# Patient Record
Sex: MALE | Race: BLACK OR AFRICAN AMERICAN | Employment: UNEMPLOYED | URBAN - METROPOLITAN AREA
[De-identification: names, ages, dates, MRNs, and addresses within clinical notes are randomized per-mention and may not be internally consistent; named-entity substitution may affect disease eponyms.]

---

## 2021-05-04 ENCOUNTER — APPOINTMENT (OUTPATIENT)
Dept: CT IMAGING | Age: 56
End: 2021-05-04
Attending: EMERGENCY MEDICINE
Payer: MEDICAID

## 2021-05-04 ENCOUNTER — HOSPITAL ENCOUNTER (OUTPATIENT)
Age: 56
Setting detail: OBSERVATION
Discharge: HOME | End: 2021-05-06
Attending: EMERGENCY MEDICINE | Admitting: HOSPITALIST
Payer: MEDICAID

## 2021-05-04 DIAGNOSIS — R73.9 HYPERGLYCEMIA: ICD-10-CM

## 2021-05-04 DIAGNOSIS — L03.90 CELLULITIS, UNSPECIFIED CELLULITIS SITE: ICD-10-CM

## 2021-05-04 DIAGNOSIS — L02.91 ABSCESS: Primary | ICD-10-CM

## 2021-05-04 LAB
ALBUMIN SERPL-MCNC: 2.2 G/DL (ref 3.5–5)
ALBUMIN/GLOB SERPL: 0.4 {RATIO} (ref 1.1–2.2)
ALP SERPL-CCNC: 118 U/L (ref 45–117)
ALT SERPL-CCNC: 13 U/L (ref 12–78)
ANION GAP SERPL CALC-SCNC: 9 MMOL/L (ref 5–15)
AST SERPL-CCNC: 14 U/L (ref 15–37)
BASOPHILS # BLD: 0.1 K/UL (ref 0–0.1)
BASOPHILS NFR BLD: 1 % (ref 0–1)
BILIRUB SERPL-MCNC: 0.2 MG/DL (ref 0.2–1)
BUN SERPL-MCNC: 19 MG/DL (ref 6–20)
BUN/CREAT SERPL: 10 (ref 12–20)
CALCIUM SERPL-MCNC: 8.5 MG/DL (ref 8.5–10.1)
CHLORIDE SERPL-SCNC: 100 MMOL/L (ref 97–108)
CO2 SERPL-SCNC: 25 MMOL/L (ref 21–32)
CREAT SERPL-MCNC: 1.92 MG/DL (ref 0.7–1.3)
DIFFERENTIAL METHOD BLD: ABNORMAL
EOSINOPHIL # BLD: 0.2 K/UL (ref 0–0.4)
EOSINOPHIL NFR BLD: 2 % (ref 0–7)
ERYTHROCYTE [DISTWIDTH] IN BLOOD BY AUTOMATED COUNT: 12.3 % (ref 11.5–14.5)
GLOBULIN SER CALC-MCNC: 5.6 G/DL (ref 2–4)
GLUCOSE BLD STRIP.AUTO-MCNC: 145 MG/DL (ref 65–100)
GLUCOSE BLD STRIP.AUTO-MCNC: 410 MG/DL (ref 65–100)
GLUCOSE SERPL-MCNC: 380 MG/DL (ref 65–100)
HCT VFR BLD AUTO: 33.7 % (ref 36.6–50.3)
HGB BLD-MCNC: 10.6 G/DL (ref 12.1–17)
IMM GRANULOCYTES # BLD AUTO: 0 K/UL (ref 0–0.04)
IMM GRANULOCYTES NFR BLD AUTO: 1 % (ref 0–0.5)
LYMPHOCYTES # BLD: 3 K/UL (ref 0.8–3.5)
LYMPHOCYTES NFR BLD: 43 % (ref 12–49)
MCH RBC QN AUTO: 29 PG (ref 26–34)
MCHC RBC AUTO-ENTMCNC: 31.5 G/DL (ref 30–36.5)
MCV RBC AUTO: 92.3 FL (ref 80–99)
MONOCYTES # BLD: 0.4 K/UL (ref 0–1)
MONOCYTES NFR BLD: 6 % (ref 5–13)
NEUTS SEG # BLD: 3.4 K/UL (ref 1.8–8)
NEUTS SEG NFR BLD: 47 % (ref 32–75)
NRBC # BLD: 0 K/UL (ref 0–0.01)
NRBC BLD-RTO: 0 PER 100 WBC
PLATELET # BLD AUTO: 299 K/UL (ref 150–400)
PMV BLD AUTO: 9.5 FL (ref 8.9–12.9)
POTASSIUM SERPL-SCNC: 5.3 MMOL/L (ref 3.5–5.1)
PROT SERPL-MCNC: 7.8 G/DL (ref 6.4–8.2)
RBC # BLD AUTO: 3.65 M/UL (ref 4.1–5.7)
SERVICE CMNT-IMP: ABNORMAL
SERVICE CMNT-IMP: ABNORMAL
SODIUM SERPL-SCNC: 134 MMOL/L (ref 136–145)
WBC # BLD AUTO: 7.1 K/UL (ref 4.1–11.1)

## 2021-05-04 PROCEDURE — 85025 COMPLETE CBC W/AUTO DIFF WBC: CPT

## 2021-05-04 PROCEDURE — 80053 COMPREHEN METABOLIC PANEL: CPT

## 2021-05-04 PROCEDURE — 99285 EMERGENCY DEPT VISIT HI MDM: CPT

## 2021-05-04 PROCEDURE — 36600 WITHDRAWAL OF ARTERIAL BLOOD: CPT

## 2021-05-04 PROCEDURE — 87040 BLOOD CULTURE FOR BACTERIA: CPT

## 2021-05-04 PROCEDURE — 96374 THER/PROPH/DIAG INJ IV PUSH: CPT

## 2021-05-04 PROCEDURE — 74011250636 HC RX REV CODE- 250/636: Performed by: EMERGENCY MEDICINE

## 2021-05-04 PROCEDURE — 36415 COLL VENOUS BLD VENIPUNCTURE: CPT

## 2021-05-04 PROCEDURE — 74011636637 HC RX REV CODE- 636/637: Performed by: EMERGENCY MEDICINE

## 2021-05-04 PROCEDURE — 82962 GLUCOSE BLOOD TEST: CPT

## 2021-05-04 PROCEDURE — 73200 CT UPPER EXTREMITY W/O DYE: CPT

## 2021-05-04 PROCEDURE — 71250 CT THORAX DX C-: CPT

## 2021-05-04 PROCEDURE — 96375 TX/PRO/DX INJ NEW DRUG ADDON: CPT

## 2021-05-04 RX ADMIN — SODIUM CHLORIDE 1000 ML: 9 INJECTION, SOLUTION INTRAVENOUS at 21:34

## 2021-05-04 RX ADMIN — HUMAN INSULIN 10 UNITS: 100 INJECTION, SOLUTION SUBCUTANEOUS at 21:35
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
## 2021-05-04 NOTE — ED TRIAGE NOTES
Pt in with multiple wounds to bilateral forearms with recent debridement at [facility]. Pt states he went home after inpatient stay, was receiving IV antibiotics but had to leave the hospital 2 days ago. He states [facility] reached out to him today with concern for him to follow up. Pt in today for wound check, wound care, is concerned for admission for continued IV antibiotics.
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
## 2021-05-04 NOTE — ED NOTES
Bedside shift change report given to Bonnie Clark RN (oncoming nurse) by Aubree Alvarez RN (offgoing nurse). Report included the following information SBAR.

## 2021-05-05 ENCOUNTER — APPOINTMENT (OUTPATIENT)
Dept: NON INVASIVE DIAGNOSTICS | Age: 56
End: 2021-05-05
Attending: INTERNAL MEDICINE
Payer: MEDICAID

## 2021-05-05 ENCOUNTER — APPOINTMENT (OUTPATIENT)
Dept: ULTRASOUND IMAGING | Age: 56
End: 2021-05-05
Attending: INTERNAL MEDICINE
Payer: MEDICAID

## 2021-05-05 LAB
AMPHET UR QL SCN: NEGATIVE
APPEARANCE UR: CLEAR
BACTERIA URNS QL MICRO: NEGATIVE /HPF
BARBITURATES UR QL SCN: NEGATIVE
BENZODIAZ UR QL: NEGATIVE
BILIRUB UR QL: NEGATIVE
CANNABINOIDS UR QL SCN: NEGATIVE
CHLORIDE UR-SCNC: 96 MMOL/L
CK SERPL-CCNC: 64 U/L (ref 39–308)
COCAINE UR QL SCN: POSITIVE
COLOR UR: ABNORMAL
DRUG SCRN COMMENT,DRGCM: ABNORMAL
ECHO AV CUSP MM: 2.56 CM
ECHO AV MEAN GRADIENT: 2.02 MMHG
ECHO AV PEAK GRADIENT: 5.24 MMHG
ECHO AV PEAK VELOCITY: 114.45 CM/S
ECHO AV VTI: 24.2 CM
ECHO EST RA PRESSURE: 8 MMHG
ECHO LA AREA 4C: 19.39 CM2
ECHO LA MAJOR AXIS: 4.23 CM
ECHO LA MINOR AXIS: 2.11 CM
ECHO LA VOL 2C: 76.77 ML (ref 18–58)
ECHO LA VOL 4C: 56.73 ML (ref 18–58)
ECHO LA VOL BP: 72.54 ML (ref 18–58)
ECHO LA VOL/BSA BIPLANE: 36.22 ML/M2 (ref 16–28)
ECHO LA VOLUME INDEX A2C: 38.33 ML/M2 (ref 16–28)
ECHO LA VOLUME INDEX A4C: 28.33 ML/M2 (ref 16–28)
ECHO LV INTERNAL DIMENSION DIASTOLIC: 4.95 CM (ref 4.2–5.9)
ECHO LV INTERNAL DIMENSION SYSTOLIC: 4 CM
ECHO LV IVSD: 1.14 CM (ref 0.6–1)
ECHO LV MASS 2D: 206.4 G (ref 88–224)
ECHO LV MASS INDEX 2D: 103 G/M2 (ref 49–115)
ECHO LV POSTERIOR WALL DIASTOLIC: 1.08 CM (ref 0.6–1)
ECHO LVOT PEAK GRADIENT: 2.5 MMHG
ECHO LVOT PEAK VELOCITY: 79.13 CM/S
ECHO LVOT VTI: 20.05 CM
ECHO MV A VELOCITY: 53.36 CM/S
ECHO MV E DECELERATION TIME (DT): 213.71 MS
ECHO MV E VELOCITY: 48.65 CM/S
ECHO MV E/A RATIO: 0.91
ECHO RV INTERNAL DIMENSION: 3.44 CM
EOSINOPHIL #/AREA URNS HPF: NEGATIVE /[HPF]
EPITH CASTS URNS QL MICRO: ABNORMAL /LPF
EST. AVERAGE GLUCOSE BLD GHB EST-MCNC: ABNORMAL MG/DL
GLUCOSE BLD STRIP.AUTO-MCNC: 124 MG/DL (ref 65–100)
GLUCOSE BLD STRIP.AUTO-MCNC: 242 MG/DL (ref 65–100)
GLUCOSE BLD STRIP.AUTO-MCNC: 310 MG/DL (ref 65–100)
GLUCOSE BLD STRIP.AUTO-MCNC: 319 MG/DL (ref 65–100)
GLUCOSE BLD STRIP.AUTO-MCNC: 341 MG/DL (ref 65–100)
GLUCOSE BLD STRIP.AUTO-MCNC: 374 MG/DL (ref 65–100)
GLUCOSE UR STRIP.AUTO-MCNC: 500 MG/DL
HBA1C MFR BLD: >14 % (ref 4–5.6)
HGB UR QL STRIP: ABNORMAL
KETONES UR QL STRIP.AUTO: NEGATIVE MG/DL
LA VOL DISK BP: 66.86 ML (ref 18–58)
LACTATE SERPL-SCNC: 1.2 MMOL/L (ref 0.4–2)
LEUKOCYTE ESTERASE UR QL STRIP.AUTO: NEGATIVE
LVOT MG: 1.1 MMHG
METHADONE UR QL: NEGATIVE
NITRITE UR QL STRIP.AUTO: NEGATIVE
OPIATES UR QL: NEGATIVE
PCP UR QL: NEGATIVE
PH UR STRIP: 6.5 [PH] (ref 5–8)
POTASSIUM SERPL-SCNC: 4.7 MMOL/L (ref 3.5–5.1)
POTASSIUM UR-SCNC: 23 MMOL/L
PROT UR STRIP-MCNC: >300 MG/DL
RBC #/AREA URNS HPF: ABNORMAL /HPF (ref 0–5)
SERVICE CMNT-IMP: ABNORMAL
SODIUM UR-SCNC: 84 MMOL/L
SP GR UR REFRACTOMETRY: 1.01 (ref 1–1.03)
UA: UC IF INDICATED,UAUC: ABNORMAL
URATE SERPL-MCNC: 5.3 MG/DL (ref 3.5–7.2)
UROBILINOGEN UR QL STRIP.AUTO: 0.2 EU/DL (ref 0.2–1)
WBC URNS QL MICRO: ABNORMAL /HPF (ref 0–4)

## 2021-05-05 PROCEDURE — 93306 TTE W/DOPPLER COMPLETE: CPT

## 2021-05-05 PROCEDURE — 82436 ASSAY OF URINE CHLORIDE: CPT

## 2021-05-05 PROCEDURE — 65270000032 HC RM SEMIPRIVATE

## 2021-05-05 PROCEDURE — 81001 URINALYSIS AUTO W/SCOPE: CPT

## 2021-05-05 PROCEDURE — 74011636637 HC RX REV CODE- 636/637: Performed by: STUDENT IN AN ORGANIZED HEALTH CARE EDUCATION/TRAINING PROGRAM

## 2021-05-05 PROCEDURE — 96372 THER/PROPH/DIAG INJ SC/IM: CPT

## 2021-05-05 PROCEDURE — 82550 ASSAY OF CK (CPK): CPT

## 2021-05-05 PROCEDURE — 83605 ASSAY OF LACTIC ACID: CPT

## 2021-05-05 PROCEDURE — 84133 ASSAY OF URINE POTASSIUM: CPT

## 2021-05-05 PROCEDURE — 96376 TX/PRO/DX INJ SAME DRUG ADON: CPT

## 2021-05-05 PROCEDURE — 89050 BODY FLUID CELL COUNT: CPT

## 2021-05-05 PROCEDURE — 36600 WITHDRAWAL OF ARTERIAL BLOOD: CPT

## 2021-05-05 PROCEDURE — 93005 ELECTROCARDIOGRAM TRACING: CPT

## 2021-05-05 PROCEDURE — 80307 DRUG TEST PRSMV CHEM ANLYZR: CPT

## 2021-05-05 PROCEDURE — 74011250636 HC RX REV CODE- 250/636: Performed by: HOSPITALIST

## 2021-05-05 PROCEDURE — 86160 COMPLEMENT ANTIGEN: CPT

## 2021-05-05 PROCEDURE — 74011250636 HC RX REV CODE- 250/636: Performed by: EMERGENCY MEDICINE

## 2021-05-05 PROCEDURE — 84132 ASSAY OF SERUM POTASSIUM: CPT

## 2021-05-05 PROCEDURE — 96375 TX/PRO/DX INJ NEW DRUG ADDON: CPT

## 2021-05-05 PROCEDURE — 87040 BLOOD CULTURE FOR BACTERIA: CPT

## 2021-05-05 PROCEDURE — 74011000258 HC RX REV CODE- 258: Performed by: STUDENT IN AN ORGANIZED HEALTH CARE EDUCATION/TRAINING PROGRAM

## 2021-05-05 PROCEDURE — 74011636637 HC RX REV CODE- 636/637: Performed by: INTERNAL MEDICINE

## 2021-05-05 PROCEDURE — 74011250637 HC RX REV CODE- 250/637: Performed by: INTERNAL MEDICINE

## 2021-05-05 PROCEDURE — G0378 HOSPITAL OBSERVATION PER HR: HCPCS

## 2021-05-05 PROCEDURE — 82962 GLUCOSE BLOOD TEST: CPT

## 2021-05-05 PROCEDURE — 74011250636 HC RX REV CODE- 250/636: Performed by: STUDENT IN AN ORGANIZED HEALTH CARE EDUCATION/TRAINING PROGRAM

## 2021-05-05 PROCEDURE — 83036 HEMOGLOBIN GLYCOSYLATED A1C: CPT

## 2021-05-05 PROCEDURE — 84300 ASSAY OF URINE SODIUM: CPT

## 2021-05-05 PROCEDURE — 76770 US EXAM ABDO BACK WALL COMP: CPT

## 2021-05-05 PROCEDURE — 74011250637 HC RX REV CODE- 250/637: Performed by: STUDENT IN AN ORGANIZED HEALTH CARE EDUCATION/TRAINING PROGRAM

## 2021-05-05 PROCEDURE — 84550 ASSAY OF BLOOD/URIC ACID: CPT

## 2021-05-05 PROCEDURE — 87205 SMEAR GRAM STAIN: CPT

## 2021-05-05 PROCEDURE — 36415 COLL VENOUS BLD VENIPUNCTURE: CPT

## 2021-05-05 RX ORDER — IBUPROFEN 200 MG
1 TABLET ORAL DAILY
Status: DISCONTINUED | OUTPATIENT
Start: 2021-05-05 | End: 2021-05-05

## 2021-05-05 RX ORDER — PROMETHAZINE HYDROCHLORIDE 25 MG/1
12.5 TABLET ORAL
Status: DISCONTINUED | OUTPATIENT
Start: 2021-05-05 | End: 2021-05-05

## 2021-05-05 RX ORDER — INSULIN LISPRO 100 [IU]/ML
INJECTION, SOLUTION INTRAVENOUS; SUBCUTANEOUS
Status: DISCONTINUED | OUTPATIENT
Start: 2021-05-05 | End: 2021-05-06 | Stop reason: HOSPADM

## 2021-05-05 RX ORDER — MAGNESIUM SULFATE 100 %
4 CRYSTALS MISCELLANEOUS AS NEEDED
Status: DISCONTINUED | OUTPATIENT
Start: 2021-05-05 | End: 2021-05-06 | Stop reason: HOSPADM

## 2021-05-05 RX ORDER — DEXTROSE 50 % IN WATER (D50W) INTRAVENOUS SYRINGE
25-50 AS NEEDED
Status: DISCONTINUED | OUTPATIENT
Start: 2021-05-05 | End: 2021-05-06 | Stop reason: HOSPADM

## 2021-05-05 RX ORDER — ONDANSETRON 2 MG/ML
4 INJECTION INTRAMUSCULAR; INTRAVENOUS
Status: DISCONTINUED | OUTPATIENT
Start: 2021-05-05 | End: 2021-05-06 | Stop reason: HOSPADM

## 2021-05-05 RX ORDER — INSULIN LISPRO 100 [IU]/ML
INJECTION, SOLUTION INTRAVENOUS; SUBCUTANEOUS
Status: DISCONTINUED | OUTPATIENT
Start: 2021-05-05 | End: 2021-05-05

## 2021-05-05 RX ORDER — OXYCODONE HYDROCHLORIDE 5 MG/1
5 TABLET ORAL
Status: DISCONTINUED | OUTPATIENT
Start: 2021-05-05 | End: 2021-05-06 | Stop reason: HOSPADM

## 2021-05-05 RX ORDER — HEPARIN SODIUM 5000 [USP'U]/ML
5000 INJECTION, SOLUTION INTRAVENOUS; SUBCUTANEOUS EVERY 8 HOURS
Status: DISCONTINUED | OUTPATIENT
Start: 2021-05-05 | End: 2021-05-06 | Stop reason: HOSPADM

## 2021-05-05 RX ORDER — ACETAMINOPHEN 325 MG/1
650 TABLET ORAL
Status: DISCONTINUED | OUTPATIENT
Start: 2021-05-05 | End: 2021-05-06 | Stop reason: HOSPADM

## 2021-05-05 RX ORDER — ACETAMINOPHEN 650 MG/1
650 SUPPOSITORY RECTAL
Status: DISCONTINUED | OUTPATIENT
Start: 2021-05-05 | End: 2021-05-06 | Stop reason: HOSPADM

## 2021-05-05 RX ORDER — INSULIN LISPRO 100 [IU]/ML
2 INJECTION, SOLUTION INTRAVENOUS; SUBCUTANEOUS
Status: DISCONTINUED | OUTPATIENT
Start: 2021-05-05 | End: 2021-05-05

## 2021-05-05 RX ORDER — OXYCODONE HYDROCHLORIDE 5 MG/1
10 TABLET ORAL
Status: DISCONTINUED | OUTPATIENT
Start: 2021-05-05 | End: 2021-05-06 | Stop reason: HOSPADM

## 2021-05-05 RX ORDER — SODIUM CHLORIDE 9 MG/ML
125 INJECTION, SOLUTION INTRAVENOUS CONTINUOUS
Status: DISCONTINUED | OUTPATIENT
Start: 2021-05-05 | End: 2021-05-05

## 2021-05-05 RX ORDER — INSULIN LISPRO 100 [IU]/ML
0.1 INJECTION, SOLUTION INTRAVENOUS; SUBCUTANEOUS
Status: DISCONTINUED | OUTPATIENT
Start: 2021-05-05 | End: 2021-05-06 | Stop reason: HOSPADM

## 2021-05-05 RX ORDER — AMLODIPINE BESYLATE 5 MG/1
5 TABLET ORAL DAILY
Status: DISCONTINUED | OUTPATIENT
Start: 2021-05-05 | End: 2021-05-06 | Stop reason: HOSPADM

## 2021-05-05 RX ORDER — INSULIN GLARGINE 100 [IU]/ML
0.3 INJECTION, SOLUTION SUBCUTANEOUS DAILY
Status: DISCONTINUED | OUTPATIENT
Start: 2021-05-05 | End: 2021-05-06 | Stop reason: HOSPADM

## 2021-05-05 RX ORDER — IBUPROFEN 200 MG
1 TABLET ORAL DAILY
Status: DISCONTINUED | OUTPATIENT
Start: 2021-05-05 | End: 2021-05-06 | Stop reason: HOSPADM

## 2021-05-05 RX ORDER — DICYCLOMINE HYDROCHLORIDE 10 MG/ML
20 INJECTION INTRAMUSCULAR
Status: DISCONTINUED | OUTPATIENT
Start: 2021-05-05 | End: 2021-05-06 | Stop reason: HOSPADM

## 2021-05-05 RX ORDER — INSULIN LISPRO 100 [IU]/ML
INJECTION, SOLUTION INTRAVENOUS; SUBCUTANEOUS EVERY 6 HOURS
Status: DISCONTINUED | OUTPATIENT
Start: 2021-05-05 | End: 2021-05-05

## 2021-05-05 RX ORDER — POLYETHYLENE GLYCOL 3350 17 G/17G
17 POWDER, FOR SOLUTION ORAL DAILY PRN
Status: DISCONTINUED | OUTPATIENT
Start: 2021-05-05 | End: 2021-05-06 | Stop reason: HOSPADM

## 2021-05-05 RX ORDER — ENOXAPARIN SODIUM 100 MG/ML
40 INJECTION SUBCUTANEOUS DAILY
Status: DISCONTINUED | OUTPATIENT
Start: 2021-05-05 | End: 2021-05-05

## 2021-05-05 RX ORDER — CLONIDINE HYDROCHLORIDE 0.1 MG/1
0.1 TABLET ORAL
Status: DISCONTINUED | OUTPATIENT
Start: 2021-05-05 | End: 2021-05-06 | Stop reason: HOSPADM

## 2021-05-05 RX ORDER — METHADONE HYDROCHLORIDE 10 MG/1
30 TABLET ORAL ONCE
Status: COMPLETED | OUTPATIENT
Start: 2021-05-05 | End: 2021-05-05

## 2021-05-05 RX ORDER — HEPARIN SODIUM 5000 [USP'U]/ML
5000 INJECTION, SOLUTION INTRAVENOUS; SUBCUTANEOUS EVERY 12 HOURS
Status: DISCONTINUED | OUTPATIENT
Start: 2021-05-05 | End: 2021-05-05

## 2021-05-05 RX ORDER — METHADONE HYDROCHLORIDE 10 MG/1
10 TABLET ORAL SEE ADMIN INSTRUCTIONS
Status: DISCONTINUED | OUTPATIENT
Start: 2021-05-05 | End: 2021-05-05

## 2021-05-05 RX ORDER — NALOXONE HYDROCHLORIDE 0.4 MG/ML
0.4 INJECTION, SOLUTION INTRAMUSCULAR; INTRAVENOUS; SUBCUTANEOUS
Status: DISCONTINUED | OUTPATIENT
Start: 2021-05-05 | End: 2021-05-06 | Stop reason: HOSPADM

## 2021-05-05 RX ORDER — LOPERAMIDE HYDROCHLORIDE 2 MG/1
2 CAPSULE ORAL
Status: DISCONTINUED | OUTPATIENT
Start: 2021-05-05 | End: 2021-05-06 | Stop reason: HOSPADM

## 2021-05-05 RX ADMIN — HUMAN INSULIN 10 UNITS: 100 INJECTION, SUSPENSION SUBCUTANEOUS at 09:25

## 2021-05-05 RX ADMIN — INSULIN LISPRO 4 UNITS: 100 INJECTION, SOLUTION INTRAVENOUS; SUBCUTANEOUS at 09:25

## 2021-05-05 RX ADMIN — HEPARIN SODIUM 5000 UNITS: 5000 INJECTION INTRAVENOUS; SUBCUTANEOUS at 14:45

## 2021-05-05 RX ADMIN — INSULIN LISPRO 4 UNITS: 100 INJECTION, SOLUTION INTRAVENOUS; SUBCUTANEOUS at 12:43

## 2021-05-05 RX ADMIN — AMLODIPINE BESYLATE 5 MG: 5 TABLET ORAL at 18:06

## 2021-05-05 RX ADMIN — VANCOMYCIN HYDROCHLORIDE 1000 MG: 1 INJECTION, POWDER, LYOPHILIZED, FOR SOLUTION INTRAVENOUS at 00:21

## 2021-05-05 RX ADMIN — METHADONE HYDROCHLORIDE 30 MG: 10 TABLET ORAL at 11:05

## 2021-05-05 RX ADMIN — HEPARIN SODIUM 5000 UNITS: 5000 INJECTION INTRAVENOUS; SUBCUTANEOUS at 21:36

## 2021-05-05 RX ADMIN — SODIUM CHLORIDE 125 ML/HR: 900 INJECTION, SOLUTION INTRAVENOUS at 09:33

## 2021-05-05 RX ADMIN — INSULIN LISPRO 3 UNITS: 100 INJECTION, SOLUTION INTRAVENOUS; SUBCUTANEOUS at 17:28

## 2021-05-05 RX ADMIN — INSULIN GLARGINE 24 UNITS: 100 INJECTION, SOLUTION SUBCUTANEOUS at 17:38

## 2021-05-05 RX ADMIN — CEFEPIME HYDROCHLORIDE 2 G: 2 INJECTION, POWDER, FOR SOLUTION INTRAVENOUS at 21:34

## 2021-05-05 RX ADMIN — CLONIDINE HYDROCHLORIDE 0.1 MG: 0.1 TABLET ORAL at 11:05

## 2021-05-05 RX ADMIN — SODIUM CHLORIDE 125 ML/HR: 900 INJECTION, SOLUTION INTRAVENOUS at 01:02

## 2021-05-05 RX ADMIN — CEFEPIME HYDROCHLORIDE 2 G: 2 INJECTION, POWDER, FOR SOLUTION INTRAVENOUS at 10:52

## 2021-05-05 RX ADMIN — INSULIN LISPRO 8 UNITS: 100 INJECTION, SOLUTION INTRAVENOUS; SUBCUTANEOUS at 17:27

## 2021-05-05 RX ADMIN — VANCOMYCIN HYDROCHLORIDE 1000 MG: 1 INJECTION, POWDER, LYOPHILIZED, FOR SOLUTION INTRAVENOUS at 22:10

## 2021-05-05 RX ADMIN — OXYCODONE HYDROCHLORIDE 10 MG: 5 TABLET ORAL at 21:35

## 2021-05-05 NOTE — PROGRESS NOTES
Case opened for discharge planning. Complete assessment to follow.   JOSE Wheeler/SAMEERA  [phone]

## 2021-05-05 NOTE — ED NOTES
TRANSFER - OUT REPORT:    Verbal report given to Lissy Patterson RN on Anel Burt  being transferred to [facility] [address] for routine progression of care       Report consisted of patients Situation, Background, Assessment and   Recommendations(SBAR). Information from the following report(s) SBAR, ED Summary and Recent Results was reviewed with the receiving nurse. Lines:   Peripheral IV 05/04/21 Left;Posterior Forearm (Active)   Site Assessment Clean, dry, & intact 05/04/21 1932   Phlebitis Assessment 0 05/04/21 1932   Infiltration Assessment 0 05/04/21 1932   Dressing Status Clean, dry, & intact 05/04/21 1932   Hub Color/Line Status Pink 05/04/21 1932   Action Taken Blood drawn 05/04/21 1932        Opportunity for questions and clarification was provided.       Patient transported with:   Registered Nurse

## 2021-05-05 NOTE — PROGRESS NOTES
Pharmacy Automatic Renal Dosing Protocol - Antimicrobials    Indication for Antimicrobials: SSTI     Current Regimen of Each Antimicrobial:  Zosyn 3.375 gm IV every 12 hours (Start Date ; Day # 1)  Vancomycin 1000 mg IV once, then 750 mg IV q15hr (Start Date ; Day # 1)    Previous Antimicrobial Therapy:    Goal Level: VANCOMYCIN TROUGH GOAL RANGE    Vancomycin Trough: 15 - 20 mcg/mL  (AUC: 400 - 600 mg/hr/Liter/day)     Date Dose & Interval Measured (mcg/mL) Extrapolated (mcg/mL)                       Date & time of next level:     Significant Cultures:    blood: pending    Radiology / Imaging results: (X-ray, CT scan or MRI):     Paralysis, amputations, malnutrition: n/a    Labs:  Recent Labs     21   CREA 1.92*   BUN 19   WBC 7.1     Temp (24hrs), Av.2 °F (36.8 °C), Min:98.2 °F (36.8 °C), Max:98.2 °F (36.8 °C)    Is the Patient on Dialysis? No    Creatinine Clearance (mL/min):   CrCl (Actual Body Weight): 41.8  CrCl (Adjusted Body Weight): 44.5  CrCl (Ideal Body Weight): 46.3    Impression/Plan:   Zosyn dose adjusted to 3.375 gm IV q8hr per renal dosing protocol   Vancomycin 1000 mg IV once, then 750 mg IV every 16 hours (predicted trough 16 mcg/ml; )  Antimicrobial stop date 5 days     Pharmacy will follow daily and adjust medications as appropriate for renal function and/or serum levels.     Thank you,  Jeanette Boyer, PHARMD

## 2021-05-05 NOTE — PROGRESS NOTES
0710) Bedside shift change report given to Dirk Schaeffer RN (oncoming nurse) by Lucia Benjamin RN (offgoing nurse). Report included the following information SBAR, Kardex and MAR. One set of blood cultures drawn in ED with arterial stick. Vancomycin given in ED. Only able to get one set for second order of blood cultures on the floor. Lispro order is still q 6 hours; pt should be NPO at midnight. 0740) pt downstairs to ultrasound  0803) pt taken to Echo  0906) Consult Dr. Mike Salinas for diet order. Pt diabetic; NPO tonight  0950) Discuss with Gaby, wound care, plan to defer to surgeon for wound care. 1020) IDR with Dr. Mike Salinas (MD), pharmacy, [address] (), Rios Perez (nursing supervisor), and Dirk Schaeffer (RN)  To discuss elevated blood glucose, insulin ACHS, pt pain control, consult to general surgery, methadone, pt stated last heroin use was Monday  1030) One attempt to draw blood cultures. 7549) Consult Dr. Mike Salinas for arterial stick for repeat potassium  1930) Bedside shift change report given to Lucia Benjamin RN (oncoming nurse) by Dirk Schaeffer RN (offgoing nurse). Report included the following information SBAR, Kardex and MAR.

## 2021-05-05 NOTE — PROGRESS NOTES
[facility] Dosing Services: Antimicrobial Stewardship Daily Doc    Consult for antibiotic dosing of vancomycin by Dr. Davion Dave  Indication:SSTI, h/o IVDU, skin abscesses @ site of injection   Day of Therapy: D2    Ht Readings from Last 1 Encounters:   05/04/21 180.3 cm (71\")        Wt Readings from Last 1 Encounters:   05/05/21 80.3 kg (177 lb 1.6 oz)        Vancomycin therapy:  Current maintenance dose: 750 mg IV q 16 hours   Dose calculated to approximate a therapeutic trough of 10-15 mcg/mL. Last trough level N/A mcg/ml    Plan for level / Adjustment in Therapy: due to patient's weight change, will change vancomycin to 1000 mg IV q 24 hours. Vt not ordered at this time   Dose administration notes:  given appropriately     Date Dose & Interval Measured (mcg/mL) Extrapolated (mcg/mL)   ? ? ? ?   ? ? ? ?   ? ? ? ? Other Antimicrobial   (not dosed by pharmacist) Cefepime 2 g IV q 12 - D1   Cultures 5/4 BCX: NGTD P  5/5 BCX: NGTD P   Serum Creatinine Lab Results   Component Value Date/Time    Creatinine 1.92 (H) 05/04/2021 07:34 PM         Creatinine Clearance Estimated Creatinine Clearance: 46.3 mL/min (A) (based on SCr of 1.92 mg/dL (H)).      Temp Temp: 97.9 °F (36.6 °C)       WBC Lab Results   Component Value Date/Time    WBC 7.1 05/04/2021 07:34 PM        H/H Lab Results   Component Value Date/Time    HGB 10.6 (L) 05/04/2021 07:34 PM        Platelets    Lab Results   Component Value Date/Time    PLATELET 430 03/68/3589 07:34 PM          Pharmacist Lazara Ingram, PHARMD Contact information: [phone]
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
## 2021-05-05 NOTE — ED NOTES
Pt alert and oriented, in no acute distress, resting in bed with bed in low position and wheels locked, call bell in reach.

## 2021-05-05 NOTE — PROGRESS NOTES
Problem: Falls - Risk of  Goal: *Absence of Falls  Description: Document Marciana Distance Fall Risk and appropriate interventions in the flowsheet. Outcome: Progressing Towards Goal  Note: Fall Risk Interventions:            Medication Interventions: Teach patient to arise slowly                   Problem: Pressure Injury - Risk of  Goal: *Prevention of pressure injury  Description: Document Griffin Scale and appropriate interventions in the flowsheet. Outcome: Progressing Towards Goal  Note: Pressure Injury Interventions:  Sensory Interventions: Assess changes in LOC    Moisture Interventions: Contain wound drainage         Mobility Interventions: Assess need for specialty bed                          Problem: Diabetes Self-Management  Goal: *Disease process and treatment process  Description: Define diabetes and identify own type of diabetes; list 3 options for treating diabetes. Outcome: Progressing Towards Goal  Goal: *Incorporating nutritional management into lifestyle  Description: Describe effect of type, amount and timing of food on blood glucose; list 3 methods for planning meals. Outcome: Progressing Towards Goal  Goal: *Incorporating physical activity into lifestyle  Description: State effect of exercise on blood glucose levels. Outcome: Progressing Towards Goal  Goal: *Developing strategies to promote health/change behavior  Description: Define the ABC's of diabetes; identify appropriate screenings, schedule and personal plan for screenings. Outcome: Progressing Towards Goal  Goal: *Using medications safely  Description: State effect of diabetes medications on diabetes; name diabetes medication taking, action and side effects. Outcome: Progressing Towards Goal  Goal: *Monitoring blood glucose, interpreting and using results  Description: Identify recommended blood glucose targets  and personal targets.   Outcome: Progressing Towards Goal  Goal: *Prevention, detection, treatment of acute complications  Description: List symptoms of hyper- and hypoglycemia; describe how to treat low blood sugar and actions for lowering  high blood glucose level. Outcome: Progressing Towards Goal  Goal: *Prevention, detection and treatment of chronic complications  Description: Define the natural course of diabetes and describe the relationship of blood glucose levels to long term complications of diabetes.   Outcome: Progressing Towards Goal  Goal: *Developing strategies to address psychosocial issues  Description: Describe feelings about living with diabetes; identify support needed and support network  Outcome: Progressing Towards Goal  Goal: *Insulin pump training  Outcome: Progressing Towards Goal  Goal: *Sick day guidelines  Outcome: Progressing Towards Goal  Goal: *Patient Specific Goal (EDIT GOAL, INSERT TEXT)  Outcome: Progressing Towards Goal

## 2021-05-05 NOTE — PROGRESS NOTES
**Physician Signature**  This document was electronically signed by: Adolfo Boucher MD  05/04/2021 11:09 PM    **Consult Information**  Member Facility: [address] [place] [address] Rd[address] MRN: [id number]  Consult ID: [id number]  Facility Time Zone: ET  Date and Time of Request: 05/04/2021 10:17:30 PM  Requesting Clinician: Patricia Morales MD  Patient Name: Andrea Hancock  YOB: 1965  Gender: Male    **Clinical Note**  Clinical Note: discussed case with ED provider    pt with h/o drug abuse (heroin) and multiple skin abscesses on arms and legs, with new abscess on upper chest    pt has dirty bandages with drainage noted from abscesses on L arm    no fevers or chills reported    pt was hospitalized at a different facility for treatment of above, and per pt, when he was discharged, he was not given any antibiotics, insulin, htn meds, or adequate PCP f/u    ED attempted to reach out to facility for further details, without success    pt states his blood sugar has been high and he has felt weakness    pt with elevated Cr 1.92 in setting of uncontrolled T2DM and HTN    no murmurs heard by ED provider    requested ED provider to obtain CT imaging of chest and L UE to assess extent of infection, and they will update when results are available    further updates and recommendations per ED    **Attestation**  Interaction Mode: Phone Only  Phone Duration (mins): 9  Time of Call : 05/04/2021 10:23 PM  Interaction Attestation: Interprofessional internet consultation was delivered through telephonic and/or electronic communication upon the request of the patients treating provider, while the requesting and the rendering provider were not in the same physical location. A written summary report was provided to the requesting provider at the originating site.   Evaluation Duration (mins): 15

## 2021-05-05 NOTE — PROGRESS NOTES
[facility] Admission Pharmacy Medication Reconciliation     Information obtained from: Patient  RxQuery data available1: No    Comments/recommendations:    Telephone interviewed patient regarding his PTA medication list and drug allergies. Patient was questioned regarding use of any inhalers, topical products, OTC/herbal/vitamin products or ophthalmic/nasal/otic medication use. Patient stated that he was not taking methadone PTA  Patient stated that he has not taken any medications in a while. Patient stated that he should have been taking the following medications:  70/30 Insulin, SSI and metformin 850 mg po BID: diabetes  Lisinopril: HTN  Gabapentin: neuropathy  Advair/Albuterol: bronchitis flare/SOB  Medication changes (since last review): Added: None  Removed: None  Adjusted: None   1RxQuery pharmacy benefit data reflects medications filled and processed through the patient's insurance, however, this data does NOT capture whether the medication was picked up or is currently being taken by the patient. Total Time Spent: 15 minutes    Past Medical History/Disease States:  Past Medical History:   Diagnosis Date    Diabetes ([facility] [address]     Hypertension          Patient allergies: Allergies as of 05/04/2021 - Review Complete 05/04/2021   Allergen Reaction Noted    Shellfish containing products Swelling 07/27/2010       PRIOR TO ADMISSION MEDICATIONS:  None        Thank you,  Shaun Muñoz, Pharm. D.  [phone]
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
## 2021-05-05 NOTE — PROGRESS NOTES
**Physician Signature**  This document was electronically signed by: Xochitl Newton MD  05/05/2021 01:09 AM    **Consult Information**  Member Facility: [address] [place] [address] Rd[address] MRN: [id number]  Consult ID: [id number]  Facility Time Zone: ET  Date and Time of Request: 05/05/2021 12:42:45 AM  Requesting Clinician: Dr. Ada Newton  Patient Name: Jorge Luis Clemons  YOB: 1965  Gender: Male    **Clinical Note**  Clinical Note: 54 y/o male pmhx of IVDA and DM p/w new abscess on upper chest.  Patient already has multiple abscesses on arms and legs. Reviewed CT imaging which was ordered and requested earlier. CT Chest mentions;  mild bilateral axillary LN  emphysema  3 mm LLL pulmonary nodule    CT UE mentions;  3 areas of focal skin thickening and inflammation in LUE  No focal fluid collection to suggest abscess  No acute osseous abnormality  Diffuse edema throughout visualized UE    Admit orders placed, will start on IV Vancomycin and IV Zosyn, NS IVFs, place on ISS and keep NPO for now. Will evaluate patient on camera when upstairs. **Attestation**  Interaction Mode: Phone Only  Phone Duration (mins): 2  Time of Call : 05/05/2021 01:06 AM  Interaction Attestation: Interprofessional internet consultation was delivered through telephonic and/or electronic communication upon the request of the patients treating provider, while the requesting and the rendering provider were not in the same physical location. A written summary report was provided to the requesting provider at the originating site.   Evaluation Duration (mins): 1

## 2021-05-05 NOTE — PROGRESS NOTES
Bedside and Verbal shift change report given to Dalia RN (oncoming nurse) by Janette RN (offgoing nurse). Report included the following information SBAR and Kardex.      Patient resting in bed quietly at this time.

## 2021-05-05 NOTE — PROGRESS NOTES
AM labs drawn and sent to lab. Only able to get one set of of blood cultures, will notify AM nurse to get other set. Blood cultures drawn on left arm.

## 2021-05-05 NOTE — PROGRESS NOTES
TRANSFER - IN REPORT:    Verbal report received from [place] on Sujatha Shank  being received from  for routine progression of care. Report consisted of patients Situation, Background, Assessment and   Recommendations(SBAR). Information from the following report(s) SBAR, ED summary, and recent results was reviewed with the receiving nurse. Opportunity for questions and clarification was provided. Assessment completed upon patients arrival to unit and care assumed. Patient resting quietly in bed at this time.

## 2021-05-06 VITALS
SYSTOLIC BLOOD PRESSURE: 160 MMHG | HEIGHT: 71 IN | RESPIRATION RATE: 16 BRPM | HEART RATE: 63 BPM | TEMPERATURE: 98.6 F | WEIGHT: 177.1 LBS | DIASTOLIC BLOOD PRESSURE: 103 MMHG | OXYGEN SATURATION: 99 % | BODY MASS INDEX: 24.79 KG/M2

## 2021-05-06 LAB
ATRIAL RATE: 58 BPM
C3 SERPL-MCNC: 109 MG/DL (ref 82–167)
C4 SERPL-MCNC: 15 MG/DL (ref 12–38)
CALCULATED P AXIS, ECG09: 51 DEGREES
CALCULATED R AXIS, ECG10: 42 DEGREES
CALCULATED T AXIS, ECG11: 64 DEGREES
DIAGNOSIS, 93000: NORMAL
GLUCOSE BLD STRIP.AUTO-MCNC: 114 MG/DL (ref 65–100)
GLUCOSE BLD STRIP.AUTO-MCNC: 164 MG/DL (ref 65–100)
P-R INTERVAL, ECG05: 150 MS
Q-T INTERVAL, ECG07: 424 MS
QRS DURATION, ECG06: 72 MS
QTC CALCULATION (BEZET), ECG08: 416 MS
SERVICE CMNT-IMP: ABNORMAL
SERVICE CMNT-IMP: ABNORMAL
VENTRICULAR RATE, ECG03: 58 BPM

## 2021-05-06 PROCEDURE — 74011250637 HC RX REV CODE- 250/637: Performed by: INTERNAL MEDICINE

## 2021-05-06 PROCEDURE — G0378 HOSPITAL OBSERVATION PER HR: HCPCS

## 2021-05-06 PROCEDURE — 96372 THER/PROPH/DIAG INJ SC/IM: CPT

## 2021-05-06 PROCEDURE — 82962 GLUCOSE BLOOD TEST: CPT

## 2021-05-06 PROCEDURE — 74011000258 HC RX REV CODE- 258: Performed by: STUDENT IN AN ORGANIZED HEALTH CARE EDUCATION/TRAINING PROGRAM

## 2021-05-06 PROCEDURE — 74011250636 HC RX REV CODE- 250/636: Performed by: STUDENT IN AN ORGANIZED HEALTH CARE EDUCATION/TRAINING PROGRAM

## 2021-05-06 PROCEDURE — 74011250637 HC RX REV CODE- 250/637: Performed by: STUDENT IN AN ORGANIZED HEALTH CARE EDUCATION/TRAINING PROGRAM

## 2021-05-06 PROCEDURE — 96376 TX/PRO/DX INJ SAME DRUG ADON: CPT

## 2021-05-06 PROCEDURE — 74011636637 HC RX REV CODE- 636/637: Performed by: STUDENT IN AN ORGANIZED HEALTH CARE EDUCATION/TRAINING PROGRAM

## 2021-05-06 RX ORDER — IBUPROFEN 200 MG
1 TABLET ORAL DAILY
Qty: 30 PATCH | Refills: 0 | Status: SHIPPED | OUTPATIENT
Start: 2021-05-07 | End: 2021-06-06

## 2021-05-06 RX ORDER — DOXYCYCLINE 100 MG/1
100 TABLET ORAL 2 TIMES DAILY
Qty: 14 TAB | Refills: 0 | Status: ON HOLD | OUTPATIENT
Start: 2021-05-06 | End: 2022-01-11

## 2021-05-06 RX ORDER — CEFDINIR 300 MG/1
300 CAPSULE ORAL 2 TIMES DAILY
Qty: 14 CAP | Refills: 0 | Status: ON HOLD | OUTPATIENT
Start: 2021-05-06 | End: 2022-01-11

## 2021-05-06 RX ORDER — PEN NEEDLE, DIABETIC 30 GX3/16"
NEEDLE, DISPOSABLE MISCELLANEOUS
Qty: 1 PACKAGE | Refills: 11 | Status: ON HOLD | OUTPATIENT
Start: 2021-05-06 | End: 2022-01-11

## 2021-05-06 RX ORDER — INSULIN LISPRO 100 [IU]/ML
5 INJECTION, SOLUTION INTRAVENOUS; SUBCUTANEOUS
Qty: 1 PACKAGE | Refills: 0 | Status: ON HOLD | OUTPATIENT
Start: 2021-05-06 | End: 2022-01-12

## 2021-05-06 RX ORDER — IBUPROFEN 200 MG
CAPSULE ORAL
Qty: 100 STRIP | Refills: 0 | Status: ON HOLD | OUTPATIENT
Start: 2021-05-06 | End: 2022-01-11

## 2021-05-06 RX ORDER — INSULIN PUMP SYRINGE, 3 ML
EACH MISCELLANEOUS
Qty: 1 KIT | Refills: 0 | Status: ON HOLD | OUTPATIENT
Start: 2021-05-06 | End: 2022-01-11

## 2021-05-06 RX ORDER — LANCETS
EACH MISCELLANEOUS
Qty: 1 EACH | Refills: 11 | Status: ON HOLD | OUTPATIENT
Start: 2021-05-06 | End: 2022-01-11

## 2021-05-06 RX ORDER — AMLODIPINE BESYLATE 10 MG/1
10 TABLET ORAL DAILY
Qty: 30 TAB | Refills: 0 | Status: SHIPPED | OUTPATIENT
Start: 2021-05-07 | End: 2021-10-06 | Stop reason: SDUPTHER

## 2021-05-06 RX ORDER — ACETAMINOPHEN 325 MG/1
650 TABLET ORAL
Qty: 30 TAB | Refills: 0 | Status: ON HOLD | OUTPATIENT
Start: 2021-05-06 | End: 2022-01-11

## 2021-05-06 RX ADMIN — INSULIN LISPRO 2 UNITS: 100 INJECTION, SOLUTION INTRAVENOUS; SUBCUTANEOUS at 08:19

## 2021-05-06 RX ADMIN — METHADONE HYDROCHLORIDE 15 MG: 10 TABLET ORAL at 08:18

## 2021-05-06 RX ADMIN — OXYCODONE HYDROCHLORIDE 5 MG: 5 TABLET ORAL at 08:18

## 2021-05-06 RX ADMIN — CEFEPIME HYDROCHLORIDE 2 G: 2 INJECTION, POWDER, FOR SOLUTION INTRAVENOUS at 09:59

## 2021-05-06 RX ADMIN — HEPARIN SODIUM 5000 UNITS: 5000 INJECTION INTRAVENOUS; SUBCUTANEOUS at 06:01

## 2021-05-06 RX ADMIN — INSULIN GLARGINE 24 UNITS: 100 INJECTION, SOLUTION SUBCUTANEOUS at 08:18

## 2021-05-06 RX ADMIN — INSULIN LISPRO 8 UNITS: 100 INJECTION, SOLUTION INTRAVENOUS; SUBCUTANEOUS at 12:22

## 2021-05-06 RX ADMIN — INSULIN LISPRO 8 UNITS: 100 INJECTION, SOLUTION INTRAVENOUS; SUBCUTANEOUS at 08:19

## 2021-05-06 RX ADMIN — AMLODIPINE BESYLATE 5 MG: 5 TABLET ORAL at 08:18

## 2021-05-06 NOTE — DISCHARGE INSTRUCTIONS

## 2021-05-06 NOTE — ACP (ADVANCE CARE PLANNING)
Current Advanced Directive/Advance Care Plan: Full Code Patient stated that he would want CPR/ventilation. Healthcare Decision maker is his sister, Siobhan Dunne #[phone]. CM added name under Healthcare Decision Maker alone with Willy Amelia Kaurjennifer, patient's uncle.     Shanda Correa, BSW/SAMEERA  [phone]

## 2021-05-06 NOTE — PROGRESS NOTES
**Physician Signature**  This document was electronically signed by: Tabatha Gabriel MD  05/06/2021 03:30 AM    **Consult Information**  Member Facility: [address] [place] [address] Rd[address] MRN: [id number]  Consult ID: [id number]  Facility Time Zone: ET  Date and Time of Request: 05/06/2021 03:20:02 AM  Requesting Clinician: Parvin Felix  Patient Name: Fransisco Montoya  YOB: 1965  Gender: Male    **Clinical Note**  Clinical Note: Order placed for arterial stick for labs for patient who is a difficult stick. **Attestation**  Interaction Mode: Electronic Interaction  Interaction Attestation: Interprofessional internet consultation was delivered through telephonic and/or electronic communication.   Evaluation Duration (mins): 1

## 2021-05-06 NOTE — PROGRESS NOTES
1529) pt prescriptions will be ready just before 5 pm  1530) . Jewell Neighbours Reviewed discharge instructions with pt including follow-up appointments, new medications and side effects, medications to continue, medications to discontinue, cellulitis education, signs/symptoms of stroke and heart attack, and MyChart information. Pt expressed understanding. IV was removed. Belongings sent home with pt.   Pt able to be reached at his uncle's Marla Starkse phone [phone]

## 2021-05-06 NOTE — PROGRESS NOTES
Reason for Admission:HISTORY OF PRESENT ILLNESS:     Hipolito Cha is a 55 y.o.  male with PMH of diabetes, hypertension, IV drug use who presents to ED with c/o multiple skin abscesses    Patient here under INPATIENT status. Has Medicaid. No notifications needed at this time. RUR Score:    11%                 Plan for utilizing home health:          PCP: First and Last name:  Debra Gipson MD     Name of Practice:    Are you a current patient: Yes/No:    Approximate date of last visit:    Can you participate in a virtual visit with your PCP:                     Current Advanced Directive/Advance Care Plan: Full Code Patient stated that he would want CPR/ventilation. Healthcare Decision maker is his sister, Alin Jackson #[phone]. CM added name under Healthcare Decision Maker alone with Mr. Eliezer Karimi, patient's uncle. Healthcare Decision Maker:   Click here to complete [address] including selection of the Healthcare Decision Maker Relationship (ie \"Primary\")                             Transition of Care Plan:                    Met with patient for assessment. Lives with uncle in apartment. Nino Mendes is disabled and patient sometimes provides assistance. Verified address and cell phone number. Patient stated that he has not seen Dr. Nile Radford is some time. He stated that she did not take his insurance anymore and he did not establish PCP services with another provider. CM discussed with patient getting another providers and he is in agreement with using Sports Medicine. Patient stated that he last used Heroin in the last 2 weeks. He has been clean but started usage again to help with pain management. Patient interested in resources for Substance Abuse. Has cane and had [facility] services about two years ago. Wants to use [facility] pharmacy. Stated that he will have ride home at discharge. Planning   1.   Schedule PCP appointment and assist with other arrangements as needed. PCP appointment scheduled for 5/20 at 2:30pm.  He will be seeing Dr. Kalia Salvador. 2. Patient has a Surgery Appointment with Dr. Tremayne Hernandez on Friday, May 7 at 10:45am.  Information put on AVS.  3. Substance Abuse resources placed on AVS.      Care Management Interventions  PCP Verified by CM:  Yes  Mode of Transport at Discharge: Self  Transition of Care Consult (CM Consult): Discharge Planning  Current Support Network: [facility] 2 for Transition of Care is Related to the Following Treatment Goals : PCP follow-up, Surgery Follow-up, Substance Abuse Resources  The Patient and/or Patient Representative was Provided with a Choice of Provider and Agrees with the Discharge Plan?: Yes  Name of the Patient Representative Who was Provided with a Choice of Provider and Agrees with the Discharge Plan: Patient  Freedom of Choice List was Provided with Basic Dialogue that Supports the Patient's Individualized Plan of Care/Goals, Treatment Preferences and Shares the Quality Data Associated with the Providers?: Yes  Discharge Location  Discharge Placement: JOSE Wolf/S[facility]ER[facility]  [phone]
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
## 2021-05-06 NOTE — PROGRESS NOTES
Problem: Falls - Risk of  Goal: *Absence of Falls  Description: Document Doris Christine Fall Risk and appropriate interventions in the flowsheet. Outcome: Progressing Towards Goal  Note: Fall Risk Interventions:            Medication Interventions: Teach patient to arise slowly                   Problem: Pressure Injury - Risk of  Goal: *Prevention of pressure injury  Description: Document Griffin Scale and appropriate interventions in the flowsheet.   Outcome: Progressing Towards Goal  Note: Pressure Injury Interventions:  Sensory Interventions: Check visual cues for pain, Maintain/enhance activity level, Minimize linen layers    Moisture Interventions: Absorbent underpads, Apply protective barrier, creams and emollients         Mobility Interventions: HOB 30 degrees or less, PT/OT evaluation                          Problem: General Wound Care  Goal: Interventions  Outcome: Progressing Towards Goal

## 2021-05-06 NOTE — PROGRESS NOTES
Problem: Falls - Risk of  Goal: *Absence of Falls  Description: Document Boni Sites Fall Risk and appropriate interventions in the flowsheet. Outcome: Resolved/Met     Problem: Patient Education: Go to Patient Education Activity  Goal: Patient/Family Education  Outcome: Resolved/Met     Problem: Pressure Injury - Risk of  Goal: *Prevention of pressure injury  Description: Document Griffin Scale and appropriate interventions in the flowsheet. Outcome: Resolved/Met     Problem: Patient Education: Go to Patient Education Activity  Goal: Patient/Family Education  Outcome: Resolved/Met     Problem: General Infection Care Plan (Adult and Pediatric)  Goal: Improvement in signs and symptoms of infection  Outcome: Resolved/Met  Goal: *Optimize nutritional status  Outcome: Resolved/Met     Problem: Patient Education: Go to Patient Education Activity  Goal: Patient/Family Education  Outcome: Resolved/Met     Problem: Diabetes Self-Management  Goal: *Disease process and treatment process  Description: Define diabetes and identify own type of diabetes; list 3 options for treating diabetes. Outcome: Resolved/Met  Goal: *Incorporating nutritional management into lifestyle  Description: Describe effect of type, amount and timing of food on blood glucose; list 3 methods for planning meals. Outcome: Resolved/Met  Goal: *Incorporating physical activity into lifestyle  Description: State effect of exercise on blood glucose levels. Outcome: Resolved/Met  Goal: *Developing strategies to promote health/change behavior  Description: Define the ABC's of diabetes; identify appropriate screenings, schedule and personal plan for screenings. Outcome: Resolved/Met  Goal: *Using medications safely  Description: State effect of diabetes medications on diabetes; name diabetes medication taking, action and side effects.   Outcome: Resolved/Met  Goal: *Monitoring blood glucose, interpreting and using results  Description: Identify recommended blood glucose targets  and personal targets. Outcome: Resolved/Met  Goal: *Prevention, detection, treatment of acute complications  Description: List symptoms of hyper- and hypoglycemia; describe how to treat low blood sugar and actions for lowering  high blood glucose level. Outcome: Resolved/Met  Goal: *Prevention, detection and treatment of chronic complications  Description: Define the natural course of diabetes and describe the relationship of blood glucose levels to long term complications of diabetes.   Outcome: Resolved/Met  Goal: *Developing strategies to address psychosocial issues  Description: Describe feelings about living with diabetes; identify support needed and support network  Outcome: Resolved/Met  Goal: *Insulin pump training  Outcome: Resolved/Met  Goal: *Sick day guidelines  Outcome: Resolved/Met  Goal: *Patient Specific Goal (EDIT GOAL, INSERT TEXT)  Outcome: Resolved/Met     Problem: Patient Education: Go to Patient Education Activity  Goal: Patient/Family Education  Outcome: Resolved/Met     Problem: General Wound Care  Goal: Interventions  Outcome: Resolved/Met

## 2021-05-06 NOTE — PROGRESS NOTES
0700) Verbal shift change report given to Dwight Henry RN (oncoming nurse) by Neel Aleman RN (offgoing nurse). Report included the following information SBAR, Kardex, Procedure Summary, Intake/Output, MAR and Dual Neuro Assessment. 6846) Pt assessed. Blood pressured elevated at 160/110. Pt stated pain 9/10, PRN darinel given. PT required 2 units of sliding scale insulin for BG. Scheduled meds given. Pt stated no other needs at this time. 0945) Nicotine patch provided and IV antibiotic hung. Pt resting in bed at this time. 56) Interdisciplinary team rounds were held 5/6/2021 with the following team members:Care Management, Nursing, Pharmacy and Physician. Plan of care discussed. See clinical pathway and/or care plan for interventions and desired outcomes. 1100) Pt made aware of discharge and plan of care. Pt left resitng in bed    1220) 8 units of regular insulin given. No sliding scale coverage required for     1445) Pt resting in bed at this time. 0) Janette completed discharge teaching for this pt.     1600) Pt was taken down to ED with this writer. Pt had all of his belongings. IV was removed and pt stated he understood all of his discharge teachings.

## 2021-05-06 NOTE — PROGRESS NOTES
Unable to draw AM labs. Attempted by this nurse and respiratory therapist. Patient is refusing any more attempts at this time. Patient is a hard stick. Will notify AM nurse.

## 2021-05-10 LAB
BACTERIA SPEC CULT: NORMAL
BACTERIA SPEC CULT: NORMAL
SERVICE CMNT-IMP: NORMAL
SERVICE CMNT-IMP: NORMAL

## 2021-05-20 ENCOUNTER — OFFICE VISIT (OUTPATIENT)
Dept: INTERNAL MEDICINE CLINIC | Age: 56
End: 2021-05-20
Payer: MEDICAID

## 2021-05-20 VITALS
SYSTOLIC BLOOD PRESSURE: 135 MMHG | TEMPERATURE: 98.3 F | HEIGHT: 71 IN | OXYGEN SATURATION: 97 % | RESPIRATION RATE: 16 BRPM | WEIGHT: 184.2 LBS | DIASTOLIC BLOOD PRESSURE: 80 MMHG | HEART RATE: 80 BPM | BODY MASS INDEX: 25.79 KG/M2

## 2021-05-20 DIAGNOSIS — G89.21 CHRONIC PAIN DUE TO TRAUMA: ICD-10-CM

## 2021-05-20 DIAGNOSIS — N18.32 STAGE 3B CHRONIC KIDNEY DISEASE (HCC): ICD-10-CM

## 2021-05-20 DIAGNOSIS — E11.65 TYPE 2 DIABETES MELLITUS WITH HYPERGLYCEMIA, WITH LONG-TERM CURRENT USE OF INSULIN (HCC): ICD-10-CM

## 2021-05-20 DIAGNOSIS — Z00.00 ENCOUNTER FOR MEDICAL EXAMINATION TO ESTABLISH CARE: ICD-10-CM

## 2021-05-20 DIAGNOSIS — D64.9 ANEMIA, UNSPECIFIED TYPE: ICD-10-CM

## 2021-05-20 DIAGNOSIS — F11.10 HEROIN ABUSE (HCC): ICD-10-CM

## 2021-05-20 DIAGNOSIS — Z76.89 ENCOUNTER FOR SUPPORT AND COORDINATION OF TRANSITION OF CARE: Primary | ICD-10-CM

## 2021-05-20 DIAGNOSIS — Z79.4 TYPE 2 DIABETES MELLITUS WITH HYPERGLYCEMIA, WITH LONG-TERM CURRENT USE OF INSULIN (HCC): ICD-10-CM

## 2021-05-20 DIAGNOSIS — L03.90 CELLULITIS, UNSPECIFIED CELLULITIS SITE: ICD-10-CM

## 2021-05-20 DIAGNOSIS — F14.10 COCAINE ABUSE (HCC): ICD-10-CM

## 2021-05-20 PROCEDURE — 99204 OFFICE O/P NEW MOD 45 MIN: CPT | Performed by: FAMILY MEDICINE

## 2021-05-20 RX ORDER — INSULIN GLARGINE 100 [IU]/ML
INJECTION, SOLUTION SUBCUTANEOUS
Qty: 8 ADJUSTABLE DOSE PRE-FILLED PEN SYRINGE | Refills: 1 | Status: ON HOLD | OUTPATIENT
Start: 2021-05-20 | End: 2022-01-12

## 2021-05-20 NOTE — PROGRESS NOTES
Chief Complaint   Patient presents with    New Patient     Patient admitted to [facility] on 5/4/21 for cellulitis. Patient has history of hypertension, diabetes,  gout, back pain, neuropathy, hip pain (requesting referral to pain management),         1. Have you been to the ER, urgent care clinic since your last visit? Hospitalized since your last visit? Yes When: 5/4/21 Where: [facility] Reason for visit: cellulitis    2. Have you seen or consulted any other health care providers outside of the [address] [place] [address] since your last visit? Include any pap smears or colon screening.  No
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
## 2021-10-05 ENCOUNTER — HOSPITAL ENCOUNTER (EMERGENCY)
Age: 56
Discharge: HOME | End: 2021-10-06
Attending: EMERGENCY MEDICINE
Payer: MEDICAID

## 2021-10-05 DIAGNOSIS — I10 PRIMARY HYPERTENSION: ICD-10-CM

## 2021-10-05 DIAGNOSIS — F11.90 OPIOID USE: ICD-10-CM

## 2021-10-05 DIAGNOSIS — F14.90 COCAINE USE: Primary | ICD-10-CM

## 2021-10-05 LAB
GLUCOSE BLD STRIP.AUTO-MCNC: 114 MG/DL (ref 65–117)
SERVICE CMNT-IMP: NORMAL

## 2021-10-05 PROCEDURE — 99285 EMERGENCY DEPT VISIT HI MDM: CPT

## 2021-10-05 PROCEDURE — 82962 GLUCOSE BLOOD TEST: CPT

## 2021-10-05 PROCEDURE — 81001 URINALYSIS AUTO W/SCOPE: CPT

## 2021-10-05 PROCEDURE — 80307 DRUG TEST PRSMV CHEM ANLYZR: CPT

## 2021-10-05 RX ORDER — AMLODIPINE BESYLATE 5 MG/1
10 TABLET ORAL
Status: COMPLETED | OUTPATIENT
Start: 2021-10-05 | End: 2021-10-06

## 2021-10-06 VITALS
RESPIRATION RATE: 37 BRPM | SYSTOLIC BLOOD PRESSURE: 175 MMHG | OXYGEN SATURATION: 100 % | DIASTOLIC BLOOD PRESSURE: 91 MMHG | HEIGHT: 71 IN | WEIGHT: 185 LBS | BODY MASS INDEX: 25.9 KG/M2 | TEMPERATURE: 97.9 F | HEART RATE: 58 BPM

## 2021-10-06 LAB
AMPHET UR QL SCN: NEGATIVE
APPEARANCE UR: CLEAR
ATRIAL RATE: 67 BPM
BACTERIA URNS QL MICRO: NEGATIVE /HPF
BARBITURATES UR QL SCN: NEGATIVE
BENZODIAZ UR QL: NEGATIVE
BILIRUB UR QL: NEGATIVE
CALCULATED P AXIS, ECG09: 78 DEGREES
CALCULATED R AXIS, ECG10: 55 DEGREES
CALCULATED T AXIS, ECG11: 83 DEGREES
CANNABINOIDS UR QL SCN: NEGATIVE
COCAINE UR QL SCN: POSITIVE
COLOR UR: ABNORMAL
DIAGNOSIS, 93000: NORMAL
DRUG SCRN COMMENT,DRGCM: ABNORMAL
EPITH CASTS URNS QL MICRO: NORMAL /LPF
GLUCOSE UR STRIP.AUTO-MCNC: NEGATIVE MG/DL
HGB UR QL STRIP: ABNORMAL
KETONES UR QL STRIP.AUTO: NEGATIVE MG/DL
LEUKOCYTE ESTERASE UR QL STRIP.AUTO: NEGATIVE
METHADONE UR QL: NEGATIVE
NITRITE UR QL STRIP.AUTO: NEGATIVE
OPIATES UR QL: NEGATIVE
P-R INTERVAL, ECG05: 148 MS
PCP UR QL: NEGATIVE
PH UR STRIP: 6.5 [PH] (ref 5–8)
PROT UR STRIP-MCNC: >300 MG/DL
Q-T INTERVAL, ECG07: 428 MS
QRS DURATION, ECG06: 80 MS
QTC CALCULATION (BEZET), ECG08: 452 MS
RBC #/AREA URNS HPF: NORMAL /HPF (ref 0–5)
SP GR UR REFRACTOMETRY: 1.01 (ref 1–1.03)
UROBILINOGEN UR QL STRIP.AUTO: 1 EU/DL (ref 0.2–1)
VENTRICULAR RATE, ECG03: 67 BPM
WBC URNS QL MICRO: NORMAL /HPF (ref 0–4)

## 2021-10-06 PROCEDURE — 93005 ELECTROCARDIOGRAM TRACING: CPT

## 2021-10-06 PROCEDURE — 74011250637 HC RX REV CODE- 250/637: Performed by: EMERGENCY MEDICINE

## 2021-10-06 RX ORDER — NALOXONE HYDROCHLORIDE 4 MG/.1ML
SPRAY NASAL
Qty: 2 EACH | Refills: 2 | Status: ON HOLD | OUTPATIENT
Start: 2021-10-06 | End: 2022-01-12

## 2021-10-06 RX ORDER — AMLODIPINE BESYLATE 10 MG/1
10 TABLET ORAL DAILY
Qty: 30 TABLET | Refills: 0 | Status: ON HOLD | OUTPATIENT
Start: 2021-10-06 | End: 2022-01-12

## 2021-10-06 RX ADMIN — AMLODIPINE BESYLATE 10 MG: 5 TABLET ORAL at 00:12

## 2021-10-06 NOTE — ED NOTES
Patient is A&Ox3. Speech clear. Follows commands. Good and equal strength in all extremities. Symmetrical smile. NAD noted. Will continue to monitor. Emergency Department Nursing Plan of Care       The Nursing Plan of Care is developed from the Nursing assessment and Emergency Department Attending provider initial evaluation. The plan of care may be reviewed in the ED Provider note.     The Plan of Care was developed with the following considerations:   Patient / Family readiness to learn indicated by:verbalized understanding  Persons(s) to be included in education: patient  Barriers to Learning/Limitations:No    Signed     Mica Pierre RN    10/6/2021   1:12 AM

## 2022-01-11 ENCOUNTER — HOSPITAL ENCOUNTER (INPATIENT)
Age: 57
LOS: 3 days | Discharge: HOME | DRG: 137 | End: 2022-01-14
Attending: EMERGENCY MEDICINE | Admitting: STUDENT IN AN ORGANIZED HEALTH CARE EDUCATION/TRAINING PROGRAM
Payer: MEDICAID

## 2022-01-11 ENCOUNTER — APPOINTMENT (OUTPATIENT)
Dept: GENERAL RADIOLOGY | Age: 57
DRG: 137 | End: 2022-01-11
Attending: EMERGENCY MEDICINE
Payer: MEDICAID

## 2022-01-11 DIAGNOSIS — U07.1 COVID-19: Primary | ICD-10-CM

## 2022-01-11 DIAGNOSIS — F11.10 OPIOID ABUSE (HCC): ICD-10-CM

## 2022-01-11 DIAGNOSIS — N28.9 ACUTE RENAL INSUFFICIENCY: ICD-10-CM

## 2022-01-11 PROBLEM — N17.9 AKI (ACUTE KIDNEY INJURY) (HCC): Status: ACTIVE | Noted: 2022-01-11

## 2022-01-11 LAB
ALBUMIN SERPL-MCNC: 1.6 G/DL (ref 3.5–5)
ALBUMIN/GLOB SERPL: 0.3 {RATIO} (ref 1.1–2.2)
ALP SERPL-CCNC: 79 U/L (ref 45–117)
ALT SERPL-CCNC: 20 U/L (ref 12–78)
AMPHET UR QL SCN: NEGATIVE
ANION GAP SERPL CALC-SCNC: 12 MMOL/L (ref 5–15)
APPEARANCE UR: CLEAR
AST SERPL-CCNC: 27 U/L (ref 15–37)
BACTERIA URNS QL MICRO: ABNORMAL /HPF
BARBITURATES UR QL SCN: NEGATIVE
BASOPHILS # BLD: 0 K/UL (ref 0–0.1)
BASOPHILS NFR BLD: 0 % (ref 0–1)
BENZODIAZ UR QL: NEGATIVE
BILIRUB SERPL-MCNC: 0.6 MG/DL (ref 0.2–1)
BILIRUB UR QL: NEGATIVE
BNP SERPL-MCNC: ABNORMAL PG/ML (ref 0–125)
BUN SERPL-MCNC: 62 MG/DL (ref 6–20)
BUN/CREAT SERPL: 14 (ref 12–20)
CALCIUM SERPL-MCNC: 8 MG/DL (ref 8.5–10.1)
CANNABINOIDS UR QL SCN: NEGATIVE
CHLORIDE SERPL-SCNC: 101 MMOL/L (ref 97–108)
CO2 SERPL-SCNC: 23 MMOL/L (ref 21–32)
COCAINE UR QL SCN: POSITIVE
COLOR UR: ABNORMAL
CREAT SERPL-MCNC: 4.28 MG/DL (ref 0.7–1.3)
CREAT UR-MCNC: 91.7 MG/DL
CREAT UR-MCNC: 92.4 MG/DL
D DIMER PPP FEU-MCNC: 2.06 MG/L FEU (ref 0–0.65)
DIFFERENTIAL METHOD BLD: ABNORMAL
DRUG SCRN COMMENT,DRGCM: ABNORMAL
EOSINOPHIL # BLD: 0.1 K/UL (ref 0–0.4)
EOSINOPHIL NFR BLD: 1 % (ref 0–7)
EPITH CASTS URNS QL MICRO: ABNORMAL /LPF
ERYTHROCYTE [DISTWIDTH] IN BLOOD BY AUTOMATED COUNT: 13.9 % (ref 11.5–14.5)
EST. AVERAGE GLUCOSE BLD GHB EST-MCNC: 120 MG/DL
FIBRINOGEN PPP-MCNC: >800 MG/DL (ref 200–475)
FLUAV RNA SPEC QL NAA+PROBE: NOT DETECTED
FLUBV RNA SPEC QL NAA+PROBE: NOT DETECTED
GLOBULIN SER CALC-MCNC: 5.8 G/DL (ref 2–4)
GLUCOSE BLD STRIP.AUTO-MCNC: 88 MG/DL (ref 65–117)
GLUCOSE BLD STRIP.AUTO-MCNC: 94 MG/DL (ref 65–117)
GLUCOSE BLD STRIP.AUTO-MCNC: 96 MG/DL (ref 65–117)
GLUCOSE SERPL-MCNC: 106 MG/DL (ref 65–100)
GLUCOSE UR STRIP.AUTO-MCNC: NEGATIVE MG/DL
HBA1C MFR BLD: 5.8 % (ref 4–5.6)
HCT VFR BLD AUTO: 41.9 % (ref 36.6–50.3)
HGB BLD-MCNC: 13.1 G/DL (ref 12.1–17)
HGB UR QL STRIP: ABNORMAL
IMM GRANULOCYTES # BLD AUTO: 0 K/UL
IMM GRANULOCYTES NFR BLD AUTO: 0 %
KETONES UR QL STRIP.AUTO: NEGATIVE MG/DL
LACTATE SERPL-SCNC: 1 MMOL/L (ref 0.4–2)
LDH SERPL L TO P-CCNC: 231 U/L (ref 85–241)
LEUKOCYTE ESTERASE UR QL STRIP.AUTO: NEGATIVE
LYMPHOCYTES # BLD: 1 K/UL (ref 0.8–3.5)
LYMPHOCYTES NFR BLD: 14 % (ref 12–49)
MCH RBC QN AUTO: 28.1 PG (ref 26–34)
MCHC RBC AUTO-ENTMCNC: 31.3 G/DL (ref 30–36.5)
MCV RBC AUTO: 89.9 FL (ref 80–99)
METHADONE UR QL: NEGATIVE
MONOCYTES # BLD: 0.5 K/UL (ref 0–1)
MONOCYTES NFR BLD: 7 % (ref 5–13)
NEUTS SEG # BLD: 5.3 K/UL (ref 1.8–8)
NEUTS SEG NFR BLD: 78 % (ref 32–75)
NITRITE UR QL STRIP.AUTO: NEGATIVE
NRBC # BLD: 0 K/UL (ref 0–0.01)
NRBC BLD-RTO: 0 PER 100 WBC
OPIATES UR QL: POSITIVE
PCP UR QL: NEGATIVE
PH UR STRIP: 6 [PH] (ref 5–8)
PLATELET # BLD AUTO: 183 K/UL (ref 150–400)
PMV BLD AUTO: 10.1 FL (ref 8.9–12.9)
POTASSIUM SERPL-SCNC: 5 MMOL/L (ref 3.5–5.1)
POTASSIUM UR-SCNC: 39 MMOL/L
PROT SERPL-MCNC: 7.4 G/DL (ref 6.4–8.2)
PROT UR STRIP-MCNC: >300 MG/DL
PROT UR-MCNC: 892 MG/DL (ref 0–11.9)
PROT/CREAT UR-RTO: 9.7
RBC # BLD AUTO: 4.66 M/UL (ref 4.1–5.7)
RBC #/AREA URNS HPF: ABNORMAL /HPF (ref 0–5)
RBC MORPH BLD: ABNORMAL
SARS-COV-2, COV2: DETECTED
SERVICE CMNT-IMP: NORMAL
SODIUM SERPL-SCNC: 136 MMOL/L (ref 136–145)
SODIUM UR-SCNC: 25 MMOL/L
SP GR UR REFRACTOMETRY: 1.01 (ref 1–1.03)
TROPONIN-HIGH SENSITIVITY: 57 NG/L (ref 0–76)
TROPONIN-HIGH SENSITIVITY: 57 NG/L (ref 0–76)
UA: UC IF INDICATED,UAUC: ABNORMAL
UROBILINOGEN UR QL STRIP.AUTO: 1 EU/DL (ref 0.2–1)
WBC # BLD AUTO: 6.9 K/UL (ref 4.1–11.1)
WBC URNS QL MICRO: ABNORMAL /HPF (ref 0–4)

## 2022-01-11 PROCEDURE — 80307 DRUG TEST PRSMV CHEM ANLYZR: CPT

## 2022-01-11 PROCEDURE — 93005 ELECTROCARDIOGRAM TRACING: CPT

## 2022-01-11 PROCEDURE — 85384 FIBRINOGEN ACTIVITY: CPT

## 2022-01-11 PROCEDURE — 74011250637 HC RX REV CODE- 250/637: Performed by: STUDENT IN AN ORGANIZED HEALTH CARE EDUCATION/TRAINING PROGRAM

## 2022-01-11 PROCEDURE — 82570 ASSAY OF URINE CREATININE: CPT

## 2022-01-11 PROCEDURE — 84484 ASSAY OF TROPONIN QUANT: CPT

## 2022-01-11 PROCEDURE — 71045 X-RAY EXAM CHEST 1 VIEW: CPT

## 2022-01-11 PROCEDURE — 87636 SARSCOV2 & INF A&B AMP PRB: CPT

## 2022-01-11 PROCEDURE — 87449 NOS EACH ORGANISM AG IA: CPT

## 2022-01-11 PROCEDURE — 71046 X-RAY EXAM CHEST 2 VIEWS: CPT

## 2022-01-11 PROCEDURE — 83880 ASSAY OF NATRIURETIC PEPTIDE: CPT

## 2022-01-11 PROCEDURE — 65270000032 HC RM SEMIPRIVATE

## 2022-01-11 PROCEDURE — 84156 ASSAY OF PROTEIN URINE: CPT

## 2022-01-11 PROCEDURE — 87040 BLOOD CULTURE FOR BACTERIA: CPT

## 2022-01-11 PROCEDURE — 74011250636 HC RX REV CODE- 250/636: Performed by: STUDENT IN AN ORGANIZED HEALTH CARE EDUCATION/TRAINING PROGRAM

## 2022-01-11 PROCEDURE — 85025 COMPLETE CBC W/AUTO DIFF WBC: CPT

## 2022-01-11 PROCEDURE — 81001 URINALYSIS AUTO W/SCOPE: CPT

## 2022-01-11 PROCEDURE — 99284 EMERGENCY DEPT VISIT MOD MDM: CPT

## 2022-01-11 PROCEDURE — 74011000258 HC RX REV CODE- 258: Performed by: EMERGENCY MEDICINE

## 2022-01-11 PROCEDURE — 74011250636 HC RX REV CODE- 250/636: Performed by: EMERGENCY MEDICINE

## 2022-01-11 PROCEDURE — 80053 COMPREHEN METABOLIC PANEL: CPT

## 2022-01-11 PROCEDURE — 74011000258 HC RX REV CODE- 258: Performed by: STUDENT IN AN ORGANIZED HEALTH CARE EDUCATION/TRAINING PROGRAM

## 2022-01-11 PROCEDURE — 82728 ASSAY OF FERRITIN: CPT

## 2022-01-11 PROCEDURE — 36415 COLL VENOUS BLD VENIPUNCTURE: CPT

## 2022-01-11 PROCEDURE — 84300 ASSAY OF URINE SODIUM: CPT

## 2022-01-11 PROCEDURE — 85379 FIBRIN DEGRADATION QUANT: CPT

## 2022-01-11 PROCEDURE — 83615 LACTATE (LD) (LDH) ENZYME: CPT

## 2022-01-11 PROCEDURE — 84133 ASSAY OF URINE POTASSIUM: CPT

## 2022-01-11 PROCEDURE — 74011000250 HC RX REV CODE- 250: Performed by: STUDENT IN AN ORGANIZED HEALTH CARE EDUCATION/TRAINING PROGRAM

## 2022-01-11 PROCEDURE — 83036 HEMOGLOBIN GLYCOSYLATED A1C: CPT

## 2022-01-11 PROCEDURE — 82962 GLUCOSE BLOOD TEST: CPT

## 2022-01-11 PROCEDURE — 83605 ASSAY OF LACTIC ACID: CPT

## 2022-01-11 PROCEDURE — 87899 AGENT NOS ASSAY W/OPTIC: CPT

## 2022-01-11 RX ORDER — MAGNESIUM SULFATE 100 %
4 CRYSTALS MISCELLANEOUS AS NEEDED
Status: DISCONTINUED | OUTPATIENT
Start: 2022-01-11 | End: 2022-01-14 | Stop reason: HOSPADM

## 2022-01-11 RX ORDER — DICYCLOMINE HYDROCHLORIDE 10 MG/ML
20 INJECTION INTRAMUSCULAR
Status: DISCONTINUED | OUTPATIENT
Start: 2022-01-11 | End: 2022-01-14 | Stop reason: HOSPADM

## 2022-01-11 RX ORDER — AZITHROMYCIN 250 MG/1
500 TABLET, FILM COATED ORAL
Status: DISCONTINUED | OUTPATIENT
Start: 2022-01-11 | End: 2022-01-11 | Stop reason: DRUGHIGH

## 2022-01-11 RX ORDER — ENOXAPARIN SODIUM 100 MG/ML
30 INJECTION SUBCUTANEOUS DAILY
Status: DISCONTINUED | OUTPATIENT
Start: 2022-01-12 | End: 2022-01-11 | Stop reason: ALTCHOICE

## 2022-01-11 RX ORDER — AZITHROMYCIN 250 MG/1
500 TABLET, FILM COATED ORAL DAILY
Status: DISCONTINUED | OUTPATIENT
Start: 2022-01-11 | End: 2022-01-14 | Stop reason: HOSPADM

## 2022-01-11 RX ORDER — ACETAMINOPHEN 325 MG/1
650 TABLET ORAL
Status: DISCONTINUED | OUTPATIENT
Start: 2022-01-11 | End: 2022-01-14 | Stop reason: HOSPADM

## 2022-01-11 RX ORDER — HEPARIN SODIUM 5000 [USP'U]/ML
5000 INJECTION, SOLUTION INTRAVENOUS; SUBCUTANEOUS EVERY 8 HOURS
Status: DISCONTINUED | OUTPATIENT
Start: 2022-01-12 | End: 2022-01-14 | Stop reason: HOSPADM

## 2022-01-11 RX ORDER — GUAIFENESIN/DEXTROMETHORPHAN 100-10MG/5
10 SYRUP ORAL
Status: DISCONTINUED | OUTPATIENT
Start: 2022-01-11 | End: 2022-01-14 | Stop reason: HOSPADM

## 2022-01-11 RX ORDER — SODIUM CHLORIDE 0.9 % (FLUSH) 0.9 %
5-10 SYRINGE (ML) INJECTION AS NEEDED
Status: DISCONTINUED | OUTPATIENT
Start: 2022-01-11 | End: 2022-01-11 | Stop reason: SDUPTHER

## 2022-01-11 RX ORDER — ONDANSETRON 4 MG/1
4 TABLET, ORALLY DISINTEGRATING ORAL
Status: DISCONTINUED | OUTPATIENT
Start: 2022-01-11 | End: 2022-01-14 | Stop reason: HOSPADM

## 2022-01-11 RX ORDER — LANOLIN ALCOHOL/MO/W.PET/CERES
100 CREAM (GRAM) TOPICAL DAILY
Status: DISCONTINUED | OUTPATIENT
Start: 2022-01-12 | End: 2022-01-14 | Stop reason: HOSPADM

## 2022-01-11 RX ORDER — METHADONE HYDROCHLORIDE 10 MG/1
30 TABLET ORAL DAILY
Status: COMPLETED | OUTPATIENT
Start: 2022-01-12 | End: 2022-01-12

## 2022-01-11 RX ORDER — AZITHROMYCIN 250 MG/1
250 TABLET, FILM COATED ORAL DAILY
Status: DISCONTINUED | OUTPATIENT
Start: 2022-01-12 | End: 2022-01-11 | Stop reason: DRUGHIGH

## 2022-01-11 RX ORDER — FOLIC ACID 1 MG/1
1 TABLET ORAL DAILY
Status: DISCONTINUED | OUTPATIENT
Start: 2022-01-12 | End: 2022-01-14 | Stop reason: HOSPADM

## 2022-01-11 RX ORDER — SODIUM CHLORIDE, SODIUM LACTATE, POTASSIUM CHLORIDE, CALCIUM CHLORIDE 600; 310; 30; 20 MG/100ML; MG/100ML; MG/100ML; MG/100ML
100 INJECTION, SOLUTION INTRAVENOUS CONTINUOUS
Status: DISPENSED | OUTPATIENT
Start: 2022-01-11 | End: 2022-01-12

## 2022-01-11 RX ORDER — ACETAMINOPHEN 650 MG/1
650 SUPPOSITORY RECTAL
Status: DISCONTINUED | OUTPATIENT
Start: 2022-01-11 | End: 2022-01-14 | Stop reason: HOSPADM

## 2022-01-11 RX ORDER — LOPERAMIDE HYDROCHLORIDE 2 MG/1
2 CAPSULE ORAL
Status: DISCONTINUED | OUTPATIENT
Start: 2022-01-11 | End: 2022-01-14 | Stop reason: HOSPADM

## 2022-01-11 RX ORDER — SODIUM CHLORIDE 0.9 % (FLUSH) 0.9 %
5-40 SYRINGE (ML) INJECTION EVERY 8 HOURS
Status: DISCONTINUED | OUTPATIENT
Start: 2022-01-11 | End: 2022-01-14 | Stop reason: HOSPADM

## 2022-01-11 RX ORDER — DEXTROSE 50 % IN WATER (D50W) INTRAVENOUS SYRINGE
25-50 AS NEEDED
Status: DISCONTINUED | OUTPATIENT
Start: 2022-01-11 | End: 2022-01-14 | Stop reason: HOSPADM

## 2022-01-11 RX ORDER — ONDANSETRON 2 MG/ML
4 INJECTION INTRAMUSCULAR; INTRAVENOUS
Status: DISCONTINUED | OUTPATIENT
Start: 2022-01-11 | End: 2022-01-14 | Stop reason: HOSPADM

## 2022-01-11 RX ORDER — LOPERAMIDE HYDROCHLORIDE 2 MG/1
2 CAPSULE ORAL AS NEEDED
Status: DISCONTINUED | OUTPATIENT
Start: 2022-01-11 | End: 2022-01-11 | Stop reason: SDUPTHER

## 2022-01-11 RX ORDER — POLYETHYLENE GLYCOL 3350 17 G/17G
17 POWDER, FOR SOLUTION ORAL DAILY PRN
Status: DISCONTINUED | OUTPATIENT
Start: 2022-01-11 | End: 2022-01-14 | Stop reason: HOSPADM

## 2022-01-11 RX ORDER — SODIUM CHLORIDE 0.9 % (FLUSH) 0.9 %
5-40 SYRINGE (ML) INJECTION AS NEEDED
Status: DISCONTINUED | OUTPATIENT
Start: 2022-01-11 | End: 2022-01-14 | Stop reason: HOSPADM

## 2022-01-11 RX ORDER — INSULIN LISPRO 100 [IU]/ML
1-10 INJECTION, SOLUTION INTRAVENOUS; SUBCUTANEOUS
Status: DISCONTINUED | OUTPATIENT
Start: 2022-01-11 | End: 2022-01-14 | Stop reason: HOSPADM

## 2022-01-11 RX ADMIN — SODIUM CHLORIDE, POTASSIUM CHLORIDE, SODIUM LACTATE AND CALCIUM CHLORIDE 125 ML/HR: 600; 310; 30; 20 INJECTION, SOLUTION INTRAVENOUS at 17:32

## 2022-01-11 RX ADMIN — SODIUM CHLORIDE, PRESERVATIVE FREE 10 ML: 5 INJECTION INTRAVENOUS at 21:08

## 2022-01-11 RX ADMIN — SODIUM CHLORIDE 190 ML: 9 INJECTION, SOLUTION INTRAVENOUS at 15:29

## 2022-01-11 RX ADMIN — SODIUM CHLORIDE 1000 ML: 9 INJECTION, SOLUTION INTRAVENOUS at 15:29

## 2022-01-11 RX ADMIN — SODIUM CHLORIDE 1000 ML: 9 INJECTION, SOLUTION INTRAVENOUS at 13:51

## 2022-01-11 RX ADMIN — SODIUM CHLORIDE, PRESERVATIVE FREE 10 ML: 5 INJECTION INTRAVENOUS at 21:09

## 2022-01-11 RX ADMIN — AZITHROMYCIN MONOHYDRATE 500 MG: 250 TABLET ORAL at 17:40

## 2022-01-11 RX ADMIN — CEFTRIAXONE SODIUM 1 G: 1 INJECTION, POWDER, FOR SOLUTION INTRAMUSCULAR; INTRAVENOUS at 17:37

## 2022-01-11 RX ADMIN — ONDANSETRON 4 MG: 2 INJECTION INTRAMUSCULAR; INTRAVENOUS at 19:18

## 2022-01-11 NOTE — PROGRESS NOTES
Problem: Airway Clearance - Ineffective  Goal: Achieve or maintain patent airway  Outcome: Progressing Towards Goal     Problem: Gas Exchange - Impaired  Goal: Absence of hypoxia  Outcome: Progressing Towards Goal  Goal: Promote optimal lung function  Outcome: Progressing Towards Goal     Problem: Breathing Pattern - Ineffective  Goal: Ability to achieve and maintain a regular respiratory rate  Outcome: Progressing Towards Goal     Problem:  Body Temperature -  Risk of, Imbalanced  Goal: Ability to maintain a body temperature within defined limits  Outcome: Progressing Towards Goal     Problem: Isolation Precautions - Risk of Spread of Infection  Goal: Prevent transmission of infectious organism to others  Outcome: Progressing Towards Goal     Problem: Nutrition Deficits  Goal: Optimize nutrtional status  Outcome: Progressing Towards Goal     Problem: Risk for Fluid Volume Deficit  Goal: Maintain normal heart rhythm  Outcome: Progressing Towards Goal  Goal: Maintain absence of muscle cramping  Outcome: Progressing Towards Goal  Goal: Maintain normal serum potassium, sodium, calcium, phosphorus, and pH  Outcome: Progressing Towards Goal     Problem: Loneliness or Risk for Loneliness  Goal: Demonstrate positive use of time alone when socialization is not possible  Outcome: Progressing Towards Goal     Problem: Fatigue  Goal: Verbalize increase energy and improved vitality  Outcome: Progressing Towards Goal

## 2022-01-11 NOTE — ED TRIAGE NOTES
Report difficulty breathing, loss of appetite and generalized weakness. Productive cough. Symptoms for several days now.

## 2022-01-11 NOTE — H&P
Hospitalist Admission Note    NAME: Yvette Anderson   :  1965   MRN:  [id number]   Room Number: ER03/03  @ [facility]     Date/Time:  2022 3:02 PM    Patient PCP: Rina Johnson MD    Please note that this dictation was completed with [employer], the computer voice recognition software. Quite often unanticipated grammatical, syntax, homophones, and other interpretive errors are inadvertently transcribed by the computer software. Please disregard these errors. Please excuse any errors that have escaped final proofreading.  ______________________________________________________________________  Given the patient's current clinical presentation, I have a high level of concern for decompensation if discharged from the emergency department. Complex decision making was performed, which includes reviewing the patient's available past medical records, laboratory results, and x-ray films. My assessment of this patient's clinical condition and my plan of care is as follows. Assessment / Plan:        Active Problems:    BHASKAR (acute kidney injury) ([facility] [address] (2022)      COVID-19 (2022)      #Acute kidney injury POA stage III  On CKDIII with proteinuria  -Suspect prerenal versus COVID induced nephropathy  -UA with protein and moderate blood  - FeNA labs pending   - US retro pending     - IVF   - Renally dose medication   - avoid nephrotoxic medications   - check Protein creatinine ratio   -Strict ins and outs      #COVID-19 pneumonia  #Shortness of breath  #Productive cough  -Chest x-ray  reviewed independently bibasilar pneumonia  -Not requiring supplemental oxygen  -Check of inflammatory markers  -Droplet precaution    -Ceftriaxone / azithromycin for suspected community-acquired pneumonia  - de-escalate antibiotics if procalcitonin is less than 0.05  -Sputum culture  -Urine Legionella and strep pneumo antigen    #Insulin-dependent diabetes mellitus  -Last A1c greater than 14   - Lispro correctional scale, FSG AC HS  - Consistent carb diet, hypoglycemia protocol. #Hypertension  -At home on amlodipine    #Opioid addiction POA  #IV fentanyl use  #Cocaine use  -UDS positive for cocaine and opioids  -Last used fentanyl today IV route  -Substance abuse order set utilized  -Methadone taper initiated   - . Patient was counseled extensively on the need to abstain from drugs its addictive tendencies, its deleterious effects on the brain, cardiovascular system, lungs as well as its financial & social sequelae          Body mass index is 23.68 kg/m². Code Status: Full  Surrogate Decision Maker:    DVT Prophylaxis: Hep SQ  GI Prophylaxis: not indicated        Subjective:   CHIEF COMPLAINT: Productive cough and shortness of breath    HISTORY OF PRESENT ILLNESS:     Gerhardt Snowman is a 55 y.o.  male with PMH of above-mentioned problems who presents to ED with c/o productive cough and shortness of breath for past 5 days. Patient states that he been having symptoms of chills, dyspnea on exertion and productive cough. Patient also endorses using IV fentanyl. Patient had any fever chest pain or abdominal pain. Denied any loss of sense of smell or taste or diarrhea. Patient endorsed loss of appetite and feeling generalized weak    We were asked to admit for work up and evaluation of the above problems.      Past Medical History:   Diagnosis Date    Diabetes ([facility] [address]     Hypertension         Past Surgical History:   Procedure Laterality Date    HX ORTHOPAEDIC  rt hip replacement    HX ORTHOPAEDIC  rt thigh woo       Social History     Tobacco Use    Smoking status: Current Every Day Smoker     Packs/day: 1.00    Smokeless tobacco: Never Used   Substance Use Topics    Alcohol use: Not Currently        Family History   Problem Relation Age of Onset    Diabetes Other     Hypertension Other      Allergies   Allergen Reactions    Shellfish Containing Products Swelling Prior to Admission medications    Medication Sig Start Date End Date Taking? Authorizing Provider   amLODIPine (NORVASC) 10 mg tablet Take 1 Tablet by mouth daily. 10/6/21   Brenda Garcia MD   naloxone (Narcan) 4 mg/actuation nasal spray Use 1 spray intranasally, then discard. Repeat with new spray every 2 min as needed for opioid overdose symptoms, alternating nostrils. 10/6/21   Brenda Garcia MD   insulin glargine (LANTUS,BASAGLAR) 100 unit/mL (3 mL) inpn Inject 25 units beneath the skin daily for diabetes 5/20/21   Jose Roberto Singh MD   acetaminophen (TYLENOL) 325 mg tablet Take 2 Tabs by mouth every six (6) hours as needed for Pain or Fever. 5/6/21   Marco A Carrillo MD   doxycycline (ADOXA) 100 mg tablet Take 1 Tab by mouth two (2) times a day. 5/6/21   Marco A Carrillo MD   cefdinir (OMNICEF) 300 mg capsule Take 1 Cap by mouth two (2) times a day. 5/6/21   Marco A Carrillo MD   Blood-Glucose Meter (Blood Glucose Monitoring) monitoring kit Check blood sugar before breakfast,before lunch,before dinner and 2 hours after dinner 5/6/21   Marco A Carrillo MD   lancets misc Check blood sugar before breakfast,before lunch,before dinner and 2 hours after dinner 5/6/21   Marco A Carrillo MD   glucose blood VI test strips (blood glucose test) strip Check blood sugar before breakfast,before lunch,before dinner and 2 hours after dinner 5/6/21   Marco A Carrillo MD   insulin lispro (HUMALOG) 100 unit/mL kwikpen 5 Units by SubCUTAneous route Before breakfast, lunch, and dinner. 5/6/21   Marco A Carrillo MD   Insulin Needles, Disposable, 31 gauge x 5/16\" ndle Use to inject insulin under the skin 4 times daily as prescribed 5/6/21   Marco A Carrillo MD       REVIEW OF SYSTEMS:     I am not able to complete the review of systems because:    The patient is intubated and sedated    The patient has altered mental status due to his acute medical problems    The patient has baseline aphasia from prior stroke(s)    The patient has baseline dementia and is not reliable historian    The patient is in acute medical distress and unable to provide information           Total of 12 systems reviewed as follows:       POSITIVE= underlined text  Negative = text not underlined  General:  fever, chills, sweats, generalized weakness, weight loss/gain,      loss of appetite   Eyes:    blurred vision, eye pain, loss of vision, double vision  ENT:    rhinorrhea, pharyngitis   Respiratory:   cough, sputum production, SOB, MEJIA, wheezing, pleuritic pain   Cardiology:   chest pain, palpitations, orthopnea, PND, edema, syncope   Gastrointestinal:  abdominal pain , N/V, diarrhea, dysphagia, constipation, bleeding   Genitourinary:  frequency, urgency, dysuria, hematuria, incontinence   Muskuloskeletal :  arthralgia, myalgia, back pain  Hematology:  easy bruising, nose or gum bleeding, lymphadenopathy   Dermatological: rash, ulceration, pruritis, color change / jaundice  Endocrine:   hot flashes or polydipsia   Neurological:  headache, dizziness, confusion, focal weakness, paresthesia,     Speech difficulties, memory loss, gait difficulty  Psychological: Feelings of anxiety, depression, agitation    Objective:   VITALS:    Visit Vitals  /83 (BP 1 Location: Left upper arm, BP Patient Position: Sitting)   Pulse (!) 109   Temp 98.8 °F (37.1 °C)   Resp 20   Ht 5' 10\" (1.778 m)   Wt 74.8 kg (165 lb)   SpO2 93%   BMI 23.68 kg/m²       PHYSICAL EXAM:    General:    Alert, cooperative, no distress, appears stated age. HEENT: Atraumatic, anicteric sclerae, pink conjunctivae     No oral ulcers, mucosa moist, throat clear, dentition fair  Neck:  Supple, symmetrical,  thyroid: non tender  Lungs:   Crackles bilaterally  Chest wall:  No tenderness  No Accessory muscle use. Heart:   Regular  rhythm,  No  murmur   No edema  Abdomen:   Soft, non-tender. Not distended. Bowel sounds normal  Extremities: No cyanosis.   No clubbing,      Skin turgor normal, Capillary refill normal, Radial dial pulse 2+  Skin:     Not pale. Not Jaundiced  No rashes   Psych:  Good insight. Not depressed. Not anxious or agitated. Neurologic: EOMs intact. No facial asymmetry. No aphasia or slurred speech. Symmetrical strength, Sensation grossly intact. Alert and oriented X 4.     ______________________________________________________________________    Care Plan discussed with:  Patient/Family    Expected  Disposition:  Home w/Family  ________________________________________________________________________  TOTAL TIME:  54 Minutes    Critical Care Provided     Minutes non procedure based      Comments    x Reviewed previous records   >50% of visit spent in counseling and coordination of care x Discussion with patient and/or family and questions answered       ________________________________________________________________________  Signed: Shannon Faith MD    Procedures: see electronic medical records for all procedures/Xrays and details which were not copied into this note but were reviewed prior to creation of Plan. LAB DATA REVIEWED:    Recent Results (from the past 24 hour(s))   EKG, 12 LEAD, INITIAL    Collection Time: 01/11/22 12:35 PM   Result Value Ref Range    Ventricular Rate 100 BPM    Atrial Rate 100 BPM    P-R Interval 128 ms    QRS Duration 68 ms    Q-T Interval 330 ms    QTC Calculation (Bezet) 425 ms    Calculated P Axis 75 degrees    Calculated R Axis 66 degrees    Calculated T Axis 110 degrees    Diagnosis       Normal sinus rhythm  Minimal voltage criteria for LVH, may be normal variant  ST & T wave abnormality, consider anterior ischemia  Abnormal ECG  When compared with ECG of 06-OCT-2021 00:54,  Vent.  rate has increased BY  33 BPM  Non-specific change in ST segment in Anterior leads  T wave inversion now evident in Anterior leads     METABOLIC PANEL, COMPREHENSIVE    Collection Time: 01/11/22  1:44 PM   Result Value Ref Range    Sodium 136 136 - 145 mmol/L    Potassium 5.0 3.5 - 5.1 mmol/L    Chloride 101 97 - 108 mmol/L    CO2 23 21 - 32 mmol/L    Anion gap 12 5 - 15 mmol/L    Glucose 106 (H) 65 - 100 mg/dL    BUN 62 (H) 6 - 20 MG/DL    Creatinine 4.28 (H) 0.70 - 1.30 MG/DL    BUN/Creatinine ratio 14 12 - 20      GFR est AA 18 (L) >60 ml/min/1.73m2    GFR est non-AA 14 (L) >60 ml/min/1.73m2    Calcium 8.0 (L) 8.5 - 10.1 MG/DL    Bilirubin, total 0.6 0.2 - 1.0 MG/DL    ALT (SGPT) 20 12 - 78 U/L    AST (SGOT) 27 15 - 37 U/L    Alk. phosphatase 79 45 - 117 U/L    Protein, total 7.4 6.4 - 8.2 g/dL    Albumin 1.6 (L) 3.5 - 5.0 g/dL    Globulin 5.8 (H) 2.0 - 4.0 g/dL    A-G Ratio 0.3 (L) 1.1 - 2.2     CBC WITH AUTOMATED DIFF    Collection Time: 01/11/22  1:44 PM   Result Value Ref Range    WBC 6.9 4.1 - 11.1 K/uL    RBC 4.66 4.10 - 5.70 M/uL    HGB 13.1 12.1 - 17.0 g/dL    HCT 41.9 36.6 - 50.3 %    MCV 89.9 80.0 - 99.0 FL    MCH 28.1 26.0 - 34.0 PG    MCHC 31.3 30.0 - 36.5 g/dL    RDW 13.9 11.5 - 14.5 %    PLATELET 441 374 - 360 K/uL    MPV 10.1 8.9 - 12.9 FL    NRBC 0.0 0  WBC    ABSOLUTE NRBC 0.00 0.00 - 0.01 K/uL    NEUTROPHILS 78 (H) 32 - 75 %    LYMPHOCYTES 14 12 - 49 %    MONOCYTES 7 5 - 13 %    EOSINOPHILS 1 0 - 7 %    BASOPHILS 0 0 - 1 %    IMMATURE GRANULOCYTES 0 %    ABS. NEUTROPHILS 5.3 1.8 - 8.0 K/UL    ABS. LYMPHOCYTES 1.0 0.8 - 3.5 K/UL    ABS. MONOCYTES 0.5 0.0 - 1.0 K/UL    ABS. EOSINOPHILS 0.1 0.0 - 0.4 K/UL    ABS. BASOPHILS 0.0 0.0 - 0.1 K/UL    ABS. IMM.  GRANS. 0.0 K/UL    DF MANUAL      RBC COMMENTS NORMOCYTIC, NORMOCHROMIC     LACTIC ACID    Collection Time: 01/11/22  1:44 PM   Result Value Ref Range    Lactic acid 1.0 0.4 - 2.0 MMOL/L   TROPONIN-HIGH SENSITIVITY    Collection Time: 01/11/22  1:44 PM   Result Value Ref Range    Troponin-High Sensitivity 57 0 - 76 ng/L   COVID-19 WITH INFLUENZA A/B    Collection Time: 01/11/22  1:44 PM   Result Value Ref Range    SARS-CoV-2 Detected (A) NOTD      Influenza A by PCR Not detected      Influenza B by PCR Not detected     GLUCOSE, POC    Collection Time: 01/11/22  2:10 PM   Result Value Ref Range    Glucose (POC) 94 65 - 117 mg/dL    Performed by [facility]

## 2022-01-11 NOTE — ED NOTES
Pt presents ambulatory to ED complaining of \"hard time breathing, weak and no appetite\". Pt reports symptoms for 4 days. Pt reports using insulin yesterday, has not had BP med in a while, approx a month. Pt reports last IV fentanyl use was this morning. Pt reports it takes about a day to begin experiencing withdrawals. Pt is alert and oriented x 4, RR even and unlabored, skin is warm and dry. Assesment completed and pt updated on plan of care. Emergency Department Nursing Plan of Care       The Nursing Plan of Care is developed from the Nursing assessment and Emergency Department Attending provider initial evaluation. The plan of care may be reviewed in the ED Provider note.     The Plan of Care was developed with the following considerations:   Patient / Family readiness to learn indicated by:verbalized understanding  Persons(s) to be included in education: patient  Barriers to Learning/Limitations:No    [address]    1/11/2022   12:17 PM    s

## 2022-01-11 NOTE — ED PROVIDER NOTES
EMERGENCY DEPARTMENT HISTORY AND PHYSICAL EXAM      Date: 1/11/2022  Patient Name: Binh Bailey    History of Presenting Illness     Chief Complaint   Patient presents with    Shortness of Breath    Anorexia    Lethargy     History Provided By: Patient    HPI: Binh Bailey, 55 y.o. male with past medical history significant for hypertension and diabetes who presents via private vehicle to the ED with cc of shortness of breath, lethargy, decreased appetite, myalgias, and productive cough for the past 3 to 4 days. Patient states he has been off his blood pressure medications for the past month or so. He does report compliance with his diabetes medications but does not check his blood glucose regularly. He states his symptoms feel similar to when he has had elevated blood glucoses in the past.  He is fully vaccinated for COVID-19 with the J&J vaccine in November and denies any recent known COVID exposures. He denies any fevers, chills, nausea, vomiting, or diarrhea. PMHx: Hypertension and diabetes  Social Hx: Smokes 1 pack/day, denies alcohol use, IV fentanyl abuse    PCP: Morris Barker MD    There are no other complaints, changes, or physical findings at this time. No current facility-administered medications on file prior to encounter. Current Outpatient Medications on File Prior to Encounter   Medication Sig Dispense Refill    amLODIPine (NORVASC) 10 mg tablet Take 1 Tablet by mouth daily. 30 Tablet 0    naloxone (Narcan) 4 mg/actuation nasal spray Use 1 spray intranasally, then discard. Repeat with new spray every 2 min as needed for opioid overdose symptoms, alternating nostrils. 2 Each 2    insulin glargine (LANTUS,BASAGLAR) 100 unit/mL (3 mL) inpn Inject 25 units beneath the skin daily for diabetes 8 Adjustable Dose Pre-filled Pen Syringe 1    acetaminophen (TYLENOL) 325 mg tablet Take 2 Tabs by mouth every six (6) hours as needed for Pain or Fever.  30 Tab 0    doxycycline (ADOXA) 100 mg tablet Take 1 Tab by mouth two (2) times a day. 14 Tab 0    cefdinir (OMNICEF) 300 mg capsule Take 1 Cap by mouth two (2) times a day. 14 Cap 0    Blood-Glucose Meter (Blood Glucose Monitoring) monitoring kit Check blood sugar before breakfast,before lunch,before dinner and 2 hours after dinner 1 Kit 0    lancets misc Check blood sugar before breakfast,before lunch,before dinner and 2 hours after dinner 1 Each 11    glucose blood VI test strips (blood glucose test) strip Check blood sugar before breakfast,before lunch,before dinner and 2 hours after dinner 100 Strip 0    insulin lispro (HUMALOG) 100 unit/mL kwikpen 5 Units by SubCUTAneous route Before breakfast, lunch, and dinner. 1 Package 0    Insulin Needles, Disposable, 31 gauge x 5/16\" ndle Use to inject insulin under the skin 4 times daily as prescribed 1 Package 11     Past History     Past Medical History:  Past Medical History:   Diagnosis Date    Diabetes ([facility] [address]     Hypertension      Past Surgical History:  Past Surgical History:   Procedure Laterality Date    HX ORTHOPAEDIC  rt hip replacement    HX ORTHOPAEDIC  rt thigh woo     Family History:  Family History   Problem Relation Age of Onset    Diabetes Other     Hypertension Other      Social History:  Social History     Tobacco Use    Smoking status: Current Every Day Smoker     Packs/day: 1.00    Smokeless tobacco: Never Used   Vaping Use    Vaping Use: Never used   Substance Use Topics    Alcohol use: Not Currently    Drug use: Yes     Types: Opiates     Comment: IV fentanly use today 1/11     Allergies: Allergies   Allergen Reactions    Shellfish Containing Products Swelling     Review of Systems   Review of Systems   Constitutional: Positive for appetite change and fatigue. Negative for chills and fever. HENT: Positive for congestion and rhinorrhea. Negative for sneezing and sore throat. Eyes: Negative for redness and visual disturbance.    Respiratory: Positive for cough and shortness of breath. Cardiovascular: Negative for chest pain and leg swelling. Gastrointestinal: Negative for abdominal pain, nausea and vomiting. Genitourinary: Negative for difficulty urinating and frequency. Musculoskeletal: Positive for myalgias. Negative for back pain and neck stiffness. Skin: Negative for rash. Neurological: Positive for weakness. Negative for dizziness, syncope and headaches. Hematological: Negative for adenopathy. All other systems reviewed and are negative. Physical Exam   Physical Exam  Vitals and nursing note reviewed. Constitutional:       Appearance: Normal appearance. He is well-developed. He is ill-appearing. HENT:      Head: Normocephalic and atraumatic. Mouth/Throat:      Mouth: Mucous membranes are dry. Cardiovascular:      Rate and Rhythm: Regular rhythm. Tachycardia present. Pulses: Normal pulses. Heart sounds: Normal heart sounds. Pulmonary:      Effort: Pulmonary effort is normal. No respiratory distress. Breath sounds: Normal breath sounds. Chest:      Chest wall: No tenderness. Abdominal:      General: Bowel sounds are normal.      Palpations: Abdomen is soft. Tenderness: There is no abdominal tenderness. There is no guarding or rebound. Musculoskeletal:      Cervical back: Full passive range of motion without pain, normal range of motion and neck supple. Skin:     General: Skin is warm and dry. Findings: No erythema or rash. Comments: Track marks on his bilateral arms from IV drug use   Neurological:      Mental Status: He is alert and oriented to person, place, and time. Psychiatric:         Speech: Speech normal.         Behavior: Behavior normal.         Thought Content:  Thought content normal.         Judgment: Judgment normal.       Diagnostic Study Results   Labs -     Recent Results (from the past 12 hour(s))   EKG, 12 LEAD, INITIAL    Collection Time: 01/11/22 12:35 PM   Result Value Ref Range    Ventricular Rate 100 BPM    Atrial Rate 100 BPM    P-R Interval 128 ms    QRS Duration 68 ms    Q-T Interval 330 ms    QTC Calculation (Bezet) 425 ms    Calculated P Axis 75 degrees    Calculated R Axis 66 degrees    Calculated T Axis 110 degrees    Diagnosis       Normal sinus rhythm  Minimal voltage criteria for LVH, may be normal variant  ST & T wave abnormality, consider anterior ischemia  Abnormal ECG  When compared with ECG of 06-OCT-2021 00:54,  Vent. rate has increased BY  33 BPM  Non-specific change in ST segment in Anterior leads  T wave inversion now evident in Anterior leads     METABOLIC PANEL, COMPREHENSIVE    Collection Time: 01/11/22  1:44 PM   Result Value Ref Range    Sodium 136 136 - 145 mmol/L    Potassium 5.0 3.5 - 5.1 mmol/L    Chloride 101 97 - 108 mmol/L    CO2 23 21 - 32 mmol/L    Anion gap 12 5 - 15 mmol/L    Glucose 106 (H) 65 - 100 mg/dL    BUN 62 (H) 6 - 20 MG/DL    Creatinine 4.28 (H) 0.70 - 1.30 MG/DL    BUN/Creatinine ratio 14 12 - 20      GFR est AA 18 (L) >60 ml/min/1.73m2    GFR est non-AA 14 (L) >60 ml/min/1.73m2    Calcium 8.0 (L) 8.5 - 10.1 MG/DL    Bilirubin, total 0.6 0.2 - 1.0 MG/DL    ALT (SGPT) 20 12 - 78 U/L    AST (SGOT) 27 15 - 37 U/L    Alk.  phosphatase 79 45 - 117 U/L    Protein, total 7.4 6.4 - 8.2 g/dL    Albumin 1.6 (L) 3.5 - 5.0 g/dL    Globulin 5.8 (H) 2.0 - 4.0 g/dL    A-G Ratio 0.3 (L) 1.1 - 2.2     CBC WITH AUTOMATED DIFF    Collection Time: 01/11/22  1:44 PM   Result Value Ref Range    WBC 6.9 4.1 - 11.1 K/uL    RBC 4.66 4.10 - 5.70 M/uL    HGB 13.1 12.1 - 17.0 g/dL    HCT 41.9 36.6 - 50.3 %    MCV 89.9 80.0 - 99.0 FL    MCH 28.1 26.0 - 34.0 PG    MCHC 31.3 30.0 - 36.5 g/dL    RDW 13.9 11.5 - 14.5 %    PLATELET 950 583 - 348 K/uL    MPV 10.1 8.9 - 12.9 FL    NRBC 0.0 0  WBC    ABSOLUTE NRBC 0.00 0.00 - 0.01 K/uL    NEUTROPHILS 78 (H) 32 - 75 %    LYMPHOCYTES 14 12 - 49 %    MONOCYTES 7 5 - 13 %    EOSINOPHILS 1 0 - 7 %    BASOPHILS 0 0 - 1 %    IMMATURE GRANULOCYTES 0 %    ABS. NEUTROPHILS 5.3 1.8 - 8.0 K/UL    ABS. LYMPHOCYTES 1.0 0.8 - 3.5 K/UL    ABS. MONOCYTES 0.5 0.0 - 1.0 K/UL    ABS. EOSINOPHILS 0.1 0.0 - 0.4 K/UL    ABS. BASOPHILS 0.0 0.0 - 0.1 K/UL    ABS. IMM. GRANS. 0.0 K/UL    DF MANUAL      RBC COMMENTS NORMOCYTIC, NORMOCHROMIC     LACTIC ACID    Collection Time: 01/11/22  1:44 PM   Result Value Ref Range    Lactic acid 1.0 0.4 - 2.0 MMOL/L   TROPONIN-HIGH SENSITIVITY    Collection Time: 01/11/22  1:44 PM   Result Value Ref Range    Troponin-High Sensitivity 57 0 - 76 ng/L   COVID-19 WITH INFLUENZA A/B    Collection Time: 01/11/22  1:44 PM   Result Value Ref Range    SARS-CoV-2 Detected (A) NOTD      Influenza A by PCR Not detected      Influenza B by PCR Not detected     GLUCOSE, POC    Collection Time: 01/11/22  2:10 PM   Result Value Ref Range    Glucose (POC) 94 65 - 117 mg/dL    Performed by [facility]        Radiologic Studies -   XR CHEST PA LAT   Final Result      Right lower lobe subsegmental atelectasis versus early pneumonia   No pneumothorax      XR CHEST PORT   Final Result      Bibasilar pneumonia   Possible left-sided pneumothorax. Recommend expiratory or lateral view. XR CHEST PA LAT    Result Date: 1/11/2022  Right lower lobe subsegmental atelectasis versus early pneumonia No pneumothorax    XR CHEST PORT    Result Date: 1/11/2022  Bibasilar pneumonia Possible left-sided pneumothorax. Recommend expiratory or lateral view. Medical Decision Making   I am the first provider for this patient. I reviewed the vital signs, available nursing notes, past medical history, past surgical history, family history and social history. Vital Signs-Reviewed the patient's vital signs.   Patient Vitals for the past 24 hrs:   Temp Pulse Resp BP SpO2   01/11/22 1220 -- -- -- -- 93 %   01/11/22 1200 98.8 °F (37.1 °C) (!) 109 20 133/83 96 %     Pulse Oximetry Analysis - 96% on RA (borderline)    Cardiac Monitor:   Rate: 109 bpm  Rhythm: Sinus Tachycardia     ED EKG interpretation: 12:35  Rhythm: normal sinus rhythm; and regular . Rate (approx.): 100; Axis: normal; P wave: normal; QRS interval: normal ; ST/T wave: non-specific changes; Other findings: left ventricular hypertrophy. This EKG was interpreted by Philip Bonilla MD,ED Provider. Records Reviewed: Nursing Notes and Old Medical Records    Provider Notes (Medical Decision Making):   67-year-old male presents with shortness of breath, myalgias, decreased appetite, cough, and congestion for the past 3 to 4 days. He is vaccinated for COVID-19 but has multiple symptoms concerning for the infection. Differential also includes hyperglycemia, medication noncompliance, electrolyte abnormality, and pneumonia. Will treat sepsis protocols, swab for COVID, and reassess. ED Course:   Initial assessment performed. The patients presenting problems have been discussed, and they are in agreement with the care plan formulated and outlined with them. I have encouraged them to ask questions as they arise throughout their visit. Progress Note  1:20 PM  Nursing informs me that he has been stuck 3 times for an IV and they have been unsuccessful. Will attempt an ultrasound-guided IV. Procedure Note- Peripheral IV Access  1:41 PM  Performed by: MD Amparo Troncoso MD gained IV access using a 20 gauge needle because the patient had no vascular access. After cleaning the site with alcohol prep, the Right basilic vein was localized with ultrasound guidance in an anterior approach. Line confirmation was obtained by direct visualization and good blood return. No anaesthetic was used. The line was successfully flushed with normal saline and was secured with transparent tape. Estimated blood loss: 1mL  The procedure took 15 minutes, and pt tolerated well. Progress Note  2:42 PM  I have re-evaluated pt and his COVID test is positive.   His creatinine has more than doubled since last year. Patient is getting 30 ml/kg of IV fluid bolus per sepsis protocols. Will discuss with the hospitalist for admission for BHASKAR and COVID.    2:56 PM  Jamel Celeste MD spoke with Dr. Hodan Abdi, Consult for Hospitalist. Discussed HPI and PE, available diagnostic tests and clinical findings. He is in agreement with care plans as outlined. He agrees to admit the patient. CRITICAL CARE NOTE :    2:58 PM    IMPENDING DETERIORATION -Airway, Respiratory, Metabolic and Renal    ASSOCIATED RISK FACTORS - Hypotension, Metabolic changes and Dehydration    MANAGEMENT- Bedside Assessment    INTERPRETATION -  Blood Pressure    INTERVENTIONS - Metobolic interventions    CASE REVIEW - Hospitalist/Intensivist and Nursing    TREATMENT RESPONSE -Improved    PERFORMED BY - Self    NOTES   :    I have spent 45 minutes of critical care time involved in lab review, consultations with specialist, family decision- making, bedside attention and documentation. During this entire length of time I was immediately available to the patient. Critical Care: The reason for providing this level of medical care for this critically ill patient was due to a critical illness that impaired one or more vital organ systems such that there was a high probability of imminent or life threatening deterioration in the patients condition. This care involved high complexity decision making to assess, manipulate, and support vital system functions. James Russell MD    Progress Note:   Updated pt on all returned results and findings. Discussed the importance of proper follow up as referred below along with return precautions. Pt in agreement with the care plan and expresses agreement with and understanding of all items discussed. Disposition:  ADMIT NOTE:  2:59 PM  The patient is being admitted to the hospital by Dr. Hodan Abdi.   The results of their tests and reasons for their admission have been discussed with the patient and/or available family. They convey agreement and understanding for the need to be admitted and for their admission diagnosis. PLAN:  1. Admit    Diagnosis     Clinical Impression:   1. COVID-19    2. Acute renal insufficiency    3. Opioid abuse [facility])            Please note that this dictation was completed with Dragon, computer voice recognition software. Quite often unanticipated grammatical, syntax, homophones, and other interpretive errors are inadvertently transcribed by the computer software. Please disregard these errors. Additionally, please excuse any errors that have escaped final proofreading.

## 2022-01-11 NOTE — PROGRESS NOTES
CM review chart patient Pt presents ambulatory to ED complaining of \"hard time breathing, weak and no appetite\". Pt reports symptoms for 4 days. Pt reports using insulin yesterday, has not had BP med in a while, approx a month. Per patient Dr. Kwesi Salomon is PCP last appointment was in may he stated he ran out of his medication and has no refills. Per patient he lives with someone and uses [facility] [address], patient confirm he is able to do a virtual appointment confirm his phone number is [phone]. Virtual appointment schedule for 1/20/22 @ [address]. Patient will need a ride on discharge. Patient ready for discharge from CM standpoint.     Vibha Castillo CM

## 2022-01-11 NOTE — ED NOTES
TRANSFER - OUT REPORT:    Verbal report given to Pavithra Tapia RN(name) on Vivienne Silverman  being transferred to PCU(unit) for routine progression of care       Report consisted of patients Situation, Background, Assessment and   Recommendations(SBAR). Information from the following report(s) SBAR, ED Summary, [facility] and Recent Results was reviewed with the receiving nurse. Lines:   Peripheral IV 01/11/22 Right;Upper Arm (Active)   Site Assessment Clean, dry, & intact 01/11/22 1348   Phlebitis Assessment 0 01/11/22 1348   Infiltration Assessment 0 01/11/22 1348   Dressing Status Clean, dry, & intact 01/11/22 1348   Dressing Type Transparent 01/11/22 1348   Hub Color/Line Status Pink;Flushed 01/11/22 1348   Action Taken Blood drawn 01/11/22 1348        Opportunity for questions and clarification was provided.       Patient transported with:   Monitor

## 2022-01-11 NOTE — PROGRESS NOTES
Patient admitted from ED to PCU 4. Patient placed on cardiac monitor showing NSR. Patient currently denying pain, \"except when I cough\". Patient maintaining oxygen saturation of 94-95% on room air with no dyspnea noted at rest. Patient reports productive cough for four days as well as increasing malaise. Patient noted to be nodding off to sleep during interview. Patient admits to injecting fentanyl earlier today. When asked how long he has used IV drugs he replied \"a long, long time. \"  Patient noted to have scarring to both upper extremities. Patient arrived to unit with a 20 gauge PIV in the RUE infusing NS bolus started in the ED.

## 2022-01-12 ENCOUNTER — APPOINTMENT (OUTPATIENT)
Dept: ULTRASOUND IMAGING | Age: 57
DRG: 137 | End: 2022-01-12
Attending: STUDENT IN AN ORGANIZED HEALTH CARE EDUCATION/TRAINING PROGRAM
Payer: MEDICAID

## 2022-01-12 ENCOUNTER — APPOINTMENT (OUTPATIENT)
Dept: NON INVASIVE DIAGNOSTICS | Age: 57
DRG: 137 | End: 2022-01-12
Attending: STUDENT IN AN ORGANIZED HEALTH CARE EDUCATION/TRAINING PROGRAM
Payer: MEDICAID

## 2022-01-12 LAB
ALBUMIN SERPL-MCNC: 1.2 G/DL (ref 3.5–5)
ALBUMIN/GLOB SERPL: 0.3 {RATIO} (ref 1.1–2.2)
ALP SERPL-CCNC: 65 U/L (ref 45–117)
ALT SERPL-CCNC: 15 U/L (ref 12–78)
ANION GAP SERPL CALC-SCNC: 11 MMOL/L (ref 5–15)
AST SERPL-CCNC: 22 U/L (ref 15–37)
ATRIAL RATE: 100 BPM
BASOPHILS # BLD: 0 K/UL (ref 0–0.1)
BASOPHILS NFR BLD: 0 % (ref 0–1)
BILIRUB SERPL-MCNC: 0.3 MG/DL (ref 0.2–1)
BUN SERPL-MCNC: 57 MG/DL (ref 6–20)
BUN/CREAT SERPL: 16 (ref 12–20)
CALCIUM SERPL-MCNC: 7.3 MG/DL (ref 8.5–10.1)
CALCULATED P AXIS, ECG09: 75 DEGREES
CALCULATED R AXIS, ECG10: 66 DEGREES
CALCULATED T AXIS, ECG11: 110 DEGREES
CHLORIDE SERPL-SCNC: 106 MMOL/L (ref 97–108)
CO2 SERPL-SCNC: 21 MMOL/L (ref 21–32)
CREAT SERPL-MCNC: 3.6 MG/DL (ref 0.7–1.3)
CRP SERPL-MCNC: 14.3 MG/DL (ref 0–0.6)
D DIMER PPP FEU-MCNC: 1.98 MG/L FEU (ref 0–0.65)
DIAGNOSIS, 93000: NORMAL
DIFFERENTIAL METHOD BLD: ABNORMAL
ECHO AV PEAK GRADIENT: 4 MMHG
ECHO AV PEAK VELOCITY: 1 M/S
ECHO AV VELOCITY RATIO: 0.7
ECHO EST RA PRESSURE: 15 MMHG
ECHO LA DIAMETER INDEX: 1.91 CM/M2
ECHO LA DIAMETER: 3.7 CM
ECHO LA VOL 2C: 60 ML (ref 18–58)
ECHO LA VOL 4C: 52 ML (ref 18–58)
ECHO LA VOL BP: 57 ML (ref 18–58)
ECHO LA VOL/BSA BIPLANE: 29 ML/M2 (ref 16–34)
ECHO LA VOLUME AREA LENGTH: 64 ML
ECHO LA VOLUME INDEX A2C: 31 ML/M2 (ref 16–34)
ECHO LA VOLUME INDEX A4C: 27 ML/M2 (ref 16–34)
ECHO LA VOLUME INDEX AREA LENGTH: 33 ML/M2 (ref 16–34)
ECHO LV EDV A2C: 190 ML
ECHO LV EDV A4C: 214 ML
ECHO LV EDV BP: 214 ML (ref 67–155)
ECHO LV EDV BP: 214 ML (ref 67–155)
ECHO LV EDV INDEX A4C: 110 ML/M2
ECHO LV EDV NDEX A2C: 98 ML/M2
ECHO LV EJECTION FRACTION A2C: 28 %
ECHO LV EJECTION FRACTION A4C: 28 %
ECHO LV EJECTION FRACTION BIPLANE: 29 % (ref 55–100)
ECHO LV EJECTION FRACTION BIPLANE: 29 % (ref 55–100)
ECHO LV ESV A2C: 137 ML
ECHO LV ESV A4C: 154 ML
ECHO LV ESV BP: 153 ML (ref 22–58)
ECHO LV ESV INDEX A2C: 71 ML/M2
ECHO LV ESV INDEX A4C: 79 ML/M2
ECHO LV ESV INDEX BP: 79 ML/M2
ECHO LV FRACTIONAL SHORTENING: 19 % (ref 28–44)
ECHO LV INTERNAL DIMENSION DIASTOLE INDEX: 2.99 CM/M2
ECHO LV INTERNAL DIMENSION DIASTOLIC: 5.8 CM (ref 4.2–5.9)
ECHO LV INTERNAL DIMENSION SYSTOLIC INDEX: 2.42 CM/M2
ECHO LV INTERNAL DIMENSION SYSTOLIC: 4.7 CM
ECHO LV IVSD: 0.9 CM (ref 0.6–1)
ECHO LV MASS 2D: 189.3 G (ref 88–224)
ECHO LV MASS INDEX 2D: 97.6 G/M2 (ref 49–115)
ECHO LV POSTERIOR WALL DIASTOLIC: 0.8 CM (ref 0.6–1)
ECHO LV RELATIVE WALL THICKNESS RATIO: 0.28
ECHO LVOT PEAK GRADIENT: 2 MMHG
ECHO LVOT PEAK VELOCITY: 0.7 M/S
ECHO MV A VELOCITY: 0.74 M/S
ECHO MV AREA PHT: 4.7 CM2
ECHO MV E DECELERATION TIME (DT): 162.1 MS
ECHO MV E VELOCITY: 0.57 M/S
ECHO MV E/A RATIO: 0.77
ECHO MV PRESSURE HALF TIME (PHT): 47 MS
ECHO RIGHT VENTRICULAR SYSTOLIC PRESSURE (RVSP): 34 MMHG
ECHO RV INTERNAL DIMENSION: 3.4 CM
ECHO TV REGURGITANT MAX VELOCITY: 2.2 M/S
ECHO TV REGURGITANT PEAK GRADIENT: 18 MMHG
EOSINOPHIL # BLD: 0 K/UL (ref 0–0.4)
EOSINOPHIL NFR BLD: 0 % (ref 0–7)
ERYTHROCYTE [DISTWIDTH] IN BLOOD BY AUTOMATED COUNT: 13.8 % (ref 11.5–14.5)
FERRITIN SERPL-MCNC: 457 NG/ML (ref 26–388)
GLOBULIN SER CALC-MCNC: 4.6 G/DL (ref 2–4)
GLUCOSE BLD STRIP.AUTO-MCNC: 102 MG/DL (ref 65–117)
GLUCOSE BLD STRIP.AUTO-MCNC: 109 MG/DL (ref 65–117)
GLUCOSE BLD STRIP.AUTO-MCNC: 140 MG/DL (ref 65–117)
GLUCOSE BLD STRIP.AUTO-MCNC: 143 MG/DL (ref 65–117)
GLUCOSE SERPL-MCNC: 119 MG/DL (ref 65–100)
HCT VFR BLD AUTO: 33.4 % (ref 36.6–50.3)
HGB BLD-MCNC: 10.7 G/DL (ref 12.1–17)
IMM GRANULOCYTES # BLD AUTO: 0 K/UL (ref 0–0.04)
IMM GRANULOCYTES NFR BLD AUTO: 1 % (ref 0–0.5)
LDH SERPL L TO P-CCNC: 168 U/L (ref 85–241)
LYMPHOCYTES # BLD: 0.9 K/UL (ref 0.8–3.5)
LYMPHOCYTES NFR BLD: 19 % (ref 12–49)
MCH RBC QN AUTO: 28.8 PG (ref 26–34)
MCHC RBC AUTO-ENTMCNC: 32 G/DL (ref 30–36.5)
MCV RBC AUTO: 89.8 FL (ref 80–99)
MONOCYTES # BLD: 0.4 K/UL (ref 0–1)
MONOCYTES NFR BLD: 7 % (ref 5–13)
NEUTS SEG # BLD: 3.7 K/UL (ref 1.8–8)
NEUTS SEG NFR BLD: 73 % (ref 32–75)
NRBC # BLD: 0 K/UL (ref 0–0.01)
NRBC BLD-RTO: 0 PER 100 WBC
P-R INTERVAL, ECG05: 128 MS
PLATELET # BLD AUTO: 148 K/UL (ref 150–400)
PMV BLD AUTO: 10 FL (ref 8.9–12.9)
POTASSIUM SERPL-SCNC: 4.4 MMOL/L (ref 3.5–5.1)
PROCALCITONIN SERPL-MCNC: 2.62 NG/ML
PROT SERPL-MCNC: 5.8 G/DL (ref 6.4–8.2)
Q-T INTERVAL, ECG07: 330 MS
QRS DURATION, ECG06: 68 MS
QTC CALCULATION (BEZET), ECG08: 425 MS
RBC # BLD AUTO: 3.72 M/UL (ref 4.1–5.7)
SERVICE CMNT-IMP: ABNORMAL
SERVICE CMNT-IMP: ABNORMAL
SERVICE CMNT-IMP: NORMAL
SERVICE CMNT-IMP: NORMAL
SODIUM SERPL-SCNC: 138 MMOL/L (ref 136–145)
VENTRICULAR RATE, ECG03: 100 BPM
WBC # BLD AUTO: 5 K/UL (ref 4.1–11.1)

## 2022-01-12 PROCEDURE — 36415 COLL VENOUS BLD VENIPUNCTURE: CPT

## 2022-01-12 PROCEDURE — 83615 LACTATE (LD) (LDH) ENZYME: CPT

## 2022-01-12 PROCEDURE — C9113 INJ PANTOPRAZOLE SODIUM, VIA: HCPCS | Performed by: STUDENT IN AN ORGANIZED HEALTH CARE EDUCATION/TRAINING PROGRAM

## 2022-01-12 PROCEDURE — 80053 COMPREHEN METABOLIC PANEL: CPT

## 2022-01-12 PROCEDURE — 85025 COMPLETE CBC W/AUTO DIFF WBC: CPT

## 2022-01-12 PROCEDURE — 93306 TTE W/DOPPLER COMPLETE: CPT | Performed by: SPECIALIST

## 2022-01-12 PROCEDURE — 93306 TTE W/DOPPLER COMPLETE: CPT

## 2022-01-12 PROCEDURE — 74011000258 HC RX REV CODE- 258: Performed by: STUDENT IN AN ORGANIZED HEALTH CARE EDUCATION/TRAINING PROGRAM

## 2022-01-12 PROCEDURE — 97530 THERAPEUTIC ACTIVITIES: CPT

## 2022-01-12 PROCEDURE — 87077 CULTURE AEROBIC IDENTIFY: CPT

## 2022-01-12 PROCEDURE — 87070 CULTURE OTHR SPECIMN AEROBIC: CPT

## 2022-01-12 PROCEDURE — 74011000250 HC RX REV CODE- 250: Performed by: STUDENT IN AN ORGANIZED HEALTH CARE EDUCATION/TRAINING PROGRAM

## 2022-01-12 PROCEDURE — 65270000032 HC RM SEMIPRIVATE

## 2022-01-12 PROCEDURE — 86140 C-REACTIVE PROTEIN: CPT

## 2022-01-12 PROCEDURE — 82962 GLUCOSE BLOOD TEST: CPT

## 2022-01-12 PROCEDURE — 84145 PROCALCITONIN (PCT): CPT

## 2022-01-12 PROCEDURE — 97161 PT EVAL LOW COMPLEX 20 MIN: CPT

## 2022-01-12 PROCEDURE — 74011250637 HC RX REV CODE- 250/637: Performed by: STUDENT IN AN ORGANIZED HEALTH CARE EDUCATION/TRAINING PROGRAM

## 2022-01-12 PROCEDURE — 74011636637 HC RX REV CODE- 636/637: Performed by: STUDENT IN AN ORGANIZED HEALTH CARE EDUCATION/TRAINING PROGRAM

## 2022-01-12 PROCEDURE — 87186 SC STD MICRODIL/AGAR DIL: CPT

## 2022-01-12 PROCEDURE — 76770 US EXAM ABDO BACK WALL COMP: CPT

## 2022-01-12 PROCEDURE — 74011250636 HC RX REV CODE- 250/636: Performed by: STUDENT IN AN ORGANIZED HEALTH CARE EDUCATION/TRAINING PROGRAM

## 2022-01-12 PROCEDURE — 85379 FIBRIN DEGRADATION QUANT: CPT

## 2022-01-12 RX ORDER — PROCHLORPERAZINE EDISYLATE 5 MG/ML
5 INJECTION INTRAMUSCULAR; INTRAVENOUS
Status: DISCONTINUED | OUTPATIENT
Start: 2022-01-12 | End: 2022-01-14 | Stop reason: HOSPADM

## 2022-01-12 RX ORDER — CLONIDINE HYDROCHLORIDE 0.1 MG/1
0.1 TABLET ORAL
Status: DISCONTINUED | OUTPATIENT
Start: 2022-01-12 | End: 2022-01-14 | Stop reason: HOSPADM

## 2022-01-12 RX ORDER — CLONIDINE HYDROCHLORIDE 0.1 MG/1
0.2 TABLET ORAL
Status: DISCONTINUED | OUTPATIENT
Start: 2022-01-12 | End: 2022-01-14 | Stop reason: HOSPADM

## 2022-01-12 RX ADMIN — CEFTRIAXONE SODIUM 1 G: 1 INJECTION, POWDER, FOR SOLUTION INTRAMUSCULAR; INTRAVENOUS at 08:56

## 2022-01-12 RX ADMIN — SODIUM CHLORIDE, PRESERVATIVE FREE 10 ML: 5 INJECTION INTRAVENOUS at 12:40

## 2022-01-12 RX ADMIN — SODIUM CHLORIDE, PRESERVATIVE FREE 10 ML: 5 INJECTION INTRAVENOUS at 21:24

## 2022-01-12 RX ADMIN — HEPARIN SODIUM 5000 UNITS: 5000 INJECTION, SOLUTION INTRAVENOUS; SUBCUTANEOUS at 06:01

## 2022-01-12 RX ADMIN — AZITHROMYCIN MONOHYDRATE 500 MG: 250 TABLET ORAL at 08:56

## 2022-01-12 RX ADMIN — PANTOPRAZOLE SODIUM 40 MG: 40 INJECTION, POWDER, FOR SOLUTION INTRAVENOUS at 12:29

## 2022-01-12 RX ADMIN — ONDANSETRON 4 MG: 2 INJECTION INTRAMUSCULAR; INTRAVENOUS at 12:30

## 2022-01-12 RX ADMIN — METHADONE HYDROCHLORIDE 30 MG: 10 TABLET ORAL at 08:56

## 2022-01-12 RX ADMIN — Medication 100 MG: at 08:57

## 2022-01-12 RX ADMIN — SODIUM CHLORIDE, PRESERVATIVE FREE 10 ML: 5 INJECTION INTRAVENOUS at 04:08

## 2022-01-12 RX ADMIN — SODIUM CHLORIDE, PRESERVATIVE FREE 10 ML: 5 INJECTION INTRAVENOUS at 08:57

## 2022-01-12 RX ADMIN — Medication 2 UNITS: at 18:46

## 2022-01-12 RX ADMIN — HEPARIN SODIUM 5000 UNITS: 5000 INJECTION, SOLUTION INTRAVENOUS; SUBCUTANEOUS at 21:23

## 2022-01-12 RX ADMIN — FOLIC ACID 1 MG: 1 TABLET ORAL at 08:57

## 2022-01-12 NOTE — PROGRESS NOTES
TRANSFER - IN REPORT:    Verbal report received from Cass Cho RN on [place]  being received from ED for routine progression of care. Report consisted of patients Situation, Background, Assessment and   Recommendations(SBAR). Information from the following report(s) SBAR, labs accordion file was reviewed with the receiving nurse. Opportunity for questions and clarification was provided. Assessment completed upon patients arrival to unit and care assumed.

## 2022-01-12 NOTE — PROGRESS NOTES
[facility] Admission Pharmacy Medication Reconciliation     Information obtained from: Patient and [address] ([phone])  RxQuery data Judi Carter: [facility]    Comments/recommendations:    Telephone interviewed patient regarding his PTA medication list and drug allergies. Patient was questioned regarding use of any other inhalers, topical products, OTC/herbal/vitamin products or ophthalmic/nasal/otic medication use. The patient was discharged from [facility] on 5/6/21 with the following medications:  Amlodipine 10 mg po daily  Toujeo 300 units/mL pen: 25 units subcutaneously daily  Humalog Kwik 100 units/mL pen: 5 units subcutaneously before breakfast, lunch, and dinner  Medication changes (since last review): Added: Black sea oil  Removed:  Amlodipine  Lantus   Humalog   Naloxone  Adjusted: None   1RxQuery pharmacy benefit data reflects medications filled and processed through the patient's insurance, however, this data does NOT capture whether the medication was picked up or is currently being taken by the patient. Total Time Spent: 30 minutes    Past Medical History/Disease States:  Past Medical History:   Diagnosis Date    Diabetes ([facility] [address]     Hypertension          Patient allergies: Allergies as of 01/11/2022 - Fully Reviewed 01/11/2022   Allergen Reaction Noted    Shellfish containing products Swelling 07/27/2010         Prior to Admission Medications   Prescriptions Last Dose Informant Patient Reported? Taking? OTHER 1/11/2022 Self Yes Yes   Sig: Black Sea Oil: Take one teaspoonful by mouth twice daily      Facility-Administered Medications: None        Thank you,  Ella Schumacher, Pharm. D., [facility]  [phone]

## 2022-01-12 NOTE — PROGRESS NOTES
Care plan reviewed, pt progressing towards care plan goals. Problem: Airway Clearance - Ineffective  Goal: Achieve or maintain patent airway  Outcome: Progressing Towards Goal     Problem: Gas Exchange - Impaired  Goal: Absence of hypoxia  Outcome: Progressing Towards Goal  Goal: Promote optimal lung function  Outcome: Progressing Towards Goal     Problem: Breathing Pattern - Ineffective  Goal: Ability to achieve and maintain a regular respiratory rate  Outcome: Progressing Towards Goal     Problem:  Body Temperature -  Risk of, Imbalanced  Goal: Ability to maintain a body temperature within defined limits  Outcome: Progressing Towards Goal  Goal: Will regain or maintain usual level of consciousness  Outcome: Progressing Towards Goal  Goal: Complications related to the disease process, condition or treatment will be avoided or minimized  Outcome: Progressing Towards Goal     Problem: Isolation Precautions - Risk of Spread of Infection  Goal: Prevent transmission of infectious organism to others  Outcome: Progressing Towards Goal     Problem: Nutrition Deficits  Goal: Optimize nutrtional status  Outcome: Progressing Towards Goal     Problem: Risk for Fluid Volume Deficit  Goal: Maintain normal heart rhythm  Outcome: Progressing Towards Goal  Goal: Maintain absence of muscle cramping  Outcome: Progressing Towards Goal  Goal: Maintain normal serum potassium, sodium, calcium, phosphorus, and pH  Outcome: Progressing Towards Goal     Problem: Loneliness or Risk for Loneliness  Goal: Demonstrate positive use of time alone when socialization is not possible  Outcome: Progressing Towards Goal     Problem: Fatigue  Goal: Verbalize increase energy and improved vitality  Outcome: Progressing Towards Goal     Problem: Patient Education: Go to Patient Education Activity  Goal: Patient/Family Education  Outcome: Progressing Towards Goal     Problem: Risk for Falls  Goal: Free of falls during inpatient stay  Description: Patient will be free of falls during inpatient stay. Outcome: Progressing Towards Goal     Problem: Alteration in Mobility  Goal: Remain as independent as possible and remain safe in environment  Description: Patient will remain as independent as possible and remain safe in their environment. Outcome: Progressing Towards Goal     Problem: Pain  Goal: Assess satisfaction of level of comfort and symptom control  Outcome: Progressing Towards Goal  Goal: *Control of acute pain  Outcome: Progressing Towards Goal     Problem: Pressure Injury - Risk of  Goal: *Prevention of pressure injury  Description: Document Griffin Scale and appropriate interventions in the flowsheet. Outcome: Progressing Towards Goal  Note: Pressure Injury Interventions:  Sensory Interventions: Minimize linen layers,Keep linens dry and wrinkle-free,Discuss PT/OT consult with provider,Assess changes in LOC,Assess need for specialty bed    Moisture Interventions: Minimize layers,Maintain skin hydration (lotion/cream),Assess need for specialty bed,Offer toileting Q_hr    Activity Interventions: Pressure redistribution bed/mattress(bed type),PT/OT evaluation    Mobility Interventions: HOB 30 degrees or less,Pressure redistribution bed/mattress (bed type),PT/OT evaluation    Nutrition Interventions: Offer support with meals,snacks and hydration    Friction and Shear Interventions: Apply protective barrier, creams and emollients,HOB 30 degrees or less,Minimize layers                Problem: Patient Education: Go to Patient Education Activity  Goal: Patient/Family Education  Outcome: Progressing Towards Goal     Problem: Falls - Risk of  Goal: *Absence of Falls  Description: Document Nataly Fall Risk and appropriate interventions in the flowsheet.   Outcome: Progressing Towards Goal  Note: Fall Risk Interventions:  Mobility Interventions: Bed/chair exit alarm,PT Consult for mobility concerns         Medication Interventions: Bed/chair exit alarm,Evaluate medications/consider consulting pharmacy,Patient to call before getting OOB,Teach patient to arise slowly                   Problem: Patient Education: Go to Patient Education Activity  Goal: Patient/Family Education  Outcome: Progressing Towards Goal     Problem: Patient Education: Go to Patient Education Activity  Goal: Patient/Family Education  Outcome: Progressing Towards Goal

## 2022-01-12 NOTE — PROGRESS NOTES
Hospitalist Progress Note    NAME: Zeina Kaplan   :  1965   MRN:  [id number]   Room Number:  Pavan Brewster  @ [facility]     Please note that this dictation was completed with Eren Gonzalez, the computer voice recognition software. Quite often unanticipated grammatical, syntax, homophones, and other interpretive errors are inadvertently transcribed by the computer software. Please disregard these errors. Please excuse any errors that have escaped final proofreading. Interim Hospital Summary: 55 y.o. male whom presented on 2022 with      Assessment / Plan:      #Acute kidney injury POA stage III  On CKDIII with nephrotic range proteinuria  -Suspect prerenal versus COVID induced nephropathy  -UA with protein and moderate blood  -Protein creatinine ratio 9.7  - FeNA 0.9%   - US retro without any obstruction     - IVF   - Renally dose medication   - avoid nephrotoxic medications   -Strict ins and outs        #COVID-19 pneumonia  #Shortness of breath  #Productive cough  -Chest x-ray  reviewed independently bibasilar pneumonia  -Not requiring supplemental oxygen  -, proBNP 60438, troponin high sensitive 57,  -Procalcitonin 2.62 and CRP 14.30  -Droplet precaution     -Ceftriaxone / azithromycin for suspected community-acquired pneumonia  -Sputum culture  -Urine Legionella and strep pneumo antigen    #Nausea and vomiting  -Zofran as needed and Compazine as needed second line  -PPI added     #Insulin-dependent diabetes mellitus  -A1c 5.8  - Lispro correctional scale, FSG AC HS  - Consistent carb diet, hypoglycemia protocol.      #Hypertension  -At home on amlodipine     #Opioid addiction POA  #IV fentanyl use  #Cocaine use  -UDS positive for cocaine and opioids  -Last used fentanyl today IV route  -Substance abuse order set utilized  -COWS  -Clonidine as needed  -Methadone taper initiated   - . Patient was counseled extensively on the need to abstain from drugs its addictive tendencies, its deleterious effects on the brain, cardiovascular system, lungs as well as its financial & social sequelae              Body mass index is 23.68 kg/m². Code Status: Full  Surrogate Decision Maker:     DVT Prophylaxis: Hep SQ  GI Prophylaxis: not indicated           Subjective:     Chief Complaint / Reason for Physician Visit  Vomiting   Discussed with RN events overnight. Review of Systems:  No fevers, chills, appetite change, cough, sputum production, shortness of breath, dyspnea on exertion, nausea,  diarrhea, constipation, chest pain, leg edema, abdominal pain, joint pain, rash, itching. Tolerating PT/OT. Tolerating diet. Objective:     VITALS:   Last 24hrs VS reviewed since prior progress note. Most recent are:  Patient Vitals for the past 24 hrs:   Temp Pulse Resp BP SpO2   01/12/22 0800 -- 76 18 122/84 97 %   01/12/22 0400 -- 89 -- -- --   01/12/22 0303 99.4 °F (37.4 °C) 87 17 128/86 96 %   01/12/22 0030 99.2 °F (37.3 °C) 98 22 131/84 94 %   01/12/22 0000 -- 95 -- -- --   01/11/22 2100 99.1 °F (37.3 °C) 91 20 135/86 94 %   01/11/22 1950 -- (!) 101 -- -- --   01/11/22 1633 98.5 °F (36.9 °C) 95 20 (!) 142/89 94 %   01/11/22 1600 -- 92 -- -- --   01/11/22 1500 -- 92 23 128/87 93 %   01/11/22 1220 -- -- -- -- 93 %   01/11/22 1200 98.8 °F (37.1 °C) (!) 109 20 133/83 96 %       Intake/Output Summary (Last 24 hours) at 1/12/2022 0846  Last data filed at 1/12/2022 0303  Gross per 24 hour   Intake 7274.83 ml   Output 950 ml   Net 6324.83 ml        PHYSICAL EXAM:  General: WD, WN. Alert, cooperative, no acute distress    EENT:  EOMI. Anicteric sclerae. MMM  Resp:  CTA bilaterally, no wheezing or rales. No accessory muscle use  CV:  Regular  rhythm,  normal S1/S2, no murmurs rubs gallops, No edema  GI:  Soft, Non distended, Non tender. +Bowel sounds  Neurologic:  Alert and oriented X 3, normal speech,   Psych:   Good insight. Not anxious nor agitated  Skin:  No rashes.   No jaundice    Reviewed most current lab test results and cultures  YES  Reviewed most current radiology test results   YES  Review and summation of old records today    NO  Reviewed patient's current orders and MAR    YES  PMH/SH reviewed - no change compared to H&P  ________________________________________________________________________  Care Plan discussed with:    Comments   Patient x    Family      RN x    Care Manager x    Consultant                       x Multidiciplinary team rounds were held today with , nursing, pharmacist and clinical coordinator. Patient's plan of care was discussed; medications were reviewed and discharge planning was addressed. ________________________________________________________________________  Total NON critical care TIME:  25  Minutes    Total CRITICAL CARE TIME Spent:   Minutes non procedure based      Comments   >50% of visit spent in counseling and coordination of care x    ________________________________________________________________________  Veronique Dior MD     Procedures: see electronic medical records for all procedures/Xrays and details which were not copied into this note but were reviewed prior to creation of Plan. LABS:  I reviewed today's most current labs and imaging studies.   Pertinent labs include:  Recent Labs     01/12/22  0251 01/11/22  1344   WBC 5.0 6.9   HGB 10.7* 13.1   HCT 33.4* 41.9   * 183     Recent Labs     01/12/22  0251 01/11/22  1344    136   K 4.4 5.0    101   CO2 21 23   * 106*   BUN 57* 62*   CREA 3.60* 4.28*   CA 7.3* 8.0*   ALB 1.2* 1.6*   TBILI 0.3 0.6   ALT 15 20       Signed: Veronique Dior MD

## 2022-01-12 NOTE — PROGRESS NOTES
1930: Report from [facility]: Emesis x1, Sascha RN in to assist pt and admin Zofran. 2000: Assessment complete. VSS. RT in for art stick, unable to obtain labs. Pt agreed to let me attempt venipuncture a bit later. Pt resting comfortably now. Denies pain and nausea. 2330: Sleeping comfortably. No needs. 0300: Labs sent. Vitals obtained, low grade temp, asymptomatic. Requesting not to be bothered, wants to try to get some sleep.    0600: Heparin given. Pt awake, alert, no complaints at this time.

## 2022-01-12 NOTE — PROGRESS NOTES
Problem: Mobility Impaired (Adult and Pediatric)  Goal: *Acute Goals and Plan of Care (Insert Text)  Description: FUNCTIONAL STATUS PRIOR TO ADMISSION: Patient was independent without use of DME.    HOME SUPPORT PRIOR TO ADMISSION: The patient reports he is staying with a friend right now. His friends home has 2-3 steps to enter and patient previously had not difficulty with stairs. Physical Therapy Goals  Initiated 1/12/2022  1. Patient will move from supine to sit and sit to supine  in bed with independence within 7 day(s). 2.  Patient will transfer from bed to chair and chair to bed with modified independence using the least restrictive device within 7 day(s). 3.  Patient will perform sit to stand with modified independence within 7 day(s). 4.  Patient will ambulate with modified independence for 50 feet with the least restrictive device within 7 day(s). 5.  Patient will ascend/descend 3 stairs with 1 handrail(s) with minimal assistance/contact guard assist within 7 day(s). Outcome: Progressing Towards Goal   PHYSICAL THERAPY EVALUATION  Patient: Elysia Murray (36 y.o. male)  Date: 1/12/2022  Primary Diagnosis: COVID-19 [U07.1]  BHASKAR (acute kidney injury) ([facility] [address] [N17.9]        Precautions:        ASSESSMENT  Based on the objective data described below, the patient presents with decreased independence with functional mobility S/P admission for COVID- and BHASKAR. Patient reports bilateral foot pain and tenderness. He states \"feels like I'm walking on rocks\" and that he walks slowly. He is received seated upright in bed. Patient reports he is fatigued and weak but agreeable to attempting evaluation. Provided increased time he transition supine to sit with feet flat on the floor. He is able to stand with Min A and side step up [facility] with HHA. Patient demonstrates increased trunk and knee flexion in standing requiring VC for upright posture. Once seated he begins to cough and has one episode of emesis. Patient is able to transition sit to supine with increased time. HOB is elevated. Current Level of Function Impacting Discharge (mobility/balance): Min A for safety    Functional Outcome Measure: The patient scored 55/100 on the Barthel outcome measure. Other factors to consider for discharge: medical stability, decreased strength/endurance     Patient will benefit from skilled therapy intervention to address the above noted impairments. PLAN :  Recommendations and Planned Interventions: bed mobility training, transfer training, gait training, therapeutic exercises, neuromuscular re-education, edema management/control, patient and family training/education, and therapeutic activities      Frequency/Duration: Patient will be followed by physical therapy:  3 times a week to address goals. Recommendation for discharge: (in order for the patient to meet his/her long term goals)  Therapy up to 5 days/week in SNF setting  pending progress with acute care     This discharge recommendation:  Has not yet been discussed the attending provider and/or case management    IF patient discharges home will need the following DME: to be determined (TBD)         SUBJECTIVE:   Patient stated [facility] just need something in case I throw up again.     OBJECTIVE DATA SUMMARY:   HISTORY:    Past Medical History:   Diagnosis Date    Diabetes ([address]     Hypertension      Past Surgical History:   Procedure Laterality Date    HX ORTHOPAEDIC  rt hip replacement    HX ORTHOPAEDIC  rt thigh woo       Personal factors and/or comorbidities impacting plan of care:     Home Situation  Home Environment:  (patient reports staying with a friend )  # Steps to Enter: 2  Rails to Enter: Yes  Hand Rails : Right  One/Two Story Residence: One story  Living Alone: No  Support Systems: Friend/Neighbor  Patient Expects to be Discharged to[de-identified] Apartment  Current DME Used/Available at Home: None    EXAMINATION/PRESENTATION/DECISION MAKING:   Critical Behavior:  Neurologic State: Alert,Drowsy,Eyes open spontaneously,Lethargic  Orientation Level: Oriented X4  Cognition: Follows commands     Hearing: Auditory  Auditory Impairment: None  Skin:    Edema:   Range Of Motion:  AROM: Generally decreased, functional           PROM: Generally decreased, functional           Strength:    Strength: Generally decreased, functional                    Tone & Sensation:   Tone: Normal              Sensation: Impaired (bilateral feet )               Coordination:  Coordination: Generally decreased, functional  Vision:      Functional Mobility:  Bed Mobility:     Supine to Sit: Bed Modified;Stand-by assistance; Additional time  Sit to Supine: Stand-by assistance; Additional time  Scooting: Stand-by assistance  Transfers:  Sit to Stand: Minimum assistance  Stand to Sit: Contact guard assistance                       Balance:   Sitting: Intact; Without support  Standing: Impaired; With support  Standing - Static: Constant support; Fair  Standing - Dynamic : Constant support; Fair  Ambulation/Gait Training:                                                         Stairs: Therapeutic Exercises:       Functional Measure:  Barthel Index:    Bathin  Bladder: 5  Bowels: 5  Groomin  Dressin  Feeding: 10  Mobility: 10  Stairs: 0  Toilet Use: 5  Transfer (Bed to Chair and Back): 10  Total: 55/100       The Barthel ADL Index: Guidelines  1. The index should be used as a record of what a patient does, not as a record of what a patient could do. 2. The main aim is to establish degree of independence from any help, physical or verbal, however minor and for whatever reason. 3. The need for supervision renders the patient not independent. 4. A patient's performance should be established using the best available evidence. Asking the patient, friends/relatives and nurses are the usual sources, but direct observation and common sense are also important.  However direct testing is not needed. 5. Usually the patient's performance over the preceding 24-48 hours is important, but occasionally longer periods will be relevant. 6. Middle categories imply that the patient supplies over 50 per cent of the effort. 7. Use of aids to be independent is allowed. Score Interpretation (from [address])    Independent   60-79 Minimally independent   40-59 Partially dependent   20-39 Very dependent   <20 Totally dependent     -Rosa Samano., Barthel, D.W. (1965). Functional evaluation: the Barthel Index. [address] ([address] (1997). The Barthel activities of daily living index: self-reporting versus actual performance in the old (> or = 75 years). Journal of [address] [facility] 45(7), [address] [facility], .Willy., Brian Phoenix., [facility]. (1999). Measuring the change in disability after inpatient rehabilitation; comparison of the responsiveness of the Barthel Index and Functional [place] Measure. Journal of Neurology, Neurosurgery, and Psychiatry, 66(4), 515-684. Radha Tinoco, N.J.A, ISABELLA Moreno, & Dennise Shelby, MWillyA. (2004) Assessment of post-stroke quality of life in cost-effectiveness studies: The usefulness of the Barthel Index and the EuroQoL-5D.  Quality of Life Research, 15, 255-30            Physical Therapy Evaluation Charge Determination   History Examination Presentation Decision-Making   MEDIUM  Complexity : 1-2 comorbidities / personal factors will impact the outcome/ POC  LOW Complexity : 1-2 Standardized tests and measures addressing body structure, function, activity limitation and / or participation in recreation  MEDIUM Complexity : Evolving with changing characteristics  Other outcome measures Barthel  MEDIUM      Based on the above components, the patient evaluation is determined to be of the following complexity level: MEDIUM    Pain Rating:      Activity Tolerance:   Fair and Poor    After treatment patient left in no apparent distress:   Supine in bed    COMMUNICATION/EDUCATION:   The patients plan of care was discussed with: Registered nurse. Fall prevention education was provided and the patient/caregiver indicated understanding., Patient/family have participated as able in goal setting and plan of care. , and Patient/family agree to work toward stated goals and plan of care.     Thank you for this referral.  Ovidio Atkinson, PT   Time Calculation: 19 mins

## 2022-01-13 LAB
ANION GAP SERPL CALC-SCNC: 11 MMOL/L (ref 5–15)
BUN SERPL-MCNC: 50 MG/DL (ref 6–20)
BUN/CREAT SERPL: 15 (ref 12–20)
CALCIUM SERPL-MCNC: 6.9 MG/DL (ref 8.5–10.1)
CHLORIDE SERPL-SCNC: 107 MMOL/L (ref 97–108)
CO2 SERPL-SCNC: 20 MMOL/L (ref 21–32)
CREAT SERPL-MCNC: 3.3 MG/DL (ref 0.7–1.3)
CRP SERPL-MCNC: 9.42 MG/DL (ref 0–0.6)
FLUID CULTURE, SPNG2: NORMAL
GLUCOSE BLD STRIP.AUTO-MCNC: 111 MG/DL (ref 65–117)
GLUCOSE BLD STRIP.AUTO-MCNC: 142 MG/DL (ref 65–117)
GLUCOSE BLD STRIP.AUTO-MCNC: 150 MG/DL (ref 65–117)
GLUCOSE BLD STRIP.AUTO-MCNC: 79 MG/DL (ref 65–117)
GLUCOSE SERPL-MCNC: 166 MG/DL (ref 65–100)
L PNEUMO1 AG UR QL IA: NEGATIVE
ORGANISM ID, SPNG3: NORMAL
PLEASE NOTE, SPNG4: NORMAL
POTASSIUM SERPL-SCNC: 4.2 MMOL/L (ref 3.5–5.1)
S PNEUM AG SPEC QL LA: NEGATIVE
SERVICE CMNT-IMP: ABNORMAL
SERVICE CMNT-IMP: ABNORMAL
SERVICE CMNT-IMP: NORMAL
SERVICE CMNT-IMP: NORMAL
SODIUM SERPL-SCNC: 138 MMOL/L (ref 136–145)
SPECIMEN SOURCE: NORMAL
SPECIMEN SOURCE: NORMAL
SPECIMEN, SPNG1: NORMAL

## 2022-01-13 PROCEDURE — C9113 INJ PANTOPRAZOLE SODIUM, VIA: HCPCS | Performed by: STUDENT IN AN ORGANIZED HEALTH CARE EDUCATION/TRAINING PROGRAM

## 2022-01-13 PROCEDURE — 36415 COLL VENOUS BLD VENIPUNCTURE: CPT

## 2022-01-13 PROCEDURE — 82962 GLUCOSE BLOOD TEST: CPT

## 2022-01-13 PROCEDURE — 74011000250 HC RX REV CODE- 250: Performed by: STUDENT IN AN ORGANIZED HEALTH CARE EDUCATION/TRAINING PROGRAM

## 2022-01-13 PROCEDURE — 74011250636 HC RX REV CODE- 250/636: Performed by: STUDENT IN AN ORGANIZED HEALTH CARE EDUCATION/TRAINING PROGRAM

## 2022-01-13 PROCEDURE — 74011000258 HC RX REV CODE- 258: Performed by: STUDENT IN AN ORGANIZED HEALTH CARE EDUCATION/TRAINING PROGRAM

## 2022-01-13 PROCEDURE — 65270000032 HC RM SEMIPRIVATE

## 2022-01-13 PROCEDURE — 86140 C-REACTIVE PROTEIN: CPT

## 2022-01-13 PROCEDURE — 80048 BASIC METABOLIC PNL TOTAL CA: CPT

## 2022-01-13 PROCEDURE — 74011250637 HC RX REV CODE- 250/637: Performed by: STUDENT IN AN ORGANIZED HEALTH CARE EDUCATION/TRAINING PROGRAM

## 2022-01-13 RX ORDER — FOLIC ACID 1 MG/1
1 TABLET ORAL DAILY
Qty: 30 TABLET | Refills: 0 | Status: SHIPPED | OUTPATIENT
Start: 2022-01-14

## 2022-01-13 RX ORDER — CEFDINIR 300 MG/1
300 CAPSULE ORAL DAILY
Qty: 4 CAPSULE | Refills: 0 | Status: SHIPPED | OUTPATIENT
Start: 2022-01-13 | End: 2022-01-17

## 2022-01-13 RX ORDER — CEFDINIR 300 MG/1
300 CAPSULE ORAL 2 TIMES DAILY
Qty: 8 CAPSULE | Refills: 0 | Status: SHIPPED | OUTPATIENT
Start: 2022-01-13 | End: 2022-01-13 | Stop reason: SDUPTHER

## 2022-01-13 RX ORDER — PANTOPRAZOLE SODIUM 40 MG/1
40 TABLET, DELAYED RELEASE ORAL DAILY
Qty: 14 TABLET | Refills: 0 | Status: SHIPPED | OUTPATIENT
Start: 2022-01-13 | End: 2022-09-07

## 2022-01-13 RX ORDER — GUAIFENESIN 100 MG/5ML
200 SOLUTION ORAL
Qty: 1 EACH | Refills: 0 | Status: SHIPPED | OUTPATIENT
Start: 2022-01-13 | End: 2022-09-07

## 2022-01-13 RX ORDER — AZITHROMYCIN 250 MG/1
500 TABLET, FILM COATED ORAL DAILY
Qty: 6 TABLET | Refills: 0 | Status: SHIPPED | OUTPATIENT
Start: 2022-01-13 | End: 2022-01-16

## 2022-01-13 RX ORDER — LANOLIN ALCOHOL/MO/W.PET/CERES
100 CREAM (GRAM) TOPICAL DAILY
Qty: 30 TABLET | Refills: 0 | Status: SHIPPED | OUTPATIENT
Start: 2022-01-14 | End: 2022-01-14 | Stop reason: SDUPTHER

## 2022-01-13 RX ADMIN — METHADONE HYDROCHLORIDE 15 MG: 10 TABLET ORAL at 08:58

## 2022-01-13 RX ADMIN — AZITHROMYCIN MONOHYDRATE 500 MG: 250 TABLET ORAL at 08:57

## 2022-01-13 RX ADMIN — Medication 100 MG: at 08:57

## 2022-01-13 RX ADMIN — HEPARIN SODIUM 5000 UNITS: 5000 INJECTION, SOLUTION INTRAVENOUS; SUBCUTANEOUS at 05:01

## 2022-01-13 RX ADMIN — PANTOPRAZOLE SODIUM 40 MG: 40 INJECTION, POWDER, FOR SOLUTION INTRAVENOUS at 08:58

## 2022-01-13 RX ADMIN — CEFTRIAXONE SODIUM 1 G: 1 INJECTION, POWDER, FOR SOLUTION INTRAMUSCULAR; INTRAVENOUS at 08:58

## 2022-01-13 RX ADMIN — SODIUM CHLORIDE, PRESERVATIVE FREE 10 ML: 5 INJECTION INTRAVENOUS at 05:02

## 2022-01-13 RX ADMIN — FOLIC ACID 1 MG: 1 TABLET ORAL at 08:57

## 2022-01-13 NOTE — DISCHARGE INSTRUCTIONS
- Please take antibiotic to complete 7 day course   - Please follow up nephrology for repeat kidney function tests   - please quarantine till 1/16/22 and wear mask around people for 5 days      When to seek emergency medical attention   Look for emergency warning signs* for COVID-19. If someone is showing any of these signs, seek emergency medical care immediately:   Trouble breathing    Persistent pain or pressure in the chest    New confusion    Inability to wake or stay awake    Bluish lips or face  *This list is not all possible symptoms. Please call your medical provider for any other symptoms that are severe or concerning to you. Call 911 or call ahead to your local emergency facility: Notify the  that you are seeking care for someone who has or may have COVID-19.

## 2022-01-13 NOTE — INTERDISCIPLINARY ROUNDS
Interdisciplinary team rounds were held 1/13/2022 with the following team members:Care Management, Nursing, Pharmacy and Physician. Plan of care discussed. See clinical pathway and/or care plan for interventions and desired outcomes.

## 2022-01-13 NOTE — DISCHARGE SUMMARY
Hospitalist Discharge Summary     Patient ID:  Jerod Sánchez  [id number]  34 y.o.  1965    PCP on record: Dennis Giordano MD    Admit date: 1/11/2022  Discharge date and time: 1/13/2022    Please note that this dictation was completed with [employer], the [employer] voice recognition software. Quite often unanticipated grammatical, syntax, homophones, and other interpretive errors are inadvertently transcribed by the computer software. Please disregard these errors. Please excuse any errors that have escaped final proofreading. Admission Diagnoses: COVID-19 [U07.1]  BHASKAR (acute kidney injury) ([facility] [address] [N17.9]    Discharge Diagnoses: Active Problems:    BHASKAR (acute kidney injury) ([facility] [address] (1/11/2022)      COVID-19 (1/11/2022)           Hospital Course:       #Acute kidney injury POA stage III  On CKDIII with nephrotic range proteinuria resolving  - Scr 4.28 > 3.60 > 3.30 ( baseline around 2)   -UA with protein and moderate blood  -Protein creatinine ratio 9.7  - FeNA 0.9%   - US retro without any obstruction  - suspect prenal in the setting of COVID nephropathy   -Nephrology consulted appreciate recommendation  - Will draw glomerulonephritis labs prior to patient discharge and labs to be followed by nephrology at [facility]  -Patient to follow-up with nephrology as an outpatient further management of BHASKAR on CKD.   Patient to have follow-up kidney function test in 1 week with nephrology  - Patient agreed and verbalized understanding           #COVID-19 pneumonia  #Shortness of breath resolved  #Productive cough resolved   -Chest x-ray  reviewed independently bibasilar pneumonia  -Not requiring supplemental oxygen  -, proBNP 57624, troponin high sensitive 57,  -Procalcitonin 2.62 and CRP 14.30 > 9.42   -Patient to receive antibiotics to complete 7-day course of community-acquired pneumonia  -Patient to practice quarantine and follow-up with primary care further management     #Nausea and vomiting POA resolved   -Patient was given 14-day PPI trial       #Type II diabetes mellitus  -A1c decreased from 14 to 5.8  -Patient to follow-up with primary care further management of diabetes mellitus     #Hypertension  --Blood pressure readings were not elevated that would require antihypertensive at this time  -Patient to follow-up with primary care for management of hypertension     #Opioid addiction POA  #IV fentanyl use  #Cocaine use  -UDS positive for cocaine and opioids  -Last used fentanyl 1/11/22 IV route  - . Patient was counseled extensively on the need to abstain from drugs its addictive tendencies, its deleterious effects on the brain, cardiovascular system, lungs as well as its financial & social sequelae      #Homelessness  -Case management to provide resources    CONSULTATIONS:  IP CONSULT TO NEPHROLOGY    Excerpted HPI from H&P of Felipe Young MD:    Thomas Lucero is a 55 y.o.  male with PMH of above-mentioned problems who presents to ED with c/o productive cough and shortness of breath for past 5 days. Patient states that he been having symptoms of chills, dyspnea on exertion and productive cough. Patient also endorses using IV fentanyl. Patient had any fever chest pain or abdominal pain. Denied any loss of sense of smell or taste or diarrhea. Patient endorsed loss of appetite and feeling generalized weak     ______________________________________________________________________  DISCHARGE SUMMARY/HOSPITAL COURSE:  for full details see H&P, daily progress notes, labs, consult notes. _______________________________________________________________________  Patient seen and examined by me on discharge day. Pertinent Findings:  Gen:    Not in distress  Chest: Clear lungs  CVS:   Regular rhythm. No edema  Abd:  Soft, not distended, not tender  Neuro:  Alert with good insight.   Oriented to person, place, and time _______________________________________________________________________  DISCHARGE MEDICATIONS:   Current Discharge Medication List      START taking these medications    Details   azithromycin (ZITHROMAX) 250 mg tablet Take 2 Tablets by mouth daily for 3 days. Qty: 6 Tablet, Refills: 0  Start date: 1/13/2022, End date: 7/91/8959      folic acid (FOLVITE) 1 mg tablet Take 1 Tablet by mouth daily. Qty: 30 Tablet, Refills: 0  Start date: 1/14/2022      thiamine HCL (B-1) 100 mg tablet Take 1 Tablet by mouth daily. Qty: 30 Tablet, Refills: 0  Start date: 1/14/2022      pantoprazole (Protonix) 40 mg tablet Take 1 Tablet by mouth daily. Qty: 14 Tablet, Refills: 0  Start date: 1/13/2022      guaiFENesin (ROBITUSSIN) 100 mg/5 mL liquid Take 10 mL by mouth three (3) times daily as needed for Cough. Qty: 1 Each, Refills: 0  Start date: 1/13/2022      cefdinir (OMNICEF) 300 mg capsule Take 1 Capsule by mouth daily for 4 days. Qty: 4 Capsule, Refills: 0  Start date: 1/13/2022, End date: 1/17/2022         CONTINUE these medications which have NOT CHANGED    Details   OTHER Black Sea Oil: Take one teaspoonful by mouth twice daily             My Recommended Diet, Activity, Wound Care, and follow-up labs are listed in the patient's Discharge Insturctions which I have personally completed and reviewed.     _______________________________________________________________________  DISPOSITION:     Home with Family: x   Home with HH/PT/OT/RN:    SNF/LTC:    ANGELA:    OTHER:        Condition at Discharge:  Stable  _______________________________________________________________________  Follow up with:   PCP : Zak Verde MD  Follow-up Information     Follow up With Specialties Details Why Qing Mccrary MD Family Medicine On 1/20/2022 Your appointment time 0845,, THIS IS A VIRTUAL APPOINTMENT, nurse will call you at 15 mins prior to you talking to MD. [address]  [address]  [facility] [address]      Dennis Giordano, [address] [phone]                Total time in minutes spent coordinating this discharge (includes going over instructions, follow-up, prescriptions, and preparing report for sign off to her PCP) :  35 minutes    Signed:  Steven Morrell MD

## 2022-01-13 NOTE — CONSULTS
[facility] Consult - [facility]  Requesting physician:   Date of consult: 01/13/22    CC: BHASKAR stage 3 on CKD 3b    This is an \"e-consult\" with data obtained by chart review and by discussion with the requesting physician. I did not directly examine or interview the patient. HPI:  56yr with pmhx of IDDM-2, heroin and cocaine abuse, chronic pain and ckd 3b admitted with COVID-19 and found to have BHASKAR stage 3. Renal service consulted for BHASKAR stage 3 with NS. Pt crt peaked on presentation and was IVF resuscitated with crt now improving from 4's--> 3, upcr of 9, ua  With > 300 protein (unchanged), moderate blood and 5-10 rbc. Home medications do not include ace-I/arb, patient denies nsaid use. Drug screen positive for cocaine. Pt reports using heroin on the day of arrival but not cocaine. Last seen by pcp in 5/21 [facility])  JJ vaccine 2 months ago.          PMH:  Past Medical History:   Diagnosis Date    Diabetes ([address]     Hypertension          PSH:  Past Surgical History:   Procedure Laterality Date    HX ORTHOPAEDIC  rt hip replacement    HX ORTHOPAEDIC  rt thigh woo       FH:  Family History   Problem Relation Age of Onset    Diabetes Other     Hypertension Other        SH:  Social History     Socioeconomic History    Marital status: SINGLE     Spouse name: Not on file    Number of children: Not on file    Years of education: Not on file    Highest education level: Not on file   Occupational History    Not on file   Tobacco Use    Smoking status: Current Every Day Smoker     Packs/day: 1.00    Smokeless tobacco: Never Used   Vaping Use    Vaping Use: Never used   Substance and Sexual Activity    Alcohol use: Not Currently    Drug use: Yes     Types: Opiates     Comment: IV fentanly use today 1/11    Sexual activity: Not Currently   Other Topics Concern    Not on file   Social History Narrative    Mr Adryan Avendano used to work as a  at a hospital. He suffered a fall from a bridge and suffered many injuries, requiring neck and hip surgery. Had TBI    He is now on SSI. On discussion 11/9/16, he names his mother Jared Park as his HEQE      Social Determinants of Health     Financial Resource Strain:     Difficulty of Paying Living Expenses: Not on file   Food Insecurity:     Worried About [address] [employer] in the Last Year: Not on file    Ramya of Food in the Last Year: Not on file   Transportation Needs:     Lack of Transportation (Medical): Not on file    Lack of Transportation (Non-Medical): Not on file   Physical Activity:     Days of Exercise per Week: Not on file    Minutes of Exercise per Session: Not on file   Stress:     Feeling of Stress : Not on file   Social Connections:     Frequency of Communication with Friends and Family: Not on file    Frequency of Social Gatherings with Friends and Family: Not on file    Attends [employer] Services: Not on file    Active Member of [address] [place] [address] or Organizations: Not on file    Attends Club or Organization Meetings: Not on file    Marital Status: Not on file   Intimate Partner Violence:     Fear of Current or Ex-Partner: Not on file    Emotionally Abused: Not on file    Physically Abused: Not on file    Sexually Abused: Not on file   Housing Stability:     Unable to Pay for Housing in the Last Year: Not on file    Number of [place] in the Last Year: Not on file    Unstable Housing in the Last Year: Not on file       Home meds:  Prior to Admission Medications   Prescriptions Last Dose Informant Patient Reported? Taking?    OTHER 1/11/2022 Self Yes Yes   Sig: Black Sea Oil: Take one teaspoonful by mouth twice daily      Facility-Administered Medications: None       Current inpatient medications:    Current Facility-Administered Medications:     cloNIDine HCL (CATAPRES) tablet 0.1 mg, 0.1 mg, Oral, Q6H PRN, Phi Knight MD    cloNIDine HCL (CATAPRES) tablet 0.2 mg, 0.2 mg, Oral, Q6H PRN, Marga Morin MD    prochlorperazine (COMPAZINE) injection 5 mg, 5 mg, IntraMUSCular, Q3H PRN, Marga Morin MD    pantoprazole (PROTONIX) 40 mg in 0.9% sodium chloride 10 mL injection, 40 mg, IntraVENous, DAILY, Marga Morin MD, 40 mg at 01/13/22 0858    sodium chloride (NS) flush 5-40 mL, 5-40 mL, IntraVENous, Q8H, Darrius Bright MD, 10 mL at 01/13/22 0502    sodium chloride (NS) flush 5-40 mL, 5-40 mL, IntraVENous, PRN, Marga Morin MD, 10 mL at 01/12/22 0857    acetaminophen (TYLENOL) tablet 650 mg, 650 mg, Oral, Q6H PRN **OR** acetaminophen (TYLENOL) suppository 650 mg, 650 mg, Rectal, Q6H PRN, Marga Morin MD    polyethylene glycol (MIRALAX) packet 17 g, 17 g, Oral, DAILY PRN, Bel Demarco MD    ondansetron (ZOFRAN ODT) tablet 4 mg, 4 mg, Oral, Q8H PRN **OR** ondansetron (ZOFRAN) injection 4 mg, 4 mg, IntraVENous, Q6H PRN, Darrius Demarco MD, 4 mg at 58/15/51 4792    folic acid (FOLVITE) tablet 1 mg, 1 mg, Oral, DAILY, Darrius Demarco MD, 1 mg at 01/13/22 0857    thiamine HCL (B-1) tablet 100 mg, 100 mg, Oral, DAILY, Darrius Demarco MD, 100 mg at 01/13/22 0857    dicyclomine (BENTYL) 10 mg/mL injection 20 mg, 20 mg, IntraMUSCular, Q6H PRN, Darrius Demarco MD    glucose chewable tablet 16 g, 4 Tablet, Oral, PRN, Marga Morin MD    dextrose (D50W) injection syrg 12.5-25 g, 25-50 mL, IntraVENous, PRN, Darrius Demarco MD    glucagon (GLUCAGEN) injection 1 mg, 1 mg, IntraMUSCular, PRN, Darrius Demarco MD    insulin lispro (HUMALOG) injection 1-10 Units, 1-10 Units, SubCUTAneous, AC&HS, Marga Morin MD, 2 Units at 01/12/22 1846    guaiFENesin-dextromethorphan (ROBITUSSIN DM) 100-10 mg/5 mL syrup 10 mL, 10 mL, Oral, Q6H PRN, Darrius Demarco MD    ipratropium-albuterol (COMBIVENT RESPIMAT) 20 mcg-100 mcg inhalation spray +++KEEP AT BEDSIDE+++, 1 Puff, Inhalation, Q4H PRN, Marga Morin MD    loperamide (IMODIUM) capsule 2 mg, 2 mg, Oral, Q3H PRN, Ambrosio Jacobs MD    [COMPLETED] methadone (DOLOPHINE) tablet 30 mg, 30 mg, Oral, DAILY, 30 mg at 01/12/22 0856 **FOLLOWED BY** methadone (DOLOPHINE) tablet 15 mg, 15 mg, Oral, DAILY, Darrius Kapadia MD, 15 mg at 01/13/22 0858    azithromycin (ZITHROMAX) tablet 500 mg, 500 mg, Oral, DAILY, Darrius Kapadia MD, 500 mg at 01/13/22 0857    heparin (porcine) injection 5,000 Units, 5,000 Units, SubCUTAneous, Q8H, Darrius Bright MD, 5,000 Units at 01/13/22 0501    cefTRIAXone (ROCEPHIN) 1 g in 0.9% sodium chloride (MBP/ADV) 50 mL MBP, 1 g, IntraVENous, DAILY, Darrius Bright MD, Last Rate: 100 mL/hr at 01/13/22 0858, 1 g at 01/13/22 0858    influenza vaccine 2021-22 (6 mos+)(PF) (FLUARIX/FLULAVAL/FLUZONE QUAD) injection 0.5 mL, 1 Each, IntraMUSCular, PRIOR TO DISCHARGE, Ambrosio Jacobs MD    Allergies:   Allergies   Allergen Reactions    Shellfish Containing Products Swelling       ROS:   Not performed    Vitals:  Patient Vitals for the past 24 hrs:   Temp Pulse Resp BP SpO2   01/13/22 0800 98.6 °F (37 °C) 72 19 139/88 98 %   01/13/22 0400 98.3 °F (36.8 °C) 71 24 129/75 97 %   01/13/22 0350 -- 79 -- -- --   01/13/22 0000 98.5 °F (36.9 °C) 71 18 110/66 94 %   01/12/22 2346 -- 70 -- -- --   01/12/22 2121 97.9 °F (36.6 °C) 74 18 122/74 95 %   01/12/22 2000 -- 72 -- -- --   01/12/22 1615 98.8 °F (37.1 °C) 77 21 138/88 97 %   01/12/22 1558 -- 79 -- -- --   01/12/22 1200 98.3 °F (36.8 °C) 79 19 129/86 99 %       I/O:    Intake/Output Summary (Last 24 hours) at 1/13/2022 1018  Last data filed at 1/13/2022 0400  Gross per 24 hour   Intake 2204 ml   Output 1175 ml   Net 1029 ml       Physical exam:  Not performed    Labs/imaging:  Recent Results (from the past 24 hour(s))   ECHO ADULT COMPLETE    Collection Time: 01/12/22 11:56 AM   Result Value Ref Range    IVSd 0.9 0.6 - 1.0 cm    LVIDd 5.8 4.2 - 5.9 cm    LVIDs 4.7 cm    LVPWd 0.8 0.6 - 1.0 cm    EF BP 29 (A) 55 - 100 %    EF BP 29 (A) 55 - 100 %    LV Ejection Fraction A2C 28 %    LV Ejection Fraction A4C 28 %    LV EDV A2C 190 mL    LV EDV A4C 214 mL    LV EDV  (A) 67 - 155 mL    LV EDV  (A) 67 - 155 mL    LV ESV A2C 137 mL    LV ESV A4C 154 mL    LV ESV  (A) 22 - 58 mL    LVOT Peak Gradient 2 mmHg    LVOT Peak Velocity 0.7 m/s    RVIDd 3.4 cm    RVSP 34 mmHg    LA Diameter 3.7 cm    LA Volume A/L 64 mL    LA Volume 2C 60 (A) 18 - 58 mL    LA Volume 4C 52 18 - 58 mL    LA Volume BP 57 18 - 58 mL    Est. RA Pressure 15 mmHg    AV Peak Gradient 4 mmHg    AV Peak Velocity 1.0 m/s    MV A Velocity 0.74 m/s    MV E Wave Deceleration Time 162.1 ms    MV E Velocity 0.57 m/s    MV PHT 47.0 ms    MV Area by PHT 4.7 cm2    TR Peak Gradient 18 mmHg    TR Max Velocity 2.20 m/s    Fractional Shortening 2D 19 28 - 44 %    LV ESV Index BP 79 mL/m2    LV ESV Index A4C 79 mL/m2    LV EDV Index A4C 110 mL/m2    LV ESV Index A2C 71 mL/m2    LV EDV Index A2C 98 mL/m2    LVIDd Index 2.99 cm/m2    LVIDs Index 2.42 cm/m2    LV RWT Ratio 0.28     LV Mass 2D 189.3 88 - 224 g    LV Mass 2D Index 97.6 49 - 115 g/m2    MV E/A 0.77     LA Volume Index A/L 33 16 - 34 mL/m2    LA Volume Index 2C 31 16 - 34 mL/m2    LA Volume Index 4C 27 16 - 34 mL/m2    LA Size Index 1.91 cm/m2    AV Velocity Ratio 0.70     LA Volume Index BP 29 16 - 34 ml/m2   GLUCOSE, POC    Collection Time: 01/12/22 12:20 PM   Result Value Ref Range    Glucose (POC) 109 65 - 117 mg/dL    Performed by Les Tavares    GLUCOSE, POC    Collection Time: 01/12/22  5:24 PM   Result Value Ref Range    Glucose (POC) 143 (H) 65 - 117 mg/dL    Performed by Les Tavares    GLUCOSE, POC    Collection Time: 01/12/22  9:20 PM   Result Value Ref Range    Glucose (POC) 102 65 - 117 mg/dL    Performed by Nic Rendon (TRV RN)    METABOLIC PANEL, BASIC    Collection Time: 01/13/22  5:03 AM   Result Value Ref Range    Sodium 138 136 - 145 mmol/L    Potassium 4.2 3.5 - 5.1 mmol/L    Chloride 107 97 - 108 mmol/L    CO2 20 (L) 21 - 32 mmol/L    Anion gap 11 5 - 15 mmol/L    Glucose 166 (H) 65 - 100 mg/dL    BUN 50 (H) 6 - 20 MG/DL    Creatinine 3.30 (H) 0.70 - 1.30 MG/DL    BUN/Creatinine ratio 15 12 - 20      GFR est AA 24 (L) >60 ml/min/1.73m2    GFR est non-AA 20 (L) >60 ml/min/1.73m2    Calcium 6.9 (L) 8.5 - 10.1 MG/DL   C REACTIVE PROTEIN, QT    Collection Time: 01/13/22  5:03 AM   Result Value Ref Range    C-Reactive protein 9.42 (H) 0.00 - 0.60 mg/dL   GLUCOSE, POC    Collection Time: 01/13/22  7:52 AM   Result Value Ref Range    Glucose (POC) 79 65 - 117 mg/dL    Performed by Anam Clarke RN        A/P:    1. Bhaskar stage 3 on CKD 3b: non-oligouric, BHASKAR etiology unclear but suspect pre-renal/ATI 2/2 COVID-19 with ddx includes GN. -Baseline ckd likely 2/2 uncontrolled DM with A1C>14 and likely DMN with heroin/cocaine use. Ddx for NS includes baseline DMN, COVAN, FSGS, MN, IgA and MM/amyloid. -no acute indication for RRT with improving renal function  -would check GN labs as patient with h/o cocaine use and covid at risk for GN  -would check javad, anti-dsdna, c3/c4, anca, antigbm, hiv, hepb/c and cryo  -will need repeat Renal panel and UPCR in 2 weeks if being discharged and followed via Telemedicine-  -would arrange for f/u labs with pcp in 7-10 days.   -discussed with patient high risk of complication including progression to ESRD and will need close follow-up if being discharged  -will need renal biopsy if no improvement     [phone] cell phone for patient     Thank you for this consult. I will continue to follow the patient with you. Please feel free to call with questions.      Anayeli Tavera M.D.  [facility]

## 2022-01-13 NOTE — PROGRESS NOTES
0700  Shift change report received from [facility] (Nurse). Report included review of SBAR, accordion report results review, orders, meds, ROS and POC. All questions answered. Transfer of care completed.

## 2022-01-13 NOTE — PROGRESS NOTES
Reason for Admission:  HISTORY OF PRESENT ILLNESS:     Lidia Carvalho is a 55 y.o.  male with PMH of above-mentioned problems who presents to ED with c/o productive cough and shortness of breath for past 5 days. RUR Score:    12%                 Plan for utilizing home health:     n/a     PCP: First and Last name:  Nay Morris MD     Name of Practice:    Are you a current patient: Yes/No:    Approximate date of last visit:    Can you participate in a virtual visit with your PCP:                     Current Advanced Directive/Advance Care Plan: Full Code  Wants CPR/ventilation. Healthcare decision maker is his sister,    Primary Decision Maker: Martell Mora Sister - [phone] or his nephew Michael Edge (phone number unknown). Healthcare Decision Maker:   Click here to complete [address] including selection of the Healthcare Decision Maker Relationship (ie \"Primary\")             Primary Decision Maker: Martell Payton - [phone]                  Transition of Care Plan:                    Patient had been staying with a friend by can not return because he is COVID positive and cannot get access to his disability income because he lost his bank card. Last stable housing was with his aunt who  last year. Patient uses [facility] pharmacy. Patient did not return to get his BP medications and has lost all his insulin and insulin supplies. SAMEERA verified that he has his cell phone and the number is [phone]. SAMEERA assisted patient in calling the [employer] a message for a return call so that patient can be entered into their data base.     SAMEERA sent an email to the [facility] for possible admission into their program.     JOSE Vickers/SAMEERA  [phone]

## 2022-01-13 NOTE — ACP (ADVANCE CARE PLANNING)
Current Advanced Directive/Advance Care Plan: Full Code  Wants CPR/ventilation. Healthcare decision maker is his sister,    Primary Decision Maker: Terry Metzger - Sister - [phone] or his nephew Kaylee Cuadra (phone number unknown).

## 2022-01-14 VITALS
WEIGHT: 167 LBS | BODY MASS INDEX: 23.91 KG/M2 | OXYGEN SATURATION: 96 % | HEART RATE: 71 BPM | SYSTOLIC BLOOD PRESSURE: 134 MMHG | DIASTOLIC BLOOD PRESSURE: 80 MMHG | HEIGHT: 70 IN | RESPIRATION RATE: 16 BRPM | TEMPERATURE: 97.7 F

## 2022-01-14 LAB
ANION GAP SERPL CALC-SCNC: 8 MMOL/L (ref 5–15)
BUN SERPL-MCNC: 49 MG/DL (ref 6–20)
BUN/CREAT SERPL: 14 (ref 12–20)
CALCIUM SERPL-MCNC: 7.8 MG/DL (ref 8.5–10.1)
CHLORIDE SERPL-SCNC: 106 MMOL/L (ref 97–108)
CO2 SERPL-SCNC: 21 MMOL/L (ref 21–32)
CREAT SERPL-MCNC: 3.41 MG/DL (ref 0.7–1.3)
GLUCOSE BLD STRIP.AUTO-MCNC: 128 MG/DL (ref 65–117)
GLUCOSE BLD STRIP.AUTO-MCNC: 162 MG/DL (ref 65–117)
GLUCOSE SERPL-MCNC: 108 MG/DL (ref 65–100)
HBV SURFACE AG SER QL: <0.1 INDEX
HBV SURFACE AG SER QL: NEGATIVE
HCV AB SER IA-ACNC: >11 INDEX
HCV AB SERPL QL IA: REACTIVE
HIV 1+2 AB+HIV1 P24 AG SERPL QL IA: NONREACTIVE
HIV12 RESULT COMMENT, HHIVC: NORMAL
POTASSIUM SERPL-SCNC: 4.7 MMOL/L (ref 3.5–5.1)
SERVICE CMNT-IMP: ABNORMAL
SERVICE CMNT-IMP: ABNORMAL
SODIUM SERPL-SCNC: 135 MMOL/L (ref 136–145)

## 2022-01-14 PROCEDURE — 83516 IMMUNOASSAY NONANTIBODY: CPT

## 2022-01-14 PROCEDURE — 80048 BASIC METABOLIC PNL TOTAL CA: CPT

## 2022-01-14 PROCEDURE — 97116 GAIT TRAINING THERAPY: CPT | Performed by: PHYSICAL THERAPIST

## 2022-01-14 PROCEDURE — 87389 HIV-1 AG W/HIV-1&-2 AB AG IA: CPT

## 2022-01-14 PROCEDURE — 74011636637 HC RX REV CODE- 636/637: Performed by: STUDENT IN AN ORGANIZED HEALTH CARE EDUCATION/TRAINING PROGRAM

## 2022-01-14 PROCEDURE — 83520 IMMUNOASSAY QUANT NOS NONAB: CPT

## 2022-01-14 PROCEDURE — 86038 ANTINUCLEAR ANTIBODIES: CPT

## 2022-01-14 PROCEDURE — 74011250637 HC RX REV CODE- 250/637: Performed by: STUDENT IN AN ORGANIZED HEALTH CARE EDUCATION/TRAINING PROGRAM

## 2022-01-14 PROCEDURE — 86160 COMPLEMENT ANTIGEN: CPT

## 2022-01-14 PROCEDURE — 86803 HEPATITIS C AB TEST: CPT

## 2022-01-14 PROCEDURE — 87340 HEPATITIS B SURFACE AG IA: CPT

## 2022-01-14 PROCEDURE — 82585 ASSAY OF CRYOFIBRINOGEN: CPT

## 2022-01-14 PROCEDURE — 36415 COLL VENOUS BLD VENIPUNCTURE: CPT

## 2022-01-14 PROCEDURE — 82962 GLUCOSE BLOOD TEST: CPT

## 2022-01-14 PROCEDURE — 86225 DNA ANTIBODY NATIVE: CPT

## 2022-01-14 RX ORDER — LANOLIN ALCOHOL/MO/W.PET/CERES
100 CREAM (GRAM) TOPICAL DAILY
Qty: 30 TABLET | Refills: 0 | Status: SHIPPED | OUTPATIENT
Start: 2022-01-14

## 2022-01-14 RX ORDER — PANTOPRAZOLE SODIUM 40 MG/1
40 TABLET, DELAYED RELEASE ORAL
Status: DISCONTINUED | OUTPATIENT
Start: 2022-01-14 | End: 2022-01-14 | Stop reason: HOSPADM

## 2022-01-14 RX ADMIN — AZITHROMYCIN MONOHYDRATE 500 MG: 250 TABLET ORAL at 09:13

## 2022-01-14 RX ADMIN — PANTOPRAZOLE SODIUM 40 MG: 40 TABLET, DELAYED RELEASE ORAL at 12:52

## 2022-01-14 RX ADMIN — METHADONE HYDROCHLORIDE 15 MG: 10 TABLET ORAL at 09:11

## 2022-01-14 RX ADMIN — Medication 100 MG: at 09:10

## 2022-01-14 RX ADMIN — FOLIC ACID 1 MG: 1 TABLET ORAL at 09:11

## 2022-01-14 RX ADMIN — Medication 2 UNITS: at 13:02

## 2022-01-14 NOTE — PROGRESS NOTES
[facility] Dosing Services: IV to PO Conversion    The pharmacist has determined that this patient meets P & T approved criteria for conversion from IV to oral therapy for the following medication:Pantoprazole    The pharmacist has written the following order for the patient: pantoprazole 40 mg po daily   The pharmacist will continue to monitor the patient's status and advise the physician if conversion back to IV therapy is recommended.     Signed Ela Null PHARMD Contact information:  [phone]

## 2022-01-14 NOTE — PROGRESS NOTES
Problem: Airway Clearance - Ineffective  Goal: Achieve or maintain patent airway  1/14/2022 1323 by Tony Hernandez RN  Outcome: Resolved/Met  1/14/2022 0926 by Tony Hernandez RN  Outcome: Progressing Towards Goal     Problem: Gas Exchange - Impaired  Goal: Absence of hypoxia  1/14/2022 1323 by Tony Hernandez RN  Outcome: Resolved/Met  1/14/2022 0926 by Tony Hernandez RN  Outcome: Progressing Towards Goal  Goal: Promote optimal lung function  1/14/2022 1323 by Tony Hernandez RN  Outcome: Resolved/Met  1/14/2022 0926 by Tony Hernandez RN  Outcome: Progressing Towards Goal     Problem: Breathing Pattern - Ineffective  Goal: Ability to achieve and maintain a regular respiratory rate  1/14/2022 1323 by Tony Hernandez RN  Outcome: Resolved/Met  1/14/2022 0926 by Tony Hernandez RN  Outcome: Progressing Towards Goal     Problem:  Body Temperature -  Risk of, Imbalanced  Goal: Ability to maintain a body temperature within defined limits  1/14/2022 1323 by Tony Hernandez RN  Outcome: Resolved/Met  1/14/2022 0926 by Tony Hernandez RN  Outcome: Progressing Towards Goal  Goal: Will regain or maintain usual level of consciousness  1/14/2022 1323 by Tony Hernandez RN  Outcome: Resolved/Met  1/14/2022 0926 by Tony Hernandez RN  Outcome: Progressing Towards Goal  Goal: Complications related to the disease process, condition or treatment will be avoided or minimized  1/14/2022 1323 by Tony Hernandez RN  Outcome: Resolved/Met  1/14/2022 0926 by Tony Hernandez RN  Outcome: Progressing Towards Goal     Problem: Isolation Precautions - Risk of Spread of Infection  Goal: Prevent transmission of infectious organism to others  1/14/2022 1323 by Tony Hernandez RN  Outcome: Resolved/Met  1/14/2022 0926 by Tony Hernandez RN  Outcome: Progressing Towards Goal     Problem: Nutrition Deficits  Goal: Optimize nutrtional status  1/14/2022 1323 by Tony Hernandez RN  Outcome: Resolved/Met  1/14/2022 0926 by Audra Pozo RN  Outcome: Progressing Towards Goal     Problem: Risk for Fluid Volume Deficit  Goal: Maintain normal heart rhythm  1/14/2022 1323 by Audra Pozo RN  Outcome: Resolved/Met  1/14/2022 0926 by Audra Pozo RN  Outcome: Progressing Towards Goal  Goal: Maintain absence of muscle cramping  1/14/2022 1323 by Audra Pozo RN  Outcome: Resolved/Met  1/14/2022 0926 by Audra Pozo RN  Outcome: Progressing Towards Goal  Goal: Maintain normal serum potassium, sodium, calcium, phosphorus, and pH  1/14/2022 1323 by Audra Pozo RN  Outcome: Resolved/Met  1/14/2022 0926 by Audra Pozo RN  Outcome: Progressing Towards Goal     Problem: Loneliness or Risk for Loneliness  Goal: Demonstrate positive use of time alone when socialization is not possible  1/14/2022 1323 by Audra Pozo RN  Outcome: Resolved/Met  1/14/2022 0926 by Audra Pozo RN  Outcome: Progressing Towards Goal     Problem: Fatigue  Goal: Verbalize increase energy and improved vitality  1/14/2022 1323 by Audra Pozo RN  Outcome: Resolved/Met  1/14/2022 0926 by Audra Pozo RN  Outcome: Progressing Towards Goal     Problem: Risk for Falls  Goal: Free of falls during inpatient stay  Description: Patient will be free of falls during inpatient stay. 1/14/2022 1323 by Audra Pozo RN  Outcome: Resolved/Met  1/14/2022 0926 by Audra Pozo RN  Outcome: Progressing Towards Goal     Problem: Alteration in Mobility  Goal: Remain as independent as possible and remain safe in environment  Description: Patient will remain as independent as possible and remain safe in their environment.   1/14/2022 1323 by Audra Pozo RN  Outcome: Resolved/Met  1/14/2022 0926 by Audra Pozo RN  Outcome: Progressing Towards Goal     Problem: Pain  Goal: Assess satisfaction of level of comfort and symptom control  1/14/2022 1323 by Audra Pozo RN  Outcome: Resolved/Met  1/14/2022 0926 by Audra Pozo RN  Outcome: Progressing Towards Goal  Goal: *Control of acute pain  1/14/2022 1323 by Fredy Robbins RN  Outcome: Resolved/Met  1/14/2022 0926 by Fredy Robbins RN  Outcome: Progressing Towards Goal     Problem: Pressure Injury - Risk of  Goal: *Prevention of pressure injury  Description: Document Griffin Scale and appropriate interventions in the flowsheet. 1/14/2022 1323 by Fredy Robbins RN  Outcome: Resolved/Met  Note: Pressure Injury Interventions:  Sensory Interventions: Assess changes in LOC    Moisture Interventions: Absorbent underpads    Activity Interventions: Increase time out of bed    Mobility Interventions: HOB 30 degrees or less    Nutrition Interventions: Offer support with meals,snacks and hydration    Friction and Shear Interventions: HOB 30 degrees or less             1/14/2022 0926 by Fredy Robbins RN  Outcome: Progressing Towards Goal  Note: Pressure Injury Interventions:  Sensory Interventions: Assess changes in LOC    Moisture Interventions: Absorbent underpads    Activity Interventions: Increase time out of bed    Mobility Interventions: HOB 30 degrees or less    Nutrition Interventions: Offer support with meals,snacks and hydration    Friction and Shear Interventions: HOB 30 degrees or less                Problem: Patient Education: Go to Patient Education Activity  Goal: Patient/Family Education  1/14/2022 1323 by Fredy Robbins RN  Outcome: Resolved/Met  1/14/2022 0926 by Fredy Robbins RN  Outcome: Progressing Towards Goal     Problem: Falls - Risk of  Goal: *Absence of Falls  Description: Document Nataly Fall Risk and appropriate interventions in the flowsheet.   1/14/2022 1323 by Fredy Robbins RN  Outcome: Resolved/Met  Note: Fall Risk Interventions:  Mobility Interventions: Bed/chair exit alarm         Medication Interventions: Teach patient to arise slowly                1/14/2022 0926 by Fredy Robbins RN  Outcome: Progressing Towards Goal  Note: Fall Risk Interventions:  Mobility Interventions: Bed/chair exit alarm         Medication Interventions: Teach patient to arise slowly                   Problem: Patient Education: Go to Patient Education Activity  Goal: Patient/Family Education  1/14/2022 1323 by Tony Hernandez RN  Outcome: Resolved/Met  1/14/2022 0926 by Tony Hernandez RN  Outcome: Progressing Towards Goal     Problem: Patient Education: Go to Patient Education Activity  Goal: Patient/Family Education  Outcome: Progressing Towards Goal

## 2022-01-14 NOTE — DISCHARGE SUMMARY
Hospitalist Discharge Summary     Patient ID:  Yvette Anderson  [id number]  2 y.o.  1965    PCP on record: Rina Johnson MD    Admit date: 1/11/2022  Discharge date and time: 1/14/2022    Please note that this dictation was completed with Bens[employer], the [employer] voice recognition software. Quite often unanticipated grammatical, syntax, homophones, and other interpretive errors are inadvertently transcribed by the computer software. Please disregard these errors. Please excuse any errors that have escaped final proofreading. 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   Patient to have follow-up kidney function test in 1 week with nephrology  - Patient agreed and verbalized understanding           #COVID-19 pneumonia  #Shortness of breath resolved  #Productive cough resolved   -Chest x-ray  reviewed independently bibasilar pneumonia  -Not requiring supplemental oxygen  -, proBNP 40513, troponin high sensitive 57,  -Procalcitonin 2.62 and CRP 14.30 > 9.42   -Patient to receive antibiotics to complete 7-day course of community-acquired pneumonia  -Patient to practice quarantine and follow-up with primary care further management     #Nausea and vomiting POA resolved   -Patient was given 14-day PPI trial       #Type II diabetes mellitus  -A1c decreased from 14 to 5.8  -Patient to follow-up with primary care further management of diabetes mellitus     #Hypertension  --Blood pressure readings were not elevated that would require antihypertensive at this time  -Patient to follow-up with primary care for management of hypertension     #Opioid addiction POA  #IV fentanyl use  #Cocaine use  -UDS positive for cocaine and opioids  -Last used fentanyl 1/11/22 IV route  - . Patient was counseled extensively on the need to abstain from drugs its addictive tendencies, its deleterious effects on the brain, cardiovascular system, lungs as well as its financial & social sequelae      #Homelessness  -Case management to provide resources    CONSULTATIONS:  IP CONSULT TO NEPHROLOGY    Excerpted HPI from H&P of Kelly Hsieh MD:    Mayela Mckeon is a 55 y.o.  male with PMH of above-mentioned problems who presents to ED with c/o productive cough and shortness of breath for past 5 days. Patient states that he been having symptoms of chills, dyspnea on exertion and productive cough. Patient also endorses using IV fentanyl. Patient had any fever chest pain or abdominal pain. Denied any loss of sense of smell or taste or diarrhea. Patient endorsed loss of appetite and feeling generalized weak     ______________________________________________________________________  DISCHARGE SUMMARY/HOSPITAL COURSE:  for full details see H&P, daily progress notes, labs, consult notes. _______________________________________________________________________  Patient seen and examined by me on discharge day. Pertinent Findings:  Gen:    Not in distress  Chest: Clear lungs  CVS:   Regular rhythm. No edema  Abd:  Soft, not distended, not tender  Neuro:  Alert with good insight.   Oriented to person, place, and time _______________________________________________________________________  DISCHARGE MEDICATIONS:   Current Discharge Medication List      START taking these medications    Details   azithromycin (ZITHROMAX) 250 mg tablet Take 2 Tablets by mouth daily for 3 days. Qty: 6 Tablet, Refills: 0  Start date: 1/13/2022, End date: 7/36/4296      folic acid (FOLVITE) 1 mg tablet Take 1 Tablet by mouth daily. Qty: 30 Tablet, Refills: 0  Start date: 1/14/2022      thiamine HCL (B-1) 100 mg tablet Take 1 Tablet by mouth daily. Qty: 30 Tablet, Refills: 0  Start date: 1/14/2022      pantoprazole (Protonix) 40 mg tablet Take 1 Tablet by mouth daily. Qty: 14 Tablet, Refills: 0  Start date: 1/13/2022      guaiFENesin (ROBITUSSIN) 100 mg/5 mL liquid Take 10 mL by mouth three (3) times daily as needed for Cough. Qty: 1 Each, Refills: 0  Start date: 1/13/2022      cefdinir (OMNICEF) 300 mg capsule Take 1 Capsule by mouth daily for 4 days. Qty: 4 Capsule, Refills: 0  Start date: 1/13/2022, End date: 1/17/2022         CONTINUE these medications which have NOT CHANGED    Details   OTHER Black Sea Oil: Take one teaspoonful by mouth twice daily             My Recommended Diet, Activity, Wound Care, and follow-up labs are listed in the patient's Discharge Insturctions which I have personally completed and reviewed.     _______________________________________________________________________  DISPOSITION:     Home with Family: x   Home with HH/PT/OT/RN:    SNF/LTC:    ANGELA:    OTHER:        Condition at Discharge:  Stable  _______________________________________________________________________  Follow up with:   PCP : Katerina Lantigua MD  Follow-up Information     Follow up With Specialties Details Why Makeda Foote MD Family Medicine On 1/20/2022 Your appointment time 0845,, THIS IS A VIRTUAL APPOINTMENT, nurse will call you at 15 mins prior to you talking to MD. [facility]  [address]  [facility] [address] , [address] [facility] 7 [phone]                Total time in minutes spent coordinating this discharge (includes going over instructions, follow-up, prescriptions, and preparing report for sign off to her PCP) :  35 minutes    Signed:  Elis Mckinnon MD

## 2022-01-14 NOTE — PROGRESS NOTES
Problem: Airway Clearance - Ineffective  Goal: Achieve or maintain patent airway  Outcome: Progressing Towards Goal     Problem: Gas Exchange - Impaired  Goal: Absence of hypoxia  Outcome: Progressing Towards Goal  Goal: Promote optimal lung function  Outcome: Progressing Towards Goal     Problem: Breathing Pattern - Ineffective  Goal: Ability to achieve and maintain a regular respiratory rate  Outcome: Progressing Towards Goal     Problem: Body Temperature -  Risk of, Imbalanced  Goal: Ability to maintain a body temperature within defined limits  Outcome: Progressing Towards Goal  Goal: Will regain or maintain usual level of consciousness  Outcome: Progressing Towards Goal  Goal: Complications related to the disease process, condition or treatment will be avoided or minimized  Outcome: Progressing Towards Goal     Problem: Isolation Precautions - Risk of Spread of Infection  Goal: Prevent transmission of infectious organism to others  Outcome: Progressing Towards Goal     Problem: Nutrition Deficits  Goal: Optimize nutrtional status  Outcome: Progressing Towards Goal     Problem: Risk for Fluid Volume Deficit  Goal: Maintain normal heart rhythm  Outcome: Progressing Towards Goal  Goal: Maintain absence of muscle cramping  Outcome: Progressing Towards Goal  Goal: Maintain normal serum potassium, sodium, calcium, phosphorus, and pH  Outcome: Progressing Towards Goal     Problem: Loneliness or Risk for Loneliness  Goal: Demonstrate positive use of time alone when socialization is not possible  Outcome: Progressing Towards Goal     Problem: Fatigue  Goal: Verbalize increase energy and improved vitality  Outcome: Progressing Towards Goal     Problem: Patient Education: Go to Patient Education Activity  Goal: Patient/Family Education  Outcome: Progressing Towards Goal     Problem: Risk for Falls  Goal: Free of falls during inpatient stay  Description: Patient will be free of falls during inpatient stay.   Outcome: Progressing Towards Goal     Problem: Alteration in Mobility  Goal: Remain as independent as possible and remain safe in environment  Description: Patient will remain as independent as possible and remain safe in their environment. Outcome: Progressing Towards Goal     Problem: Pain  Goal: Assess satisfaction of level of comfort and symptom control  Outcome: Progressing Towards Goal  Goal: *Control of acute pain  Outcome: Progressing Towards Goal     Problem: Pressure Injury - Risk of  Goal: *Prevention of pressure injury  Description: Document Griffin Scale and appropriate interventions in the flowsheet. Outcome: Progressing Towards Goal  Note: Pressure Injury Interventions:  Sensory Interventions: Assess changes in LOC    Moisture Interventions: Absorbent underpads    Activity Interventions: Increase time out of bed    Mobility Interventions: HOB 30 degrees or less    Nutrition Interventions: Offer support with meals,snacks and hydration    Friction and Shear Interventions: HOB 30 degrees or less                Problem: Patient Education: Go to Patient Education Activity  Goal: Patient/Family Education  Outcome: Progressing Towards Goal     Problem: Falls - Risk of  Goal: *Absence of Falls  Description: Document Nataly Fall Risk and appropriate interventions in the flowsheet.   Outcome: Progressing Towards Goal  Note: Fall Risk Interventions:  Mobility Interventions: Bed/chair exit alarm         Medication Interventions: Teach patient to arise slowly                   Problem: Patient Education: Go to Patient Education Activity  Goal: Patient/Family Education  Outcome: Progressing Towards Goal     Problem: Patient Education: Go to Patient Education Activity  Goal: Patient/Family Education  Outcome: Progressing Towards Goal

## 2022-01-14 NOTE — PROGRESS NOTES
0700- shift change report given to miquel benson  by rachell. Report included the following information lab values, sbar, current vitals and discharge information. 0900- patient assessed resting comfortably in bed with no complaints. Plans to d.c later today. 1415- patient safely discharged, provided w/ meds, d/c avs reviewed this patient in detail regarding meds and f/u apts, pt able to provide accurate teach back, all belongs in pt possession. Room reviewed no personal belongings left behind. Pt transported off unit via transport set up by hospital. No s/s of distress at d/c. Patient safely d/c off unit.

## 2022-01-15 LAB
C3 SERPL-MCNC: 142 MG/DL (ref 82–167)
C4 SERPL-MCNC: 22 MG/DL (ref 12–38)
DSDNA AB SER-ACNC: 6 IU/ML (ref 0–9)

## 2022-01-16 LAB
BACTERIA SPEC CULT: ABNORMAL
BACTERIA SPEC CULT: ABNORMAL
BACTERIA SPEC CULT: NORMAL
BACTERIA SPEC CULT: NORMAL
GRAM STN SPEC: ABNORMAL
SERVICE CMNT-IMP: ABNORMAL
SERVICE CMNT-IMP: NORMAL
SERVICE CMNT-IMP: NORMAL

## 2022-01-17 ENCOUNTER — PATIENT OUTREACH (OUTPATIENT)
Dept: CASE MANAGEMENT | Age: 57
End: 2022-01-17

## 2022-01-17 LAB
ANA TITR SER IF: NEGATIVE {TITER}
C-ANCA TITR SER IF: NORMAL TITER
GBM IGG SER-ACNC: 5 UNITS (ref 0–20)
MYELOPEROXIDASE AB SER IA-ACNC: <9 U/ML (ref 0–9)
P-ANCA ATYPICAL TITR SER IF: NORMAL TITER
P-ANCA TITR SER IF: NORMAL TITER
PROTEINASE3 AB SER IA-ACNC: <3.5 U/ML (ref 0–3.5)

## 2022-01-19 LAB — CRYOFIB PLAS QL: NORMAL

## 2022-01-25 ENCOUNTER — NURSE TRIAGE (OUTPATIENT)
Dept: OTHER | Facility: CLINIC | Age: 57
End: 2022-01-25

## 2022-01-25 NOTE — TELEPHONE ENCOUNTER
Received call from Jasmin Officer at [facility] with Red Flag Complaint. Subjective: Caller states \"My nose still runs but besides that everything is getting much better. My appetite is coming back. \"     Current Symptoms: Patient previously was admitted to the hospital with Jeremie. Missed his virtual appointment on 1/20. Went to the pharmacy for more medication and was told they didn't have any for him. Patient denies worsening symptoms. Completed COVID isolation on 1/21. Recommended disposition: Reschedule ER follow up appointment. States he has to call his ride 3 days ahead of time. Has flip phone and doesn't know how to operate it much so unable to do virtual visit. Is homeless right now. Care advice provided, patient verbalizes understanding; denies any other questions or concerns; instructed to call back for any new or worsening symptoms. Writer provided warm transfer to [place] at [facility] for appointment scheduling    Attention Provider: Thank you for allowing me to participate in the care of your patient. The patient was connected to triage in response to information provided to the [facility]. Please do not respond through this encounter as the response is not directed to a shared pool.               Reason for Disposition   Requesting regular office appointment    Protocols used: INFORMATION ONLY CALL - NO TRIAGE-ADULT-

## 2022-02-10 ENCOUNTER — PATIENT OUTREACH (OUTPATIENT)
Dept: CASE MANAGEMENT | Age: 57
End: 2022-02-10

## 2022-02-10 NOTE — PROGRESS NOTES
02/10/22  CTN unable to reach patient on phone, call goes directly to . CTN left message. Chart reviewed, patient called PCP office, reports he is doing better with exception of \"runny nose\". He has appt scheduled with provider tomorrow. ---leno

## 2022-02-14 ENCOUNTER — PATIENT OUTREACH (OUTPATIENT)
Dept: CASE MANAGEMENT | Age: 57
End: 2022-02-14

## 2022-02-14 NOTE — PROGRESS NOTES
Patient resolved from [address] Transitions episode on 2/14/2022. Patient/family has been provided the following resources and education related to COVID-19:                         Signs, symptoms and red flags related to COVID-19            CDC exposure and quarantine guidelines            Con[facility]it exposure contact - [phone]            Contact for their local Department of Health                 Patient currently reports that the following symptoms have improved:  patient states he has no symptoms and feels better. No further outreach scheduled with this CTN/ACM/LPN/HC/ MA. Episode of Care resolved. Patient has this CTN/ACM/LPN/HC/MA contact information if future needs arise.

## 2022-03-05 ENCOUNTER — HOSPITAL ENCOUNTER (EMERGENCY)
Age: 57
Discharge: HOME | End: 2022-03-05
Attending: EMERGENCY MEDICINE
Payer: MEDICAID

## 2022-03-05 VITALS
BODY MASS INDEX: 27.1 KG/M2 | WEIGHT: 188.9 LBS | DIASTOLIC BLOOD PRESSURE: 92 MMHG | HEART RATE: 75 BPM | RESPIRATION RATE: 17 BRPM | OXYGEN SATURATION: 100 % | SYSTOLIC BLOOD PRESSURE: 146 MMHG

## 2022-03-05 DIAGNOSIS — I10 ESSENTIAL HYPERTENSION: Primary | ICD-10-CM

## 2022-03-05 DIAGNOSIS — F11.10 OPIOID ABUSE (HCC): ICD-10-CM

## 2022-03-05 PROCEDURE — 99284 EMERGENCY DEPT VISIT MOD MDM: CPT

## 2022-03-05 PROCEDURE — 74011250637 HC RX REV CODE- 250/637: Performed by: EMERGENCY MEDICINE

## 2022-03-05 PROCEDURE — 93005 ELECTROCARDIOGRAM TRACING: CPT

## 2022-03-05 RX ORDER — LISINOPRIL 30 MG/1
30 TABLET ORAL DAILY
COMMUNITY
End: 2022-03-05 | Stop reason: SDUPTHER

## 2022-03-05 RX ORDER — CLONIDINE HYDROCHLORIDE 0.1 MG/1
0.2 TABLET ORAL
Status: COMPLETED | OUTPATIENT
Start: 2022-03-05 | End: 2022-03-05

## 2022-03-05 RX ORDER — LISINOPRIL 30 MG/1
30 TABLET ORAL DAILY
Qty: 30 TABLET | Refills: 0 | Status: SHIPPED | OUTPATIENT
Start: 2022-03-05 | End: 2022-07-12 | Stop reason: SINTOL

## 2022-03-05 RX ADMIN — CLONIDINE HYDROCHLORIDE 0.2 MG: 0.1 TABLET ORAL at 09:29

## 2022-03-05 NOTE — ED TRIAGE NOTES
Pt has been at [facility] detox and was sent for general malaise and hypertesnsion. Pt has been non-compliant with htn meds.

## 2022-03-05 NOTE — ED PROVIDER NOTES
EMERGENCY DEPARTMENT HISTORY AND PHYSICAL EXAM      Date: 3/5/2022  Patient Name: Lilliana Lora    History of Presenting Illness     Chief Complaint   Patient presents with    Hypertension     History Provided By: Patient and EMS    HPI: Lilliana Lora, 65 y.o. male with past medical history significant for hypertension, diabetes, and acid reflux who presents via EMS to the ED with cc of generalized malaise and elevated blood pressure readings. Patient states he is currently at [facility] inpatient detox program and has been feeling bad for the past day or 2. He has been off all of his blood pressure medications as well as his diabetes medications for the past 2 months. He has not used any illegal drugs since Wednesday but has not been started on Suboxone yet. He has been given clonidine and hydroxyzine to help manage the withdrawal symptoms. He denies any fevers, chills, cough, nausea, vomiting, or diarrhea. He states he had Covid back in January of this year and does not feel like this is Covid or anything infectious. PMHx: Hypertension, diabetes, GERD  Social Hx: Hypertension, diabetes, and GERD    PCP: Isac Stratton MD    There are no other complaints, changes, or physical findings at this time. No current facility-administered medications on file prior to encounter. Current Outpatient Medications on File Prior to Encounter   Medication Sig Dispense Refill    [DISCONTINUED] lisinopriL (PRINIVIL, ZESTRIL) 30 mg tablet Take 30 mg by mouth daily.  thiamine HCL (B-1) 100 mg tablet Take 1 Tablet by mouth daily. 30 Tablet 0    folic acid (FOLVITE) 1 mg tablet Take 1 Tablet by mouth daily. 30 Tablet 0    pantoprazole (Protonix) 40 mg tablet Take 1 Tablet by mouth daily. 14 Tablet 0    guaiFENesin (ROBITUSSIN) 100 mg/5 mL liquid Take 10 mL by mouth three (3) times daily as needed for Cough.  1 Each 0    OTHER Black Sea Oil: Take one teaspoonful by mouth twice daily       Past History Past Medical History:  Past Medical History:   Diagnosis Date    Diabetes ([facility] [address]     Hypertension      Past Surgical History:  Past Surgical History:   Procedure Laterality Date    HX ORTHOPAEDIC  rt hip replacement    HX ORTHOPAEDIC  rt thigh woo     Family History:  Family History   Problem Relation Age of Onset    Diabetes Other     Hypertension Other      Social History:  Social History     Tobacco Use    Smoking status: Current Every Day Smoker     Packs/day: 1.00    Smokeless tobacco: Never Used   Vaping Use    Vaping Use: Never used   Substance Use Topics    Alcohol use: Not Currently    Drug use: Yes     Types: Opiates     Comment: IV fentanly use today 1/11     Allergies: Allergies   Allergen Reactions    Shellfish Containing Products Swelling     Review of Systems   Review of Systems   Constitutional: Positive for fatigue. Negative for chills and fever. HENT: Negative for congestion, rhinorrhea, sneezing and sore throat. Eyes: Negative for redness and visual disturbance. Respiratory: Negative for shortness of breath. Cardiovascular: Negative for chest pain and leg swelling. Gastrointestinal: Negative for abdominal pain, nausea and vomiting. Genitourinary: Negative for difficulty urinating and frequency. Musculoskeletal: Negative for back pain, myalgias and neck stiffness. Skin: Negative for rash. Neurological: Negative for dizziness, syncope, weakness and headaches. Hematological: Negative for adenopathy. All other systems reviewed and are negative. Physical Exam   Physical Exam  Vitals and nursing note reviewed. Constitutional:       Appearance: Normal appearance. He is well-developed. HENT:      Head: Normocephalic and atraumatic. Cardiovascular:      Rate and Rhythm: Normal rate and regular rhythm. Pulses: Normal pulses. Heart sounds: Normal heart sounds. Pulmonary:      Effort: Pulmonary effort is normal. No respiratory distress.       Breath sounds: Normal breath sounds. Chest:      Chest wall: No tenderness. Abdominal:      General: Bowel sounds are normal.      Palpations: Abdomen is soft. Tenderness: There is no abdominal tenderness. There is no guarding or rebound. Musculoskeletal:      Cervical back: Full passive range of motion without pain, normal range of motion and neck supple. Skin:     General: Skin is warm and dry. Findings: No erythema or rash. Neurological:      Mental Status: He is alert and oriented to person, place, and time. Psychiatric:         Speech: Speech normal.         Behavior: Behavior normal.         Thought Content: Thought content normal.         Judgment: Judgment normal.       Diagnostic Study Results   Labs -   No results found for this or any previous visit (from the past 12 hour(s)). Radiologic Studies -   No orders to display     No results found. Medical Decision Making   I am the first provider for this patient. I reviewed the vital signs, available nursing notes, past medical history, past surgical history, family history and social history. Vital Signs-Reviewed the patient's vital signs.   Patient Vitals for the past 24 hrs:   Pulse Resp BP SpO2   03/05/22 1120 -- -- (!) 146/92 --   03/05/22 1100 -- -- (!) 146/99 --   03/05/22 1040 -- -- (!) 159/98 --   03/05/22 1020 -- -- (!) 151/94 --   03/05/22 0925 75 17 -- 100 %   03/05/22 0920 69 23 (!) 172/109 98 %   03/05/22 0915 67 21 -- 100 %   03/05/22 0910 75 18 -- 100 %   03/05/22 0905 67 14 -- 99 %   03/05/22 0900 70 20 (!) 174/109 96 %   03/05/22 0855 71 18 -- 100 %   03/05/22 0850 66 19 -- 96 %   03/05/22 0845 71 16 -- 100 %   03/05/22 0840 65 21 (!) 159/103 100 %   03/05/22 0835 69 18 -- 100 %   03/05/22 0830 64 14 -- 100 %   03/05/22 0825 68 22 -- --   03/05/22 0820 65 18 (!) 174/101 100 %   03/05/22 0730 66 18 (!) 157/99 99 %     Pulse Oximetry Analysis - 99% on RA (normal)    Cardiac Monitor:   Rate: 66 bpm  Rhythm: Normal Sinus Rhythm     ED EKG interpretation: 08:22  Rhythm: normal sinus rhythm; and regular . Rate (approx.): 62; Axis: normal; P wave: normal; QRS interval: normal ; ST/T wave: non-specific changes; Other findings: abnormal ekg. This EKG was interpreted by Mame Carl MD,ED Provider. Records Reviewed: Nursing Notes, Old Medical Records, Previous electrocardiograms, Previous Radiology Studies and Previous Laboratory Studies    Provider Notes (Medical Decision Making):   60-year-old male presents via EMS with elevated blood pressure readings. He is currently staying and the healing place for opioid abuse. Per chart review, he has been on lisinopril in the past and he reports taking 30 mg. He has been off of it for approximately a month. Will treat him with clonidine in the emergency department and refill his medication. Will have him follow-up with his primary care doctor as an outpatient. ED Course:   Initial assessment performed. The patients presenting problems have been discussed, and they are in agreement with the care plan formulated and outlined with them. I have encouraged them to ask questions as they arise throughout their visit. Progress Note  8:48 AM  Nursing has informed me that they are unable to obtain an IV. Will treat with oral medications and reassess. Progress Note  10:02 AM  I have re-evaluated pt and he states he is starting to feel a little better. His blood pressure has started to trend down. Will continue to monitor and plan for discharge. Progress Note  11:18 AM  I have re-evaluated pt and his blood pressure is currently 146/99. Will refill his lisinopril and have him follow-up with outpatient primary care. Progress Note:   Updated pt on all returned results and findings. Discussed the importance of proper follow up as referred below along with return precautions.  Pt in agreement with the care plan and expresses agreement with and understanding of all items discussed. Disposition:  Discharge Note:  The pt is ready for discharge. The pt's signs, symptoms, diagnosis, and discharge instructions have been discussed and pt has conveyed their understanding. The pt is to follow up as recommended or return to ER should their symptoms worsen. Plan has been discussed and pt is in agreement. PLAN:  1. Discharge Medication List as of 3/5/2022 11:21 AM      CONTINUE these medications which have CHANGED    Details   lisinopriL (PRINIVIL, ZESTRIL) 30 mg tablet Take 1 Tablet by mouth daily. , Print, Disp-30 Tablet, R-0         CONTINUE these medications which have NOT CHANGED    Details   thiamine HCL (B-1) 100 mg tablet Take 1 Tablet by mouth daily. , Normal, Disp-30 Tablet, R-0      folic acid (FOLVITE) 1 mg tablet Take 1 Tablet by mouth daily. , Normal, Disp-30 Tablet, R-0      pantoprazole (Protonix) 40 mg tablet Take 1 Tablet by mouth daily. , Normal, Disp-14 Tablet, R-0      guaiFENesin (ROBITUSSIN) 100 mg/5 mL liquid Take 10 mL by mouth three (3) times daily as needed for Cough., Normal, Disp-1 Each, R-0      OTHER Black Sea Oil: Take one teaspoonful by mouth twice daily, Historical Med           2. Follow-up Information     Follow up With Specialties Details Why Contact Info    Enrique Peraza MD Family Medicine Schedule an appointment as soon as possible for a visit   [address] [place] [address] [address] [address]  [phone]      [address] [place] [address] DEPT Emergency Medicine  As needed, If symptoms worsen [facility]  [phone]        Return to ED if worse     Diagnosis     Clinical Impression:   1. Essential hypertension    2. Opioid abuse [facility])            Please note that this dictation was completed with Dragon, computer voice recognition software. Quite often unanticipated grammatical, syntax, homophones, and other interpretive errors are inadvertently transcribed by the computer software. Please disregard these errors.   Additionally, please excuse any errors that have escaped final proofreading.

## 2022-03-05 NOTE — ED NOTES
Discharge instructions were given to the patient by Warren Espitia RN  . The patient left the Emergency Department ambulatory, alert and oriented and in no acute distress with 1 prescriptions. The patient was encouraged to call or return to the ED for worsening issues or problems and was encouraged to schedule a follow up appointment for continuing care. The patient verbalized understanding of discharge instructions and prescriptions, all questions were answered. The patient has no further concerns at this time.

## 2022-03-05 NOTE — ED NOTES
Patient here via EMS with c/o general malaise and elevated blood pressure. Patient reports that he has been at [facility] for inpatient detox. EMS reports that patient takes medication for blood pressure and diabetes but that he told them he has not had it in months. Patient reportedly taking clonidine in rehab but states that they have not started him on suboxone yet. Patient alert and oriented, respirations even and unlabored. Emergency Department Nursing Plan of Care       The Nursing Plan of Care is developed from the Nursing assessment and Emergency Department Attending provider initial evaluation. The plan of care may be reviewed in the ED Provider note.     The Plan of Care was developed with the following considerations:   Patient / Family readiness to learn indicated by:verbalized understanding  Persons(s) to be included in education: patient  Barriers to Learning/Limitations:No    Signed     Brookyln Hawkins RN    3/5/2022   11:57 AM

## 2022-03-05 NOTE — ED NOTES
TRANSFER - OUT REPORT:    Report called to [facility] Withdrawal Management. TELEPHONE report given to [facility] - GAUDENCIO -[phone] (name) on Megan Manuel  being transferred back to rehab facility via [facility] (unit) for routine progression of care       Report consisted of patients Situation, Background, Assessment and   Recommendations(SBAR). Information from the following report(s) SBAR, ED Summary and MAR was reviewed with the receiving nurse. Lines:       Opportunity for questions and clarification was provided.       Patient transported with:   cab/Uber/aDniel

## 2022-03-07 LAB
ATRIAL RATE: 62 BPM
CALCULATED P AXIS, ECG09: 72 DEGREES
CALCULATED R AXIS, ECG10: 32 DEGREES
CALCULATED T AXIS, ECG11: 156 DEGREES
DIAGNOSIS, 93000: NORMAL
P-R INTERVAL, ECG05: 152 MS
Q-T INTERVAL, ECG07: 416 MS
QRS DURATION, ECG06: 72 MS
QTC CALCULATION (BEZET), ECG08: 422 MS
VENTRICULAR RATE, ECG03: 62 BPM

## 2022-03-19 PROBLEM — N17.9 AKI (ACUTE KIDNEY INJURY) (HCC): Status: ACTIVE | Noted: 2022-01-11

## 2022-03-19 PROBLEM — U07.1 COVID-19: Status: ACTIVE | Noted: 2022-01-11

## 2022-05-05 PROCEDURE — 96375 TX/PRO/DX INJ NEW DRUG ADDON: CPT

## 2022-07-12 ENCOUNTER — OFFICE VISIT (OUTPATIENT)
Dept: INTERNAL MEDICINE CLINIC | Age: 57
End: 2022-07-12
Payer: MEDICAID

## 2022-07-12 VITALS
OXYGEN SATURATION: 97 % | TEMPERATURE: 97.6 F | BODY MASS INDEX: 32.5 KG/M2 | HEART RATE: 74 BPM | SYSTOLIC BLOOD PRESSURE: 138 MMHG | RESPIRATION RATE: 14 BRPM | HEIGHT: 70 IN | DIASTOLIC BLOOD PRESSURE: 92 MMHG | WEIGHT: 227 LBS

## 2022-07-12 DIAGNOSIS — F11.91 HISTORY OF HEROIN USE: ICD-10-CM

## 2022-07-12 DIAGNOSIS — Z79.4 TYPE 2 DIABETES MELLITUS WITH HYPERGLYCEMIA, WITH LONG-TERM CURRENT USE OF INSULIN (HCC): ICD-10-CM

## 2022-07-12 DIAGNOSIS — N18.4 CKD (CHRONIC KIDNEY DISEASE) STAGE 4, GFR 15-29 ML/MIN (HCC): ICD-10-CM

## 2022-07-12 DIAGNOSIS — I10 ELEVATED BLOOD PRESSURE READING IN OFFICE WITH DIAGNOSIS OF HYPERTENSION: Primary | ICD-10-CM

## 2022-07-12 DIAGNOSIS — E11.65 TYPE 2 DIABETES MELLITUS WITH HYPERGLYCEMIA, WITH LONG-TERM CURRENT USE OF INSULIN (HCC): ICD-10-CM

## 2022-07-12 PROCEDURE — 3044F HG A1C LEVEL LT 7.0%: CPT | Performed by: FAMILY MEDICINE

## 2022-07-12 PROCEDURE — 99213 OFFICE O/P EST LOW 20 MIN: CPT | Performed by: FAMILY MEDICINE

## 2022-07-12 RX ORDER — METOPROLOL SUCCINATE 100 MG/1
TABLET, EXTENDED RELEASE ORAL
COMMUNITY
Start: 2022-07-08

## 2022-07-12 RX ORDER — AMLODIPINE BESYLATE 5 MG/1
5 TABLET ORAL DAILY
Qty: 30 TABLET | Refills: 2 | Status: SHIPPED | OUTPATIENT
Start: 2022-07-12

## 2022-07-12 RX ORDER — QUETIAPINE FUMARATE 100 MG/1
TABLET, FILM COATED ORAL
COMMUNITY
Start: 2022-07-08 | End: 2022-09-07

## 2022-07-12 RX ORDER — FUROSEMIDE 40 MG/1
TABLET ORAL
COMMUNITY
Start: 2022-07-08 | End: 2022-09-07

## 2022-07-12 RX ORDER — GABAPENTIN 300 MG/1
300 CAPSULE ORAL 3 TIMES DAILY
COMMUNITY
Start: 2022-07-08

## 2022-07-12 RX ORDER — TRAZODONE HYDROCHLORIDE 50 MG/1
TABLET ORAL
COMMUNITY
Start: 2022-07-08

## 2022-07-12 NOTE — PROGRESS NOTES
SPORTS MEDICINE AND PRIMARY CARE  Zoila Dejesus. MD Rebecca  [address]  [id number]    Chief Complaint   Patient presents with    Hypertension       SUBJECTIVE:    Edilma Parent is a 65 y.o. male for HTN follow up. Last visit May 2021.    htn x 20 yr  +ckd  bp not well controlled at [facility]'s office  No chest pain  +sob +/- (rascon, 2 blocks) none at rest  No palpitations  No edema  Not exercising or following low sodium diet    Fentanyl/heroin/cocaine use-last used 5 mo  overdose hx  cva-like sx with last use  Hx of over 30 yrs use    feet numb  Uses gabapentin    Mood disorder- on meds    T2DM  a1c  1/2022 5.8%, 5/2021, over 14%  No diabetic meds listed  Current Outpatient Medications   Medication Sig Dispense Refill    furosemide (LASIX) 40 mg tablet       QUEtiapine (SEROquel) 100 mg tablet       traZODone (DESYREL) 50 mg tablet       gabapentin (NEURONTIN) 300 mg capsule Take 300 mg by mouth three (3) times daily.  amLODIPine (NORVASC) 5 mg tablet Take 1 Tablet by mouth daily. 30 Tablet 2    metoprolol succinate (TOPROL-XL) 100 mg tablet  (Patient not taking: Reported on 7/12/2022)      thiamine HCL (B-1) 100 mg tablet Take 1 Tablet by mouth daily. (Patient not taking: Reported on 7/12/2022) 30 Tablet 0    folic acid (FOLVITE) 1 mg tablet Take 1 Tablet by mouth daily. (Patient not taking: Reported on 7/12/2022) 30 Tablet 0    pantoprazole (Protonix) 40 mg tablet Take 1 Tablet by mouth daily. (Patient not taking: Reported on 7/12/2022) 14 Tablet 0    guaiFENesin (ROBITUSSIN) 100 mg/5 mL liquid Take 10 mL by mouth three (3) times daily as needed for Cough.  (Patient not taking: Reported on 7/12/2022) 1 Each 0    OTHER Black Sea Oil: Take one teaspoonful by mouth twice daily (Patient not taking: Reported on 7/12/2022)       Past Medical History:   Diagnosis Date    Diabetes ([address]     Hypertension      Past Surgical History:   Procedure Laterality Date    HX ORTHOPAEDIC  rt hip replacement  HX ORTHOPAEDIC  rt thigh woo     Allergies   Allergen Reactions    Shellfish Containing Products Swelling       REVIEW OF SYSTEMS:  General: negative for - chills or fever  ENT: negative for - headaches, nasal congestion, tinnitus, hearing loss, vision changes, sore throat  Respiratory: negative for - cough, hemoptysis, shortness of breath or wheezing  Cardiovascular : negative for - chest pain, edema, palpitations or shortness of breath  Gastrointestinal: negative for - abdominal pain, blood in stools, heartburn or nausea/vomiting, diarrhea, constipation  Genito-Urinary: no dysuria, trouble voiding, hematuria or erectile dysfunction  Musculoskeletal: negative for - gait disturbance, joint pain, joint stiffness , joint swelling, muscle aches  Neurological: no TIA or stroke symptoms  Hematologic: no bruises, no bleeding, no swollen glands  Integument: no lumps, mole changes, nail changes or rash  Endocrine:no malaise/lethargy or unexpected weight changes      Social History     Socioeconomic History    Marital status: SINGLE   Tobacco Use    Smoking status: Current Every Day Smoker     Packs/day: 1.00    Smokeless tobacco: Never Used   Vaping Use    Vaping Use: Never used   Substance and Sexual Activity    Alcohol use: Not Currently    Drug use: Not Currently     Types: Opiates     Comment: IV fentanly use today 1/11    Sexual activity: Not Currently   Social History Narrative    Mr Rebel 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 He suffered a fall from a bridge and suffered many injuries, requiring neck and hip surgery. Had TBI    He is now on SSI.         On discussion 11/9/16, he names his mother Juany Lu as his MPOA      Family History   Problem Relation Age of Onset    Diabetes Other     Hypertension Other        OBJECTIVE:     Visit Vitals  BP (!) 138/92 (BP 1 Location: Left arm, BP Patient Position: Sitting, BP Cuff Size: Adult)   Pulse 74   Temp 97.6 °F (36.4 °C) (Oral)   Resp 14 Ht 5' 10\" (1.778 m)   Wt 227 lb (103 kg)   SpO2 97%   BMI 32.57 kg/m²     Appearance: alert, well appearing, and in no distress. General exam: CVS exam BP noted to be elevated today in office, S1, S2 normal, no gallop, no murmur, chest clear, no JVD, no HSM, +BLLE edema,  peripheral vascular exam both carotids normal upstroke without bruits, radial pulses normal, pedal pulses normal both DP's neurological exam alert, , normal speech, no focal findings or movement disorder noted. ASSESSMENT/PLAN:  1. Elevated blood pressure reading in office with diagnosis of hypertension -initiate norvasc 5 mg . Follow up 2 wks   2. CKD (chronic kidney disease) stage 4, GFR 15-29 ml/min ([facility]) - pt needs renal follow up   3. Type 2 diabetes mellitus with hyperglycemia, with long-term current use of insulin ([facility]) -controlled a1c off medication? 4. History of heroin use - abstinent x 5 mo   . Orders Placed This Encounter    furosemide (LASIX) 40 mg tablet    QUEtiapine (SEROquel) 100 mg tablet    traZODone (DESYREL) 50 mg tablet    metoprolol succinate (TOPROL-XL) 100 mg tablet    gabapentin (NEURONTIN) 300 mg capsule    amLODIPine (NORVASC) 5 mg tablet   RTO 2 wks, DM, medication follow up    I have discussed the diagnosis with the patient and the intended plan as seen in the  orders above. The patient understands and agrees with the plan. The patient has   received an after visit summary. Questions were answered concerning  future plans  Patient labs and/or xrays were reviewed as available. Past records were reviewed as available. Counseled regarding diet, exercise and healthy lifestyle          Advised patient to proceed to urgent care, call back or return to office if symptoms develop/worsen/change/persist.  Discussed expected course/resolution/complications of diagnosis in detail with patient.      Medication risks/benefits/costs/interactions/alternatives discussed with patient    Signed,  Beau Rowe M.D.      This note was created using voice recognition software.   Edits have been made but syntax errors might exist.

## 2022-07-12 NOTE — PROGRESS NOTES
Identified pt with two pt identifiers(name and ). Chief Complaint   Patient presents with    Hypertension      Vitals:    22 1328 22 1431   BP: (!) 144/95 (!) 138/92   Pulse: 87 74   Resp: 14    Temp: 97.6 °F (36.4 °C)    TempSrc: Oral    SpO2: 97%    Weight: 227 lb (103 kg)    Height: 5' 10\" (1.778 m)    PainSc:   0 - No pain       Health Maintenance Due   Topic    Depression Screen     COVID-19 Vaccine (1)    Pneumococcal 0-64 years (1 - PCV)    Foot Exam Q1     MICROALBUMIN Q1     Eye Exam Retinal or Dilated     Lipid Screen     DTaP/Tdap/Td series (1 - Tdap)    Colorectal Cancer Screening Combo     Shingrix Vaccine Age 50> (1 of 2)       Depression Screening:  :     3 most recent PHQ Screens 2022   Little interest or pleasure in doing things Not at all   Feeling down, depressed, irritable, or hopeless Not at all   Total Score PHQ 2 0        Fall Risk Assessment:  :     No flowsheet data found. Abuse Screening:  :     No flowsheet data found. Coordination of Care Questionnaire:  :     1. Have you been to the ER, urgent care clinic since your last visit? Hospitalized since your last visit? No    2. Have you seen or consulted any other health care providers outside of the [address] [place] [address] since your last visit? Include any pap smears or colon screening.   Dr. Rajan Lenz for Suboxone treatment at [facility]

## 2022-08-11 ENCOUNTER — HOSPITAL ENCOUNTER (EMERGENCY)
Age: 57
Discharge: HOME | End: 2022-08-11
Attending: EMERGENCY MEDICINE
Payer: MEDICAID

## 2022-08-11 VITALS
HEIGHT: 71 IN | RESPIRATION RATE: 16 BRPM | TEMPERATURE: 98.4 F | HEART RATE: 95 BPM | WEIGHT: 227 LBS | OXYGEN SATURATION: 98 % | DIASTOLIC BLOOD PRESSURE: 108 MMHG | SYSTOLIC BLOOD PRESSURE: 156 MMHG | BODY MASS INDEX: 31.78 KG/M2

## 2022-08-11 DIAGNOSIS — K04.7 DENTAL ABSCESS: Primary | ICD-10-CM

## 2022-08-11 PROCEDURE — 74011000250 HC RX REV CODE- 250: Performed by: NURSE PRACTITIONER

## 2022-08-11 PROCEDURE — 99283 EMERGENCY DEPT VISIT LOW MDM: CPT

## 2022-08-11 PROCEDURE — 74011250637 HC RX REV CODE- 250/637: Performed by: NURSE PRACTITIONER

## 2022-08-11 RX ORDER — PENICILLIN V POTASSIUM 500 MG/1
500 TABLET, FILM COATED ORAL 4 TIMES DAILY
Qty: 28 TABLET | Refills: 0 | Status: SHIPPED | OUTPATIENT
Start: 2022-08-11 | End: 2022-08-18

## 2022-08-11 RX ORDER — NAPROXEN 500 MG/1
500 TABLET ORAL 2 TIMES DAILY WITH MEALS
Qty: 20 TABLET | Refills: 0 | Status: SHIPPED | OUTPATIENT
Start: 2022-08-11 | End: 2022-08-21

## 2022-08-11 RX ADMIN — DIPHENHYDRAMINE HYDROCHLORIDE: 12.5 LIQUID ORAL at 10:11

## 2022-08-11 NOTE — DISCHARGE INSTRUCTIONS
It was a pleasure taking care of you at [facility] Emergency Department today. We know that when you come to [facility], you are entrusting us with your health, comfort, and safety. Our physicians and nurses honor that trust, and we truly appreciate the opportunity to care for you and your loved ones. We also value our feedback. If you receive a survey about your Emergency Department experience today, please fill it out. We care about our patients' feedback, and we listen to what you have to say. Thank you!

## 2022-08-11 NOTE — ED PROVIDER NOTES
EMERGENCY DEPARTMENT HISTORY AND PHYSICAL EXAM          Date: 8/11/2022  Patient Name: Brian Calvillo    History of Presenting Illness     Chief Complaint   Patient presents with    Skin Problem         History Provided By: Patient    HPI: Brian Calvillo is a 65 y.o. male with a PMH of diabetes and hypertension who presents with skin problem. Patient states he noticed swelling to his chin approximately 4 days ago. He complains of dental pain and gum swelling. Denies fever, chills, ear pain. Patient states he has poor dentition and does not have a dentist.  Patient states he has not taken anything for his pain. PCP: Rakesh Gayle MD    Current Facility-Administered Medications   Medication Dose Route Frequency Provider Last Rate Last Admin    dental ball (lidocaine/Benadryl/Cetacaine) mixture   Mucous Membrane ONCE Johnson Pinto NP         Current Outpatient Medications   Medication Sig Dispense Refill    furosemide (LASIX) 40 mg tablet       QUEtiapine (SEROquel) 100 mg tablet       traZODone (DESYREL) 50 mg tablet       metoprolol succinate (TOPROL-XL) 100 mg tablet  (Patient not taking: Reported on 7/12/2022)      gabapentin (NEURONTIN) 300 mg capsule Take 300 mg by mouth three (3) times daily. amLODIPine (NORVASC) 5 mg tablet Take 1 Tablet by mouth daily. 30 Tablet 2    thiamine HCL (B-1) 100 mg tablet Take 1 Tablet by mouth daily. (Patient not taking: Reported on 7/12/2022) 30 Tablet 0    folic acid (FOLVITE) 1 mg tablet Take 1 Tablet by mouth daily. (Patient not taking: Reported on 7/12/2022) 30 Tablet 0    pantoprazole (Protonix) 40 mg tablet Take 1 Tablet by mouth daily. (Patient not taking: Reported on 7/12/2022) 14 Tablet 0    guaiFENesin (ROBITUSSIN) 100 mg/5 mL liquid Take 10 mL by mouth three (3) times daily as needed for Cough.  (Patient not taking: Reported on 7/12/2022) 1 Each 0    OTHER Black Sea Oil: Take one teaspoonful by mouth twice daily (Patient not taking: Reported on 7/12/2022)         Past History     Past Medical History:  Past Medical History:   Diagnosis Date    Diabetes [address] [address]     Hypertension        Past Surgical History:  Past Surgical History:   Procedure Laterality Date    HX ORTHOPAEDIC  rt hip replacement    HX ORTHOPAEDIC  rt thigh woo       Family History:  Family History   Problem Relation Age of Onset    Diabetes Other     Hypertension Other        Social History:  Social History     Tobacco Use    Smoking status: Every Day     Packs/day: 1.00     Types: Cigarettes    Smokeless tobacco: Never   Vaping Use    Vaping Use: Never used   Substance Use Topics    Alcohol use: Not Currently    Drug use: Not Currently     Types: Opiates     Comment: IV fentanly use today 1/11       Allergies: Allergies   Allergen Reactions    Shellfish Containing Products Swelling         Review of Systems   Review of Systems   Constitutional:  Negative for chills and fever. HENT:  Positive for dental problem and facial swelling. Negative for congestion, drooling, ear discharge, ear pain, mouth sores, rhinorrhea and sore throat. Eyes:  Negative for pain and discharge. Respiratory:  Negative for cough and shortness of breath. Cardiovascular:  Negative for chest pain and leg swelling. Gastrointestinal:  Negative for abdominal pain, nausea and vomiting. Genitourinary:  Negative for urgency. Musculoskeletal:  Negative for arthralgias. Skin:  Negative for wound. Neurological:  Negative for numbness and headaches. All other systems reviewed and are negative. Physical Exam     Vitals:    08/11/22 0930   BP: (!) 157/94   Pulse: (!) 103   Resp: 14   Temp: 98.4 °F (36.9 °C)   SpO2: 96%   Weight: 103 kg (227 lb)   Height: 5' 11\" (1.803 m)     Physical Exam  Vitals and nursing note reviewed. Constitutional:       General: He is not in acute distress. Appearance: He is well-developed. He is not ill-appearing. HENT:      Head: Normocephalic and atraumatic.       Right Ear: Tympanic membrane, ear canal and external ear normal.      Left Ear: Tympanic membrane, ear canal and external ear normal.      Nose: Nose normal.      Mouth/Throat:      Dentition: Abnormal dentition. Dental caries present. Pharynx: Oropharynx is clear. Uvula midline. Eyes:      Conjunctiva/sclera: Conjunctivae normal.      Pupils: Pupils are equal, round, and reactive to light. Cardiovascular:      Rate and Rhythm: Normal rate and regular rhythm. Pulses: Normal pulses. Heart sounds: Normal heart sounds. Pulmonary:      Effort: Pulmonary effort is normal.      Breath sounds: Normal breath sounds. Musculoskeletal:      Cervical back: Normal range of motion and neck supple. Neurological:      Mental Status: He is alert and oriented to person, place, and time. Psychiatric:         Thought Content: Thought content normal.         Diagnostic Study Results     Labs -   No results found for this or any previous visit (from the past 12 hour(s)). Radiologic Studies -   No orders to display     CT Results  (Last 48 hours)      None          CXR Results  (Last 48 hours)      None              Medical Decision Making   I am the first provider for this patient. I reviewed the vital signs, available nursing notes, past medical history, past surgical history, family history and social history. Vital Signs-Reviewed the patient's vital signs. Records Reviewed: Nursing Notes and Old Medical Records    Provider Notes (Medical Decision Making):     ED COURSE:   Initial assessment performed. The patients presenting problems have been discussed, and they are in agreement with the care plan formulated and outlined with them. I have encouraged them to ask questions as they arise throughout their visit.      80-year-old female presenting with skin problem however patient appears to have dental problem with lower gum swelling noted to possibly be an abscess but not visible for incision and drainage. Plan to treat with pen VK and naprosyn. Disposition:  Discharge     DISCHARGE NOTE:       Care plan outlined and precautions discussed. Patient has no new complaints, changes, or physical findings. All of pt's questions and concerns were addressed. Patient was instructed and agrees to follow up with Dentist, as well as to return to the ED upon further deterioration. Patient is ready to go home. Follow-up Information       Follow up With Specialties Details Why [facility] EMERGENCY DEPT Emergency Medicine Go in 2 days If symptoms worsen [address]  Schedule an appointment as soon as possible for a visit  for tooth extraction [address]  [phone]            Current Discharge Medication List          Procedures:  Procedures    Please note that this dictation was completed with Dragon, computer voice recognition software. Quite often unanticipated grammatical, syntax, homophones, and other interpretive errors are inadvertently transcribed by the computer software. Please disregard these errors. Additionally, please excuse any errors that have escaped final proofreading. Diagnosis     Clinical Impression:   1.  Dental abscess

## 2022-08-30 ENCOUNTER — APPOINTMENT (OUTPATIENT)
Dept: GENERAL RADIOLOGY | Age: 57
End: 2022-08-30
Attending: PHYSICIAN ASSISTANT
Payer: MEDICAID

## 2022-08-30 ENCOUNTER — HOSPITAL ENCOUNTER (EMERGENCY)
Age: 57
Discharge: ANOTHER HOSPITAL | End: 2022-08-30
Attending: EMERGENCY MEDICINE | Admitting: EMERGENCY MEDICINE
Payer: MEDICAID

## 2022-08-30 ENCOUNTER — HOSPITAL ENCOUNTER (INPATIENT)
Age: 57
LOS: 8 days | Discharge: HOME HEALTH | DRG: 194 | End: 2022-09-07
Attending: FAMILY MEDICINE | Admitting: FAMILY MEDICINE
Payer: MEDICAID

## 2022-08-30 ENCOUNTER — APPOINTMENT (OUTPATIENT)
Dept: CT IMAGING | Age: 57
End: 2022-08-30
Attending: PHYSICIAN ASSISTANT
Payer: MEDICAID

## 2022-08-30 ENCOUNTER — APPOINTMENT (OUTPATIENT)
Dept: VASCULAR SURGERY | Age: 57
End: 2022-08-30
Attending: PHYSICIAN ASSISTANT
Payer: MEDICAID

## 2022-08-30 VITALS
HEIGHT: 71 IN | TEMPERATURE: 98.3 F | RESPIRATION RATE: 13 BRPM | WEIGHT: 227 LBS | OXYGEN SATURATION: 95 % | SYSTOLIC BLOOD PRESSURE: 144 MMHG | HEART RATE: 84 BPM | BODY MASS INDEX: 31.78 KG/M2 | DIASTOLIC BLOOD PRESSURE: 91 MMHG

## 2022-08-30 DIAGNOSIS — N18.4 ACUTE RENAL FAILURE SUPERIMPOSED ON STAGE 4 CHRONIC KIDNEY DISEASE, UNSPECIFIED ACUTE RENAL FAILURE TYPE (HCC): ICD-10-CM

## 2022-08-30 DIAGNOSIS — E87.5 HYPERKALEMIA: ICD-10-CM

## 2022-08-30 DIAGNOSIS — N17.9 AKI (ACUTE KIDNEY INJURY) (HCC): ICD-10-CM

## 2022-08-30 DIAGNOSIS — R80.1 PERSISTENT PROTEINURIA: ICD-10-CM

## 2022-08-30 DIAGNOSIS — N17.9 ACUTE RENAL FAILURE SUPERIMPOSED ON STAGE 4 CHRONIC KIDNEY DISEASE, UNSPECIFIED ACUTE RENAL FAILURE TYPE (HCC): ICD-10-CM

## 2022-08-30 DIAGNOSIS — R77.0 LOW SERUM ALBUMIN: ICD-10-CM

## 2022-08-30 DIAGNOSIS — B18.2 CHRONIC HEPATITIS C WITHOUT HEPATIC COMA (HCC): ICD-10-CM

## 2022-08-30 DIAGNOSIS — F19.11 DRUG ABUSE IN REMISSION (HCC): ICD-10-CM

## 2022-08-30 DIAGNOSIS — M10.9 GOUT OF KNEE, UNSPECIFIED CAUSE, UNSPECIFIED CHRONICITY, UNSPECIFIED LATERALITY: Primary | ICD-10-CM

## 2022-08-30 DIAGNOSIS — I50.9 ACUTE ON CHRONIC CONGESTIVE HEART FAILURE, UNSPECIFIED HEART FAILURE TYPE (HCC): Primary | ICD-10-CM

## 2022-08-30 LAB
ALBUMIN SERPL-MCNC: 3 G/DL (ref 3.5–5)
ALBUMIN/GLOB SERPL: 0.5 {RATIO} (ref 1.1–2.2)
ALP SERPL-CCNC: 175 U/L (ref 45–117)
ALT SERPL-CCNC: 16 U/L (ref 12–78)
ANION GAP SERPL CALC-SCNC: 14 MMOL/L (ref 5–15)
APPEARANCE UR: CLEAR
AST SERPL-CCNC: 15 U/L (ref 15–37)
BACTERIA URNS QL MICRO: NEGATIVE /HPF
BASOPHILS # BLD: 0 K/UL (ref 0–0.1)
BASOPHILS NFR BLD: 0 % (ref 0–1)
BILIRUB SERPL-MCNC: 0.7 MG/DL (ref 0.2–1)
BILIRUB UR QL: NEGATIVE
BNP SERPL-MCNC: ABNORMAL PG/ML (ref 0–125)
BUN SERPL-MCNC: 78 MG/DL (ref 6–20)
BUN/CREAT SERPL: 9 (ref 12–20)
CALCIUM SERPL-MCNC: 7.3 MG/DL (ref 8.5–10.1)
CHLORIDE SERPL-SCNC: 102 MMOL/L (ref 97–108)
CK SERPL-CCNC: 998 U/L (ref 39–308)
CO2 SERPL-SCNC: 20 MMOL/L (ref 21–32)
COLOR UR: ABNORMAL
CREAT SERPL-MCNC: 9.08 MG/DL (ref 0.7–1.3)
CRP SERPL-MCNC: 9.23 MG/DL (ref 0–0.6)
DIFFERENTIAL METHOD BLD: ABNORMAL
EOSINOPHIL # BLD: 0.1 K/UL (ref 0–0.4)
EOSINOPHIL NFR BLD: 1 % (ref 0–7)
EPITH CASTS URNS QL MICRO: ABNORMAL /LPF
ERYTHROCYTE [DISTWIDTH] IN BLOOD BY AUTOMATED COUNT: 14 % (ref 11.5–14.5)
ERYTHROCYTE [SEDIMENTATION RATE] IN BLOOD: 86 MM/HR (ref 0–20)
GLOBULIN SER CALC-MCNC: 5.5 G/DL (ref 2–4)
GLUCOSE SERPL-MCNC: 127 MG/DL (ref 65–100)
GLUCOSE UR STRIP.AUTO-MCNC: NEGATIVE MG/DL
HCT VFR BLD AUTO: 33.7 % (ref 36.6–50.3)
HGB BLD-MCNC: 10.7 G/DL (ref 12.1–17)
HGB UR QL STRIP: ABNORMAL
IMM GRANULOCYTES # BLD AUTO: 0 K/UL (ref 0–0.04)
IMM GRANULOCYTES NFR BLD AUTO: 0 % (ref 0–0.5)
KETONES UR QL STRIP.AUTO: NEGATIVE MG/DL
LEUKOCYTE ESTERASE UR QL STRIP.AUTO: NEGATIVE
LYMPHOCYTES # BLD: 1.1 K/UL (ref 0.8–3.5)
LYMPHOCYTES NFR BLD: 20 % (ref 12–49)
MCH RBC QN AUTO: 30.7 PG (ref 26–34)
MCHC RBC AUTO-ENTMCNC: 31.8 G/DL (ref 30–36.5)
MCV RBC AUTO: 96.6 FL (ref 80–99)
MONOCYTES # BLD: 0.4 K/UL (ref 0–1)
MONOCYTES NFR BLD: 6 % (ref 5–13)
NEUTS SEG # BLD: 4.1 K/UL (ref 1.8–8)
NEUTS SEG NFR BLD: 73 % (ref 32–75)
NITRITE UR QL STRIP.AUTO: NEGATIVE
NRBC # BLD: 0 K/UL (ref 0–0.01)
NRBC BLD-RTO: 0 PER 100 WBC
PH UR STRIP: 7.5 [PH] (ref 5–8)
PLATELET # BLD AUTO: 206 K/UL (ref 150–400)
PMV BLD AUTO: 10.9 FL (ref 8.9–12.9)
POTASSIUM SERPL-SCNC: 6.1 MMOL/L (ref 3.5–5.1)
PROT SERPL-MCNC: 8.5 G/DL (ref 6.4–8.2)
PROT UR STRIP-MCNC: 300 MG/DL
RBC # BLD AUTO: 3.49 M/UL (ref 4.1–5.7)
RBC #/AREA URNS HPF: ABNORMAL /HPF (ref 0–5)
SODIUM SERPL-SCNC: 136 MMOL/L (ref 136–145)
SP GR UR REFRACTOMETRY: 1.01
TROPONIN-HIGH SENSITIVITY: 25 NG/L (ref 0–76)
UA: UC IF INDICATED,UAUC: ABNORMAL
URATE SERPL-MCNC: 7 MG/DL (ref 3.5–7.2)
UROBILINOGEN UR QL STRIP.AUTO: 1 EU/DL (ref 0.2–1)
WBC # BLD AUTO: 5.7 K/UL (ref 4.1–11.1)
WBC URNS QL MICRO: ABNORMAL /HPF (ref 0–4)

## 2022-08-30 PROCEDURE — 93970 EXTREMITY STUDY: CPT | Performed by: INTERNAL MEDICINE

## 2022-08-30 PROCEDURE — 93970 EXTREMITY STUDY: CPT

## 2022-08-30 PROCEDURE — 99285 EMERGENCY DEPT VISIT HI MDM: CPT | Performed by: EMERGENCY MEDICINE

## 2022-08-30 PROCEDURE — 73562 X-RAY EXAM OF KNEE 3: CPT

## 2022-08-30 PROCEDURE — 71045 X-RAY EXAM CHEST 1 VIEW: CPT

## 2022-08-30 PROCEDURE — 84550 ASSAY OF BLOOD/URIC ACID: CPT

## 2022-08-30 PROCEDURE — 74011250637 HC RX REV CODE- 250/637: Performed by: PHYSICIAN ASSISTANT

## 2022-08-30 PROCEDURE — 82550 ASSAY OF CK (CPK): CPT

## 2022-08-30 PROCEDURE — 85025 COMPLETE CBC W/AUTO DIFF WBC: CPT

## 2022-08-30 PROCEDURE — 36600 WITHDRAWAL OF ARTERIAL BLOOD: CPT

## 2022-08-30 PROCEDURE — 93005 ELECTROCARDIOGRAM TRACING: CPT

## 2022-08-30 PROCEDURE — 85652 RBC SED RATE AUTOMATED: CPT

## 2022-08-30 PROCEDURE — 83880 ASSAY OF NATRIURETIC PEPTIDE: CPT

## 2022-08-30 PROCEDURE — 81001 URINALYSIS AUTO W/SCOPE: CPT

## 2022-08-30 PROCEDURE — 80053 COMPREHEN METABOLIC PANEL: CPT

## 2022-08-30 PROCEDURE — 65210000002 HC RM PRIVATE GYN

## 2022-08-30 PROCEDURE — 84484 ASSAY OF TROPONIN QUANT: CPT

## 2022-08-30 PROCEDURE — 73502 X-RAY EXAM HIP UNI 2-3 VIEWS: CPT

## 2022-08-30 PROCEDURE — 86140 C-REACTIVE PROTEIN: CPT

## 2022-08-30 PROCEDURE — 36415 COLL VENOUS BLD VENIPUNCTURE: CPT

## 2022-08-30 PROCEDURE — 74176 CT ABD & PELVIS W/O CONTRAST: CPT

## 2022-08-30 RX ORDER — ACETAMINOPHEN 500 MG
1000 TABLET ORAL
Status: DISCONTINUED | OUTPATIENT
Start: 2022-08-30 | End: 2022-08-30 | Stop reason: HOSPADM

## 2022-08-30 RX ORDER — BUPRENORPHINE HYDROCHLORIDE, NALOXONE HYDROCHLORIDE 8; 2 MG/1; MG/1
FILM, SOLUBLE BUCCAL; SUBLINGUAL
COMMUNITY
Start: 2022-08-17 | End: 2022-09-07

## 2022-08-30 RX ORDER — ALLOPURINOL 300 MG/1
300 TABLET ORAL DAILY
COMMUNITY
End: 2022-09-07

## 2022-08-30 RX ORDER — BUPRENORPHINE HYDROCHLORIDE, NALOXONE HYDROCHLORIDE 4; 1 MG/1; MG/1
FILM, SOLUBLE BUCCAL; SUBLINGUAL
COMMUNITY
Start: 2022-08-17 | End: 2022-09-07

## 2022-08-30 RX ADMIN — SODIUM ZIRCONIUM CYCLOSILICATE 10 G: 10 POWDER, FOR SUSPENSION ORAL at 16:02

## 2022-08-30 NOTE — ED NOTES
Pt presents ambulatory to ED complaining of low back pain, bilateral knee pain and swelling and right hip pain. Hx hip fx in right hip. Hx gout - pt states he has not been taking his allopurinol as prescribed and believes his gout is flaring up in his knees. Pt ambulatory without assistance. Pt is alert and oriented x 4, RR even and unlabored, skin is warm and dry. Assesment completed and pt updated on plan of care. Emergency Department Nursing Plan of Care       The Nursing Plan of Care is developed from the Nursing assessment and Emergency Department Attending provider initial evaluation. The plan of care may be reviewed in the ED Provider note.     The Plan of Care was developed with the following considerations:   Patient / Family readiness to learn indicated by:verbalized understanding  Persons(s) to be included in education: patient  Barriers to Learning/Limitations:No    Signed     Cata Quiñonez RN    8/30/2022   12:44 PM

## 2022-08-30 NOTE — ED PROVIDER NOTES
EMERGENCY DEPARTMENT HISTORY AND PHYSICAL EXAM      Date: 8/30/2022  Patient Name: Jayleen Rico    History of Presenting Illness     Chief Complaint   Patient presents with    Leg Pain    Hip Pain     History Provided By: Patient    HPI: Jayleen Rico, 65 y.o. male with medical history significant for tobacco abuse, hypertension, diabetes, gout, right hip replacement, substance abuse, CKD who presents via self to the ED with cc of acute moderate aching bilateral lower extremity pain and swelling localized to bilateral knees and right hip. Symptoms have been progressing for over 1 week. Endorses that he was not taking his allopurinol, but when his symptoms started he started taking this medicine again. No other new medications. Endorses taking Lasix as prescribed. Denies any new chest pain, shortness of breath, palpitations, dyspnea on exertion, lightheadedness, dizziness, syncope, seizure, fever, chills, nausea, vomiting. He does endorse polyuria. No dysuria, hematuria, flank pain      PCP: Jasmyn Armstrong MD    There are no other complaints, changes, or physical findings at this time. No current facility-administered medications on file prior to encounter. Current Outpatient Medications on File Prior to Encounter   Medication Sig Dispense Refill    Suboxone 4-1 mg film sublingual film       Suboxone 8-2 mg film sublingaul film       allopurinoL (ZYLOPRIM) 300 mg tablet Take 300 mg by mouth daily. furosemide (LASIX) 40 mg tablet       traZODone (DESYREL) 50 mg tablet       metoprolol succinate (TOPROL-XL) 100 mg tablet       gabapentin (NEURONTIN) 300 mg capsule Take 300 mg by mouth three (3) times daily. amLODIPine (NORVASC) 5 mg tablet Take 1 Tablet by mouth daily. 30 Tablet 2    QUEtiapine (SEROquel) 100 mg tablet  (Patient not taking: Reported on 8/30/2022)      thiamine HCL (B-1) 100 mg tablet Take 1 Tablet by mouth daily.  (Patient not taking: No sig reported) 30 Tablet 0 folic acid (FOLVITE) 1 mg tablet Take 1 Tablet by mouth daily. (Patient not taking: No sig reported) 30 Tablet 0    pantoprazole (Protonix) 40 mg tablet Take 1 Tablet by mouth daily. (Patient not taking: No sig reported) 14 Tablet 0    guaiFENesin (ROBITUSSIN) 100 mg/5 mL liquid Take 10 mL by mouth three (3) times daily as needed for Cough. (Patient not taking: No sig reported) 1 Each 0    OTHER Black Sea Oil: Take one teaspoonful by mouth twice daily (Patient not taking: No sig reported)       Past History     Past Medical History:  Past Medical History:   Diagnosis Date    Diabetes ([facility] [address]     Gout     Hypertension      Past Surgical History:  Past Surgical History:   Procedure Laterality Date    HX ORTHOPAEDIC  rt hip replacement    HX ORTHOPAEDIC  rt thigh woo     Family History:  Family History   Problem Relation Age of Onset    Diabetes Other     Hypertension Other      Social History:  Social History     Tobacco Use    Smoking status: Every Day     Packs/day: 1.00     Types: Cigarettes    Smokeless tobacco: Never   Vaping Use    Vaping Use: Never used   Substance Use Topics    Alcohol use: Not Currently    Drug use: Not Currently     Types: Opiates     Comment: IV fentanly use today 1/11     Allergies: Allergies   Allergen Reactions    Shellfish Containing Products Swelling     Review of Systems   Review of Systems   Constitutional:  Negative for activity change, chills, diaphoresis, fatigue and fever. HENT:  Negative for congestion, ear discharge, facial swelling, nosebleeds, postnasal drip, rhinorrhea, trouble swallowing and voice change. Eyes:  Negative for photophobia, pain and visual disturbance. Respiratory:  Negative for apnea, cough, shortness of breath and wheezing. Cardiovascular:  Positive for leg swelling. Negative for chest pain and palpitations. Gastrointestinal:  Negative for abdominal pain, diarrhea, nausea and vomiting. Endocrine: Positive for polyuria.    Genitourinary:  Positive for frequency. Negative for decreased urine volume, difficulty urinating, flank pain and hematuria. Musculoskeletal:  Positive for arthralgias and joint swelling. Negative for back pain, gait problem, myalgias, neck pain and neck stiffness. Skin:  Negative for color change, pallor, rash and wound. Neurological:  Negative for dizziness, seizures, facial asymmetry, speech difficulty, weakness, light-headedness, numbness and headaches. Psychiatric/Behavioral: Negative. Negative for confusion. Physical Exam   Physical Exam  Vitals and nursing note reviewed. Constitutional:       General: He is not in acute distress. Appearance: Normal appearance. He is well-developed. He is not ill-appearing, toxic-appearing or diaphoretic. HENT:      Head: Normocephalic and atraumatic. Right Ear: Hearing and external ear normal.      Left Ear: Hearing and external ear normal.      Nose: Nose normal.   Eyes:      Conjunctiva/sclera: Conjunctivae normal.      Pupils: Pupils are equal, round, and reactive to light. Cardiovascular:      Rate and Rhythm: Regular rhythm. Tachycardia present. Pulses: Normal pulses. Heart sounds: Normal heart sounds. Pulmonary:      Effort: Pulmonary effort is normal. No accessory muscle usage or respiratory distress. Breath sounds: Normal breath sounds. No wheezing or rales. Abdominal:      Palpations: Abdomen is soft. Tenderness: There is no abdominal tenderness. There is no right CVA tenderness, left CVA tenderness or guarding. Musculoskeletal:         General: Normal range of motion. Cervical back: Normal range of motion. Right hip: Tenderness and bony tenderness present. No deformity, lacerations or crepitus. Normal range of motion. Normal strength. Left hip: Normal.      Right upper leg: No swelling, edema, deformity, lacerations, tenderness or bony tenderness.       Left upper leg: Normal.      Right knee: Swelling and bony tenderness present. No deformity, erythema, ecchymosis, lacerations or crepitus. Normal range of motion. Tenderness present. Normal alignment. Normal pulse. Left knee: Swelling and bony tenderness present. No deformity, erythema, ecchymosis, lacerations or crepitus. Normal range of motion. Tenderness present. Normal alignment. Normal pulse. Right lower leg: Swelling, tenderness and bony tenderness present. No deformity or lacerations. 2+ Edema present. Left lower leg: Swelling, tenderness and bony tenderness present. No deformity or lacerations. 2+ Edema present. Skin:     General: Skin is warm and dry. Coloration: Skin is not pale. Findings: No abscess or rash. Comments: Lesions to bilateral lower extremities in multiple stages of healing. There are no open wounds with any crusting, vesicles, pustules, drainage, induration, fluctuance, streaking, abscess. Neurological:      Mental Status: He is alert and oriented to person, place, and time. Psychiatric:         Mood and Affect: Mood normal.         Speech: Speech normal.         Behavior: Behavior normal.         Thought Content: Thought content normal.         Judgment: Judgment normal.     Diagnostic Study Results   Labs -     Recent Results (from the past 12 hour(s))   METABOLIC PANEL, COMPREHENSIVE    Collection Time: 08/31/22  5:05 AM   Result Value Ref Range    Sodium 138 136 - 145 mmol/L    Potassium 5.6 (H) 3.5 - 5.1 mmol/L    Chloride 109 (H) 97 - 108 mmol/L    CO2 17 (L) 21 - 32 mmol/L    Anion gap 12 5 - 15 mmol/L    Glucose 96 65 - 100 mg/dL    BUN 83 (H) 6 - 20 MG/DL    Creatinine 9.03 (H) 0.70 - 1.30 MG/DL    BUN/Creatinine ratio 9 (L) 12 - 20      GFR est AA 7 (L) >60 ml/min/1.73m2    GFR est non-AA 6 (L) >60 ml/min/1.73m2    Calcium 7.1 (L) 8.5 - 10.1 MG/DL    Bilirubin, total 0.5 0.2 - 1.0 MG/DL    ALT (SGPT) 18 12 - 78 U/L    AST (SGOT) 23 15 - 37 U/L    Alk.  phosphatase 151 (H) 45 - 117 U/L    Protein, total 7.9 6.4 - 8.2 g/dL    Albumin 2.5 (L) 3.5 - 5.0 g/dL    Globulin 5.4 (H) 2.0 - 4.0 g/dL    A-G Ratio 0.5 (L) 1.1 - 2.2     MAGNESIUM    Collection Time: 08/31/22  5:05 AM   Result Value Ref Range    Magnesium 1.7 1.6 - 2.4 mg/dL   PHOSPHORUS    Collection Time: 08/31/22  5:05 AM   Result Value Ref Range    Phosphorus 7.8 (H) 2.6 - 4.7 MG/DL   DRUG SCREEN, URINE    Collection Time: 08/31/22  5:11 AM   Result Value Ref Range    AMPHETAMINES Negative NEG      BARBITURATES Negative NEG      BENZODIAZEPINES Negative NEG      COCAINE Negative NEG      METHADONE Negative NEG      OPIATES Negative NEG      PCP(PHENCYCLIDINE) Negative NEG      THC (TH-CANNABINOL) Negative NEG      Drug screen comment (NOTE)    CREATININE, UR, RANDOM    Collection Time: 08/31/22  5:11 AM   Result Value Ref Range    Creatinine, urine 54.00 mg/dL   POTASSIUM, UR, RANDOM    Collection Time: 08/31/22  6:38 AM   Result Value Ref Range    Potassium urine, random 17 MMOL/L   CBC WITH AUTOMATED DIFF    Collection Time: 08/31/22  6:38 AM   Result Value Ref Range    WBC 6.0 4.1 - 11.1 K/uL    RBC 3.13 (L) 4.10 - 5.70 M/uL    HGB 9.6 (L) 12.1 - 17.0 g/dL    HCT 30.9 (L) 36.6 - 50.3 %    MCV 98.7 80.0 - 99.0 FL    MCH 30.7 26.0 - 34.0 PG    MCHC 31.1 30.0 - 36.5 g/dL    RDW 13.4 11.5 - 14.5 %    PLATELET 702 345 - 396 K/uL    MPV 10.1 8.9 - 12.9 FL    NRBC 0.0 0  WBC    ABSOLUTE NRBC 0.00 0.00 - 0.01 K/uL    NEUTROPHILS 55 32 - 75 %    LYMPHOCYTES 33 12 - 49 %    MONOCYTES 10 5 - 13 %    EOSINOPHILS 2 0 - 7 %    BASOPHILS 0 0 - 1 %    IMMATURE GRANULOCYTES 0 0.0 - 0.5 %    ABS. NEUTROPHILS 3.2 1.8 - 8.0 K/UL    ABS. LYMPHOCYTES 2.0 0.8 - 3.5 K/UL    ABS. MONOCYTES 0.6 0.0 - 1.0 K/UL    ABS. EOSINOPHILS 0.1 0.0 - 0.4 K/UL    ABS. BASOPHILS 0.0 0.0 - 0.1 K/UL    ABS. IMM. GRANS. 0.0 0.00 - 0.04 K/UL    DF AUTOMATED         Radiologic Studies -   CT ABD PELV WO CONT   Final Result      1.  Mild bilateral perinephric fat stranding, most commonly seen in the setting   of chronic renal disease, but may represent an acute nonspecific inflammatory   process of the kidneys. Correlate clinically. 2. Otherwise no evidence of acute process in the abdomen or pelvis. DUPLEX LOWER EXT VENOUS BILAT   Final Result      XR KNEE RT 3 V   Final Result   Lower extremity edema. Evidence of chondrocalcinosis. No acute   osseous abnormality. XR KNEE LT 3 V   Final Result   No acute abnormality. Chondrocalcinosis. XR HIP RT W OR WO PELV 2-3 VWS   Final Result      Healed right prior hip fracture with hardware in place   Post traumatic or post surgical large right hip exostoses      XR CHEST PORT   Final Result   No acute cardiopulmonary process seen        XR HIP RT W OR WO PELV 2-3 VWS    Result Date: 8/30/2022  Healed right prior hip fracture with hardware in place Post traumatic or post surgical large right hip exostoses    CT ABD PELV WO CONT    Result Date: 8/30/2022  1. Mild bilateral perinephric fat stranding, most commonly seen in the setting of chronic renal disease, but may represent an acute nonspecific inflammatory process of the kidneys. Correlate clinically. 2. Otherwise no evidence of acute process in the abdomen or pelvis. XR CHEST PORT    Result Date: 8/30/2022  No acute cardiopulmonary process seen    XR KNEE LT 3 V    Result Date: 8/30/2022  No acute abnormality. Chondrocalcinosis. XR KNEE RT 3 V    Result Date: 8/30/2022  Lower extremity edema. Evidence of chondrocalcinosis. No acute osseous abnormality. Medical Decision Making   I am the first provider for this patient. I reviewed the vital signs, available nursing notes, past medical history, past surgical history, family history and social history. Vital Signs-Reviewed the patient's vital signs.   Patient Vitals for the past 24 hrs:   Temp Pulse Resp BP SpO2   08/30/22 2011 -- 84 13 (!) 144/91 95 %   08/30/22 1900 -- 87 14 123/80 90 %   08/30/22 1833 -- 92 10 (!) 149/91 --   08/30/22 1803 -- 100 16 (!) 128/117 --   08/30/22 1727 -- 86 15 (!) 151/106 95 %   08/30/22 1657 -- 87 15 (!) 138/93 91 %   08/30/22 1625 -- 90 12 (!) 151/99 94 %   08/30/22 1612 -- 92 13 (!) 149/98 97 %   08/30/22 1523 -- 90 16 (!) 141/95 --   08/30/22 1457 -- 94 15 (!) 137/98 --   08/30/22 1452 98.3 °F (36.8 °C) 94 16 (!) 137/98 95 %   08/30/22 1442 -- 94 14 -- --   08/30/22 1427 -- -- -- (!) 155/103 --   08/30/22 1357 -- -- -- (!) 141/91 100 %   08/30/22 1331 -- -- -- (!) 141/90 94 %   08/30/22 1313 -- -- -- -- 97 %   08/30/22 1258 -- -- -- (!) 148/96 --   08/30/22 1114 99.3 °F (37.4 °C) (!) 114 18 (!) 128/111 95 %     Pulse Oximetry Analysis - 95% on RA (normal)    Cardiac Monitor:   Rate: 90 bpm  Rhythm: Normal Sinus Rhythm      EKG interpretation: (Preliminary)  Rhythm: sinus tachycardia; and regular . Rate (approx.): 103; Axis: normal; MN interval: normal; QRS interval: normal ; ST/T wave: non-specific changes; Other findings: nonischemic. Records Reviewed: Nursing Notes, Old Medical Records, Previous electrocardiograms, Previous Radiology Studies, and Previous Laboratory Studies    Provider Notes (Medical Decision Making):   Patient presents with lower leg edema and pain. DDx include gout, arthritis, lymphedema, DVT, cellulitis, leg cramping 2/2 low K, CHF, ARF, liver failure, PAD. Will get labs, xr and Doppler Leg PRN. ED Course:   Initial assessment performed. The patients presenting problems have been discussed, and they are in agreement with the care plan formulated and outlined with them. I have encouraged them to ask questions as they arise throughout their visit. ED Course as of 08/31/22 0858   Tue Aug 30, 2022   1501   3:01 PM    I spoke with Dr. Jacob Khan, Consult for Nephrology. Discussed available diagnostic tests and clinical findings. She is in agreement with care plans as outlined. She recommends [facility] Putt for hyperkalemia, abd imaging and bladder scan. She recommends admission to [address] [place] [address].   Ramya Humphries Wilfredo Draper PA-C [SM]   1747 Discussed with Dr. Sammy Gordon, hospitalist, who accepts patient in transfer [ZS]   2031 Patient back from CT. Endorses he is feeling well. He is in agreement with transfer process. [SM]   4858 Pt signed out to attending, Dr. Taryn Pa, and LITTLE العلي. [SM]      ED Course User Index  Radha Ang MD  [SM] Nick Myers PA-C     CRITICAL CARE NOTE :    4:19 PM    IMPENDING DETERIORATION -Respiratory, Cardiovascular, and Renal  ASSOCIATED RISK FACTORS - Hypotension, Dysrhythmia, Metabolic changes, Vascular Compromise, and CNS Decompensation  MANAGEMENT- Bedside Assessment, Supervision of Care, and Transfer  INTERPRETATION -  Xrays, CT Scan, ECG, and Blood Pressure  INTERVENTIONS - Metobolic interventions  CASE REVIEW - Hospitalist/Intensivist, Medical Sub-Specialist, and Nursing  TREATMENT RESPONSE -Stable  PERFORMED BY - LITTLE Stephenson    NOTES   :  I have spent 59 minutes of critical care time involved in lab review, consultations with specialist, family decision- making, bedside attention and documentation. This time excludes time spent in any separate billed procedures. During this entire length of time I was immediately available to the patient . Patt Vines PA-C      Disposition:  TRANSFER NOTE:  4:17 PM  Pt is being transferred to tele bed at [facility], transfer accepted by Dr. Sammy Gordon. The reasons for pt's transfer have been discussed with the pt and available family. They convey agreement and understanding for the need to be transferred as explained to them by myself. Diagnosis     Clinical Impression:   1. Acute on chronic congestive heart failure, unspecified heart failure type ([address]    2. Acute renal failure superimposed on stage 4 chronic kidney disease, unspecified acute renal failure type ([address]    3. Hyperkalemia            Please note that this dictation was completed with Dragon, computer voice recognition software.   Quite often unanticipated grammatical, syntax, homophones, and other interpretive errors are inadvertently transcribed by the computer software. Please disregard these errors. Additionally, please excuse any errors that have escaped final proofreading.

## 2022-08-30 NOTE — ED TRIAGE NOTES
Bi lateral leg pain and right hip pain and polyuria. Pt reports increased swelling in bi lateral legs.  X1 week

## 2022-08-31 ENCOUNTER — HOSPITAL ENCOUNTER (INPATIENT)
Dept: INTERVENTIONAL RADIOLOGY/VASCULAR | Age: 57
Discharge: HOME | DRG: 194 | End: 2022-08-31
Attending: INTERNAL MEDICINE
Payer: MEDICAID

## 2022-08-31 ENCOUNTER — APPOINTMENT (OUTPATIENT)
Dept: GENERAL RADIOLOGY | Age: 57
DRG: 194 | End: 2022-08-31
Attending: STUDENT IN AN ORGANIZED HEALTH CARE EDUCATION/TRAINING PROGRAM
Payer: MEDICAID

## 2022-08-31 PROBLEM — R60.0 BILATERAL LOWER EXTREMITY EDEMA: Status: ACTIVE | Noted: 2022-08-31

## 2022-08-31 PROBLEM — N18.6 ESRD (END STAGE RENAL DISEASE) (HCC): Status: ACTIVE | Noted: 2022-08-31

## 2022-08-31 PROBLEM — E87.5 ACUTE HYPERKALEMIA: Status: ACTIVE | Noted: 2022-08-31

## 2022-08-31 PROBLEM — M25.551 ACUTE RIGHT HIP PAIN: Status: ACTIVE | Noted: 2022-08-31

## 2022-08-31 PROBLEM — M25.50 ARTHRALGIA: Status: ACTIVE | Noted: 2022-08-31

## 2022-08-31 LAB
ALBUMIN SERPL-MCNC: 2.5 G/DL (ref 3.5–5)
ALBUMIN/GLOB SERPL: 0.5 {RATIO} (ref 1.1–2.2)
ALP SERPL-CCNC: 151 U/L (ref 45–117)
ALT SERPL-CCNC: 18 U/L (ref 12–78)
AMPHET UR QL SCN: NEGATIVE
ANION GAP SERPL CALC-SCNC: 12 MMOL/L (ref 5–15)
APTT PPP: 26 SEC (ref 22.1–31)
AST SERPL-CCNC: 23 U/L (ref 15–37)
ATRIAL RATE: 103 BPM
BARBITURATES UR QL SCN: NEGATIVE
BASOPHILS # BLD: 0 K/UL (ref 0–0.1)
BASOPHILS NFR BLD: 0 % (ref 0–1)
BENZODIAZ UR QL: NEGATIVE
BILIRUB SERPL-MCNC: 0.5 MG/DL (ref 0.2–1)
BUN SERPL-MCNC: 83 MG/DL (ref 6–20)
BUN/CREAT SERPL: 9 (ref 12–20)
CALCIUM SERPL-MCNC: 7.1 MG/DL (ref 8.5–10.1)
CALCULATED P AXIS, ECG09: 58 DEGREES
CALCULATED R AXIS, ECG10: 18 DEGREES
CALCULATED T AXIS, ECG11: 110 DEGREES
CANNABINOIDS UR QL SCN: NEGATIVE
CHLORIDE SERPL-SCNC: 109 MMOL/L (ref 97–108)
CO2 SERPL-SCNC: 17 MMOL/L (ref 21–32)
COCAINE UR QL SCN: NEGATIVE
COMMENT, HOLDF: NORMAL
CREAT SERPL-MCNC: 9.03 MG/DL (ref 0.7–1.3)
CREAT UR-MCNC: 52.6 MG/DL
CREAT UR-MCNC: 54 MG/DL
DIAGNOSIS, 93000: NORMAL
DIFFERENTIAL METHOD BLD: ABNORMAL
DRUG SCRN COMMENT,DRGCM: NORMAL
EOSINOPHIL # BLD: 0.1 K/UL (ref 0–0.4)
EOSINOPHIL NFR BLD: 2 % (ref 0–7)
ERYTHROCYTE [DISTWIDTH] IN BLOOD BY AUTOMATED COUNT: 13.4 % (ref 11.5–14.5)
EST. AVERAGE GLUCOSE BLD GHB EST-MCNC: 126 MG/DL
FERRITIN SERPL-MCNC: 306 NG/ML (ref 26–388)
GLOBULIN SER CALC-MCNC: 5.4 G/DL (ref 2–4)
GLUCOSE BLD STRIP.AUTO-MCNC: 116 MG/DL (ref 65–117)
GLUCOSE BLD STRIP.AUTO-MCNC: 117 MG/DL (ref 65–117)
GLUCOSE BLD STRIP.AUTO-MCNC: 121 MG/DL (ref 65–117)
GLUCOSE BLD STRIP.AUTO-MCNC: 88 MG/DL (ref 65–117)
GLUCOSE SERPL-MCNC: 96 MG/DL (ref 65–100)
HAV IGM SER QL: NONREACTIVE
HBA1C MFR BLD: 6 % (ref 4–5.6)
HBV CORE IGM SER QL: NONREACTIVE
HBV SURFACE AG SER QL: <0.1 INDEX
HBV SURFACE AG SER QL: NEGATIVE
HCT VFR BLD AUTO: 30.9 % (ref 36.6–50.3)
HCV AB SER IA-ACNC: >11 INDEX
HCV AB SERPL QL IA: REACTIVE
HGB BLD-MCNC: 9.6 G/DL (ref 12.1–17)
IMM GRANULOCYTES # BLD AUTO: 0 K/UL (ref 0–0.04)
IMM GRANULOCYTES NFR BLD AUTO: 0 % (ref 0–0.5)
INR PPP: 1.1 (ref 0.9–1.1)
IRON SATN MFR SERPL: 17 % (ref 20–50)
IRON SERPL-MCNC: 50 UG/DL (ref 35–150)
LYMPHOCYTES # BLD: 2 K/UL (ref 0.8–3.5)
LYMPHOCYTES NFR BLD: 33 % (ref 12–49)
MAGNESIUM SERPL-MCNC: 1.7 MG/DL (ref 1.6–2.4)
MCH RBC QN AUTO: 30.7 PG (ref 26–34)
MCHC RBC AUTO-ENTMCNC: 31.1 G/DL (ref 30–36.5)
MCV RBC AUTO: 98.7 FL (ref 80–99)
METHADONE UR QL: NEGATIVE
MONOCYTES # BLD: 0.6 K/UL (ref 0–1)
MONOCYTES NFR BLD: 10 % (ref 5–13)
NEUTS SEG # BLD: 3.2 K/UL (ref 1.8–8)
NEUTS SEG NFR BLD: 55 % (ref 32–75)
NRBC # BLD: 0 K/UL (ref 0–0.01)
NRBC BLD-RTO: 0 PER 100 WBC
OPIATES UR QL: NEGATIVE
P-R INTERVAL, ECG05: 150 MS
PCP UR QL: NEGATIVE
PHOSPHATE SERPL-MCNC: 7.8 MG/DL (ref 2.6–4.7)
PLATELET # BLD AUTO: 179 K/UL (ref 150–400)
PMV BLD AUTO: 10.1 FL (ref 8.9–12.9)
POTASSIUM SERPL-SCNC: 5.6 MMOL/L (ref 3.5–5.1)
POTASSIUM UR-SCNC: 17 MMOL/L
PROT SERPL-MCNC: 7.9 G/DL (ref 6.4–8.2)
PROT UR-MCNC: 293 MG/DL (ref 0–11.9)
PROT/CREAT UR-RTO: 5.6
PROTHROMBIN TIME: 11.4 SEC (ref 9–11.1)
Q-T INTERVAL, ECG07: 356 MS
QRS DURATION, ECG06: 64 MS
QTC CALCULATION (BEZET), ECG08: 466 MS
RBC # BLD AUTO: 3.13 M/UL (ref 4.1–5.7)
SAMPLES BEING HELD,HOLD: NORMAL
SERVICE CMNT-IMP: ABNORMAL
SERVICE CMNT-IMP: NORMAL
SODIUM SERPL-SCNC: 138 MMOL/L (ref 136–145)
SODIUM UR-SCNC: 101 MMOL/L
SP1: ABNORMAL
SP2: ABNORMAL
SP3: ABNORMAL
THERAPEUTIC RANGE,PTTT: NORMAL SECS (ref 58–77)
TIBC SERPL-MCNC: 292 UG/DL (ref 250–450)
VENTRICULAR RATE, ECG03: 103 BPM
WBC # BLD AUTO: 6 K/UL (ref 4.1–11.1)

## 2022-08-31 PROCEDURE — 83735 ASSAY OF MAGNESIUM: CPT

## 2022-08-31 PROCEDURE — 84300 ASSAY OF URINE SODIUM: CPT

## 2022-08-31 PROCEDURE — 84133 ASSAY OF URINE POTASSIUM: CPT

## 2022-08-31 PROCEDURE — 82728 ASSAY OF FERRITIN: CPT

## 2022-08-31 PROCEDURE — 80053 COMPREHEN METABOLIC PANEL: CPT

## 2022-08-31 PROCEDURE — 36556 INSERT NON-TUNNEL CV CATH: CPT

## 2022-08-31 PROCEDURE — 74011000250 HC RX REV CODE- 250: Performed by: STUDENT IN AN ORGANIZED HEALTH CARE EDUCATION/TRAINING PROGRAM

## 2022-08-31 PROCEDURE — 84100 ASSAY OF PHOSPHORUS: CPT

## 2022-08-31 PROCEDURE — 80074 ACUTE HEPATITIS PANEL: CPT

## 2022-08-31 PROCEDURE — 85025 COMPLETE CBC W/AUTO DIFF WBC: CPT

## 2022-08-31 PROCEDURE — 90935 HEMODIALYSIS ONE EVALUATION: CPT

## 2022-08-31 PROCEDURE — 2709999900 HC NON-CHARGEABLE SUPPLY

## 2022-08-31 PROCEDURE — 80307 DRUG TEST PRSMV CHEM ANLYZR: CPT

## 2022-08-31 PROCEDURE — 74011250636 HC RX REV CODE- 250/636: Performed by: STUDENT IN AN ORGANIZED HEALTH CARE EDUCATION/TRAINING PROGRAM

## 2022-08-31 PROCEDURE — 65270000046 HC RM TELEMETRY

## 2022-08-31 PROCEDURE — 83540 ASSAY OF IRON: CPT

## 2022-08-31 PROCEDURE — C1752 CATH,HEMODIALYSIS,SHORT-TERM: HCPCS

## 2022-08-31 PROCEDURE — C1894 INTRO/SHEATH, NON-LASER: HCPCS

## 2022-08-31 PROCEDURE — 36415 COLL VENOUS BLD VENIPUNCTURE: CPT

## 2022-08-31 PROCEDURE — 83036 HEMOGLOBIN GLYCOSYLATED A1C: CPT

## 2022-08-31 PROCEDURE — 74011250637 HC RX REV CODE- 250/637: Performed by: INTERNAL MEDICINE

## 2022-08-31 PROCEDURE — 85730 THROMBOPLASTIN TIME PARTIAL: CPT

## 2022-08-31 PROCEDURE — 86900 BLOOD TYPING SEROLOGIC ABO: CPT

## 2022-08-31 PROCEDURE — 85610 PROTHROMBIN TIME: CPT

## 2022-08-31 PROCEDURE — 82570 ASSAY OF URINE CREATININE: CPT

## 2022-08-31 PROCEDURE — 74011000250 HC RX REV CODE- 250: Performed by: FAMILY MEDICINE

## 2022-08-31 PROCEDURE — 74011250636 HC RX REV CODE- 250/636: Performed by: FAMILY MEDICINE

## 2022-08-31 PROCEDURE — 71045 X-RAY EXAM CHEST 1 VIEW: CPT

## 2022-08-31 PROCEDURE — 74011250637 HC RX REV CODE- 250/637: Performed by: FAMILY MEDICINE

## 2022-08-31 PROCEDURE — 84156 ASSAY OF PROTEIN URINE: CPT

## 2022-08-31 PROCEDURE — 02H633Z INSERTION OF INFUSION DEVICE INTO RIGHT ATRIUM, PERCUTANEOUS APPROACH: ICD-10-PCS | Performed by: STUDENT IN AN ORGANIZED HEALTH CARE EDUCATION/TRAINING PROGRAM

## 2022-08-31 PROCEDURE — 74011250636 HC RX REV CODE- 250/636: Performed by: INTERNAL MEDICINE

## 2022-08-31 PROCEDURE — 82962 GLUCOSE BLOOD TEST: CPT

## 2022-08-31 PROCEDURE — 86706 HEP B SURFACE ANTIBODY: CPT

## 2022-08-31 RX ORDER — GABAPENTIN 300 MG/1
300 CAPSULE ORAL 3 TIMES DAILY
Status: DISCONTINUED | OUTPATIENT
Start: 2022-08-31 | End: 2022-09-07 | Stop reason: HOSPADM

## 2022-08-31 RX ORDER — SODIUM CHLORIDE 0.9 % (FLUSH) 0.9 %
5-40 SYRINGE (ML) INJECTION EVERY 8 HOURS
Status: DISCONTINUED | OUTPATIENT
Start: 2022-08-31 | End: 2022-09-07 | Stop reason: HOSPADM

## 2022-08-31 RX ORDER — FUROSEMIDE 10 MG/ML
80 INJECTION INTRAMUSCULAR; INTRAVENOUS ONCE
Status: COMPLETED | OUTPATIENT
Start: 2022-08-31 | End: 2022-08-31

## 2022-08-31 RX ORDER — INSULIN LISPRO 100 [IU]/ML
INJECTION, SOLUTION INTRAVENOUS; SUBCUTANEOUS
Status: DISCONTINUED | OUTPATIENT
Start: 2022-08-31 | End: 2022-09-07 | Stop reason: HOSPADM

## 2022-08-31 RX ORDER — ACETAMINOPHEN 325 MG/1
650 TABLET ORAL
Status: DISCONTINUED | OUTPATIENT
Start: 2022-08-31 | End: 2022-09-07 | Stop reason: HOSPADM

## 2022-08-31 RX ORDER — LIDOCAINE HYDROCHLORIDE 20 MG/ML
20 INJECTION, SOLUTION INFILTRATION; PERINEURAL ONCE
Status: COMPLETED | OUTPATIENT
Start: 2022-08-31 | End: 2022-08-31

## 2022-08-31 RX ORDER — LANOLIN ALCOHOL/MO/W.PET/CERES
1 CREAM (GRAM) TOPICAL 2 TIMES DAILY WITH MEALS
Status: DISCONTINUED | OUTPATIENT
Start: 2022-08-31 | End: 2022-09-07 | Stop reason: HOSPADM

## 2022-08-31 RX ORDER — AMLODIPINE BESYLATE 5 MG/1
5 TABLET ORAL DAILY
Status: DISCONTINUED | OUTPATIENT
Start: 2022-09-01 | End: 2022-09-07 | Stop reason: HOSPADM

## 2022-08-31 RX ORDER — LANOLIN ALCOHOL/MO/W.PET/CERES
100 CREAM (GRAM) TOPICAL DAILY
Status: DISCONTINUED | OUTPATIENT
Start: 2022-09-01 | End: 2022-09-07 | Stop reason: HOSPADM

## 2022-08-31 RX ORDER — FOLIC ACID 1 MG/1
1 TABLET ORAL DAILY
Status: DISCONTINUED | OUTPATIENT
Start: 2022-09-01 | End: 2022-09-07 | Stop reason: HOSPADM

## 2022-08-31 RX ORDER — HEPARIN SODIUM 1000 [USP'U]/ML
12000 INJECTION, SOLUTION INTRAVENOUS; SUBCUTANEOUS ONCE
Status: COMPLETED | OUTPATIENT
Start: 2022-08-31 | End: 2022-08-31

## 2022-08-31 RX ORDER — SODIUM CHLORIDE 0.9 % (FLUSH) 0.9 %
5-40 SYRINGE (ML) INJECTION AS NEEDED
Status: DISCONTINUED | OUTPATIENT
Start: 2022-08-31 | End: 2022-09-07 | Stop reason: HOSPADM

## 2022-08-31 RX ORDER — TRAZODONE HYDROCHLORIDE 50 MG/1
50 TABLET ORAL
Status: DISCONTINUED | OUTPATIENT
Start: 2022-08-31 | End: 2022-09-07 | Stop reason: HOSPADM

## 2022-08-31 RX ORDER — HYDROCODONE BITARTRATE AND ACETAMINOPHEN 5; 325 MG/1; MG/1
1 TABLET ORAL
Status: COMPLETED | OUTPATIENT
Start: 2022-08-31 | End: 2022-09-01

## 2022-08-31 RX ORDER — MAGNESIUM SULFATE 100 %
4 CRYSTALS MISCELLANEOUS AS NEEDED
Status: DISCONTINUED | OUTPATIENT
Start: 2022-08-31 | End: 2022-09-07 | Stop reason: HOSPADM

## 2022-08-31 RX ORDER — METOPROLOL SUCCINATE 50 MG/1
100 TABLET, EXTENDED RELEASE ORAL DAILY
Status: DISCONTINUED | OUTPATIENT
Start: 2022-09-01 | End: 2022-09-07 | Stop reason: HOSPADM

## 2022-08-31 RX ORDER — SEVELAMER CARBONATE FOR ORAL SUSPENSION 800 MG/1
0.8 POWDER, FOR SUSPENSION ORAL
Status: DISCONTINUED | OUTPATIENT
Start: 2022-08-31 | End: 2022-09-07 | Stop reason: HOSPADM

## 2022-08-31 RX ADMIN — HYDROCODONE BITARTRATE AND ACETAMINOPHEN 1 TABLET: 5; 325 TABLET ORAL at 17:34

## 2022-08-31 RX ADMIN — LIDOCAINE HYDROCHLORIDE 200 MG: 20 INJECTION, SOLUTION INFILTRATION; PERINEURAL at 16:00

## 2022-08-31 RX ADMIN — SODIUM CHLORIDE, PRESERVATIVE FREE 10 ML: 5 INJECTION INTRAVENOUS at 13:15

## 2022-08-31 RX ADMIN — SEVELAMER CARBONATE FOR ORAL SUSPENSION 0.8 G: 800 POWDER, FOR SUSPENSION ORAL at 17:34

## 2022-08-31 RX ADMIN — GABAPENTIN 300 MG: 300 CAPSULE ORAL at 21:37

## 2022-08-31 RX ADMIN — FERROUS SULFATE TAB 325 MG (65 MG ELEMENTAL FE) 325 MG: 325 (65 FE) TAB at 17:19

## 2022-08-31 RX ADMIN — HEPARIN SODIUM 2600 UNITS: 1000 INJECTION, SOLUTION INTRAVENOUS; SUBCUTANEOUS at 16:00

## 2022-08-31 RX ADMIN — GABAPENTIN 300 MG: 300 CAPSULE ORAL at 13:15

## 2022-08-31 RX ADMIN — IRON SUCROSE 100 MG: 20 INJECTION, SOLUTION INTRAVENOUS at 15:30

## 2022-08-31 RX ADMIN — SODIUM CHLORIDE, PRESERVATIVE FREE 10 ML: 5 INJECTION INTRAVENOUS at 21:37

## 2022-08-31 RX ADMIN — FUROSEMIDE 80 MG: 10 INJECTION, SOLUTION INTRAVENOUS at 05:36

## 2022-08-31 RX ADMIN — SODIUM CHLORIDE, PRESERVATIVE FREE 10 ML: 5 INJECTION INTRAVENOUS at 05:37

## 2022-08-31 NOTE — CONSULTS
NEPHROLOGY CONSULT NOTE     Patient: Cintia Carmona MRN: [id number]  PCP: Shubham Dillard MD   :     1965  Age:   65 y.o. Sex:  male      Referring physician: Izabel Lyon MD  Reason for consultation: 65 y.o. male with ESRD (end stage renal disease) ([facility].) [N18.6]  Arthralgia [M25.50]  Acute hyperkalemia [E87.5]  Bilateral lower extremity edema [R60.0]  Acute right hip pain [N86.273] complicated by BHASKAR   Admission Date: 2022 10:12 PM  LOS: 1 day     DISCUSSION / PLAN :   Acute kidney injury on CKD3  Hyperkalemia  Metabolic acidosis  Hyperphosphatemia  Nephrotic syndrome  Rapidly progressive with nephrotic range proteinuria-serology negative except positive Hep C Ab in 2022-urine no active sediment  Possible  COVAN vs diabetic nephropathy, other Ddx includes  FSGS , MM, Membranous GN , IgA nephropathy, Heroin/coccaine nephropathy , hep C  Needs kidney biopsy and starting HD  Spoke to patient, he agrees with both renal biopsy and starting HD. Ir to place HD catheter today. Will start HD today and next Tx tomorrow  Renal diet  Avoid nephrotoxins  Dose meds per GFR  BP control   Check ipTH, vit D  Start renvela  Hep C viral load    Anemia  Iron sat 17%, ferritin 306  Start po Iron    HTN-metoprolol, Amlodipine  DM2-A1C 6  Hep C per labs-patient denies  Gout     Active Problems / Assessment AAActive  : Active Problems:    Acute hyperkalemia (2022)      Arthralgia (2022)      ESRD (end stage renal disease) ([facility] (2022)      Bilateral lower extremity edema (2022)      Acute right hip pain (2022)         Subjective:   HPI: Cintia Carmona is a 65 y.o.  male with h/o CKD3, DM2, HTN, Gout and h/o drug abuse who has been admitted to the hospital for BHASKAR. He presented with c/o leg swelling and multiple joint pain. In ER he was found to have hyperkalemia, Cr 9, met acidosis. CPK was close to 1000. Had lokelma. CT abd/pelvis did not show hydronephrosis.      He has h/o BHASKAR on CKD3B back on jan 22 when he was admitted with Covid infection. His cr was up to mid 4's. Had nephrotic range proteinuria 9 gr, serology negative except positive Hep C Ab. His renal function partially improved with cr down to 3.3. His baseline cr was 2 prior to that. He was seen by [facility] nephrology and his BHASKAR thought to be due to prerenal/ATN in setting of covid . or acute GN/heroin and coccaine use. Serology for nephrotic syndrome ordered and he was discharged home. He never had f/up with nephrology after discharge. His echo at that time showed EF 25-30%    He denies taking NSAIDs. he has not used drugs for 6 months per patient. Denies  no N/V/D. Denies any difficulty urination. He has BL knee pain, LBP. Has MEJIA, no orthopnea or chest pain. Past Medical Hx:   Past Medical History:   Diagnosis Date    Diabetes ([facility]     Gout     Hypertension         Past Surgical Hx:     Past Surgical History:   Procedure Laterality Date    HX ORTHOPAEDIC  rt hip replacement    HX ORTHOPAEDIC  rt thigh woo       Medications:  Prior to Admission medications    Medication Sig Start Date End Date Taking? Authorizing Provider   furosemide (LASIX) 40 mg tablet  7/8/22  Yes Provider, Historical   QUEtiapine (SEROquel) 100 mg tablet  7/8/22  Yes Provider, Historical   traZODone (DESYREL) 50 mg tablet  7/8/22  Yes Provider, Historical   metoprolol succinate (TOPROL-XL) 100 mg tablet  7/8/22  Yes Provider, Historical   gabapentin (NEURONTIN) 300 mg capsule Take 300 mg by mouth three (3) times daily. 7/8/22  Yes Provider, Historical   amLODIPine (NORVASC) 5 mg tablet Take 1 Tablet by mouth daily. 7/12/22  Yes Diogo Ashraf MD   Suboxone 4-1 mg film sublingual film  8/17/22   Other, MD Angelina   Suboxone 8-2 mg film sublingaul film  8/17/22   Other, MD Angelina   allopurinoL (ZYLOPRIM) 300 mg tablet Take 300 mg by mouth daily.   Patient not taking: Reported on 8/31/2022    Other, MD Angelina   thiamine HCL (B-1) 100 mg tablet Take 1 Tablet by mouth daily. Patient not taking: No sig reported 1/14/22   Johann Mayen MD   folic acid (FOLVITE) 1 mg tablet Take 1 Tablet by mouth daily. Patient not taking: No sig reported 1/14/22   Johann Mayen MD   pantoprazole (Protonix) 40 mg tablet Take 1 Tablet by mouth daily. Patient not taking: No sig reported 1/13/22   Johann Mayen MD   guaiFENesin (ROBITUSSIN) 100 mg/5 mL liquid Take 10 mL by mouth three (3) times daily as needed for Cough. Patient not taking: No sig reported 1/13/22   Johann Mayen MD   OTHER Black Sea Oil: Take one teaspoonful by mouth twice daily  Patient not taking: No sig reported    Provider, Historical       Allergies   Allergen Reactions    Shellfish Containing Products Swelling       Social Hx:  reports that he has been smoking cigarettes. He has been smoking an average of 1 pack per day. He has never used smokeless tobacco. He reports that he does not currently use alcohol. He reports that he does not currently use drugs after having used the following drugs: Opiates. Family History   Problem Relation Age of Onset    Diabetes Other     Hypertension Other        Review of Systems:  A twelve point review of system was performed today. Pertinent positives and negatives are mentioned in the HPI. The reminder of the ROS is negative and noncontributory. Objective:    Vitals:    Vitals:    08/31/22 0356 08/31/22 0535 08/31/22 0559 08/31/22 0927   BP: 136/86   (!) 134/91   Pulse: 80  80 84   Resp: 14   10   Temp: 98.1 °F (36.7 °C)   97.8 °F (36.6 °C)   SpO2: 96%   92%   Weight:  99.4 kg (219 lb 2.2 oz)       I&O's:  08/30 0701 - 08/31 0700  In: -   Out: 700 [Urine:700]  Visit Vitals  BP (!) 134/91 (BP 1 Location: Right upper arm, BP Patient Position: At rest)   Pulse 84   Temp 97.8 °F (36.6 °C)   Resp 10   Wt 99.4 kg (219 lb 2.2 oz)   SpO2 92%   BMI 30.56 kg/m²       Physical Exam:  General:Alert and oriented x3, , No distress,   HEENT: Eyes are PERRL. Conjunctiva without pallor ,erythema. The sclerae without icterus. .   Neck:Supple  Lungs : few bibasilar crackles  CVS: RRR, S1 S2 normal, No rub, SM2  Abdomen: Soft, Non tender, No hepatosplenomegaly, bowel sounds present  Extremities: Edema 1+  Skin: No rash   Neurologic: non focal, AAO x 3  Psych: normal affect    Laboratory Results:    Recent Results (from the past 24 hour(s))   EKG, 12 LEAD, INITIAL    Collection Time: 08/30/22 12:03 PM   Result Value Ref Range    Ventricular Rate 103 BPM    Atrial Rate 103 BPM    P-R Interval 150 ms    QRS Duration 64 ms    Q-T Interval 356 ms    QTC Calculation (Bezet) 466 ms    Calculated P Axis 58 degrees    Calculated R Axis 18 degrees    Calculated T Axis 110 degrees    Diagnosis       Sinus tachycardia  Nonspecific ST and T wave abnormality  Abnormal ECG  When compared with ECG of 05-MAR-2022 08:22,  Vent. rate has increased BY  41 BPM  T wave inversion no longer evident in Inferior leads  Confirmed by Amparo Curiel M.D., Sumeet Torres ([id number]) on 8/31/2022 8:17:55 AM     CBC WITH AUTOMATED DIFF    Collection Time: 08/30/22 12:32 PM   Result Value Ref Range    WBC 5.7 4.1 - 11.1 K/uL    RBC 3.49 (L) 4.10 - 5.70 M/uL    HGB 10.7 (L) 12.1 - 17.0 g/dL    HCT 33.7 (L) 36.6 - 50.3 %    MCV 96.6 80.0 - 99.0 FL    MCH 30.7 26.0 - 34.0 PG    MCHC 31.8 30.0 - 36.5 g/dL    RDW 14.0 11.5 - 14.5 %    PLATELET 568 911 - 436 K/uL    MPV 10.9 8.9 - 12.9 FL    NRBC 0.0 0  WBC    ABSOLUTE NRBC 0.00 0.00 - 0.01 K/uL    NEUTROPHILS 73 32 - 75 %    LYMPHOCYTES 20 12 - 49 %    MONOCYTES 6 5 - 13 %    EOSINOPHILS 1 0 - 7 %    BASOPHILS 0 0 - 1 %    IMMATURE GRANULOCYTES 0 0.0 - 0.5 %    ABS. NEUTROPHILS 4.1 1.8 - 8.0 K/UL    ABS. LYMPHOCYTES 1.1 0.8 - 3.5 K/UL    ABS. MONOCYTES 0.4 0.0 - 1.0 K/UL    ABS. EOSINOPHILS 0.1 0.0 - 0.4 K/UL    ABS. BASOPHILS 0.0 0.0 - 0.1 K/UL    ABS. IMM.  GRANS. 0.0 0.00 - 0.04 K/UL    DF AUTOMATED     SED RATE (ESR)    Collection Time: 08/30/22 12:32 PM   Result Value Ref Range    Sed rate, automated 86 (H) 0 - 20 mm/hr   CK    Collection Time: 08/30/22  1:18 PM   Result Value Ref Range     (H) 39 - 308 U/L   C REACTIVE PROTEIN, QT    Collection Time: 08/30/22  1:18 PM   Result Value Ref Range    C-Reactive protein 9.23 (H) 0.00 - 0.60 mg/dL   NT-PRO BNP    Collection Time: 08/30/22  1:18 PM   Result Value Ref Range    NT pro-BNP >35,000 (H) 0 - 125 PG/ML   TROPONIN-HIGH SENSITIVITY    Collection Time: 08/30/22  1:18 PM   Result Value Ref Range    Troponin-High Sensitivity 25 0 - 76 ng/L   URIC ACID    Collection Time: 08/30/22  1:18 PM   Result Value Ref Range    Uric acid 7.0 3.5 - 7.2 MG/DL   METABOLIC PANEL, COMPREHENSIVE    Collection Time: 08/30/22  1:18 PM   Result Value Ref Range    Sodium 136 136 - 145 mmol/L    Potassium 6.1 (H) 3.5 - 5.1 mmol/L    Chloride 102 97 - 108 mmol/L    CO2 20 (L) 21 - 32 mmol/L    Anion gap 14 5 - 15 mmol/L    Glucose 127 (H) 65 - 100 mg/dL    BUN 78 (H) 6 - 20 MG/DL    Creatinine 9.08 (H) 0.70 - 1.30 MG/DL    BUN/Creatinine ratio 9 (L) 12 - 20      GFR est AA 7 (L) >60 ml/min/1.73m2    GFR est non-AA 6 (L) >60 ml/min/1.73m2    Calcium 7.3 (L) 8.5 - 10.1 MG/DL    Bilirubin, total 0.7 0.2 - 1.0 MG/DL    ALT (SGPT) 16 12 - 78 U/L    AST (SGOT) 15 15 - 37 U/L    Alk.  phosphatase 175 (H) 45 - 117 U/L    Protein, total 8.5 (H) 6.4 - 8.2 g/dL    Albumin 3.0 (L) 3.5 - 5.0 g/dL    Globulin 5.5 (H) 2.0 - 4.0 g/dL    A-G Ratio 0.5 (L) 1.1 - 2.2     URINALYSIS W/ REFLEX CULTURE    Collection Time: 08/30/22  1:48 PM    Specimen: Urine   Result Value Ref Range    Color YELLOW/STRAW      Appearance CLEAR CLEAR      Specific gravity 1.015      pH (UA) 7.5 5.0 - 8.0      Protein 300 (A) NEG mg/dL    Glucose Negative NEG mg/dL    Ketone Negative NEG mg/dL    Bilirubin Negative NEG      Blood SMALL (A) NEG      Urobilinogen 1.0 0.2 - 1.0 EU/dL    Nitrites Negative NEG      Leukocyte Esterase Negative NEG      WBC 0-4 0 - 4 /hpf    RBC 0-5 0 - 5 /hpf    Epithelial cells FEW FEW /lpf    Bacteria Negative NEG /hpf    UA:UC IF INDICATED CULTURE NOT INDICATED BY UA RESULT CNI     METABOLIC PANEL, COMPREHENSIVE    Collection Time: 08/31/22  5:05 AM   Result Value Ref Range    Sodium 138 136 - 145 mmol/L    Potassium 5.6 (H) 3.5 - 5.1 mmol/L    Chloride 109 (H) 97 - 108 mmol/L    CO2 17 (L) 21 - 32 mmol/L    Anion gap 12 5 - 15 mmol/L    Glucose 96 65 - 100 mg/dL    BUN 83 (H) 6 - 20 MG/DL    Creatinine 9.03 (H) 0.70 - 1.30 MG/DL    BUN/Creatinine ratio 9 (L) 12 - 20      GFR est AA 7 (L) >60 ml/min/1.73m2    GFR est non-AA 6 (L) >60 ml/min/1.73m2    Calcium 7.1 (L) 8.5 - 10.1 MG/DL    Bilirubin, total 0.5 0.2 - 1.0 MG/DL    ALT (SGPT) 18 12 - 78 U/L    AST (SGOT) 23 15 - 37 U/L    Alk.  phosphatase 151 (H) 45 - 117 U/L    Protein, total 7.9 6.4 - 8.2 g/dL    Albumin 2.5 (L) 3.5 - 5.0 g/dL    Globulin 5.4 (H) 2.0 - 4.0 g/dL    A-G Ratio 0.5 (L) 1.1 - 2.2     MAGNESIUM    Collection Time: 08/31/22  5:05 AM   Result Value Ref Range    Magnesium 1.7 1.6 - 2.4 mg/dL   PHOSPHORUS    Collection Time: 08/31/22  5:05 AM   Result Value Ref Range    Phosphorus 7.8 (H) 2.6 - 4.7 MG/DL   DRUG SCREEN, URINE    Collection Time: 08/31/22  5:11 AM   Result Value Ref Range    AMPHETAMINES Negative NEG      BARBITURATES Negative NEG      BENZODIAZEPINES Negative NEG      COCAINE Negative NEG      METHADONE Negative NEG      OPIATES Negative NEG      PCP(PHENCYCLIDINE) Negative NEG      THC (TH-CANNABINOL) Negative NEG      Drug screen comment (NOTE)    CREATININE, UR, RANDOM    Collection Time: 08/31/22  5:11 AM   Result Value Ref Range    Creatinine, urine 54.00 mg/dL   POTASSIUM, UR, RANDOM    Collection Time: 08/31/22  6:38 AM   Result Value Ref Range    Potassium urine, random 17 MMOL/L   CBC WITH AUTOMATED DIFF    Collection Time: 08/31/22  6:38 AM   Result Value Ref Range    WBC 6.0 4.1 - 11.1 K/uL    RBC 3.13 (L) 4.10 - 5.70 M/uL    HGB 9.6 (L) 12.1 - 17.0 g/dL    HCT 30.9 (L) 36.6 - 50.3 %    MCV 98.7 80.0 - 99.0 FL    MCH 30.7 26.0 - 34.0 PG    MCHC 31.1 30.0 - 36.5 g/dL    RDW 13.4 11.5 - 14.5 %    PLATELET 891 237 - 318 K/uL    MPV 10.1 8.9 - 12.9 FL    NRBC 0.0 0  WBC    ABSOLUTE NRBC 0.00 0.00 - 0.01 K/uL    NEUTROPHILS 55 32 - 75 %    LYMPHOCYTES 33 12 - 49 %    MONOCYTES 10 5 - 13 %    EOSINOPHILS 2 0 - 7 %    BASOPHILS 0 0 - 1 %    IMMATURE GRANULOCYTES 0 0.0 - 0.5 %    ABS. NEUTROPHILS 3.2 1.8 - 8.0 K/UL    ABS. LYMPHOCYTES 2.0 0.8 - 3.5 K/UL    ABS. MONOCYTES 0.6 0.0 - 1.0 K/UL    ABS. EOSINOPHILS 0.1 0.0 - 0.4 K/UL    ABS. BASOPHILS 0.0 0.0 - 0.1 K/UL    ABS. IMM.  GRANS. 0.0 0.00 - 0.04 K/UL    DF AUTOMATED     GLUCOSE, POC    Collection Time: 08/31/22  9:38 AM   Result Value Ref Range    Glucose (POC) 88 65 - 117 mg/dL    Performed by Chaz Gonzalez         Lab Results   Component Value Date    BUN 83 (H) 08/31/2022     08/31/2022    K 5.6 (H) 08/31/2022     (H) 08/31/2022    CO2 17 (L) 08/31/2022       Lab Results   Component Value Date    BUN 83 (H) 08/31/2022    BUN 78 (H) 08/30/2022    BUN 49 (H) 01/14/2022    BUN 50 (H) 01/13/2022    BUN 57 (H) 01/12/2022    K 5.6 (H) 08/31/2022    K 6.1 (H) 08/30/2022    K 4.7 01/14/2022    K 4.2 01/13/2022    K 4.4 01/12/2022       Lab Results   Component Value Date    WBC 6.0 08/31/2022    RBC 3.13 (L) 08/31/2022    HGB 9.6 (L) 08/31/2022    HCT 30.9 (L) 08/31/2022    MCV 98.7 08/31/2022    MCH 30.7 08/31/2022    RDW 13.4 08/31/2022     08/31/2022       Lab Results   Component Value Date    PHOS 7.8 (H) 08/31/2022       Urine dipstick:   Lab Results   Component Value Date/Time    Color YELLOW/STRAW 08/30/2022 01:48 PM    Appearance CLEAR 08/30/2022 01:48 PM    Specific gravity 1.015 08/30/2022 01:48 PM    Specific gravity 1.030 07/01/2015 01:41 PM    pH (UA) 7.5 08/30/2022 01:48 PM    Protein 300 (A) 08/30/2022 01:48 PM    Glucose Negative 08/30/2022 01:48 PM    Ketone Negative 08/30/2022 01:48 PM Bilirubin Negative 08/30/2022 01:48 PM    Urobilinogen 1.0 08/30/2022 01:48 PM    Nitrites Negative 08/30/2022 01:48 PM    Leukocyte Esterase Negative 08/30/2022 01:48 PM    Epithelial cells FEW 08/30/2022 01:48 PM    Bacteria Negative 08/30/2022 01:48 PM    WBC 0-4 08/30/2022 01:48 PM    RBC 0-5 08/30/2022 01:48 PM       I have reviewed the following: All pertinent labs, microbiology data, radiology imaging for my assessment     ECG- Rev: Yes / No  Xray/CT/US/MRI REV: Yes/ No    Care Plan discussed with:  patient     Chart reviewed. Total time spent with patient:  45  Medications list Personally Reviewed   [x]      Yes     []               No      Thank you for allowing us to participate in the care of this patient. We will follow patient.  Please dont hesitate to call with any questions    David Valadez MD  8/31/2022    [address] [place] [address]

## 2022-08-31 NOTE — DISCHARGE INSTRUCTIONS
Download the Heart Failure Rainbow Cem: Search in your [employer] (Android) or [employer] C[employer] Store (CodaMationphone): Search for- HF Rainbow Cem.    Robodrom    HF Rainbow is a brand-new phone cem that helps you track daily symptoms, vitals, mood, energy level and more. You can even add your heart failure care team members to view your data and monitor your condition at home. HF Rainbow lets you:  Track symptoms, medications and more  Share health information with your health care team  Connect with others living with heart failure               Discharge Instructions       PATIENT ID: Debby Gonzalez  MRN: [id number]   YOB: 1965       DATE OF DISCHARGE: 9/7/2022      ATTENDING PHYSICIAN: Lucas Samano MD   DISCHARGING PROVIDER: Lucas Samano MD    To contact this individual call [phone] and ask the  to page. If unavailable ask to be transferred the Adult Hospitalist Department.     DISCHARGE DIAGNOSES ESRD, L Knee pain, Hepatitis C    CONSULTATIONS: Nephrology, Hepatology, Cardiology, Orthopedic Surgery     PROCEDURES/SURGERIES: * No surgery found *    PENDING TEST RESULTS:   At the time of discharge the following test results are still pending: none    FOLLOW UP APPOINTMENTS:   Follow-up Information       Follow up With Specialties Details Why Contact [address] follow up with PCP scheduled for Tuesday, September 13th, 2022 at 1:00 p.m. [address] [address] [phone]      [facility]  Go on 9/6/2022 Tuesday, Thursday, Saturday, 11:00 am [address] [place] [address]  [phone] (Phone#)  [phone] (Fax#)    Kianna Sánchez MD [address] [place] [address] Vascular Surgery, Interventional Cardiology Physician, Cardiovascular Disease Physician Schedule an appointment as soon as possible for a visit in 1 week(s)  [address] [facility]      Ronny Jaramillo MD Nephrology Schedule an appointment as soon as possible for a visit in 2 week(s)  [facility]  Schedule an appointment as soon as possible for a visit in 1 week(s) Hepatitis C infection [address] [place] [address]  [address] [place]  [phone]              ADDITIONAL CARE RECOMMENDATIONS:   Please be sure to attend all follow-up appointments, and call and make appointments if they are not already scheduled. Do not skip dialysis! You are supposed to go 3 times every week (Tuesday, Thursday, Saturday). DIET: Renal Diet    ACTIVITY: Activity as tolerated    WOUND CARE: none    EQUIPMENT needed: none      DISCHARGE MEDICATIONS:   See Medication Reconciliation Form    It is important that you take the medication exactly as they are prescribed. Keep your medication in the bottles provided by the pharmacist and keep a list of the medication names, dosages, and times to be taken in your wallet. Do not take other medications without consulting your doctor. NOTIFY YOUR PHYSICIAN FOR ANY OF THE FOLLOWING:   Fever over 101 degrees for 24 hours. Chest pain, shortness of breath, fever, chills, nausea, vomiting, diarrhea, change in mentation, falling, weakness, bleeding. Severe pain or pain not relieved by medications. Or, any other signs or symptoms that you may have questions about. Signed:   Leigh Barnard MD  9/7/2022  8:15 AM    [facility]/ED Visit Follow-Up Instructions/Information    You may have an in home follow up visit set up with [facility] or may wish to contact [facility] to set-up a visit:    What are we? [facility] is an in-home urgent care service staffed with emergency trained medical teams.   We come to your home in a vehicle stocked with medical supplies and technology. An ER physician is always available if needed. When? As a part of your hospital follow-up, an appointment has been/ or can be set up for us to come see you. Usually, this will be 24-72 hours after you leave the hospital or as needed. [facility] is open 7am-9pm, 7 days a week, 365 days a year, including holidays. Why? We know that you cannot always get to your doctor after being in the hospital and that your doctor is not always available when you need them. Once your workup is complete, we'll call in your prescriptions, update your family doctor, and handle billing with your insurance so you can focus on feeling better, faster without leaving home. How much? We accept most major health insurance plans, including Medicaid, Medicare, and Medicare Advantage [facility], [facility], [employer], and [employer]. We also accept: credit, debit, health savings account (HSA), health reimbursement account (HRA) and flexible spending account (FSA) payments. [facility]'s prices compare to conventional urgent care facilities, but we bring the care to you. How to reach us? Getting care is easy- use our mobile cem (Woto), website ([email or web address]) or call us [phone].

## 2022-08-31 NOTE — PROGRESS NOTES
800:    Bedside shift change report given to Ibeth Jay (oncoming nurse) by Cole Crandall (offgoing nurse). Report included the following information SBAR, Kardex, Intake/Output, MAR, and Recent Results.

## 2022-08-31 NOTE — DIABETES MGMT
[facility] SPECIALIST CONSULT     Initial Presentation   Paris Quintanilla is a 65 y.o. male who presented to [address] [place] [address] ED 8/30/22 with a one week c/o bilateral leg pain, swelling and polyuria. Abnormal labs included BUN 78, creatinine 9.08, K 6.1, CO2 20, , , CRP 9.23, proBNP > 35,000. Xray with: Healed right prior hip fracture with hardware in place Post traumatic or post surgical large right hip exostoses  Abd CT with: Mild bilateral perinephric fat stranding, most commonly seen in the setting of chronic renal disease, but may represent an acute nonspecific inflammatory process of the kidneys. Correlate clinically. 2. Otherwise no evidence of acute process in the abdomen or pelvis. He was then transferred to [facility] for further medical management. HX:   Past Medical History:   Diagnosis Date    Diabetes ([facility] [address]     Gout     Hypertension     Substance and tobacco abuse  CKD    INITIAL DX:   ESRD (end stage renal disease) ([facility] [address] [N18.6]  Arthralgia [M25.50]  Acute hyperkalemia [E87.5]  Bilateral lower extremity edema [R60.0]  Acute right hip pain [M25.551]     Current Treatment     TX: Nephrology consult, Strict I&Os    Consulted by  Loco Bishop MD  for advanced diabetes nursing assessment and care for:   [x] Home management assessment    Hospital Course   Clinical progress has been uncomplicated. Diabetes History   Type 2 Diabetes- Diagnosed a little less than 10 years ago  Stopped insulin therapy about 6-9 mos ago  Ambulatory BG management provided by: PCP Jana Egan MD    Diabetes-related Medical History  Acute complications  None  Neurological complications  Peripheral neuropathy  Microvascular disease  Nephropathy  Macrovascular disease  Foot wounds  Other associated conditions     Drug abuse on soboxone, HF    Diabetes Medication History  Key Antihyperglycemic Medications       Patient is on no antihyperglycemic meds.              Diabetes self-management practices:   Eating pattern   Decreased appetite with fluid overload  [x] Breakfast Eggs and toast  [x] Lunch  Skips  [x] [facility]  Residents in group home rotate who cooks. Could be burgers, chicken, usually a meat/green and starch  [x] Bedtime None  [x] Snacks  PB crackers  [x] Beverages Saul aid, water, soda  Physical activity pattern   Limited purposeful activity   Monitoring pattern   Does have a glucometer   Doesn't check BG anymore      Subjective   I don't take medicine anymore for diabetes.      Lives in group recovery home  Single  Objective   Physical exam  General Overweight AA male in no acute distress/ill-appearing. Conversant and cooperative  Neuro  Alert, oriented   Vital Signs Visit Vitals  /86 (BP 1 Location: Right upper arm, BP Patient Position: Supine)   Pulse 80   Temp 98.1 °F (36.7 °C)   Resp 14   Wt 99.4 kg (219 lb 2.2 oz)   SpO2 96%   BMI 30.56 kg/m²     Skin  Warm and dry. Acanthosis noted along neckline. No lipohypertrophy or lipoatrophy noted at injection sites   Heart   Regular rate and rhythm. No murmurs, rubs or gallops  Lungs  Clear to auscultation without rales or rhonchi  Extremities No foot wounds, edema in lower extremities. Old healed scar on R ankle (he tells me from an old spider bite that got infected/required surgery). Scabs and scars on upper and lower extremities.          Laboratory  Recent Labs     22  0638 22  0505 22  1318 22  1232   GLU  --  96 127*  --    AGAP  --  12 14  --    WBC 6.0  --   --  5.7   CREA  --  9.03* 9.08*  --    GFRNA  --  6* 6*  --    AST  --  23 15  --    ALT  --  18 16  --        Factors impacting BG management  Factor Dose Comments   Nutrition:  Standard meals   NPO    Other:   Kidney function GFR 7      Blood glucose pattern      Significant diabetes-related events over the past 24-72 hours  A1C Pending, Last was 5.8% in   Fasting B  Pre-prandial: 127 at 1pm yesterday  No insulin required at this time. Assessment and Plan   Nursing Diagnosis Risk for unstable blood glucose pattern   Nursing Intervention Domain 6089 Decision-making Support   Nursing Interventions Examined current inpatient diabetes/blood glucose control   Explored factors facilitating and impeding inpatient management  Explored corrective strategies with patient and responsible inpatient provider   Informed patient of rational for insulin strategy while hospitalized   \    Evaluation   Gerda Pérez is a 65year old gentleman, with Type 2 Diabetes off anyihyperglycemic therapy, who is admitted with increased bilateral leg swelling/pain and found to have fluid overload, electrolyte disarray and renal failure. His initial labs included BUN 78, creatinine 9.08, K 6.1, CO2 20, , , CRP 9.23, proBNP > 35,000 and has been admitted for medical work-up with nephrology. Current A1C is pending but last was low at 5.8%. Current glucose trends have been  without correctional insulin while NPO. Please continue to trend glucose pattern as oral intake is advanced and add low dose basal if glucose sustained over inpatient BG goal of 140-180mg/dl. Please note that he is at higher risk for hypoglycemia while on insulin therapy with GFR below 20. Recommendations   POC glucose ACHS    Diet advancement per primary team.  When advanced, please include consistent carbohydrate component of diet (60 grams CHO/meal    Continue correctional humalog at HIGH sensitivity ACHS (ESRD)    4. If BG sustained over 200 mg/dl, start very low dose basal insulin: 0.15 units/kg/day    Diabetes Management Team to sign off at this point as patient's blood glucose remains stable. Please re-consult us if patient needs change. Thank you for including us in their care.   Billing Code(s)   [x] 58025     Before making these care recommendations, I personally reviewed the hospitalization record, including notes, laboratory & diagnostic data and current medications, and examined the patient at the bedside (circumstances permitting) before making care recommendations. More than fifty (50) percent of the time was spent in patient counseling and/or care coordination.   Total minutes: 39      PERNELL Mei BC-ADM  Board Certified Advanced Diabetes Manager  Clinical Nurse Specialist  [facility]  Access via [address] [place] [address]

## 2022-08-31 NOTE — PROGRESS NOTES
[facility] Adult  Hospitalist Group                                                                                          Hospitalist Progress Note  Kem Samano MD  Answering service: [phone] from in house phone        Date of Service:  2022  NAME:  Priya Santacruz  :  1965  MRN:  [id number]      Admission Summary:   Priya Santacruz is a 65 y.o. male with past medical history of CKD, type 2 diabetes mellitus, hypertension, gout, heroin/ cocaine/ Fentanyl abuse, tobacco abuse presented as a direct admission/ transfer from [facility] ED to [facility]) with chief complaints of  lower back, right hip, bilateral knee pain and swelling. Patient notes onset of symptoms starting about 1 week ago, constant, moderate severity, worsening, without specific aggravating/ alleviating factors, with associated swelling in both legs. On arrival in the ED, initial recorded vital signs were T 98.1 F, /107m HR 91, RR 13, O2sat 95% on room air. Abnormal labs included BUN 78, creatinine 9.08, K 6.1, CO2 20, , , CRP 9.23, proBNP > 35,000. CT abdomen/pelvis without IV contrast showed mild bilateral perinephric fat stranding suggesting CKD with no acute process noted. Patient was given [facility] with [facility] and then transferred to [facility]. Interval history / Subjective:   Patient reports pain is still severe. No chest pain or shortness of breath. Assessment & Plan:      ESRD (end stage renal disease)   Rapidly progressive renal failure. Need need biopsy and HD. Appreciated nephrology recommendation     2. Acute hyperkalemia   -We will continue Kayexalate. 3. Bilateral lower extremity edema   -h/o HFrEF   -elevated proBNP 35,000 (albeit without overt pulmonary edema noted on chest xray):  We will consult cardiology 2.  -place on strict Is and Os and daily weights  -keep BLE elevated at rest  -venous duplex BLE negative for DVT  -Echocardiogram.  Ordered. 4. Arthralgia   -prior h/o gout but uric acid level still within normal limits  -CRP 9.23; may suggest acute inflammatory/ polyarthritis  -not taking Allopurinol at home     5. Elevated CK level  -; monitor     6. Acute right hip pain   -order Tylenol for mild/ moderate pain     7. Heroin/ cocaine/ Fentanyl abuse  -Negative UDS     8. Tobacco abuse  -encourage smoking cessation     9. Normocytic, normochromic anemia  -Hgb 9.6     10. Type 2 diabetes mellitus: Blood sugar is stable. Hemoglobin A1c is 6       11. HFrEF  -last 2d echocardiogram on 1/12/2022:  LVEF = 25%-30%  -plan as above     VTE prophylaxis  -SCDs to BLEs     ADVANCED DIRECTIVE/ CODE STATUS:  FULL CODE as per discussion with patient. Anticipated disposition: > 48 hours          Code status:   Prophylaxis:   Care Plan discussed with:   Anticipated Disposition:      Hospital Problems  Date Reviewed: 7/12/2022            Codes Class Noted POA    Acute hyperkalemia ICD-10-CM: E87.5  ICD-9-CM: 276.7  8/31/2022 Unknown        Arthralgia ICD-10-CM: M25.50  ICD-9-CM: 719.40  8/31/2022 Unknown        ESRD (end stage renal disease) ([facility] [address] ICD-10-CM: N18.6  ICD-9-CM: 585.6  8/31/2022 Unknown        Bilateral lower extremity edema ICD-10-CM: R60.0  ICD-9-CM: 782.3  8/31/2022 Unknown        Acute right hip pain ICD-10-CM: M25.551  ICD-9-CM: 719.45  8/31/2022 Unknown             Review of Systems:   A comprehensive review of systems was negative except for that written in the HPI. Vital Signs:    Last 24hrs VS reviewed since prior progress note.  Most recent are:  Visit Vitals  /86 (BP 1 Location: Right upper arm, BP Patient Position: Supine)   Pulse 80   Temp 98.1 °F (36.7 °C)   Resp 14   Wt 99.4 kg (219 lb 2.2 oz)   SpO2 96%   BMI 30.56 kg/m²         Intake/Output Summary (Last 24 hours) at 8/31/2022 0804  Last data filed at 8/31/2022 0520  Gross per 24 hour   Intake --   Output 700 ml   Net -700 ml Physical Examination:     I had a face to face encounter with this patient and independently examined them on 8/31/2022 as outlined below:          Constitutional:  No acute distress, cooperative, pleasant    ENT:  Oral mucosa moist, oropharynx benign. Resp:  CTA bilaterally. No wheezing/rhonchi/rales. No accessory muscle use. CV:  Regular rhythm, normal rate, no murmurs, gallops, rubs    GI:  Soft, non distended, non tender. normoactive bowel sounds, no hepatosplenomegaly     Musculoskeletal:  No edema, warm, 2+ pulses throughout    Neurologic:  Moves all extremities. AAOx3, CN II-XII reviewed            Data Review:    Review and/or order of clinical lab test      Labs:     Recent Labs     08/31/22  0638 08/30/22  1232   WBC 6.0 5.7   HGB 9.6* 10.7*   HCT 30.9* 33.7*    206     Recent Labs     08/31/22  0505 08/30/22  1318    136   K 5.6* 6.1*   * 102   CO2 17* 20*   BUN 83* 78*   CREA 9.03* 9.08*   GLU 96 127*   CA 7.1* 7.3*   MG 1.7  --    PHOS 7.8*  --    URICA  --  7.0     Recent Labs     08/31/22  0505 08/30/22  1318   ALT 18 16   * 175*   TBILI 0.5 0.7   TP 7.9 8.5*   ALB 2.5* 3.0*   GLOB 5.4* 5.5*     No results for input(s): INR, PTP, APTT, INREXT in the last 72 hours. No results for input(s): FE, TIBC, PSAT, FERR in the last 72 hours. No results found for: FOL, RBCF   No results for input(s): PH, PCO2, PO2 in the last 72 hours.   Recent Labs     08/30/22  1318   *     No results found for: CHOL, CHOLX, CHLST, CHOLV, HDL, HDLP, LDL, LDLC, DLDLP, TGLX, TRIGL, TRIGP, CHHD, CHHDX  Lab Results   Component Value Date/Time    Glucose (POC) 162 (H) 01/14/2022 12:51 PM    Glucose (POC) 128 (H) 01/14/2022 08:51 AM    Glucose (POC) 150 (H) 01/13/2022 08:57 PM    Glucose (POC) 111 01/13/2022 04:34 PM    Glucose (POC) 142 (H) 01/13/2022 11:37 AM     Lab Results   Component Value Date/Time    Color YELLOW/STRAW 08/30/2022 01:48 PM    Appearance CLEAR 08/30/2022 01:48 PM Specific gravity 1.015 08/30/2022 01:48 PM    Specific gravity 1.030 07/01/2015 01:41 PM    pH (UA) 7.5 08/30/2022 01:48 PM    Protein 300 (A) 08/30/2022 01:48 PM    Glucose Negative 08/30/2022 01:48 PM    Ketone Negative 08/30/2022 01:48 PM    Bilirubin Negative 08/30/2022 01:48 PM    Urobilinogen 1.0 08/30/2022 01:48 PM    Nitrites Negative 08/30/2022 01:48 PM    Leukocyte Esterase Negative 08/30/2022 01:48 PM    Epithelial cells FEW 08/30/2022 01:48 PM    Bacteria Negative 08/30/2022 01:48 PM    WBC 0-4 08/30/2022 01:48 PM    RBC 0-5 08/30/2022 01:48 PM         Medications Reviewed:     Current Facility-Administered Medications   Medication Dose Route Frequency    sodium chloride (NS) flush 5-40 mL  5-40 mL IntraVENous Q8H    sodium chloride (NS) flush 5-40 mL  5-40 mL IntraVENous PRN    glucose chewable tablet 16 g  4 Tablet Oral PRN    glucagon (GLUCAGEN) injection 1 mg  1 mg IntraMUSCular PRN    dextrose 10 % infusion 0-250 mL  0-250 mL IntraVENous PRN    insulin lispro (HUMALOG) injection   SubCUTAneous AC&HS     ______________________________________________________________________  EXPECTED LENGTH OF STAY: - - -  ACTUAL LENGTH OF STAY:          1                 Jose Alfredo Moore MD

## 2022-08-31 NOTE — PROCEDURES
Interventional Radiology  Procedure Note        8/31/2022 4:26 PM    Patient: Priya Santacruz     Informed consent obtained    Diagnosis: CKD    Procedure(s): nontunneled hemodialysis catheter placement    Specimens removed:  none    Complications: None    Primary Physician: Natalie Khan MD    Recommendations: N/A    Discharge Disposition: remain in room    Full dictated report to follow    Natalie Khan MD  Interventional Radiology  [facility], [facility].  4:26 PM, 8/31/2022

## 2022-08-31 NOTE — H&P
[facility]    History & Physical      Date of admission: 8/30/2022    Patient name: Wade Kelly  MRN: [id number]  YOB: 1965  Age: 65 y.o. Primary care provider:  Alma Alejandra MD     Source of Information: patient, ED and electronic medical records                              Chief complaint:  lower back, right hip, bilateral knee pain and swelling    History of present illness  Wade Kelly is a 65 y.o. male with past medical history of CKD, type 2 diabetes mellitus, hypertension, gout, heroin/ cocaine/ Fentanyl abuse, tobacco abuse presented as a direct admission/ transfer from [facility] ED to [facility]) with chief complaints of  lower back, right hip, bilateral knee pain and swelling. Patient notes onset of symptoms starting about 1 week ago, constant, moderate severity, worsening, without specific aggravating/ alleviating factors, with associated swelling in both legs. On arrival in the ED, initial recorded vital signs were T 98.1 F, /107m HR 91, RR 13, O2sat 95% on room air. Abnormal labs included BUN 78, creatinine 9.08, K 6.1, CO2 20, , , CRP 9.23, proBNP > 35,000. CT abdomen/pelvis without IV contrast showed mild bilateral perinephric fat stranding suggesting CKD with no acute process noted. Patient was given ALINA NAINA. [facility] and [facility] Past Medical History:   Diagnosis Date    Diabetes ([address]     Gout     Hypertension       Past Surgical History:   Procedure Laterality Date    HX ORTHOPAEDIC  rt hip replacement    HX ORTHOPAEDIC  rt thigh woo     MEDICATIONS:  Prior to Admission medications    Medication Sig Start Date End Date Taking?  Authorizing Provider   Suboxone 4-1 mg film sublingual film  8/17/22   Other, MD Angelina   Suboxone 8-2 mg film sublingaul film  8/17/22   Other, MD Angelina   allopurinoL (ZYLOPRIM) 300 mg tablet Take 300 mg by mouth daily. Other, MD Angelina   furosemide (LASIX) 40 mg tablet  7/8/22   Provider, Historical   QUEtiapine (SEROquel) 100 mg tablet  7/8/22   Provider, Historical   traZODone (DESYREL) 50 mg tablet  7/8/22   Provider, Historical   metoprolol succinate (TOPROL-XL) 100 mg tablet  7/8/22   Provider, Historical   gabapentin (NEURONTIN) 300 mg capsule Take 300 mg by mouth three (3) times daily. 7/8/22   Provider, Historical   amLODIPine (NORVASC) 5 mg tablet Take 1 Tablet by mouth daily. 7/12/22   Shubham Dillard MD   thiamine HCL (B-1) 100 mg tablet Take 1 Tablet by mouth daily. Patient not taking: No sig reported 1/14/22   Julien Downing MD   folic acid (FOLVITE) 1 mg tablet Take 1 Tablet by mouth daily. Patient not taking: No sig reported 1/14/22   Julien Downing MD   pantoprazole (Protonix) 40 mg tablet Take 1 Tablet by mouth daily. Patient not taking: No sig reported 1/13/22   Julien Downing MD   guaiFENesin (ROBITUSSIN) 100 mg/5 mL liquid Take 10 mL by mouth three (3) times daily as needed for Cough. Patient not taking: No sig reported 1/13/22   Julien Downing MD   OTHER Black Sea Oil: Take one teaspoonful by mouth twice daily  Patient not taking: No sig reported    Provider, Historical     Allergies   Allergen Reactions    Shellfish Containing Products Swelling         Social history  Patient resides  X  Independently                Ambulates  X  Independently                 Alcohol history   X  denies           Smoking history      x  ~ 1 ppd   x  Current smoker     Illicit drugs: h/o heroin/ cocaine/ Fentanyl (reportedly quit about 6 months ago)    Family History   Problem Relation Age of Onset    Diabetes Other     Hypertension Other         Review of systems  Pertinent positives as noted in HPI.   All other systems were reviewed and were negative     Physical Examination   Visit Vitals  /77   Pulse 80   Temp 98.1 °F (36.7 °C)   Resp 13   SpO2 95%               General:  Patient in no acute respiratory distress. Head:  Normocephalic, without obvious abnormality, atraumatic   Eyes:  Conjunctivae/corneas clear. Pupils 2 mm reactive bilateral.   E/N/M/T: Nares normal. Septum midline. No nasal drainage or sinus tenderness  Tongue midline/ non-edematous  Clear oropharynx   Neck: Normal appearance and movements, symmetrical, trachea midline  No palpable adenopathy  No thyroid enlargement, tenderness or nodules  No carotid bruit   No JVD  Trachea midline   Lungs:   Symmetrical chest expansion and respiratory effort  Clear to auscultation bilaterally   Chest wall:  No tenderness or deformity   Heart:  Regular rate and rhythm   Normal S1 and S2; no murmur, click, rub or gallop   Abdomen:   Soft, no tenderness  No rebound, guarding, or rigidity  Non-distended   Bowel sounds normal  No masses or hepatosplenomegaly  No hernias present   Back: No costovertebral angle tenderness  No step-off deformity   Extremities: Extremities normal, atraumatic  No cyanosis   Marked non-pitting bilateral lower extremity (leg) edema     Vascular/  Pulses: 2+ radial/ 1+ DP bilateral pulses   Integument/  Skin: No rashes or ulcers  Warm and dry   Musculo-      skeletal: Gait not tested  Normal symmetry, ROM, strength and tone  No calf tenderness   Neuro: GCS 15. Moves all extremities x 4. No slurred speech. No facial droop. Sensation grossly intact.     Psych: Alert, oriented x 3           I reviewed the following data:      24 Hour Results:  Recent Results (from the past 24 hour(s))   EKG, 12 LEAD, INITIAL    Collection Time: 08/30/22 12:03 PM   Result Value Ref Range    Ventricular Rate 103 BPM    Atrial Rate 103 BPM    P-R Interval 150 ms    QRS Duration 64 ms    Q-T Interval 356 ms    QTC Calculation (Bezet) 466 ms    Calculated P Axis 58 degrees    Calculated R Axis 18 degrees    Calculated T Axis 110 degrees    Diagnosis       Sinus tachycardia  Nonspecific ST and T wave abnormality  Abnormal ECG  When compared with ECG of 05-MAR-2022 08:22,  Vent. rate has increased BY  41 BPM  T wave inversion no longer evident in Inferior leads     CBC WITH AUTOMATED DIFF    Collection Time: 08/30/22 12:32 PM   Result Value Ref Range    WBC 5.7 4.1 - 11.1 K/uL    RBC 3.49 (L) 4.10 - 5.70 M/uL    HGB 10.7 (L) 12.1 - 17.0 g/dL    HCT 33.7 (L) 36.6 - 50.3 %    MCV 96.6 80.0 - 99.0 FL    MCH 30.7 26.0 - 34.0 PG    MCHC 31.8 30.0 - 36.5 g/dL    RDW 14.0 11.5 - 14.5 %    PLATELET 954 359 - 197 K/uL    MPV 10.9 8.9 - 12.9 FL    NRBC 0.0 0  WBC    ABSOLUTE NRBC 0.00 0.00 - 0.01 K/uL    NEUTROPHILS 73 32 - 75 %    LYMPHOCYTES 20 12 - 49 %    MONOCYTES 6 5 - 13 %    EOSINOPHILS 1 0 - 7 %    BASOPHILS 0 0 - 1 %    IMMATURE GRANULOCYTES 0 0.0 - 0.5 %    ABS. NEUTROPHILS 4.1 1.8 - 8.0 K/UL    ABS. LYMPHOCYTES 1.1 0.8 - 3.5 K/UL    ABS. MONOCYTES 0.4 0.0 - 1.0 K/UL    ABS. EOSINOPHILS 0.1 0.0 - 0.4 K/UL    ABS. BASOPHILS 0.0 0.0 - 0.1 K/UL    ABS. IMM.  GRANS. 0.0 0.00 - 0.04 K/UL    DF AUTOMATED     SED RATE (ESR)    Collection Time: 08/30/22 12:32 PM   Result Value Ref Range    Sed rate, automated 86 (H) 0 - 20 mm/hr   CK    Collection Time: 08/30/22  1:18 PM   Result Value Ref Range     (H) 39 - 308 U/L   C REACTIVE PROTEIN, QT    Collection Time: 08/30/22  1:18 PM   Result Value Ref Range    C-Reactive protein 9.23 (H) 0.00 - 0.60 mg/dL   NT-PRO BNP    Collection Time: 08/30/22  1:18 PM   Result Value Ref Range    NT pro-BNP >35,000 (H) 0 - 125 PG/ML   TROPONIN-HIGH SENSITIVITY    Collection Time: 08/30/22  1:18 PM   Result Value Ref Range    Troponin-High Sensitivity 25 0 - 76 ng/L   URIC ACID    Collection Time: 08/30/22  1:18 PM   Result Value Ref Range    Uric acid 7.0 3.5 - 7.2 MG/DL   METABOLIC PANEL, COMPREHENSIVE    Collection Time: 08/30/22  1:18 PM   Result Value Ref Range    Sodium 136 136 - 145 mmol/L    Potassium 6.1 (H) 3.5 - 5.1 mmol/L    Chloride 102 97 - 108 mmol/L    CO2 20 (L) 21 - 32 mmol/L    Anion gap 14 5 - 15 mmol/L Glucose 127 (H) 65 - 100 mg/dL    BUN 78 (H) 6 - 20 MG/DL    Creatinine 9.08 (H) 0.70 - 1.30 MG/DL    BUN/Creatinine ratio 9 (L) 12 - 20      GFR est AA 7 (L) >60 ml/min/1.73m2    GFR est non-AA 6 (L) >60 ml/min/1.73m2    Calcium 7.3 (L) 8.5 - 10.1 MG/DL    Bilirubin, total 0.7 0.2 - 1.0 MG/DL    ALT (SGPT) 16 12 - 78 U/L    AST (SGOT) 15 15 - 37 U/L    Alk. phosphatase 175 (H) 45 - 117 U/L    Protein, total 8.5 (H) 6.4 - 8.2 g/dL    Albumin 3.0 (L) 3.5 - 5.0 g/dL    Globulin 5.5 (H) 2.0 - 4.0 g/dL    A-G Ratio 0.5 (L) 1.1 - 2.2     URINALYSIS W/ REFLEX CULTURE    Collection Time: 08/30/22  1:48 PM    Specimen: Urine   Result Value Ref Range    Color YELLOW/STRAW      Appearance CLEAR CLEAR      Specific gravity 1.015      pH (UA) 7.5 5.0 - 8.0      Protein 300 (A) NEG mg/dL    Glucose Negative NEG mg/dL    Ketone Negative NEG mg/dL    Bilirubin Negative NEG      Blood SMALL (A) NEG      Urobilinogen 1.0 0.2 - 1.0 EU/dL    Nitrites Negative NEG      Leukocyte Esterase Negative NEG      WBC 0-4 0 - 4 /hpf    RBC 0-5 0 - 5 /hpf    Epithelial cells FEW FEW /lpf    Bacteria Negative NEG /hpf    UA:UC IF INDICATED CULTURE NOT INDICATED BY UA RESULT CNI       Recent Labs     08/30/22  1232   WBC 5.7   HGB 10.7*   HCT 33.7*        Recent Labs     08/30/22  1318      K 6.1*      CO2 20*   *   BUN 78*   CREA 9.08*   CA 7.3*   ALB 3.0*   TBILI 0.7   ALT 16       Imaging    CT ABD PELV WO CONT     INDICATION: renal failure     COMPARISON: Renal failure. CONTRAST:  None. TECHNIQUE:   Thin axial images were obtained through the abdomen and pelvis. Coronal and  sagittal reconstructions were generated. Oral contrast was not administered. CT  dose reduction was achieved through use of a standardized protocol tailored for  this examination and automatic exposure control for dose modulation. FINDINGS:   Lower Thorax:  Lung Bases: Mild subsegmental atelectasis/scarring in the left lower lobe. No  pleural effusion. Heart: The heart is normal in size. No pericardial effusion. Abdomen/Pelvis:  Evaluation of the solid organs is markedly limited without the use of IV  contrast.     Liver:  No focal liver lesions. Biliary system: Gallbladder is unremarkable. Spleen: Normal.     Pancreas: Normal.     Kidneys/Ureters/Bladder: There is mild bilateral perinephric fat stranding. No  renal masses. No renal or ureteral calculi. No hydronephrosis or hydroureter. The bladder is normal.     Adrenals: Normal.     Stomach/bowel: No dilation or abnormal wall thickening is present. No free  intraperitoneal air noted. Normal appendix. Small umbilical hernia containing  fat and a loop of small bowel. Reproductive Organs: Prostate is normal in size. Vasculature: Normal caliber arteries. Mild calcification of the infrarenal  abdominal aorta and iliac vasculature. Nodes: No pathologically enlarged lymph nodes. Fluid: No free fluid. Bones/Soft Tissue: No acute fractures or aggressive osseous lesions are seen. Chronic fracture deformity of the right proximal femur status post ORIF. IMPRESSION     1. Mild bilateral perinephric fat stranding, most commonly seen in the setting  of chronic renal disease, but may represent an acute nonspecific inflammatory  process of the kidneys. Correlate clinically. 2. Otherwise no evidence of acute process in the abdomen or pelvis. XR CHEST PORT     INDICATION: leg swelling     COMPARISON: 1/11/2022     FINDINGS: A portable AP radiograph of the chest was obtained at 1205 hours. The  patient is on a cardiac monitor. The lungs are clear. The cardiac and  mediastinal contours and pulmonary vascularity are normal. There is a  posttraumatic deformity of the left chest wall. IMPRESSION  No acute cardiopulmonary process seen    XR HIP RT W OR WO PELV 2-3 VWS     INDICATION: Hip pain after fall. COMPARISON: 3/5/2019.      FINDINGS: AP view of the pelvis and nonstandard frogleg lateral view of the  right hip demonstrate a healed right hip ORIF without hardware loosening. Two  extensive posttraumatic/postoperative exostosis arises from the greater and  lesser trochanters. No new fracture is identified. IMPRESSION     Healed right prior hip fracture with hardware in place  Post traumatic or post surgical large right hip exostoses    XR KNEE LT 3 V     INDICATION: Left knee pain after trauma. COMPARISON: None. FINDINGS: Three views of the left knee demonstrate no fracture or other acute  osseous or articular abnormality. There is no effusion. Marked chondrocalcinosis  without any erosive change. IMPRESSION  No acute abnormality. Chondrocalcinosis      XR KNEE RT 3 V     INDICATION: Right knee pain and swelling. COMPARISON: None. FINDINGS: Three views of the right knee demonstrate no fracture or other acute  osseous or articular abnormality. There is no effusion. Calcification is seen in  the meniscus compatible with chondrocalcinosis. Lower extremity edema is noted. IMPRESSION  Lower extremity edema. Evidence of chondrocalcinosis. No acute  osseous abnormality. VENOUS DUPLEX BILATERAL LOWER EXTREMITIES:    No evidence of deep vein thrombosis in the right lower extremity. No evidence of deep vein thrombosis in the left lower extremity. Assessment and Plan     ESRD (end stage renal disease)   -BUN 78, creatinine 9.08 (vs. 3.41 on 1/14/2022)  -proteinuria (urine protein 300)  -consult nephrologist; evaluate for possible hemodialysis  -repeat renal panel  -order sodium bicarbonate IV infusion    2. Acute hyperkalemia   -noted on 8/30/2022   -given Lokelma 10 grams po x 1 yesterday  -order stat K level   -order continuous telemetry monitoring    3.  Bilateral lower extremity edema   -h/o HFrEF   -elevated proBNP 35,000 (albeit without overt pulmonary edema noted on chest xray)  -place on strict Is and Os and daily weights  -keep BLE elevated at rest  -venous duplex BLE negative for DVT  -order 2d echo    4. Arthralgia   -prior h/o gout but uric acid level still within normal limits  -CRP 9.23; may suggest acute inflammatory/ polyarthritis  -not taking Allopurinol at home    5. Elevated CK level  -; may suggest mild rhabdomyolysis   -will repeat CK levels to trend    6. Acute right hip pain   -order Tylenol for mild/ moderate pain    7. Heroin/ cocaine/ Fentanyl abuse  -order UDS    8. Tobacco abuse  -encourage smoking cessation    9. Normocytic, normochromic anemia  -Hgb 10.7    10. Type 2 diabetes mellitus  -Order Humalog insulin correctional coverage, scheduled blood glucose checks and check hemoglobin A1c level. 11. HFrEF  -last 2d echocardiogram on 1/12/2022:  LVEF = 25%-30%  -plan as above    VTE prophylaxis  -SCDs to BLEs    ADVANCED DIRECTIVE/ CODE STATUS:  FULL CODE as per discussion with patient. Addendum:  I spoke with nephrologist on call Dr. Eugenie Pederson who recommends giving Lasix 80 mg IV x 1 dose and adding labs- urine K, Na, and creatinine.       Anticipated disposition: > 48 hours       Signed by: Kane Elmore MD    August 31, 2022 at 4:10 AM

## 2022-08-31 NOTE — PROGRESS NOTES
TRANSFER - IN REPORT:    Verbal report received from [place] (name) on Ana Luisa Kirby  being received from [facility] ED (unit) for routine progression of care      Report consisted of patients Situation, Background, Assessment and   Recommendations(SBAR). Information from the following report(s) SBAR, Kardex, Intake/Output, MAR, and Recent Results was reviewed with the receiving nurse. Opportunity for questions and clarification was provided. Assessment completed upon patients arrival to unit and care assumed.

## 2022-08-31 NOTE — PROGRESS NOTES
Problem: Heart Failure: Day 1  Goal: Activity/Safety  Outcome: Progressing Towards Goal  Goal: Consults, if ordered  Outcome: Progressing Towards Goal  Goal: Diagnostic Test/Procedures  Outcome: Progressing Towards Goal  Goal: Nutrition/Diet  Outcome: Progressing Towards Goal

## 2022-08-31 NOTE — PROGRESS NOTES
Primary Nurse Janae Edge RN and Leo Miner RN performed a dual skin assessment on this patient No impairment noted  Griffin score is 23

## 2022-08-31 NOTE — PROGRESS NOTES
Bedside shift change report given to Moody (oncoming nurse) by DOMENIC Willett (offgoing nurse). Report included the following information SBAR, Kardex, Procedure Summary, MAR, and Recent Results.

## 2022-08-31 NOTE — NURSE NAVIGATOR
HEART FAILURE NURSE NAVIGATOR NOTE  [address]    Patient chart was reviewed by Heart Failure (HF) Nurse Navigators for compliance of prescribed treatment with guidelines directed medical therapy (GDMT) and HF database completed. Please, review beneath recommendations for symptomatic patients with HF with Reduced Ejection Fraction ? 40% (HFrEF) for your consideration when taking care of this patient. Current Medical Therapy:    Name Paris Quintanilla    1965   LVEF 25-30%- echo dated 22   NYHA Functional Class    ARNi/ACEi/ARB Not ordered-need documentation for contraindication   Beta-blocker Toprol xl 100mg (POA)   Aldosterone Antagonist Not ordered-need documentation for contraindication   SGLT2 inhibitor Not ordered-need documentation for contraindication   Hydralazine/Isosorbide Dinitrate Not ordered-need documentation for contraindication   Consulting Cardiologist: Not yet consulted     Recommendations:    Please, add the following GDMT for HFrEF ? 40% [Class 1] or document in discharge summary/progress note why patient cannot take the medication:  ARNi/ACEi or ARB  Beta-blockers (carvedilol, sustained-release metoprolol succinate or bisoprolol)  Aldosterone antagonists GFR > 30 and K< 5  SGLT2 inhibitor  Hydralazine/Isosorbide dinitrate for  Americans with Class III/IV symptoms  Adjust diuretic dose at discharge if hospitalized for volume overload    Consider adding the following GDMT for HFrEF ? 40%, if appropriate [Class 2b]:   Ivabradine for patients on maximally tolerated beta-blocker dose in order to achieve HR 70-80bpm  Digoxin, goal level 0.5-0.9  Polyunsaturated fatty acids  Vericuguat  For patient with hyperkalemia while on RAASi > 5.5, consider adding potassium binders (patiromer, sodium zirconium cycosilicate)    Note: the following medications may be potentially harmful in heart failure [Class 3]:  Calcium channel blockers (doxazosin, diltiazem, verapamil, nifedipine)  Antiarrhythmics (flecanide, disopyrimide, sotalol, dronedarone)  Diabetes medications (thiasolidinediones, saxagliptin, alogliptin)  NSAIDs and CORTES 2 inhibitors    Consider vaccinations for respiratory illnesses (flu, pneumonia, covid) [Class 2b]    For eligible patients with LVEF < 35% consider discharge with wearable defibrillation [Class 2b] and/or referral for ICD implantation [Class 1] for prevention of sudden cardiac death. Due to severely reduced LVEF < 25% and/or recurrent hospitalizations this patient may benefit from referral to Advanced Heart Failure Program to assess suitability for advanced therapies, such as left-ventricular assist device, heart transplantation, palliative inotropes or palliation [Class 1]. To obtain in-patient consultation or refer to [address] [place] [address], call [phone]    Patient Education:     Teach back in heart failure education provided, including information about medical therapy, lifestyle modifications, diet and fluid restrictions, physical activity. Educational resources provided: Living with Heart Failure booklet; Signs/Symptoms magnet; Weight Calendar; [facility] flyer; Preparation for Successful Discharge Checklist.  Information provided about HF support group. Heart failure avoiding triggers on discharge instructions. Plan for Transitional Care:    Post discharge follow up phone call to be made within 48-72 hours of discharge. Patient will follow-up with PCP. Patient will follow-up with Primary Cardiologist.  Obstructive sleep apnea screening done and patient was referred to Sleep Medicine. Referral/follow-up with Cardiac Rehabilitation.   Referral/follow-up with Advanced Heart Failure Specialist.  Referral/follow-up with Palliative Care Specialist.      Heart Failure Nurse Navigator  Phone: [phone]  /  [phone]    *Recommendations listed above are based on 2022 AHA/ACC/HFSA Guideline for the Management of Heart Failure: A Report of the [address] on Clinical Practice Guidelines. Circulation 2022; [id number]. and 2017 AHA/ACC/HRS guideline for management of patients with ventricular arrhythmias and the prevention of sudden cardiac death: A Report of the [employer] of Cardiology/American Heart Association Task Force on Clinical Practice Guidelines and the Heart Rhythm Society.  Heart Rhythm, Vol 15, No 10, October 2018 *Class of Recommendation: Class 1 (strong), Class 2a (moderate), Class 2b (weak), Class 3 (not recommended, potentially harmful)

## 2022-08-31 NOTE — PROGRESS NOTES
Reason for Admission:   Lower back, right hip, and bilateral knee pain and swelling. History of CKD, type 2 diabetes, hypertension, gout, heroin/cocaine/fentanyl abuse. RUR Score:     16% Moderate             PCP: First and Last name:   Derrek Morris MD     Name of Practice: [address] [place] [address] and Primary Care   Are you a current patient: Yes/No: Yes   Approximate date of last visit: 7/12/22   Can you participate in a virtual visit if needed: No    Do you (patient/family) have any concerns for transition/discharge? None              Plan for utilizing home health:   TBD    Current Advanced Directive/Advance Care Plan:  Full Code      Healthcare Decision Maker:   Click here to complete [id number] including selection of the Healthcare Decision Maker Relationship (ie \"Primary\")            Primary Decision Maker: Martínez Emily [facility] - [phone]    Transition of Care Plan:      CM met with patient to inform of CM role and to assess needs. Patient lives at home with 5 roommates. He reports independence at baseline with no DME use. Patient receives monthly disability income. Patient states that he uses [facility] his primary [facility]. Patient will need transportation assistance home via Medicaid cab. CM to monitor and assist with transitional care planning needs.      Mary Greer MS

## 2022-09-01 ENCOUNTER — APPOINTMENT (OUTPATIENT)
Dept: CT IMAGING | Age: 57
DRG: 194 | End: 2022-09-01
Attending: INTERNAL MEDICINE
Payer: MEDICAID

## 2022-09-01 DIAGNOSIS — G47.33 OSA (OBSTRUCTIVE SLEEP APNEA): Primary | ICD-10-CM

## 2022-09-01 LAB
ABO + RH BLD: NORMAL
ALBUMIN SERPL-MCNC: 2.6 G/DL (ref 3.5–5)
ALBUMIN/GLOB SERPL: 0.5 {RATIO} (ref 1.1–2.2)
ALP SERPL-CCNC: 157 U/L (ref 45–117)
ALT SERPL-CCNC: 17 U/L (ref 12–78)
ANION GAP SERPL CALC-SCNC: 4 MMOL/L (ref 5–15)
AST SERPL-CCNC: 18 U/L (ref 15–37)
BASOPHILS # BLD: 0 K/UL (ref 0–0.1)
BASOPHILS NFR BLD: 0 % (ref 0–1)
BILIRUB SERPL-MCNC: 0.3 MG/DL (ref 0.2–1)
BLOOD GROUP ANTIBODIES SERPL: NORMAL
BUN SERPL-MCNC: 57 MG/DL (ref 6–20)
BUN/CREAT SERPL: 9 (ref 12–20)
CALCIUM SERPL-MCNC: 7.2 MG/DL (ref 8.5–10.1)
CHLORIDE SERPL-SCNC: 105 MMOL/L (ref 97–108)
CO2 SERPL-SCNC: 28 MMOL/L (ref 21–32)
CREAT SERPL-MCNC: 6.67 MG/DL (ref 0.7–1.3)
DIFFERENTIAL METHOD BLD: ABNORMAL
EOSINOPHIL # BLD: 0.1 K/UL (ref 0–0.4)
EOSINOPHIL NFR BLD: 3 % (ref 0–7)
ERYTHROCYTE [DISTWIDTH] IN BLOOD BY AUTOMATED COUNT: 13.3 % (ref 11.5–14.5)
GLOBULIN SER CALC-MCNC: 5.4 G/DL (ref 2–4)
GLUCOSE BLD STRIP.AUTO-MCNC: 121 MG/DL (ref 65–117)
GLUCOSE BLD STRIP.AUTO-MCNC: 142 MG/DL (ref 65–117)
GLUCOSE BLD STRIP.AUTO-MCNC: 77 MG/DL (ref 65–117)
GLUCOSE SERPL-MCNC: 106 MG/DL (ref 65–100)
HBV SURFACE AB SER QL: REACTIVE
HBV SURFACE AB SER-ACNC: 25.49 MIU/ML
HCT VFR BLD AUTO: 29.1 % (ref 36.6–50.3)
HGB BLD-MCNC: 9.4 G/DL (ref 12.1–17)
IMM GRANULOCYTES # BLD AUTO: 0 K/UL (ref 0–0.04)
IMM GRANULOCYTES NFR BLD AUTO: 0 % (ref 0–0.5)
LYMPHOCYTES # BLD: 1.4 K/UL (ref 0.8–3.5)
LYMPHOCYTES NFR BLD: 31 % (ref 12–49)
MAGNESIUM SERPL-MCNC: 1.8 MG/DL (ref 1.6–2.4)
MCH RBC QN AUTO: 30.8 PG (ref 26–34)
MCHC RBC AUTO-ENTMCNC: 32.3 G/DL (ref 30–36.5)
MCV RBC AUTO: 95.4 FL (ref 80–99)
MONOCYTES # BLD: 0.5 K/UL (ref 0–1)
MONOCYTES NFR BLD: 11 % (ref 5–13)
NEUTS SEG # BLD: 2.5 K/UL (ref 1.8–8)
NEUTS SEG NFR BLD: 55 % (ref 32–75)
NRBC # BLD: 0 K/UL (ref 0–0.01)
NRBC BLD-RTO: 0 PER 100 WBC
PLATELET # BLD AUTO: 215 K/UL (ref 150–400)
PMV BLD AUTO: 9.9 FL (ref 8.9–12.9)
POTASSIUM SERPL-SCNC: 4.5 MMOL/L (ref 3.5–5.1)
PROT SERPL-MCNC: 8 G/DL (ref 6.4–8.2)
RBC # BLD AUTO: 3.05 M/UL (ref 4.1–5.7)
SERVICE CMNT-IMP: ABNORMAL
SERVICE CMNT-IMP: ABNORMAL
SERVICE CMNT-IMP: NORMAL
SODIUM SERPL-SCNC: 137 MMOL/L (ref 136–145)
SPECIMEN EXP DATE BLD: NORMAL
WBC # BLD AUTO: 4.6 K/UL (ref 4.1–11.1)

## 2022-09-01 PROCEDURE — 85025 COMPLETE CBC W/AUTO DIFF WBC: CPT

## 2022-09-01 PROCEDURE — 36415 COLL VENOUS BLD VENIPUNCTURE: CPT

## 2022-09-01 PROCEDURE — 77012 CT SCAN FOR NEEDLE BIOPSY: CPT

## 2022-09-01 PROCEDURE — 74011000250 HC RX REV CODE- 250

## 2022-09-01 PROCEDURE — 74011250637 HC RX REV CODE- 250/637: Performed by: INTERNAL MEDICINE

## 2022-09-01 PROCEDURE — 83735 ASSAY OF MAGNESIUM: CPT

## 2022-09-01 PROCEDURE — 88305 TISSUE EXAM BY PATHOLOGIST: CPT

## 2022-09-01 PROCEDURE — 74011000250 HC RX REV CODE- 250: Performed by: FAMILY MEDICINE

## 2022-09-01 PROCEDURE — 99153 MOD SED SAME PHYS/QHP EA: CPT

## 2022-09-01 PROCEDURE — 82962 GLUCOSE BLOOD TEST: CPT

## 2022-09-01 PROCEDURE — 87522 HEPATITIS C REVRS TRNSCRPJ: CPT

## 2022-09-01 PROCEDURE — 80053 COMPREHEN METABOLIC PANEL: CPT

## 2022-09-01 PROCEDURE — 77030020268 HC MISC GENERAL SUPPLY

## 2022-09-01 PROCEDURE — 74011250637 HC RX REV CODE- 250/637: Performed by: FAMILY MEDICINE

## 2022-09-01 PROCEDURE — 77030014115

## 2022-09-01 PROCEDURE — 74011250636 HC RX REV CODE- 250/636: Performed by: RADIOLOGY

## 2022-09-01 PROCEDURE — 90935 HEMODIALYSIS ONE EVALUATION: CPT

## 2022-09-01 PROCEDURE — 65270000046 HC RM TELEMETRY

## 2022-09-01 PROCEDURE — 87902 NFCT AGT GNTYP ALYS HEP C: CPT

## 2022-09-01 RX ORDER — FLUMAZENIL 0.1 MG/ML
0.5 INJECTION INTRAVENOUS
Status: DISCONTINUED | OUTPATIENT
Start: 2022-09-01 | End: 2022-09-01

## 2022-09-01 RX ORDER — ISOSORBIDE DINITRATE 20 MG/1
20 TABLET ORAL 2 TIMES DAILY
Status: DISCONTINUED | OUTPATIENT
Start: 2022-09-01 | End: 2022-09-07 | Stop reason: HOSPADM

## 2022-09-01 RX ORDER — FENTANYL CITRATE 50 UG/ML
200 INJECTION, SOLUTION INTRAMUSCULAR; INTRAVENOUS
Status: DISCONTINUED | OUTPATIENT
Start: 2022-09-01 | End: 2022-09-01

## 2022-09-01 RX ORDER — SODIUM CHLORIDE 9 MG/ML
25 INJECTION, SOLUTION INTRAVENOUS
Status: DISCONTINUED | OUTPATIENT
Start: 2022-09-01 | End: 2022-09-07 | Stop reason: HOSPADM

## 2022-09-01 RX ORDER — HYDRALAZINE HYDROCHLORIDE 25 MG/1
25 TABLET, FILM COATED ORAL 2 TIMES DAILY
Status: DISCONTINUED | OUTPATIENT
Start: 2022-09-01 | End: 2022-09-07 | Stop reason: HOSPADM

## 2022-09-01 RX ORDER — NALOXONE HYDROCHLORIDE 0.4 MG/ML
0.4 INJECTION, SOLUTION INTRAMUSCULAR; INTRAVENOUS; SUBCUTANEOUS
Status: DISCONTINUED | OUTPATIENT
Start: 2022-09-01 | End: 2022-09-01

## 2022-09-01 RX ORDER — MIDAZOLAM HYDROCHLORIDE 1 MG/ML
5 INJECTION, SOLUTION INTRAMUSCULAR; INTRAVENOUS
Status: DISCONTINUED | OUTPATIENT
Start: 2022-09-01 | End: 2022-09-01

## 2022-09-01 RX ORDER — LIDOCAINE HYDROCHLORIDE 10 MG/ML
INJECTION, SOLUTION EPIDURAL; INFILTRATION; INTRACAUDAL; PERINEURAL
Status: COMPLETED
Start: 2022-09-01 | End: 2022-09-01

## 2022-09-01 RX ADMIN — SODIUM CHLORIDE, PRESERVATIVE FREE 10 ML: 5 INJECTION INTRAVENOUS at 21:03

## 2022-09-01 RX ADMIN — ISOSORBIDE DINITRATE 20 MG: 20 TABLET ORAL at 17:16

## 2022-09-01 RX ADMIN — FERROUS SULFATE TAB 325 MG (65 MG ELEMENTAL FE) 325 MG: 325 (65 FE) TAB at 17:16

## 2022-09-01 RX ADMIN — SEVELAMER CARBONATE FOR ORAL SUSPENSION 0.8 G: 800 POWDER, FOR SUSPENSION ORAL at 17:00

## 2022-09-01 RX ADMIN — MIDAZOLAM HYDROCHLORIDE 1 MG: 1 INJECTION, SOLUTION INTRAMUSCULAR; INTRAVENOUS at 10:16

## 2022-09-01 RX ADMIN — AMLODIPINE BESYLATE 5 MG: 5 TABLET ORAL at 08:08

## 2022-09-01 RX ADMIN — HYDROCODONE BITARTRATE AND ACETAMINOPHEN 1 TABLET: 5; 325 TABLET ORAL at 17:16

## 2022-09-01 RX ADMIN — MIDAZOLAM HYDROCHLORIDE 1 MG: 1 INJECTION, SOLUTION INTRAMUSCULAR; INTRAVENOUS at 10:20

## 2022-09-01 RX ADMIN — LIDOCAINE HYDROCHLORIDE 10 ML: 10 INJECTION, SOLUTION EPIDURAL; INFILTRATION; INTRACAUDAL; PERINEURAL at 10:38

## 2022-09-01 RX ADMIN — TRAZODONE HYDROCHLORIDE 50 MG: 50 TABLET ORAL at 23:49

## 2022-09-01 RX ADMIN — METOPROLOL SUCCINATE 100 MG: 50 TABLET, EXTENDED RELEASE ORAL at 08:08

## 2022-09-01 RX ADMIN — GABAPENTIN 300 MG: 300 CAPSULE ORAL at 21:03

## 2022-09-01 RX ADMIN — FENTANYL CITRATE 50 MCG: 50 INJECTION, SOLUTION INTRAMUSCULAR; INTRAVENOUS at 10:21

## 2022-09-01 RX ADMIN — HYDRALAZINE HYDROCHLORIDE 25 MG: 25 TABLET, FILM COATED ORAL at 17:16

## 2022-09-01 NOTE — ADVANCED PRACTICE NURSE
Hemodialysis / [phone]    Vitals Pre Post Assessment Pre Post   BP BP: (!) 155/105 (09/01/22 1300)   158/98 LOC A&O x 3 A&O x 3   HR Pulse (Heart Rate): 83 (09/01/22 1300) 74 Lungs clear clear   Resp Resp Rate: 20 (09/01/22 1300) 19 Cardiac regular regular   Temp Temp: 98.3 °F (36.8 °C) (09/01/22 1240) 98 Skin warm warm   Weight Pre-Dialysis Weight: 100.6 kg (221 lb 12.5 oz) (09/01/22 1240)  Edema No edema No edema   Tele status remote remote Pain Pain Intensity 1: 0 (09/01/22 1220) No pain     Orders   Duration: Start: 1245 End: 1515 Total: 2.5 hr   Dialyzer: Dialyzer/Set Up Inspection: Layla Cogan ([id number]) (09/01/22 1240)   K Bath: Dialysate K (mEq/L): 2 (09/01/22 1240)   Ca Bath: Dialysate CA (mEq/L): 2.5 (09/01/22 1240)   Na: Dialysate NA (mEq/L): 135 (09/01/22 1240)   Bicarb: Dialysate HCO3 (mEq/L): 38 (09/01/22 1240)   Target Fluid Removal: Goal/Amount of Fluid to Remove (mL): 1000 mL (09/01/22 1240)     Access   Type & Location: RIJ cath   Comments:            in place, patent, dressing dry and intact, no S/S of infection.                             Labs   HBsAg (Antigen) / date:                  08/31/22 Negative                             HBsAb (Antibody) / date: 08/31/22 Immune   Source: Connect [facility]   Obtained/Reviewed  Critical Results Called HGB   Date Value Ref Range Status   09/01/2022 9.4 (L) 12.1 - 17.0 g/dL Final     Potassium   Date Value Ref Range Status   09/01/2022 4.5 3.5 - 5.1 mmol/L Final     Calcium   Date Value Ref Range Status   09/01/2022 7.2 (L) 8.5 - 10.1 MG/DL Final     BUN   Date Value Ref Range Status   09/01/2022 57 (H) 6 - 20 MG/DL Final     Creatinine   Date Value Ref Range Status   09/01/2022 6.67 (H) 0.70 - 1.30 MG/DL Final     Comment:     INVESTIGATED PER DELTA CHECK PROTOCOL        Meds Given   Name Dose Route                    Adequacy / Fluid    Total Liters Process: 38 L   Net Fluid Removed: 1000 ml      Comments   Time Out Done:   (Time) Yes, [phone] Admitting Diagnosis: Renal failure   Consent obtained/signed: Informed Consent Verified: Yes (09/01/22 1240)   Machine / RO # Machine Number: [id number] (09/01/22 1240)   Primary Nurse Rpt Pre: Erik Perez RN   Primary Nurse Rpt Post: Shahram RN   Pt Education: Yes, r/t dialysis process   Care Plan: Continue HD as ordered   Pts outpatient clinic:      Tx Summary   Comments:         tolerated tx well, 2nd tx today at end, all blood in circuit returned with 300 ml NS, RIJ cath in place, patent, dressing dry and intact, no S/S of infection.

## 2022-09-01 NOTE — CONSULTS
[facility] Cardiology Consultation    Date of Consult:  09/01/22  Date of Admission: 8/30/2022  Primary Cardiologist: [facility]  Physician Requesting consult: Dr. Josseline Dunbar / Reason for Consult:   CHF    History of Present Illness:  Steve Thomas is a 65 y.o. male with the below listed medical history who was admitted with lower back, right hip and b/l knee pain and swelling. Reports 3-4 weeks of progressively worsening ankle edema and dyspnea. Denies overt orthopnea and denies chest discomfort. Says he has not used illicit drugs for at least 6 months. Denies prior history of stress test or cardiac cath. He seemed to be unaware of the drop in his EF from the 5/2021 to 1/2022 echo. 1/2022 echo was done in setting of COVID-19 PNA and hospitalization. NT pro BNP >35,000. Past Medical History:   Diagnosis Date    Diabetes ([address]     Gout     Hypertension        Prior to Admission medications    Medication Sig Start Date End Date Taking? Authorizing Provider   furosemide (LASIX) 40 mg tablet  7/8/22  Yes Provider, Historical   QUEtiapine (SEROquel) 100 mg tablet  7/8/22  Yes Provider, Historical   traZODone (DESYREL) 50 mg tablet  7/8/22  Yes Provider, Historical   metoprolol succinate (TOPROL-XL) 100 mg tablet  7/8/22  Yes Provider, Historical   gabapentin (NEURONTIN) 300 mg capsule Take 300 mg by mouth three (3) times daily. 7/8/22  Yes Provider, Historical   amLODIPine (NORVASC) 5 mg tablet Take 1 Tablet by mouth daily. 7/12/22  Yes Nayeli Mayes MD   Suboxone 4-1 mg film sublingual film  8/17/22   Other, MD Angelina   Suboxone 8-2 mg film sublingaul film  8/17/22   Other, MD Angelina   allopurinoL (ZYLOPRIM) 300 mg tablet Take 300 mg by mouth daily. Patient not taking: Reported on 8/31/2022    Other, MD Angelina   thiamine HCL (B-1) 100 mg tablet Take 1 Tablet by mouth daily.   Patient not taking: No sig reported 1/14/22   Yancy Olivo MD   folic acid (FOLVITE) 1 mg tablet Take 1 Tablet by mouth daily. Patient not taking: No sig reported 1/14/22   Yancy Olivo MD   pantoprazole (Protonix) 40 mg tablet Take 1 Tablet by mouth daily. Patient not taking: No sig reported 1/13/22   Yancy Olivo MD   guaiFENesin (ROBITUSSIN) 100 mg/5 mL liquid Take 10 mL by mouth three (3) times daily as needed for Cough.   Patient not taking: No sig reported 1/13/22   Yancy Olivo MD   OTHER [place] Oil: Take one teaspoonful by mouth twice daily  Patient not taking: No sig reported    Provider, Historical       Current Facility-Administered Medications   Medication Dose Route Frequency    flumazeniL (ROMAZICON) 0.1 mg/mL injection 0.5 mg  0.5 mg IntraVENous RAD PRN    midazolam (VERSED) injection 5 mg  5 mg IntraVENous Rad Multiple    fentaNYL citrate (PF) injection 200 mcg  200 mcg IntraVENous Rad Multiple    naloxone (NARCAN) injection 0.4 mg  0.4 mg IntraVENous RAD PRN    0.9% sodium chloride infusion  25 mL/hr IntraVENous RAD CONTINUOUS    sodium chloride (NS) flush 5-40 mL  5-40 mL IntraVENous Q8H    sodium chloride (NS) flush 5-40 mL  5-40 mL IntraVENous PRN    glucose chewable tablet 16 g  4 Tablet Oral PRN    glucagon (GLUCAGEN) injection 1 mg  1 mg IntraMUSCular PRN    dextrose 10 % infusion 0-250 mL  0-250 mL IntraVENous PRN    insulin lispro (HUMALOG) injection   SubCUTAneous AC&HS    amLODIPine (NORVASC) tablet 5 mg  5 mg Oral DAILY    folic acid (FOLVITE) tablet 1 mg  1 mg Oral DAILY    gabapentin (NEURONTIN) capsule 300 mg  300 mg Oral TID    metoprolol succinate (TOPROL-XL) XL tablet 100 mg  100 mg Oral DAILY    thiamine HCL (B-1) tablet 100 mg  100 mg Oral DAILY    traZODone (DESYREL) tablet 50 mg  50 mg Oral QHS PRN    acetaminophen (TYLENOL) tablet 650 mg  650 mg Oral Q6H PRN    HYDROcodone-acetaminophen (NORCO) 5-325 mg per tablet 1 Tablet  1 Tablet Oral Q6H PRN    sevelamer carbonate (RENVELA) oral powder 0.8 g  0.8 g Oral TID WITH MEALS    ferrous sulfate tablet 325 mg  1 Tablet Oral BID WITH MEALS    iron sucrose (VENOFER) injection 100 mg  100 mg IntraVENous DAILY       Family History   Problem Relation Age of Onset    Diabetes Other     Hypertension Other        Social History     Socioeconomic History    Marital status: SINGLE     Spouse name: Not on file    Number of children: Not on file    Years of education: Not on file    Highest education level: Not on file   Occupational History    Not on file   Tobacco Use    Smoking status: Every Day     Packs/day: 1.00     Types: Cigarettes    Smokeless tobacco: Never   Vaping Use    Vaping Use: Never used   Substance and Sexual Activity    Alcohol use: Not Currently    Drug use: Not Currently     Types: Opiates     Comment: IV fentanly use today 1/11    Sexual activity: Not Currently   Other Topics Concern    Not on file   Social History Narrative    Mr Samuel Claudio used to work as a  at a hospital. He suffered a fall from a bridge and suffered many injuries, requiring neck and hip surgery. Had TBI    He is now on SSI. On discussion 11/9/16, he names his mother Samson Harkins as his MPOA      Social Determinants of Health     Financial Resource Strain: Not on file   Food Insecurity: Not on file   Transportation Needs: Not on file   Physical Activity: Not on file   Stress: Not on file   Social Connections: Not on file   Intimate Partner Violence: Not on file   Housing Stability: Not on file       Review of Systems   All other systems reviewed and are negative.     Visit Vitals  BP (!) 138/97   Pulse 88   Temp 98.2 °F (36.8 °C)   Resp 12   Wt 98 kg (216 lb)   SpO2 95%   BMI 30.13 kg/m²         Intake/Output Summary (Last 24 hours) at 9/1/2022 1046  Last data filed at 9/1/2022 0817  Gross per 24 hour   Intake 480 ml   Output 3275 ml   Net -2795 ml        Physical Exam  GEN: NAD, appears stated age  HEENT: EOMI  NECK: Normal JVP   CV: RRR, normal S1 and S2, no M/R/G  LUNGS: CTAB, no W/R/R  ABD: NABS, soft, NT/ND  EXT: Trace Additional Notes: SPG OBSERVED pitting edema in b/l ankles, 2+ and symmetrical radial pulses b/l, slightly cool extremities  PSYCH: Mood and affect normal  NEURO: Alert, MAEW, face symmetrical, speech intact    Lab Review:  BMP:   Lab Results   Component Value Date/Time     09/01/2022 04:45 AM    K 4.5 09/01/2022 04:45 AM     09/01/2022 04:45 AM    CO2 28 09/01/2022 04:45 AM    AGAP 4 (L) 09/01/2022 04:45 AM     (H) 09/01/2022 04:45 AM    BUN 57 (H) 09/01/2022 04:45 AM    CREA 6.67 (H) 09/01/2022 04:45 AM    GFRAA 10 (L) 09/01/2022 04:45 AM    GFRNA 9 (L) 09/01/2022 04:45 AM        CBC:  Lab Results   Component Value Date/Time    WBC 4.6 09/01/2022 04:45 AM    HGB 9.4 (L) 09/01/2022 04:45 AM    HCT 29.1 (L) 09/01/2022 04:45 AM    PLATELET 499 08/33/1325 04:45 AM    MCV 95.4 09/01/2022 04:45 AM       All Cardiac Markers in the last 24 hours:  No results found for: CPK, CK, CKMMB, CKMB, RCK3, CKMBT, CKMBPOC, CKNDX, CKND1, ALVIN, TROPT, TROIQ, ALESSIA, TROPT, TNIPOC, BNP, BNPP, BNPNT    No results found for: CHOL, CHOLPOCT, CHOLX, CHLST, CHOLV, HDL, HDLPOC, HDLP, LDL, LDLCPOC, LDLC, DLDLP, VLDLC, VLDL, TGLX, TRIGL, TRIGP, TGLPOCT, CHHD, CHHDX     Data Review:  ECG tracing personally reviewed:   Sinus tachycardia at 103 bpm, NSSTT changes    Echocardiogram:  01/11/22    ECHO ADULT COMPLETE 01/12/2022 1/12/2022    Interpretation Summary  Formatting of this result is different from the original.      Left Ventricle: Left ventricle size is normal. Normal wall thickness. Moderate global hypokinesis present. Severely reduced left ventricular systolic function with a visually estimated EF of 25 - 30%. Diastolic dysfunction present. Mitral Valve: Mildly thickened leaflets. Technical qualifiers: Echo study was technically difficult. Signed by: Tara Wray MD on 1/12/2022  4:34 PM    TTE 5/5/21:  LV: Estimated LVEF is 55 - 60%. Visually measured ejection fraction.  Normal cavity size and systolic function (ejection fraction Detail Level: Simple normal). Mild concentric hypertrophy. Wall motion: normal. Left ventricular diastolic dysfunction. LA: Mildly dilated left atrium. Left Atrium volume index is 33 mL/m2. MV: Mitral valve thickening. Mild mitral valve regurgitation is present. Echo study was technically difficulty. Other cardiac testing: N/A    Other imaging:  CT abd/pelvis:  IMPRESSION     1. Mild bilateral perinephric fat stranding, most commonly seen in the setting  of chronic renal disease, but may represent an acute nonspecific inflammatory  process of the kidneys. Correlate clinically. 2. Otherwise no evidence of acute process in the abdomen or pelvis. Assessment:    Chronic combined systolic and diastolic CHF with EF 98-35%, unclear if NICM or ICM, NYHA class II  Acute on chronic renal insufficiency  Polysubstance abuse  Now abstinent  HTN  DM2  Anemia    Recommendations / Plan:  - Repeat echo  - Continue metoprolol succinate 100 mg daily, amlodipine 5 mg daily  - Start ISDN 20 mg BID  - Start hydralazine 25 mg BID  - Strict Is and Os, daily weights  - Once better compensated, will need R/LHC to evaluate hemodynamics and evaluate for CAD -- no urgent need to pursue this now  - Will ask one of my partners to see him in my absence    Thank you for the opportunity to participate in the care of Steve Thomas and please do not hesitate to contact us should you have any questions. Loyd Mullen, [address] [facility]  09/01/22 Additional Notes: Will refer out to Dr. Montalvo for treatment when she is ready.

## 2022-09-01 NOTE — DIALYSIS
Hemodialysis / [phone]    Vitals Pre Post Assessment Pre Post   BP BP: (!) 142/94 (08/31/22 2315)   153/100 LOC AXOX4 AXOX4   HR Pulse (Heart Rate): 75 (08/31/22 2315) 77 Lungs CTA, RA Clear   Resp Resp Rate: 10 (08/31/22 2315) 11 Cardiac NSR NSR   Temp Temp: 98.2 °F (36.8 °C) (08/31/22 2315) 98 Skin W/D/I W/D/I   Weight Pre-Dialysis Weight: 100.1 kg (220 lb 10.9 oz) (08/31/22 2315) -1 kg Edema BLE,non pitting BLE, non pitting   Tele status Bedside/Remote Bedside/Remote Pain Denies Denies     Orders   Duration: Start: 3169 End: 0115 Total: 2 hrs   Dialyzer: Dialyzer/Set Up Inspection: Layla Cogan (08/31/22 2315)   K Bath: Dialysate K (mEq/L): 2 (08/31/22 2315)   Ca Bath: Dialysate CA (mEq/L): 2.5 (08/31/22 2315)   Na: Dialysate NA (mEq/L): 135 (08/31/22 2315)   Bicarb: Dialysate HCO3 (mEq/L): 38 (08/31/22 2315)   Target Fluid Removal: Goal/Amount of Fluid to Remove (mL): 1000 mL (08/31/22 2315)     Access   Type & Location: R IJ non tunneled CVC    Comments: Inserted /placement verified 8/31/22. Ports patent , running well . Initial dsg  CDI. Pre/Post labs drawn.                                       Labs   HBsAg (Antigen) / date:      Neg 8/31/22                                         HBsAb (Antibody) / date: Immune 8/31/22   Source: [facility]   Obtained/Reviewed  Critical Results Called HGB   Date Value Ref Range Status   08/31/2022 9.6 (L) 12.1 - 17.0 g/dL Final     Potassium   Date Value Ref Range Status   08/31/2022 5.6 (H) 3.5 - 5.1 mmol/L Final     Comment:     SPECIMEN HEMOLYZED, RESULTS MAY BE AFFECTED     Calcium   Date Value Ref Range Status   08/31/2022 7.1 (L) 8.5 - 10.1 MG/DL Final     BUN   Date Value Ref Range Status   08/31/2022 83 (H) 6 - 20 MG/DL Final     Creatinine   Date Value Ref Range Status   08/31/2022 9.03 (H) 0.70 - 1.30 MG/DL Final        Meds Given   Name Dose Route                    Adequacy / Fluid    Total Liters Process: 43.2   Net Fluid Removed: 1000 ml Comments   Time Out Done:   (Time) 2310   Admitting Diagnosis: BHASKAR/Hyperkalemia   Consent obtained/signed: Informed Consent Verified: Yes (08/31/22 3211)   Machine / RO # Machine Number: [id number] (08/31/22 9980)   Primary Nurse Rpt Pre: Melchor Guidry RN   Primary Nurse Rpt Post: JD Ocampo RN   Pt Education: Procedure, access care   Care Plan: 2nd HD tx as per Nephrologists, monitor labs. Pts outpatient clinic: N/A     Tx Summary   Comments:   Uneventful 1st HD tx, pt tolerated well, BP stable but elevated. No c/o throughout tx. No meds given intra tx. All blood returned with rinseback, ports flushed and packed with NS only, new caps and clamps secured. Pt was left in stable condition.

## 2022-09-01 NOTE — PROGRESS NOTES
Bedside and Verbal shift change report given to Home Iraheta RN (oncoming nurse) by Zoya Keller RN (offgoing nurse). Report included the following information SBAR, Kardex, ED Summary, Procedure Summary, Intake/Output, MAR, Recent Results, and Cardiac Rhythm NSR .

## 2022-09-01 NOTE — PROGRESS NOTES
[address] [facility]. [place], [address]   Tel.  [phone]   Fax. [address] [facility] [address]   [place], [address] [facility]   Tel.  [phone]   Fax. [phone] [address]   Tel.  [phone]   Fax. [phone]         Subjective:     Jayleen Rico is a 65y.o. year old male seen in-patient for sleep evaluation in collaboration with the Heart Failure Nurse Navigator. He reports he has experienced excessive daytime sleepiness for several years and it has worsened. The sleepiness is described as being moderate, he has been taking naps during the day. The patient notes that he will experience frequent awakening from sleep. In general he is able to return to sleep after awakening, he tends to awaken spontaneously. The patient has not undergone diagnostic testing for the current problems. Active Problems List   HFrEF with an EF of 25-30%  ESRD  DM2  HTN  BMI 30.13    Assessment:     Holland Sleepiness Score: 14     Stop Bang 9/1/2022   Does the patient snore loudly (louder than talking or loud enough to be heard through closed doors)? 0   Does the patient often feel tired, fatigued, or sleepy during the daytime, even after a \"good\" night's sleep? 1   Has anyone ever observed the patient stop breathing during their sleep?  0   Does the patient have or are they being treated for high blood pressure? 1   Is the patient's BMI greater than 35? 0   Is your neck circumference greater than 17 inches (Male) or 16 inches (Female)? 1   Is the patient older than 48? 1   Is the patient male? 1   MEGHANA Score 5       Plan:     The patient currently has significant risk for having sleep apnea. Sleep testing will be scheduled for 10/4/22 at the [facility] sleep center. He was provided information on the correlation between sleep apnea and heart failure.       Corresponding risk factors for sleep apnea and the importance of proper treatment were reviewed. Reviewed how treating sleep apnea can help improve heart function     The patient was counseled regarding proper sleep hygiene, with emphasis on ensuring sufficient total sleep time; safe driving and the benefits of exercise and weight loss. All of his questions were addressed. STEPHANIE Tran  Electronically signed.  09/01/22

## 2022-09-01 NOTE — PROGRESS NOTES
TRANSFER - OUT REPORT:    Verbal report given to [facility] RN(name) on [facility]ks[facility]  being transferred to (unit) for routine post - op       Report consisted of patients Situation, Background, Assessment and   Recommendations(SBAR). Information from the following report(s) SBAR and Procedure Summary was reviewed with the receiving nurse. Lines:   Peripheral IV 08/30/22 Right Antecubital (Active)   Site Assessment Intact 09/01/22 0345   Phlebitis Assessment 0 09/01/22 0345   Infiltration Assessment 0 09/01/22 0345   Dressing Status Intact 09/01/22 0345   Dressing Type Transparent 09/01/22 0345   Hub Color/Line Status Blue;Capped 09/01/22 0345   Action Taken Open ports on tubing capped 09/01/22 0345   Alcohol Cap Used Yes 09/01/22 0345       Peripheral IV 09/01/22 Left Antecubital (Active)        Opportunity for questions and clarification was provided.       Patient transported with:   Transporter when ready

## 2022-09-01 NOTE — PROGRESS NOTES
Patient seen and evaluated in patient by Mackenzie Kiran Sleep Navigator in collaboration with the Heart Failure Nurse Navigator due to high suspicion of sleep disordered breathing. In lab sleep study ordered for evaluation.      Orders Placed This Encounter    [phone] POLYSOMNOGRAPHY (1st NIGHT STUDY) SPLIT IF MEETS CRITERIA     Standing Status:   Future     Standing Expiration Date:   9/1/2023     Scheduling Instructions:      Route to Dr. Rima Saha Specific Question:   Reason for Exam     Answer:   MEGHANA/heart failure     CHRIS Rasheed, [facility]   Nurse Practitioner  [facility] [address]

## 2022-09-01 NOTE — PROGRESS NOTES
Spoke with Dialysis nurse about procedure summary and patient monitoring required post procedure. Dialysis states understanding.

## 2022-09-01 NOTE — PROGRESS NOTES
Renal Progress Note    NAME:  Esme Anguiano   :   1965   MRN:   [id number]     Date/Time:  2022 1:05 PM  Subjective:        seen on HD, HD JAMIE Valencia at bedside  Had renal biopsy this am, patient has generalized bodyache.  No N/V/SOB      Medications reviewed:  Current Facility-Administered Medications   Medication Dose Route Frequency    0.9% sodium chloride infusion  25 mL/hr IntraVENous RAD CONTINUOUS    isosorbide dinitrate (ISORDIL) tablet 20 mg  20 mg Oral BID    hydrALAZINE (APRESOLINE) tablet 25 mg  25 mg Oral BID    sodium chloride (NS) flush 5-40 mL  5-40 mL IntraVENous Q8H    sodium chloride (NS) flush 5-40 mL  5-40 mL IntraVENous PRN    glucose chewable tablet 16 g  4 Tablet Oral PRN    glucagon (GLUCAGEN) injection 1 mg  1 mg IntraMUSCular PRN    dextrose 10 % infusion 0-250 mL  0-250 mL IntraVENous PRN    insulin lispro (HUMALOG) injection   SubCUTAneous AC&HS    amLODIPine (NORVASC) tablet 5 mg  5 mg Oral DAILY    folic acid (FOLVITE) tablet 1 mg  1 mg Oral DAILY    gabapentin (NEURONTIN) capsule 300 mg  300 mg Oral TID    metoprolol succinate (TOPROL-XL) XL tablet 100 mg  100 mg Oral DAILY    thiamine HCL (B-1) tablet 100 mg  100 mg Oral DAILY    traZODone (DESYREL) tablet 50 mg  50 mg Oral QHS PRN    acetaminophen (TYLENOL) tablet 650 mg  650 mg Oral Q6H PRN    HYDROcodone-acetaminophen (NORCO) 5-325 mg per tablet 1 Tablet  1 Tablet Oral Q6H PRN    sevelamer carbonate (RENVELA) oral powder 0.8 g  0.8 g Oral TID WITH MEALS    ferrous sulfate tablet 325 mg  1 Tablet Oral BID WITH MEALS    iron sucrose (VENOFER) injection 100 mg  100 mg IntraVENous DAILY        Objective:   Vitals:  Visit Vitals  BP (!) 150/100 (BP 1 Location: Right upper arm, BP Patient Position: Supine)   Pulse 89   Temp 98.2 °F (36.8 °C)   Resp 18   Wt 98 kg (216 lb)   SpO2 100%   BMI 30.13 kg/m²     Temp (24hrs), Av.2 °F (36.8 °C), Min:98 °F (36.7 °C), Max:98.3 °F (36.8 °C)    O2 Flow Rate (L/min): 3 l/min O2 Device: None (Room air)    Last 24hr Input/Output:    Intake/Output Summary (Last 24 hours) at 9/1/2022 1305  Last data filed at 9/1/2022 0817  Gross per 24 hour   Intake --   Output 3275 ml   Net -3275 ml        Physical Exam:  General:Alert and oriented x3, , No distress, on HD  HEENT: Eyes are PERRL. Conjunctiva without pallor ,erythema. The sclerae without icterus. .   Neck:Supple, RT IJ temp HD catheter  Lungs : decreased breathing sounds basilar   CVS: RRR, S1 S2 normal, No rub, SM2  Abdomen: Soft, Non tender, ND,  bowel sounds present  Extremities: Edema 1+  Skin: No rash   Neurologic: non focal, AAO x 3  Psych: normal affect       [] Telemetry Reviewed     [] NSR [] PAC/PVCs   [] Afib  [] Paced   [] NSVT   [] Felix [] NGT  [] Intubated on vent    Lab Data Reviewed:    Recent Results (from the past 24 hour(s))   GLUCOSE, POC    Collection Time: 08/31/22  1:12 PM   Result Value Ref Range    Glucose (POC) 117 65 - 117 mg/dL    Performed by Nacho Moreno    GLUCOSE, POC    Collection Time: 08/31/22  4:31 PM   Result Value Ref Range    Glucose (POC) 116 65 - 117 mg/dL    Performed by Phyllis BOYKIN    HEPATITIS PANEL, ACUTE    Collection Time: 08/31/22  5:49 PM   Result Value Ref Range    Hepatitis A, IgM NONREACTIVE NR      __          Hepatitis B surface Ag <0.10 Index    Hep B surface Ag Interp. Negative NEG      __          Hepatitis B core, IgM NONREACTIVE NR      __          Hepatitis C virus Ab >11.00 Index    Hep C virus Ab Interp. REACTIVE (A) NR     SAMPLES BEING HELD    Collection Time: 08/31/22  5:49 PM   Result Value Ref Range    SAMPLES BEING HELD 1SST     COMMENT        Add-on orders for these samples will be processed based on acceptable specimen integrity and analyte stability, which may vary by analyte. HEP B SURFACE AB    Collection Time: 08/31/22  5:49 PM   Result Value Ref Range    Hepatitis B surface Ab 25.49 mIU/mL    Hep B surface Ab Interp.  REACTIVE (A) NR     TYPE & SCREEN Collection Time: 08/31/22  5:53 PM   Result Value Ref Range    Crossmatch Expiration 09/03/2022,2359     ABO/Rh(D) [place] POSITIVE     Antibody screen NEG    GLUCOSE, POC    Collection Time: 08/31/22  9:18 PM   Result Value Ref Range    Glucose (POC) 121 (H) 65 - 117 mg/dL    Performed by OMER Thorne    Collection Time: 09/01/22  4:45 AM   Result Value Ref Range    Sodium 137 136 - 145 mmol/L    Potassium 4.5 3.5 - 5.1 mmol/L    Chloride 105 97 - 108 mmol/L    CO2 28 21 - 32 mmol/L    Anion gap 4 (L) 5 - 15 mmol/L    Glucose 106 (H) 65 - 100 mg/dL    BUN 57 (H) 6 - 20 MG/DL    Creatinine 6.67 (H) 0.70 - 1.30 MG/DL    BUN/Creatinine ratio 9 (L) 12 - 20      GFR est AA 10 (L) >60 ml/min/1.73m2    GFR est non-AA 9 (L) >60 ml/min/1.73m2    Calcium 7.2 (L) 8.5 - 10.1 MG/DL    Bilirubin, total 0.3 0.2 - 1.0 MG/DL    ALT (SGPT) 17 12 - 78 U/L    AST (SGOT) 18 15 - 37 U/L    Alk. phosphatase 157 (H) 45 - 117 U/L    Protein, total 8.0 6.4 - 8.2 g/dL    Albumin 2.6 (L) 3.5 - 5.0 g/dL    Globulin 5.4 (H) 2.0 - 4.0 g/dL    A-G Ratio 0.5 (L) 1.1 - 2.2     MAGNESIUM    Collection Time: 09/01/22  4:45 AM   Result Value Ref Range    Magnesium 1.8 1.6 - 2.4 mg/dL   CBC WITH AUTOMATED DIFF    Collection Time: 09/01/22  4:45 AM   Result Value Ref Range    WBC 4.6 4.1 - 11.1 K/uL    RBC 3.05 (L) 4.10 - 5.70 M/uL    HGB 9.4 (L) 12.1 - 17.0 g/dL    HCT 29.1 (L) 36.6 - 50.3 %    MCV 95.4 80.0 - 99.0 FL    MCH 30.8 26.0 - 34.0 PG    MCHC 32.3 30.0 - 36.5 g/dL    RDW 13.3 11.5 - 14.5 %    PLATELET 450 702 - 403 K/uL    MPV 9.9 8.9 - 12.9 FL    NRBC 0.0 0  WBC    ABSOLUTE NRBC 0.00 0.00 - 0.01 K/uL    NEUTROPHILS 55 32 - 75 %    LYMPHOCYTES 31 12 - 49 %    MONOCYTES 11 5 - 13 %    EOSINOPHILS 3 0 - 7 %    BASOPHILS 0 0 - 1 %    IMMATURE GRANULOCYTES 0 0.0 - 0.5 %    ABS. NEUTROPHILS 2.5 1.8 - 8.0 K/UL    ABS. LYMPHOCYTES 1.4 0.8 - 3.5 K/UL    ABS. MONOCYTES 0.5 0.0 - 1.0 K/UL    ABS.  EOSINOPHILS 0.1 0.0 - 0.4 K/UL    ABS. BASOPHILS 0.0 0.0 - 0.1 K/UL    ABS. IMM.  GRANS. 0.0 0.00 - 0.04 K/UL    DF AUTOMATED     GLUCOSE, POC    Collection Time: 09/01/22  8:29 AM   Result Value Ref Range    Glucose (POC) 77 65 - 117 mg/dL    Performed by [facility]          Assessment/Plan:     Acute kidney injury on CKD3  Hyperkalemia  Metabolic acidosis  Hyperphosphatemia  Nephrotic syndrome  Rapidly progressive with nephrotic range proteinuria-serology negative except positive Hep C Ab in Jan 2022-urine no active sediment--UPCR 5.6  Possible  COVAN vs diabetic nephropathy, other Ddx includes  FSGS , MM, Membranous GN , IgA nephropathy, Heroin/coccaine nephropathy , hep C  Spoke to patient, he agrees with both renal biopsy and starting HD.   -s/p kidney biopsy 9/1and   -started  HD 8/31, second HD today, next on Sat  Renal diet  Avoid nephrotoxins  Dose meds per GFR  BP control   Check ipTH, vit D  Start renvela  Hep C viral load     Anemia  Iron sat 17%, ferritin 306  Start IV Iron     HTN-metoprolol, Amlodipine  Combined systolic and diastolic HF, cardiology recommendation appreciated  DM2-A1C 6  Hep C per labs-patient denies  Gout-uric acid 7  ____________________________________________    Total time spent with patient:  [] 15   [] 25   [] 35   []  __ minutes    [] Critical Care Provided    Care Plan discussed with:    [] Patient   [] Family    [] Care Manager   [] Consultant/Specialist :      []   >50% of visit spent in counseling and coordination of care   (Discussed [] CODE status,  [] Care Plan, [] D/C Planning)    ___________________________________________________    Attending Physician: MD Khoi Soto

## 2022-09-01 NOTE — H&P
Interventional Radiology History and Physical (Inpatient)    Patient: Radha Zhou 65 y.o. male     Referring Physician:  No ref. provider found    Chief Complaint: No chief complaint on file. History of Present Illness: conscious sedation (Versed and fentanyl) for  renal biopsy    History:  Past Medical History:   Diagnosis Date    Diabetes ([address]     Gout     Hypertension      Family History   Problem Relation Age of Onset    Diabetes Other     Hypertension Other      Social History     Socioeconomic History    Marital status: SINGLE     Spouse name: Not on file    Number of children: Not on file    Years of education: Not on file    Highest education level: Not on file   Occupational History    Not on file   Tobacco Use    Smoking status: Every Day     Packs/day: 1.00     Types: Cigarettes    Smokeless tobacco: Never   Vaping Use    Vaping Use: Never used   Substance and Sexual Activity    Alcohol use: Not Currently    Drug use: Not Currently     Types: Opiates     Comment: IV fentanly use today 1/11    Sexual activity: Not Currently   Other Topics Concern    Not on file   Social History Narrative    Mr Karla Kingsley used to work as a  at a hospital. He suffered a fall from a bridge and suffered many injuries, requiring neck and hip surgery. Had TBI    He is now on SSI. On discussion 11/9/16, he names his mother Carloz Delatorre as his MPOA      Social Determinants of Health     Financial Resource Strain: Not on file   Food Insecurity: Not on file   Transportation Needs: Not on file   Physical Activity: Not on file   Stress: Not on file   Social Connections: Not on file   Intimate Partner Violence: Not on file   Housing Stability: Not on file       Allergies:    Allergies   Allergen Reactions    Shellfish Containing Products Swelling       Current Medications:  Current Facility-Administered Medications   Medication Dose Route Frequency    lidocaine (PF) (XYLOCAINE) 10 mg/mL (1 %) injection flumazeniL (ROMAZICON) 0.1 mg/mL injection 0.5 mg  0.5 mg IntraVENous RAD PRN    midazolam (VERSED) injection 5 mg  5 mg IntraVENous Rad Multiple    fentaNYL citrate (PF) injection 200 mcg  200 mcg IntraVENous Rad Multiple    naloxone (NARCAN) injection 0.4 mg  0.4 mg IntraVENous RAD PRN    0.9% sodium chloride infusion  25 mL/hr IntraVENous RAD CONTINUOUS    sodium chloride (NS) flush 5-40 mL  5-40 mL IntraVENous Q8H    sodium chloride (NS) flush 5-40 mL  5-40 mL IntraVENous PRN    glucose chewable tablet 16 g  4 Tablet Oral PRN    glucagon (GLUCAGEN) injection 1 mg  1 mg IntraMUSCular PRN    dextrose 10 % infusion 0-250 mL  0-250 mL IntraVENous PRN    insulin lispro (HUMALOG) injection   SubCUTAneous AC&HS    amLODIPine (NORVASC) tablet 5 mg  5 mg Oral DAILY    folic acid (FOLVITE) tablet 1 mg  1 mg Oral DAILY    gabapentin (NEURONTIN) capsule 300 mg  300 mg Oral TID    metoprolol succinate (TOPROL-XL) XL tablet 100 mg  100 mg Oral DAILY    thiamine HCL (B-1) tablet 100 mg  100 mg Oral DAILY    traZODone (DESYREL) tablet 50 mg  50 mg Oral QHS PRN    acetaminophen (TYLENOL) tablet 650 mg  650 mg Oral Q6H PRN    HYDROcodone-acetaminophen (NORCO) 5-325 mg per tablet 1 Tablet  1 Tablet Oral Q6H PRN    sevelamer carbonate (RENVELA) oral powder 0.8 g  0.8 g Oral TID WITH MEALS    ferrous sulfate tablet 325 mg  1 Tablet Oral BID WITH MEALS    iron sucrose (VENOFER) injection 100 mg  100 mg IntraVENous DAILY        Physical Exam:  Blood pressure (!) 154/107, pulse 88, temperature 98.2 °F (36.8 °C), resp. rate 11, weight 98 kg (216 lb), SpO2 94 %.   GENERAL: alert, cooperative, no distress, appears stated age, LUNG: clear to auscultation bilaterally, HEART: regular rhythm, tachy    Findings/Diagnosis: conscious sedation (Versed and fentanyl) for  renal biopsy    Alerts:    Hospital Problems  Date Reviewed: 7/12/2022            Codes Class Noted POA    Acute hyperkalemia ICD-10-CM: E87.5  ICD-9-CM: 276.7  8/31/2022 Unknown        Arthralgia ICD-10-CM: M25.50  ICD-9-CM: 719.40  8/31/2022 Unknown        ESRD (end stage renal disease) ([facility] [address] ICD-10-CM: N18.6  ICD-9-CM: 585.6  8/31/2022 Unknown        Bilateral lower extremity edema ICD-10-CM: R60.0  ICD-9-CM: 782.3  8/31/2022 Unknown        Acute right hip pain ICD-10-CM: M25.551  ICD-9-CM: 719.45  8/31/2022 Unknown           Laboratory:      Recent Labs     09/01/22  0445 08/31/22  1103   HGB 9.4*  --    HCT 29.1*  --    WBC 4.6  --      --    INR  --  1.1   BUN 57*  --    CREA 6.67*  --    K 4.5  --          Plan of Care/Planned Procedure:  Risks, benefits, and alternatives reviewed with patient and he agrees to proceed with the procedure. Full dictated report to follow.   History and Physical

## 2022-09-01 NOTE — ADVANCED PRACTICE NURSE
Left lower back biopsy site assess, no bleeding noted, Band-Aid in place. Continue to monitor, patient remain in supine position.

## 2022-09-01 NOTE — PROGRESS NOTES
[facility] Adult  Hospitalist Group                                                                                          Hospitalist Progress Note  Gaurav Gallo MD  Answering service: [phone] OR [phone] from in house phone        Date of Service:  2022  NAME:  Luma Ram  :  1965  MRN:  [id number]      Admission Summary:   Luma Ram is a 65 y.o. male with past medical history of CKD, type 2 diabetes mellitus, hypertension, gout, heroin/ cocaine/ Fentanyl abuse, tobacco abuse presented as a direct admission/ transfer from [facility] ED to [facility]) with chief complaints of  lower back, right hip, bilateral knee pain and swelling. Patient notes onset of symptoms starting about 1 week ago, constant, moderate severity, worsening, without specific aggravating/ alleviating factors, with associated swelling in both legs. On arrival in the ED, initial recorded vital signs were T 98.1 F, /107m HR 91, RR 13, O2sat 95% on room air. Abnormal labs included BUN 78, creatinine 9.08, K 6.1, CO2 20, , , CRP 9.23, proBNP > 35,000. CT abdomen/pelvis without IV contrast showed mild bilateral perinephric fat stranding suggesting CKD with no acute process noted. Patient was given [place] and then transferred to [facility] [place]. Interval history / Subjective:   Endorses hunger because he hasn't eaten today. States his knees are sore which is chronic for him. He has no other concerns today. Assessment & Plan:     #ESRD (end stage renal disease)   Rapidly progressive renal failure, possible COVAN v diabetic nephropathy per nephrology.   - HD started   Plan:   -Biopsy today  - F/u ipTH, vit D, Hep C  - Continue renvela  -Appreciated nephrology recommendations     #Acute hyperkalemia   -S/p Kayexalate.   - now s/p HD      #Bilateral lower extremity edema   #HFrEF, EF 25-50%  - NYHA class II   -elevated proBNP 35,000 (albeit without overt pulmonary edema noted on chest xray)  -venous duplex BLE negative for DVT  Plan:   - Consult cardiology, appreciate recommendations   - Will need med optimization given reduced ejection fraction   -place on strict Is and Os and daily weights  -keep BLE elevated at rest  -F/u ECHO     #Arthralgia   #History of gout  -prior h/o gout but uric acid level still within normal limits  -CRP 9.23; may suggest acute inflammatory/ polyarthritis  - Knees mildly swollen on exam, nonerythematous, unlikely to be bilateral septic joints   -not taking Allopurinol at home  - Recommend f/u for nonemergent arthrocentesis    #Elevated CK level  -; possibly secondary to acute renal failure   - monitor     #Acute right hip pain   -order Tylenol for mild/ moderate pain     #History of Heroin/ cocaine/ Fentanyl abuse  -Negative UDS     #Tobacco abuse  -encourage smoking cessation     #Normocytic, normochromic anemia  -Hgb 9.6     #Type 2 diabetes mellitus: Blood sugar is stable. Hemoglobin A1c is 6       VTE prophylaxis  -SCDs to BLEs     ADVANCED DIRECTIVE/ CODE STATUS:  FULL CODE as per discussion with patient. Code status: FULL  Prophylaxis: SCDs  Care Plan discussed with: patient, nursing  Anticipated Disposition: [place] Problems  Date Reviewed: 7/12/2022            Codes Class Noted POA    Acute hyperkalemia ICD-10-CM: E87.5  ICD-9-CM: 276.7  8/31/2022 Unknown        Arthralgia ICD-10-CM: M25.50  ICD-9-CM: 719.40  8/31/2022 Unknown        ESRD (end stage renal disease) ([facility] [address] ICD-10-CM: N18.6  ICD-9-CM: 585.6  8/31/2022 Unknown        Bilateral lower extremity edema ICD-10-CM: R60.0  ICD-9-CM: 782.3  8/31/2022 Unknown        Acute right hip pain ICD-10-CM: M25.551  ICD-9-CM: 719.45  8/31/2022 Unknown             Review of Systems:   A comprehensive review of systems was negative except for that written in the HPI. Vital Signs:    Last 24hrs VS reviewed since prior progress note.  Most recent are:  Visit Vitals  BP (!) 155/106 (BP 1 Location: Left upper arm, BP Patient Position: Semi fowlers)   Pulse 88   Temp 98.2 °F (36.8 °C)   Resp 13   Wt 98 kg (216 lb)   SpO2 94%   BMI 30.13 kg/m²         Intake/Output Summary (Last 24 hours) at 9/1/2022 0919  Last data filed at 9/1/2022 7641  Gross per 24 hour   Intake 480 ml   Output 3275 ml   Net -2795 ml        Physical Examination:     I had a face to face encounter with this patient and independently examined them on 9/1/2022 as outlined below:          Constitutional:  No acute distress, cooperative, pleasant    ENT:  Oral mucosa moist, oropharynx benign. Resp:  CTA bilaterally. No wheezing/rhonchi/rales. No accessory muscle use. CV:  Regular rhythm, normal rate, no murmurs, gallops, rubs    GI:  Soft, non distended, non tender. normoactive bowel sounds, no hepatosplenomegaly     Musculoskeletal:  No edema, warm, 2+ pulses throughout    Neurologic:  Moves all extremities. AAOx3, CN II-XII reviewed            Data Review:    Review and/or order of clinical lab test      Labs:     Recent Labs     09/01/22 0445 08/31/22  0638   WBC 4.6 6.0   HGB 9.4* 9.6*   HCT 29.1* 30.9*    179     Recent Labs     09/01/22 0445 08/31/22  0505 08/30/22  1318    138 136   K 4.5 5.6* 6.1*    109* 102   CO2 28 17* 20*   BUN 57* 83* 78*   CREA 6.67* 9.03* 9.08*   * 96 127*   CA 7.2* 7.1* 7.3*   MG 1.8 1.7  --    PHOS  --  7.8*  --    URICA  --   --  7.0     Recent Labs     09/01/22 0445 08/31/22  0505 08/30/22  1318   ALT 17 18 16   * 151* 175*   TBILI 0.3 0.5 0.7   TP 8.0 7.9 8.5*   ALB 2.6* 2.5* 3.0*   GLOB 5.4* 5.4* 5.5*     Recent Labs     08/31/22  1103   INR 1.1   PTP 11.4*   APTT 26.0      Recent Labs     08/31/22  1103   TIBC 292   PSAT 17*   FERR 306      No results found for: FOL, RBCF   No results for input(s): PH, PCO2, PO2 in the last 72 hours.   Recent Labs     08/30/22  1318   *     No results found for: CHOL, [facility], CHLST, CHOLV, HDL, HDLP, LDL, LDLC, DLDLP, TGLX, TRIGL, TRIGP, CHHD, CHHDX  Lab Results   Component Value Date/Time    Glucose (POC) 77 09/01/2022 08:29 AM    Glucose (POC) 121 (H) 08/31/2022 09:18 PM    Glucose (POC) 116 08/31/2022 04:31 PM    Glucose (POC) 117 08/31/2022 01:12 PM    Glucose (POC) 88 08/31/2022 09:38 AM     Lab Results   Component Value Date/Time    Color YELLOW/STRAW 08/30/2022 01:48 PM    Appearance CLEAR 08/30/2022 01:48 PM    Specific gravity 1.015 08/30/2022 01:48 PM    Specific gravity 1.030 07/01/2015 01:41 PM    pH (UA) 7.5 08/30/2022 01:48 PM    Protein 300 (A) 08/30/2022 01:48 PM    Glucose Negative 08/30/2022 01:48 PM    Ketone Negative 08/30/2022 01:48 PM    Bilirubin Negative 08/30/2022 01:48 PM    Urobilinogen 1.0 08/30/2022 01:48 PM    Nitrites Negative 08/30/2022 01:48 PM    Leukocyte Esterase Negative 08/30/2022 01:48 PM    Epithelial cells FEW 08/30/2022 01:48 PM    Bacteria Negative 08/30/2022 01:48 PM    WBC 0-4 08/30/2022 01:48 PM    RBC 0-5 08/30/2022 01:48 PM         Medications Reviewed:     Current Facility-Administered Medications   Medication Dose Route Frequency    lidocaine (PF) (XYLOCAINE) 10 mg/mL (1 %) injection        sodium chloride (NS) flush 5-40 mL  5-40 mL IntraVENous Q8H    sodium chloride (NS) flush 5-40 mL  5-40 mL IntraVENous PRN    glucose chewable tablet 16 g  4 Tablet Oral PRN    glucagon (GLUCAGEN) injection 1 mg  1 mg IntraMUSCular PRN    dextrose 10 % infusion 0-250 mL  0-250 mL IntraVENous PRN    insulin lispro (HUMALOG) injection   SubCUTAneous AC&HS    amLODIPine (NORVASC) tablet 5 mg  5 mg Oral DAILY    folic acid (FOLVITE) tablet 1 mg  1 mg Oral DAILY    gabapentin (NEURONTIN) capsule 300 mg  300 mg Oral TID    metoprolol succinate (TOPROL-XL) XL tablet 100 mg  100 mg Oral DAILY    thiamine HCL (B-1) tablet 100 mg  100 mg Oral DAILY    traZODone (DESYREL) tablet 50 mg  50 mg Oral QHS PRN    acetaminophen (TYLENOL) tablet 650 mg  650 mg Oral Q6H PRN    HYDROcodone-acetaminophen (NORCO) 5-325 mg per tablet 1 Tablet  1 Tablet Oral Q6H PRN    sevelamer carbonate (RENVELA) oral powder 0.8 g  0.8 g Oral TID WITH MEALS    ferrous sulfate tablet 325 mg  1 Tablet Oral BID WITH MEALS    iron sucrose (VENOFER) injection 100 mg  100 mg IntraVENous DAILY     ______________________________________________________________________  EXPECTED LENGTH OF STAY: - - -  ACTUAL LENGTH OF STAY:          2                 Rosey Florentino MD

## 2022-09-02 ENCOUNTER — APPOINTMENT (OUTPATIENT)
Dept: ULTRASOUND IMAGING | Age: 57
DRG: 194 | End: 2022-09-02
Attending: STUDENT IN AN ORGANIZED HEALTH CARE EDUCATION/TRAINING PROGRAM
Payer: MEDICAID

## 2022-09-02 ENCOUNTER — APPOINTMENT (OUTPATIENT)
Dept: NON INVASIVE DIAGNOSTICS | Age: 57
DRG: 194 | End: 2022-09-02
Attending: INTERNAL MEDICINE
Payer: MEDICAID

## 2022-09-02 PROBLEM — I50.42 CHRONIC COMBINED SYSTOLIC AND DIASTOLIC CHF (CONGESTIVE HEART FAILURE) (HCC): Status: ACTIVE | Noted: 2022-09-02

## 2022-09-02 PROBLEM — I50.42 CHRONIC COMBINED SYSTOLIC AND DIASTOLIC CHF (CONGESTIVE HEART FAILURE) (HCC): Chronic | Status: ACTIVE | Noted: 2022-09-02

## 2022-09-02 LAB
25(OH)D3 SERPL-MCNC: 26.7 NG/ML (ref 30–100)
ALBUMIN SERPL-MCNC: 2.5 G/DL (ref 3.5–5)
ANION GAP SERPL CALC-SCNC: 6 MMOL/L (ref 5–15)
BUN SERPL-MCNC: 38 MG/DL (ref 6–20)
BUN/CREAT SERPL: 7 (ref 12–20)
CALCIUM SERPL-MCNC: 7.3 MG/DL (ref 8.5–10.1)
CALCIUM SERPL-MCNC: 7.5 MG/DL (ref 8.5–10.1)
CHLORIDE SERPL-SCNC: 104 MMOL/L (ref 97–108)
CK SERPL-CCNC: 526 U/L (ref 39–308)
CO2 SERPL-SCNC: 25 MMOL/L (ref 21–32)
CREAT SERPL-MCNC: 5.62 MG/DL (ref 0.7–1.3)
GLUCOSE BLD STRIP.AUTO-MCNC: 101 MG/DL (ref 65–117)
GLUCOSE BLD STRIP.AUTO-MCNC: 103 MG/DL (ref 65–117)
GLUCOSE BLD STRIP.AUTO-MCNC: 111 MG/DL (ref 65–117)
GLUCOSE BLD STRIP.AUTO-MCNC: 119 MG/DL (ref 65–117)
GLUCOSE SERPL-MCNC: 117 MG/DL (ref 65–100)
PHOSPHATE SERPL-MCNC: 5.1 MG/DL (ref 2.6–4.7)
PHOSPHATE SERPL-MCNC: 5.3 MG/DL (ref 2.6–4.7)
POTASSIUM SERPL-SCNC: 5.2 MMOL/L (ref 3.5–5.1)
PTH-INTACT SERPL-MCNC: 1305.6 PG/ML (ref 18.4–88)
SERVICE CMNT-IMP: ABNORMAL
SERVICE CMNT-IMP: NORMAL
SODIUM SERPL-SCNC: 135 MMOL/L (ref 136–145)

## 2022-09-02 PROCEDURE — 82962 GLUCOSE BLOOD TEST: CPT

## 2022-09-02 PROCEDURE — 0TB13ZX EXCISION OF LEFT KIDNEY, PERCUTANEOUS APPROACH, DIAGNOSTIC: ICD-10-PCS | Performed by: STUDENT IN AN ORGANIZED HEALTH CARE EDUCATION/TRAINING PROGRAM

## 2022-09-02 PROCEDURE — 93306 TTE W/DOPPLER COMPLETE: CPT

## 2022-09-02 PROCEDURE — 83970 ASSAY OF PARATHORMONE: CPT

## 2022-09-02 PROCEDURE — 74011250637 HC RX REV CODE- 250/637: Performed by: FAMILY MEDICINE

## 2022-09-02 PROCEDURE — 84100 ASSAY OF PHOSPHORUS: CPT

## 2022-09-02 PROCEDURE — 74011250637 HC RX REV CODE- 250/637: Performed by: INTERNAL MEDICINE

## 2022-09-02 PROCEDURE — 65270000046 HC RM TELEMETRY

## 2022-09-02 PROCEDURE — 76882 US LMTD JT/FCL EVL NVASC XTR: CPT

## 2022-09-02 PROCEDURE — 36415 COLL VENOUS BLD VENIPUNCTURE: CPT

## 2022-09-02 PROCEDURE — 82550 ASSAY OF CK (CPK): CPT

## 2022-09-02 PROCEDURE — 74011250636 HC RX REV CODE- 250/636: Performed by: INTERNAL MEDICINE

## 2022-09-02 PROCEDURE — 74011000250 HC RX REV CODE- 250: Performed by: FAMILY MEDICINE

## 2022-09-02 PROCEDURE — 82306 VITAMIN D 25 HYDROXY: CPT

## 2022-09-02 PROCEDURE — 80069 RENAL FUNCTION PANEL: CPT

## 2022-09-02 RX ORDER — CALCITRIOL 0.25 UG/1
0.25 CAPSULE ORAL DAILY
Status: DISCONTINUED | OUTPATIENT
Start: 2022-09-02 | End: 2022-09-07 | Stop reason: HOSPADM

## 2022-09-02 RX ORDER — DICLOFENAC SODIUM 10 MG/G
2 GEL TOPICAL 2 TIMES DAILY
Status: DISCONTINUED | OUTPATIENT
Start: 2022-09-02 | End: 2022-09-07 | Stop reason: HOSPADM

## 2022-09-02 RX ORDER — LIDOCAINE HYDROCHLORIDE AND EPINEPHRINE 10; 10 MG/ML; UG/ML
1.5 INJECTION, SOLUTION INFILTRATION; PERINEURAL AS NEEDED
Status: CANCELLED | OUTPATIENT
Start: 2022-09-02

## 2022-09-02 RX ADMIN — DICLOFENAC SODIUM 2 G: 10 GEL TOPICAL at 12:36

## 2022-09-02 RX ADMIN — AMLODIPINE BESYLATE 5 MG: 5 TABLET ORAL at 08:39

## 2022-09-02 RX ADMIN — SODIUM CHLORIDE, PRESERVATIVE FREE 10 ML: 5 INJECTION INTRAVENOUS at 21:05

## 2022-09-02 RX ADMIN — HYDRALAZINE HYDROCHLORIDE 25 MG: 25 TABLET, FILM COATED ORAL at 17:13

## 2022-09-02 RX ADMIN — ISOSORBIDE DINITRATE 20 MG: 20 TABLET ORAL at 08:39

## 2022-09-02 RX ADMIN — ISOSORBIDE DINITRATE 20 MG: 20 TABLET ORAL at 17:13

## 2022-09-02 RX ADMIN — SODIUM CHLORIDE, PRESERVATIVE FREE 10 ML: 5 INJECTION INTRAVENOUS at 17:14

## 2022-09-02 RX ADMIN — HYDRALAZINE HYDROCHLORIDE 25 MG: 25 TABLET, FILM COATED ORAL at 08:39

## 2022-09-02 RX ADMIN — SEVELAMER CARBONATE FOR ORAL SUSPENSION 0.8 G: 800 POWDER, FOR SUSPENSION ORAL at 12:36

## 2022-09-02 RX ADMIN — SEVELAMER CARBONATE FOR ORAL SUSPENSION 0.8 G: 800 POWDER, FOR SUSPENSION ORAL at 08:39

## 2022-09-02 RX ADMIN — GABAPENTIN 300 MG: 300 CAPSULE ORAL at 08:39

## 2022-09-02 RX ADMIN — FOLIC ACID 1 MG: 1 TABLET ORAL at 08:39

## 2022-09-02 RX ADMIN — METOPROLOL SUCCINATE 100 MG: 50 TABLET, EXTENDED RELEASE ORAL at 08:39

## 2022-09-02 RX ADMIN — FERROUS SULFATE TAB 325 MG (65 MG ELEMENTAL FE) 325 MG: 325 (65 FE) TAB at 08:39

## 2022-09-02 RX ADMIN — IRON SUCROSE 100 MG: 20 INJECTION, SOLUTION INTRAVENOUS at 08:49

## 2022-09-02 RX ADMIN — CALCITRIOL CAPSULES 0.25 MCG 0.25 MCG: 0.25 CAPSULE ORAL at 12:36

## 2022-09-02 RX ADMIN — TRAZODONE HYDROCHLORIDE 50 MG: 50 TABLET ORAL at 23:08

## 2022-09-02 RX ADMIN — GABAPENTIN 300 MG: 300 CAPSULE ORAL at 21:05

## 2022-09-02 RX ADMIN — DICLOFENAC SODIUM 2 G: 10 GEL TOPICAL at 17:13

## 2022-09-02 RX ADMIN — Medication 100 MG: at 08:39

## 2022-09-02 RX ADMIN — GABAPENTIN 300 MG: 300 CAPSULE ORAL at 17:13

## 2022-09-02 RX ADMIN — FERROUS SULFATE TAB 325 MG (65 MG ELEMENTAL FE) 325 MG: 325 (65 FE) TAB at 17:13

## 2022-09-02 RX ADMIN — SEVELAMER CARBONATE FOR ORAL SUSPENSION 0.8 G: 800 POWDER, FOR SUSPENSION ORAL at 17:13

## 2022-09-02 NOTE — PROGRESS NOTES
Renal Progress Note    NAME:  Parris Rodriguez   :   1965   MRN:   [id number]     Date/Time:  2022 1:05 PM  Subjective:        seen on HD, HD RN Praneeth Madrigal at bedside  Had renal biopsy this am, patient has generalized bodyache. No N/V/SOB     c/o left knee pain and LBP. No SOB, renal Bx result is pending.  Plan for HD in am. PTH 1300      Medications reviewed:  Current Facility-Administered Medications   Medication Dose Route Frequency    0.9% sodium chloride infusion  25 mL/hr IntraVENous RAD CONTINUOUS    isosorbide dinitrate (ISORDIL) tablet 20 mg  20 mg Oral BID    hydrALAZINE (APRESOLINE) tablet 25 mg  25 mg Oral BID    sodium chloride (NS) flush 5-40 mL  5-40 mL IntraVENous Q8H    sodium chloride (NS) flush 5-40 mL  5-40 mL IntraVENous PRN    glucose chewable tablet 16 g  4 Tablet Oral PRN    glucagon (GLUCAGEN) injection 1 mg  1 mg IntraMUSCular PRN    dextrose 10 % infusion 0-250 mL  0-250 mL IntraVENous PRN    insulin lispro (HUMALOG) injection   SubCUTAneous AC&HS    amLODIPine (NORVASC) tablet 5 mg  5 mg Oral DAILY    folic acid (FOLVITE) tablet 1 mg  1 mg Oral DAILY    gabapentin (NEURONTIN) capsule 300 mg  300 mg Oral TID    metoprolol succinate (TOPROL-XL) XL tablet 100 mg  100 mg Oral DAILY    thiamine HCL (B-1) tablet 100 mg  100 mg Oral DAILY    traZODone (DESYREL) tablet 50 mg  50 mg Oral QHS PRN    acetaminophen (TYLENOL) tablet 650 mg  650 mg Oral Q6H PRN    sevelamer carbonate (RENVELA) oral powder 0.8 g  0.8 g Oral TID WITH MEALS    ferrous sulfate tablet 325 mg  1 Tablet Oral BID WITH MEALS    iron sucrose (VENOFER) injection 100 mg  100 mg IntraVENous DAILY        Objective:   Vitals:  Visit Vitals  BP (!) 137/91 (BP 1 Location: Right arm, BP Patient Position: At rest;Semi fowlers)   Pulse 79   Temp 98 °F (36.7 °C)   Resp 16   Wt 96.7 kg (213 lb 3 oz)   SpO2 95%   BMI 29.73 kg/m²     Temp (24hrs), Av.3 °F (36.8 °C), Min:98 °F (36.7 °C), Max:98.8 °F (37.1 °C)    O2 Flow Rate (L/min): 3 l/min O2 Device: None (Room air)    Last 24hr Input/Output:    Intake/Output Summary (Last 24 hours) at 9/2/2022 0953  Last data filed at 9/1/2022 2027  Gross per 24 hour   Intake --   Output 1200 ml   Net -1200 ml          Physical Exam:  General:Alert and oriented x3, , No distress, on HD  HEENT: Eyes are PERRL. Conjunctiva without pallor ,erythema. The sclerae without icterus.  .   Neck:Supple, RT IJ temp HD catheter  Lungs : decreased breathing sounds basilar   CVS: RRR, S1 S2 normal, No rub, SM2  Abdomen: Soft, Non tender, ND,  bowel sounds present  Extremities: Edema 1+  Skin: No rash   Neurologic: non focal, AAO x 3  Psych: normal affect       [] Telemetry Reviewed     [] NSR [] PAC/PVCs   [] Afib  [] Paced   [] NSVT   [] Felix [] NGT  [] Intubated on vent    Lab Data Reviewed:    Recent Results (from the past 24 hour(s))   GLUCOSE, POC    Collection Time: 09/01/22  4:05 PM   Result Value Ref Range    Glucose (POC) 121 (H) 65 - 117 mg/dL    Performed by Brandan Cox PCT    GLUCOSE, POC    Collection Time: 09/01/22  9:05 PM   Result Value Ref Range    Glucose (POC) 142 (H) 65 - 117 mg/dL    Performed by Xiang Wong    RENAL FUNCTION PANEL    Collection Time: 09/02/22  1:50 AM   Result Value Ref Range    Sodium 135 (L) 136 - 145 mmol/L    Potassium 5.2 (H) 3.5 - 5.1 mmol/L    Chloride 104 97 - 108 mmol/L    CO2 25 21 - 32 mmol/L    Anion gap 6 5 - 15 mmol/L    Glucose 117 (H) 65 - 100 mg/dL    BUN 38 (H) 6 - 20 MG/DL    Creatinine 5.62 (H) 0.70 - 1.30 MG/DL    BUN/Creatinine ratio 7 (L) 12 - 20      GFR est AA 13 (L) >60 ml/min/1.73m2    GFR est non-AA 11 (L) >60 ml/min/1.73m2    Calcium 7.5 (L) 8.5 - 10.1 MG/DL    Phosphorus 5.1 (H) 2.6 - 4.7 MG/DL    Albumin 2.5 (L) 3.5 - 5.0 g/dL   PTH INTACT    Collection Time: 09/02/22  1:50 AM   Result Value Ref Range    Calcium 7.3 (L) 8.5 - 10.1 MG/DL    PTH, Intact 1,305.6 (H) 18.4 - 88.0 pg/mL   PHOSPHORUS    Collection Time: 09/02/22  1:50 AM   Result Value Ref Range    Phosphorus 5.3 (H) 2.6 - 4.7 MG/DL   CK    Collection Time: 09/02/22  7:45 AM   Result Value Ref Range     (H) 39 - 308 U/L   GLUCOSE, POC    Collection Time: 09/02/22  8:23 AM   Result Value Ref Range    Glucose (POC) 101 65 - 117 mg/dL    Performed by Ashley Lutz          Assessment/Plan:     Acute kidney injury on CKD3  Hyperkalemia  Metabolic acidosis  Hyperphosphatemia  Nephrotic syndrome  Rapidly progressive with nephrotic range proteinuria-serology negative except positive Hep C Ab in Jan 2022-urine no active sediment--UPCR 5.6  -might be related to Hep-C , other Ddx:  COVAN vs diabetic nephropathy, FSGS , MM, Membranous GN , IgA nephropathy, Heroin/coccaine nephropathy   Spoke to patient, he agrees with both renal biopsy and starting HD.   -s/p kidney biopsy 9/1and HD catheter placement 8/31  -started  HD 8/31, second HD t9/1, next on Sat  Renal diet-low potassium  Avoid nephrotoxins  Dose meds per GFR  BP control     Secondary hyperparathyroidism  Started renvela  Start calcitriol  F/up vit D level     Anemia  Iron sat 17%, ferritin 306  Start IV Iron     HTN-metoprolol, Amlodipine  Combined systolic and diastolic HF, cardiology recommendation appreciated  DM2-A1C 6  Hep C , recommend to consult hepatology  Gout-uric acid 7-added diclofenac gel for rt knee pain  ____________________________________________    Total time spent with patient:  [] 15   [] 25   [] 35   []  __ minutes    [] Critical Care Provided    Care Plan discussed with:    [] Patient   [] Family    [] Care Manager   [] Consultant/Specialist :      []   >50% of visit spent in counseling and coordination of care   (Discussed [] CODE status,  [] Care Plan, [] D/C Planning)    ___________________________________________________    Attending Physician: MD Khoi Gamino

## 2022-09-02 NOTE — PROGRESS NOTES
Bedside and Verbal shift change report given to Celina Ellis RN (oncoming nurse) by Cheri Thomas RN (offgoing nurse). Report included the following information SBAR, Kardex, ED Summary, Procedure Summary, Intake/Output, MAR, Recent Results, and Cardiac Rhythm NSR .

## 2022-09-02 NOTE — CONSULTS
ORTHO CONSULT NOTE    Date of Consultation:  2022      HPI:  Mike Patel is a 65 y.o. male who c/o intermittent bilateral knee pain and stiffness. Symptoms have occurred before in the past, this latest episode started with the R, now worse on the L, \"tight\", achy, worse with motion, moderate to severe. There is no hx of recent trauma. He endorses a hx of \"gout\". Denies numbness, tingling, focal weakness, instability, cold or pale extremities. Past Medical History:   Diagnosis Date    Diabetes ([address]     Gout     Hypertension       Past Surgical History:   Procedure Laterality Date    HX ORTHOPAEDIC  rt hip replacement    HX ORTHOPAEDIC  rt thigh woo      Family History   Problem Relation Age of Onset    Diabetes Other     Hypertension Other       Social History     Tobacco Use    Smoking status: Every Day     Packs/day: 1.00     Types: Cigarettes    Smokeless tobacco: Never   Substance Use Topics    Alcohol use: Not Currently     Allergies   Allergen Reactions    Shellfish Containing Products Swelling        Review of Systems:  Per HPI. Objective:     Patient Vitals for the past 8 hrs:   BP Temp Pulse Resp SpO2   22 1400 -- -- 79 -- --   22 1204 114/74 97.9 °F (36.6 °C) 76 14 96 %   22 1200 -- -- 77 -- --   22 1000 -- -- 77 -- --     Temp (24hrs), Av.2 °F (36.8 °C), Min:97.9 °F (36.6 °C), Max:98.8 °F (37.1 °C)      EXAM:   Walking from the bathroom while entering the room, sat on the bed, NAD. Bilat lower legs with edema, induration; knees do not appear swollen, no effusions; no specific ttp along the joint line, patella, popliteal fossa; full AROM, some pain at extremes or flexion and extension. No excessive warmth, overlying skin normal in temp and color; Grossly NVI. Imaging Review:   Results from Hospital Encounter encounter on 22    XR CHEST PORT    Narrative  INDICATION:  post andrea insertion    EXAM: Chest single view.     COMPARISON: None. [facility] FINDINGS: A single frontal view of the chest at 1654 hours shows mild  interstitial prominence without acute focal infiltrate or effusion. A  double-lumen catheter has been introduced from the right subclavian approach,  with its tip situated at the cavoatrial junction. No pneumothorax. .  The heart,  mediastinum and pulmonary vasculature are normal .  The bony thorax is  unremarkable for age. .    Impression  No pneumothorax status post right central venous catheter placement. .  . Results from [place] encounter on 08/30/22    CT BX RENAL NDL PERC    Narrative  CLINICAL HISTORY: Renal failure. COMPARISON: None. PROCEDURE: CT-guided left renal biopsy. CT dose reduction was achieved through  use of a standardized protocol tailored for this examination and automatic  exposure control for dose modulation. OPERATORS: Davida Vargas M.D.    ESTIMATED BLOOD LOSS: None. ANESTHESIA: buffered lidocaine local anesthetic. The patient was evaluated and felt to be an appropriate candidate for conscious  sedation. Moderate sedation was achieved with intravenous Versed and Fentanyl,  and was supervised by Dr. Anju Segura. Continuous independent monitoring was  performed by a registered nurse assigned to the Department of Radiology using  automated blood pressure, EKG, and pulse oximetry. Please see preprocedure  assessment and nursing record in the hospital information system for full  documentation. Medication:  Versed: 2 mg  Fentanyl: 50 mcg  Intraprocedure time: 25 minutes      TECHNIQUE AND FINDINGS:  The patient's relevant allergies, medications, laboratory results, and history  were reviewed. The patient was identified by name and date of birth. The  procedure, benefits, risks, and alternatives (including not having the  procedure) were discussed. All of the patient's questions were answered. Informed verbal and written consent was obtained.     Focused CT was performed of the abdomen, the patient in prone position. The skin  site was marked over the left lower pole. A time-out procedure using two patient  identifiers was performed and laterality confirmed. The posterior approach was  chosen and the skin was prepped and draped using usual sterile technique. Subcutaneous and deep tissues were anesthetized with local anesthetic. Under  CT-fluoroscopic guidance, 2 samples were obtained with an 18-gauge core biopsy  device, submitted to the on-site pathologist, and sample adequacy confirmed. There were no immediate complications. The patient tolerated the procedure well. Postprocedure diagnosis: Acute renal failure    Impression  Uncomplicated CT-guided left renal biopsy.       Labs:   Recent Results (from the past 24 hour(s))   GLUCOSE, POC    Collection Time: 09/01/22  9:05 PM   Result Value Ref Range    Glucose (POC) 142 (H) 65 - 117 mg/dL    Performed by Ana Deleonh    RENAL FUNCTION PANEL    Collection Time: 09/02/22  1:50 AM   Result Value Ref Range    Sodium 135 (L) 136 - 145 mmol/L    Potassium 5.2 (H) 3.5 - 5.1 mmol/L    Chloride 104 97 - 108 mmol/L    CO2 25 21 - 32 mmol/L    Anion gap 6 5 - 15 mmol/L    Glucose 117 (H) 65 - 100 mg/dL    BUN 38 (H) 6 - 20 MG/DL    Creatinine 5.62 (H) 0.70 - 1.30 MG/DL    BUN/Creatinine ratio 7 (L) 12 - 20      GFR est AA 13 (L) >60 ml/min/1.73m2    GFR est non-AA 11 (L) >60 ml/min/1.73m2    Calcium 7.5 (L) 8.5 - 10.1 MG/DL    Phosphorus 5.1 (H) 2.6 - 4.7 MG/DL    Albumin 2.5 (L) 3.5 - 5.0 g/dL   PTH INTACT    Collection Time: 09/02/22  1:50 AM   Result Value Ref Range    Calcium 7.3 (L) 8.5 - 10.1 MG/DL    PTH, Intact 1,305.6 (H) 18.4 - 88.0 pg/mL   PHOSPHORUS    Collection Time: 09/02/22  1:50 AM   Result Value Ref Range    Phosphorus 5.3 (H) 2.6 - 4.7 MG/DL   VITAMIN D, 25 HYDROXY    Collection Time: 09/02/22  1:50 AM   Result Value Ref Range    Vitamin D 25-Hydroxy 26.7 (L) 30 - 100 ng/mL   CK    Collection Time: 09/02/22  7:45 AM Result Value Ref Range     (H) 39 - 308 U/L   GLUCOSE, POC    Collection Time: 09/02/22  8:23 AM   Result Value Ref Range    Glucose (POC) 101 65 - 117 mg/dL    Performed by Isacc Powell    GLUCOSE, POC    Collection Time: 09/02/22 12:02 PM   Result Value Ref Range    Glucose (POC) 111 65 - 117 mg/dL    Performed by Isacc Powell        Impression:     Bilateral Knee OA, Acute on Chronic pain. Plan:   Exam is not concerning for infectious process; patient is ambulatory and has full AROM of both knees. There is no overlying skin change, no effusion, no hx of trauma. Imaging was negative. CRP noted, but given the exam there is not need to aspirate at this time for therapeutic or diagnostic purposes. Treat conservatively, could benefit from dedication therapy while in house. F/U outpatient as needed for evaluation of chronic knee pain. Dr. Meeta Cassidy is aware and agrees with above plan.       LITTLE Palomino  Orthopedic Trauma Service  [facility]

## 2022-09-02 NOTE — PROGRESS NOTES
[facility] Cardiology Progress Note    Date of service: 9/2/2022    Subjective:   Starting to feel better    Objective:    Visit Vitals  /84 (BP 1 Location: Right upper arm, BP Patient Position: At rest)   Pulse 78   Temp 98.1 °F (36.7 °C)   Resp 14   Wt 96.7 kg (213 lb 3 oz)   SpO2 94%   BMI 29.73 kg/m²       Physical Exam  Constitutional:       Appearance: He is well-developed. HENT:      Head: Normocephalic and atraumatic. Eyes:      General: No scleral icterus. Conjunctiva/sclera: Conjunctivae normal.   Neck:      Vascular: No JVD. Cardiovascular:      Rate and Rhythm: Normal rate and regular rhythm. Heart sounds: Normal heart sounds. No murmur heard. No gallop. Pulmonary:      Effort: Pulmonary effort is normal. No respiratory distress. Breath sounds: Normal breath sounds. No stridor. No wheezing or rales. Abdominal:      General: There is no distension. Palpations: Abdomen is soft. Musculoskeletal:         General: No deformity. Skin:     General: Skin is warm and dry. Neurological:      Mental Status: He is alert and oriented to person, place, and time.    Psychiatric:         Behavior: Behavior normal.       Data reviewed:  Recent Results (from the past 12 hour(s))   GLUCOSE, POC    Collection Time: 09/01/22  9:05 PM   Result Value Ref Range    Glucose (POC) 142 (H) 65 - 117 mg/dL    Performed by Edilma Tee    RENAL FUNCTION PANEL    Collection Time: 09/02/22  1:50 AM   Result Value Ref Range    Sodium 135 (L) 136 - 145 mmol/L    Potassium 5.2 (H) 3.5 - 5.1 mmol/L    Chloride 104 97 - 108 mmol/L    CO2 25 21 - 32 mmol/L    Anion gap 6 5 - 15 mmol/L    Glucose 117 (H) 65 - 100 mg/dL    BUN 38 (H) 6 - 20 MG/DL    Creatinine 5.62 (H) 0.70 - 1.30 MG/DL    BUN/Creatinine ratio 7 (L) 12 - 20      GFR est AA 13 (L) >60 ml/min/1.73m2    GFR est non-AA 11 (L) >60 ml/min/1.73m2    Calcium 7.5 (L) 8.5 - 10.1 MG/DL    Phosphorus 5.1 (H) 2.6 - 4.7 MG/DL    Albumin 2.5 (L) 3.5 - 5.0 g/dL   PTH INTACT    Collection Time: 09/02/22  1:50 AM   Result Value Ref Range    Calcium 7.3 (L) 8.5 - 10.1 MG/DL    PTH, Intact 1,305.6 (H) 18.4 - 88.0 pg/mL   PHOSPHORUS    Collection Time: 09/02/22  1:50 AM   Result Value Ref Range    Phosphorus 5.3 (H) 2.6 - 4.7 MG/DL     01/11/22    ECHO ADULT COMPLETE 01/12/2022 1/12/2022    Interpretation Summary  Formatting of this result is different from the original.      Left Ventricle: Left ventricle size is normal. Normal wall thickness. Moderate global hypokinesis present. Severely reduced left ventricular systolic function with a visually estimated EF of 25 - 30%. Diastolic dysfunction present. Mitral Valve: Mildly thickened leaflets. Technical qualifiers: Echo study was technically difficult. Signed by: Kitty Horton MD on 1/12/2022  4:34 PM        Repeat echo pending    Assessment and Plan:    Chronic combined systolic and diastolic CHF with EF 68-14%, unclear if NICM or ICM, NYHA class II  Volume status likely complicated significantly by renal insufficiency   - continue medical therapies (metoprolol, hydralazine, ISDN)  - no ARB / ACEI / ARNI / aldactone given advanced renal failure    Cardiomyopathy  EF 25-30%  - Etiology unclear  - Defer cath until renal situation more clear to avoid nephrotoxic contrast    Acute on chronic renal insufficiency  Nephrology work-up in progress  - HD per Nephrology    Polysubstance abuse  Now abstinent  HTN  DM2  Anemia      Signed:  Buck Barrientos MD  Interventional Cardiology  9/2/2022

## 2022-09-02 NOTE — PROGRESS NOTES
Bedside and Verbal shift change report given to Kam Moon RN (oncoming nurse) by Esau Walters (offgoing nurse). Report included the following information SBAR, Kardex, Intake/Output, MAR, and Recent Results. Endocrine...

## 2022-09-02 NOTE — PROGRESS NOTES
Transition of Care  RUR 15% Moderate  Disposition Home  DME None  Transportation Medicaid Cab  Follow Up PCP, Specialist  *LARA KAY DC*    CM placed dialysis referral to [facility] via careport. Patient planned for weekend DC if dialysis chair can be confirmed. CM to monitor. Transition of Care Plan:     The Plan for Transition of Care is related to the following treatment goals: Outpatient Dialysis    The Patient and/or patient representative  was provided with a choice of provider and agrees  with the discharge plan. Yes [x] No []    A Freedom of choice list was provided with basic dialogue that supports the patient's individualized plan of care/goals and shares the quality data associated with the providers. Yes [x] No []    Guillaume Carson, MS    2:55 CM received confirmation from [address] that the patient has been clinically accepted and financially cleared to start dialysis on 09/06/2022 at 11:00 AM and ongoing thereafter for 11:15 AM TTS. Per nephrology, patient will not be a weekend DC. Assigned [facility] coordinator is Judge Trinidad and she can be reached at [phone], ext [phone]. Tracking# for referral is [id number]. CM spoke with Judge Trinidad and advised DC planned for next week but not sure when. CM placed call to [address] transportation [phone] to arrange trip for dialysis on 9/6/22. Patient confirmed for  at 10:00 am/confirmation# [id number]. Should patient not DC on Monday 9/5/22, follow up will need to be made with [facility] and [facility] transportation. [facility] welcome letter provided to patient. CM to monitor.      Guillaume Carson, MS

## 2022-09-02 NOTE — PROGRESS NOTES
[facility] Adult  Hospitalist Group                                                                                          Hospitalist Progress Note  Elvie Dancer, MD  Answering service: [phone] OR [phone] from in house phone        Date of Service:  2022  NAME:  Alejandra Pendleton  :  1965  MRN:  [id number]      Admission Summary:   Alejandra Pendleton is a 65 y.o. male with past medical history of CKD, type 2 diabetes mellitus, hypertension, gout, heroin/ cocaine/ Fentanyl abuse, tobacco abuse presented as a direct admission/ transfer from [facility] ED to [facility]) with chief complaints of  lower back, right hip, bilateral knee pain and swelling. Patient notes onset of symptoms starting about 1 week ago, constant, moderate severity, worsening, without specific aggravating/ alleviating factors, with associated swelling in both legs. On arrival in the ED, initial recorded vital signs were T 98.1 F, /107m HR 91, RR 13, O2sat 95% on room air. Abnormal labs included BUN 78, creatinine 9.08, K 6.1, CO2 20, , , CRP 9.23, proBNP > 35,000. CT abdomen/pelvis without IV contrast showed mild bilateral perinephric fat stranding suggesting CKD with no acute process noted. [facility]. Interval history / Subjective:   States his knees are only thing bothering him. Some mild pain at bx site on his back.       Assessment & Plan:     #ESRD (end stage renal disease)   Rapidly progressive renal failure, possible COVAN v diabetic nephropathy per nephrology.   - HD started , biopsy   Plan:   - HD per nephrology   - F/u ipTH, vit D, Hep C  - Continue renvela  -Appreciated nephrology recommendations     #Bilateral lower extremity edema   #HFrEF, EF 25-50%  - NYHA class II   -elevated proBNP 35,000 (albeit without overt pulmonary edema noted on chest xray)  -venous duplex BLE negative for DVT  Plan:   - Consult cardiology, appreciate recommendations   - On metop, hydral, ISDN  - Holding ARB/ACE/aldactone due to renal failure  - place on strict Is and Os and daily weights  - keep BLE elevated at rest  - F/u ECHO    #Hepatitis C   - Hep C positive, with Quant 7.2 million   Plan:   - Consult hepatology     #Arthralgia   #History of gout  -prior h/o gout but uric acid level still within normal limits  -CRP 9.23; may suggest acute inflammatory/ polyarthritis  - L knee with worsening medial effusion, and pt states pain has been worsening over past week   - Some warmth on exam compared to distal aspect of leg  - Given worsening pain, effusion and elevated CRP will consult ortho for further evaluation   - US left knee to eval for presence of effusion     #Elevated CK level  -; possibly secondary to acute renal failure   - Improved to 526  - monitor    #Secondary Hyperparathyroidism  - Likely 2/2 ESRD  - Plan for PCP, nephro f/u      #Acute right hip pain   -order Tylenol for mild/ moderate pain     #History of Heroin/ cocaine/ Fentanyl abuse  -Negative UDS     #Tobacco abuse  -encourage smoking cessation     #Normocytic, normochromic anemia  -Hgb 9.6     #Type 2 diabetes mellitus: Blood sugar is stable. Hemoglobin A1c is 6    #Acute hyperkalemia,   -- Low K diet, HD to manag     VTE prophylaxis  -SCDs to BLEs     ADVANCED DIRECTIVE/ CODE STATUS:  FULL CODE as per discussion with patient.     Code status: FULL  Prophylaxis: SCDs  Care Plan discussed with: patient, nursing  Anticipated Disposition: [place] [facility]  Date Reviewed: 7/12/2022            Codes Class Noted POA    Chronic combined systolic and diastolic CHF (congestive heart failure) (HCC) (Chronic) ICD-10-CM: I50.42  ICD-9-CM: 428.42, 428.0  9/2/2022 Unknown        Acute hyperkalemia ICD-10-CM: E87.5  ICD-9-CM: 276.7  8/31/2022 Unknown        Arthralgia ICD-10-CM: M25.50  ICD-9-CM: 719.40  8/31/2022 Unknown        ESRD (end stage renal disease) ([facility] [address] ICD-10-CM: N18.6  ICD-9-CM: 585.6  8/31/2022 Unknown        Bilateral lower extremity edema ICD-10-CM: R60.0  ICD-9-CM: 782.3  8/31/2022 Unknown        Acute right hip pain ICD-10-CM: M25.551  ICD-9-CM: 719.45  8/31/2022 Unknown             Review of Systems:   A comprehensive review of systems was negative except for that written in the HPI. Vital Signs:    Last 24hrs VS reviewed since prior progress note. Most recent are:  Visit Vitals  BP (!) 137/91 (BP 1 Location: Right arm, BP Patient Position: At rest;Semi fowlers)   Pulse 79   Temp 98 °F (36.7 °C)   Resp 16   Wt 96.7 kg (213 lb 3 oz)   SpO2 95%   BMI 29.73 kg/m²         Intake/Output Summary (Last 24 hours) at 9/2/2022 0854  Last data filed at 9/1/2022 2027  Gross per 24 hour   Intake --   Output 1200 ml   Net -1200 ml        Physical Examination:     I had a face to face encounter with this patient and independently examined them on 9/2/2022 as outlined below:          Constitutional:  No acute distress, cooperative, pleasant    ENT:  Oral mucosa moist, oropharynx benign. Resp:  CTA bilaterally. No wheezing/rhonchi/rales. No accessory muscle use. CV:  Regular rhythm, normal rate, no murmurs, gallops, rubs    GI:  Soft, non distended, non tender. normoactive bowel sounds, no hepatosplenomegaly     Musculoskeletal:  2+ pulses throughout. L knee with medial effusion, mild warmth compared to distal leg, no erythema    Neurologic:  Moves all extremities.   AAOx3, CN II-XII reviewed            Data Review:    Review and/or order of clinical lab test      Labs:     Recent Labs     09/01/22  0445 08/31/22  0638   WBC 4.6 6.0   HGB 9.4* 9.6*   HCT 29.1* 30.9*    179     Recent Labs     09/02/22  0150 09/01/22  0445 08/31/22  0505 08/30/22  1318   * 137 138 136   K 5.2* 4.5 5.6* 6.1*    105 109* 102   CO2 25 28 17* 20*   BUN 38* 57* 83* 78*   CREA 5.62* 6.67* 9.03* 9.08*   * 106* 96 127*   CA 7.3*  7.5* 7.2* 7. 1* 7.3*   MG  --  1.8 1.7  --    PHOS 5.3*  5.1*  --  7.8*  --    URICA  --   --   --  7.0     Recent Labs     09/02/22  0150 09/01/22  0445 08/31/22  0505 08/30/22  1318   ALT  --  17 18 16   AP  --  157* 151* 175*   TBILI  --  0.3 0.5 0.7   TP  --  8.0 7.9 8.5*   ALB 2.5* 2.6* 2.5* 3.0*   GLOB  --  5.4* 5.4* 5.5*     Recent Labs     08/31/22  1103   INR 1.1   PTP 11.4*   APTT 26.0      Recent Labs     08/31/22  1103   TIBC 292   PSAT 17*   FERR 306      No results found for: FOL, RBCF   No results for input(s): PH, PCO2, PO2 in the last 72 hours.   Recent Labs     09/02/22  0745 08/30/22  1318   * 998*     No results found for: CHOL, CHOLX, CHLST, CHOLV, HDL, HDLP, LDL, LDLC, DLDLP, TGLX, TRIGL, TRIGP, CHHD, CHHDX  Lab Results   Component Value Date/Time    Glucose (POC) 101 09/02/2022 08:23 AM    Glucose (POC) 142 (H) 09/01/2022 09:05 PM    Glucose (POC) 121 (H) 09/01/2022 04:05 PM    Glucose (POC) 77 09/01/2022 08:29 AM    Glucose (POC) 121 (H) 08/31/2022 09:18 PM     Lab Results   Component Value Date/Time    Color YELLOW/STRAW 08/30/2022 01:48 PM    Appearance CLEAR 08/30/2022 01:48 PM    Specific gravity 1.015 08/30/2022 01:48 PM    Specific gravity 1.030 07/01/2015 01:41 PM    pH (UA) 7.5 08/30/2022 01:48 PM    Protein 300 (A) 08/30/2022 01:48 PM    Glucose Negative 08/30/2022 01:48 PM    Ketone Negative 08/30/2022 01:48 PM    Bilirubin Negative 08/30/2022 01:48 PM    Urobilinogen 1.0 08/30/2022 01:48 PM    Nitrites Negative 08/30/2022 01:48 PM    Leukocyte Esterase Negative 08/30/2022 01:48 PM    Epithelial cells FEW 08/30/2022 01:48 PM    Bacteria Negative 08/30/2022 01:48 PM    WBC 0-4 08/30/2022 01:48 PM    RBC 0-5 08/30/2022 01:48 PM         Medications Reviewed:     Current Facility-Administered Medications   Medication Dose Route Frequency    0.9% sodium chloride infusion  25 mL/hr IntraVENous RAD CONTINUOUS    isosorbide dinitrate (ISORDIL) tablet 20 mg  20 mg Oral BID    hydrALAZINE (APRESOLINE) tablet 25 mg  25 mg Oral BID    sodium chloride (NS) flush 5-40 mL  5-40 mL IntraVENous Q8H    sodium chloride (NS) flush 5-40 mL  5-40 mL IntraVENous PRN    glucose chewable tablet 16 g  4 Tablet Oral PRN    glucagon (GLUCAGEN) injection 1 mg  1 mg IntraMUSCular PRN    dextrose 10 % infusion 0-250 mL  0-250 mL IntraVENous PRN    insulin lispro (HUMALOG) injection   SubCUTAneous AC&HS    amLODIPine (NORVASC) tablet 5 mg  5 mg Oral DAILY    folic acid (FOLVITE) tablet 1 mg  1 mg Oral DAILY    gabapentin (NEURONTIN) capsule 300 mg  300 mg Oral TID    metoprolol succinate (TOPROL-XL) XL tablet 100 mg  100 mg Oral DAILY    thiamine HCL (B-1) tablet 100 mg  100 mg Oral DAILY    traZODone (DESYREL) tablet 50 mg  50 mg Oral QHS PRN    acetaminophen (TYLENOL) tablet 650 mg  650 mg Oral Q6H PRN    sevelamer carbonate (RENVELA) oral powder 0.8 g  0.8 g Oral TID WITH MEALS    ferrous sulfate tablet 325 mg  1 Tablet Oral BID WITH MEALS    iron sucrose (VENOFER) injection 100 mg  100 mg IntraVENous DAILY     ______________________________________________________________________  EXPECTED LENGTH OF STAY: - - -  ACTUAL LENGTH OF STAY:          3                 Rosey Munoz MD

## 2022-09-02 NOTE — PROGRESS NOTES
Hospital follow-up PCP transitional care appointment has been scheduled with Dr. Jenna Dale for Tuesday, September 13th, 2022  at 1:00p.mWilly Mccrary Pending patient discharge.   Tyrone Alvarado, Care Management

## 2022-09-03 LAB
ALBUMIN SERPL-MCNC: 2.5 G/DL (ref 3.5–5)
ANION GAP SERPL CALC-SCNC: 9 MMOL/L (ref 5–15)
BASOPHILS # BLD: 0 K/UL (ref 0–0.1)
BASOPHILS NFR BLD: 0 % (ref 0–1)
BUN SERPL-MCNC: 53 MG/DL (ref 6–20)
BUN/CREAT SERPL: 8 (ref 12–20)
CALCIUM SERPL-MCNC: 7.8 MG/DL (ref 8.5–10.1)
CHLORIDE SERPL-SCNC: 104 MMOL/L (ref 97–108)
CO2 SERPL-SCNC: 23 MMOL/L (ref 21–32)
CREAT SERPL-MCNC: 6.84 MG/DL (ref 0.7–1.3)
DIFFERENTIAL METHOD BLD: ABNORMAL
ECHO AV AREA PEAK VELOCITY: 3.4 CM2
ECHO AV AREA/BSA PEAK VELOCITY: 1.6 CM2/M2
ECHO AV PEAK GRADIENT: 3 MMHG
ECHO AV PEAK VELOCITY: 0.9 M/S
ECHO AV VELOCITY RATIO: 0.89
ECHO LA DIAMETER INDEX: 1.61 CM/M2
ECHO LA DIAMETER: 3.5 CM
ECHO LA VOL 2C: 128 ML (ref 18–58)
ECHO LA VOL 4C: 102 ML (ref 18–58)
ECHO LA VOLUME AREA LENGTH: 124 ML
ECHO LA VOLUME INDEX A2C: 59 ML/M2 (ref 16–34)
ECHO LA VOLUME INDEX A4C: 47 ML/M2 (ref 16–34)
ECHO LA VOLUME INDEX AREA LENGTH: 57 ML/M2 (ref 16–34)
ECHO LV E' LATERAL VELOCITY: 4 CM/S
ECHO LV E' SEPTAL VELOCITY: 4 CM/S
ECHO LV FRACTIONAL SHORTENING: 14 % (ref 28–44)
ECHO LV INTERNAL DIMENSION DIASTOLE INDEX: 2.3 CM/M2
ECHO LV INTERNAL DIMENSION DIASTOLIC: 5 CM (ref 4.2–5.9)
ECHO LV INTERNAL DIMENSION SYSTOLIC INDEX: 1.98 CM/M2
ECHO LV INTERNAL DIMENSION SYSTOLIC: 4.3 CM
ECHO LV IVSD: 1.6 CM (ref 0.6–1)
ECHO LV MASS 2D: 322.6 G (ref 88–224)
ECHO LV MASS INDEX 2D: 148.7 G/M2 (ref 49–115)
ECHO LV POSTERIOR WALL DIASTOLIC: 1.4 CM (ref 0.6–1)
ECHO LV RELATIVE WALL THICKNESS RATIO: 0.56
ECHO LVOT AREA: 3.8 CM2
ECHO LVOT DIAM: 2.2 CM
ECHO LVOT PEAK GRADIENT: 3 MMHG
ECHO LVOT PEAK VELOCITY: 0.8 M/S
ECHO MV A VELOCITY: 0.81 M/S
ECHO MV AREA PHT: 3.6 CM2
ECHO MV E DECELERATION TIME (DT): 210.1 MS
ECHO MV E VELOCITY: 0.58 M/S
ECHO MV E/A RATIO: 0.72
ECHO MV E/E' LATERAL: 14.5
ECHO MV E/E' RATIO (AVERAGED): 14.5
ECHO MV E/E' SEPTAL: 14.5
ECHO MV PRESSURE HALF TIME (PHT): 60.9 MS
ECHO RV FREE WALL PEAK S': 9 CM/S
ECHO RV TAPSE: 2.1 CM (ref 1.7–?)
ECHO TV REGURGITANT MAX VELOCITY: 2.07 M/S
ECHO TV REGURGITANT PEAK GRADIENT: 17 MMHG
EOSINOPHIL # BLD: 0.2 K/UL (ref 0–0.4)
EOSINOPHIL NFR BLD: 4 % (ref 0–7)
ERYTHROCYTE [DISTWIDTH] IN BLOOD BY AUTOMATED COUNT: 13.1 % (ref 11.5–14.5)
GLUCOSE BLD STRIP.AUTO-MCNC: 105 MG/DL (ref 65–117)
GLUCOSE BLD STRIP.AUTO-MCNC: 113 MG/DL (ref 65–117)
GLUCOSE BLD STRIP.AUTO-MCNC: 129 MG/DL (ref 65–117)
GLUCOSE SERPL-MCNC: 109 MG/DL (ref 65–100)
HCT VFR BLD AUTO: 29.3 % (ref 36.6–50.3)
HGB BLD-MCNC: 9.4 G/DL (ref 12.1–17)
IMM GRANULOCYTES # BLD AUTO: 0 K/UL (ref 0–0.04)
IMM GRANULOCYTES NFR BLD AUTO: 1 % (ref 0–0.5)
LYMPHOCYTES # BLD: 1.9 K/UL (ref 0.8–3.5)
LYMPHOCYTES NFR BLD: 36 % (ref 12–49)
MCH RBC QN AUTO: 30.9 PG (ref 26–34)
MCHC RBC AUTO-ENTMCNC: 32.1 G/DL (ref 30–36.5)
MCV RBC AUTO: 96.4 FL (ref 80–99)
MONOCYTES # BLD: 0.6 K/UL (ref 0–1)
MONOCYTES NFR BLD: 11 % (ref 5–13)
NEUTS SEG # BLD: 2.5 K/UL (ref 1.8–8)
NEUTS SEG NFR BLD: 48 % (ref 32–75)
NRBC # BLD: 0 K/UL (ref 0–0.01)
NRBC BLD-RTO: 0 PER 100 WBC
PHOSPHATE SERPL-MCNC: 6.3 MG/DL (ref 2.6–4.7)
PLATELET # BLD AUTO: 223 K/UL (ref 150–400)
PMV BLD AUTO: 9.4 FL (ref 8.9–12.9)
POTASSIUM SERPL-SCNC: 5.2 MMOL/L (ref 3.5–5.1)
RBC # BLD AUTO: 3.04 M/UL (ref 4.1–5.7)
SERVICE CMNT-IMP: ABNORMAL
SERVICE CMNT-IMP: NORMAL
SERVICE CMNT-IMP: NORMAL
SODIUM SERPL-SCNC: 136 MMOL/L (ref 136–145)
WBC # BLD AUTO: 5.1 K/UL (ref 4.1–11.1)

## 2022-09-03 PROCEDURE — 74011250637 HC RX REV CODE- 250/637: Performed by: INTERNAL MEDICINE

## 2022-09-03 PROCEDURE — 65270000046 HC RM TELEMETRY

## 2022-09-03 PROCEDURE — 80069 RENAL FUNCTION PANEL: CPT

## 2022-09-03 PROCEDURE — 74011000250 HC RX REV CODE- 250: Performed by: FAMILY MEDICINE

## 2022-09-03 PROCEDURE — 74011250636 HC RX REV CODE- 250/636: Performed by: INTERNAL MEDICINE

## 2022-09-03 PROCEDURE — 36415 COLL VENOUS BLD VENIPUNCTURE: CPT

## 2022-09-03 PROCEDURE — 90935 HEMODIALYSIS ONE EVALUATION: CPT

## 2022-09-03 PROCEDURE — 82962 GLUCOSE BLOOD TEST: CPT

## 2022-09-03 PROCEDURE — 74011250637 HC RX REV CODE- 250/637: Performed by: FAMILY MEDICINE

## 2022-09-03 PROCEDURE — 85025 COMPLETE CBC W/AUTO DIFF WBC: CPT

## 2022-09-03 PROCEDURE — 99231 SBSQ HOSP IP/OBS SF/LOW 25: CPT | Performed by: ORTHOPAEDIC SURGERY

## 2022-09-03 PROCEDURE — 97116 GAIT TRAINING THERAPY: CPT

## 2022-09-03 PROCEDURE — 97161 PT EVAL LOW COMPLEX 20 MIN: CPT

## 2022-09-03 RX ORDER — HEPARIN SODIUM 1000 [USP'U]/ML
2600 INJECTION, SOLUTION INTRAVENOUS; SUBCUTANEOUS AS NEEDED
Status: DISCONTINUED | OUTPATIENT
Start: 2022-09-03 | End: 2022-09-07 | Stop reason: HOSPADM

## 2022-09-03 RX ADMIN — AMLODIPINE BESYLATE 5 MG: 5 TABLET ORAL at 13:56

## 2022-09-03 RX ADMIN — GABAPENTIN 300 MG: 300 CAPSULE ORAL at 13:56

## 2022-09-03 RX ADMIN — SEVELAMER CARBONATE FOR ORAL SUSPENSION 0.8 G: 800 POWDER, FOR SUSPENSION ORAL at 17:19

## 2022-09-03 RX ADMIN — FERROUS SULFATE TAB 325 MG (65 MG ELEMENTAL FE) 325 MG: 325 (65 FE) TAB at 17:18

## 2022-09-03 RX ADMIN — DICLOFENAC SODIUM 2 G: 10 GEL TOPICAL at 13:59

## 2022-09-03 RX ADMIN — SEVELAMER CARBONATE FOR ORAL SUSPENSION 0.8 G: 800 POWDER, FOR SUSPENSION ORAL at 13:56

## 2022-09-03 RX ADMIN — Medication 100 MG: at 13:56

## 2022-09-03 RX ADMIN — CALCITRIOL CAPSULES 0.25 MCG 0.25 MCG: 0.25 CAPSULE ORAL at 13:56

## 2022-09-03 RX ADMIN — GABAPENTIN 300 MG: 300 CAPSULE ORAL at 21:21

## 2022-09-03 RX ADMIN — SODIUM CHLORIDE, PRESERVATIVE FREE 10 ML: 5 INJECTION INTRAVENOUS at 13:58

## 2022-09-03 RX ADMIN — DICLOFENAC SODIUM 2 G: 10 GEL TOPICAL at 17:19

## 2022-09-03 RX ADMIN — FOLIC ACID 1 MG: 1 TABLET ORAL at 13:56

## 2022-09-03 RX ADMIN — HYDRALAZINE HYDROCHLORIDE 25 MG: 25 TABLET, FILM COATED ORAL at 17:18

## 2022-09-03 RX ADMIN — METOPROLOL SUCCINATE 100 MG: 50 TABLET, EXTENDED RELEASE ORAL at 13:56

## 2022-09-03 RX ADMIN — SODIUM CHLORIDE, PRESERVATIVE FREE 10 ML: 5 INJECTION INTRAVENOUS at 06:48

## 2022-09-03 RX ADMIN — ISOSORBIDE DINITRATE 20 MG: 20 TABLET ORAL at 17:18

## 2022-09-03 RX ADMIN — HEPARIN SODIUM 2600 UNITS: 1000 INJECTION INTRAVENOUS; SUBCUTANEOUS at 09:12

## 2022-09-03 NOTE — PROGRESS NOTES
Renal Progress Note    NAME:  Parris Rodriguez   :   1965   MRN:   [id number]     Date/Time:  9/3/2022 1:05 PM  Subjective:        seen on HD, HD RN Praneeth Madrigal at bedside  Had renal biopsy this am, patient has generalized bodyache. No N/V/SOB     c/o left knee pain and LBP. No SOB, renal Bx result is pending.  Plan for HD in am. PTH 1300  9/3 seen on HD, no complaint, BP stable      Medications reviewed:  Current Facility-Administered Medications   Medication Dose Route Frequency    heparin (porcine) 1,000 unit/mL injection 2,600 Units  2,600 Units IntraCATHeter PRN    diclofenac (VOLTAREN) 1 % topical gel 2 g  2 g Topical BID    calcitRIOL (ROCALTROL) capsule 0.25 mcg  0.25 mcg Oral DAILY    0.9% sodium chloride infusion  25 mL/hr IntraVENous RAD CONTINUOUS    isosorbide dinitrate (ISORDIL) tablet 20 mg  20 mg Oral BID    hydrALAZINE (APRESOLINE) tablet 25 mg  25 mg Oral BID    sodium chloride (NS) flush 5-40 mL  5-40 mL IntraVENous Q8H    sodium chloride (NS) flush 5-40 mL  5-40 mL IntraVENous PRN    glucose chewable tablet 16 g  4 Tablet Oral PRN    glucagon (GLUCAGEN) injection 1 mg  1 mg IntraMUSCular PRN    dextrose 10 % infusion 0-250 mL  0-250 mL IntraVENous PRN    insulin lispro (HUMALOG) injection   SubCUTAneous AC&HS    amLODIPine (NORVASC) tablet 5 mg  5 mg Oral DAILY    folic acid (FOLVITE) tablet 1 mg  1 mg Oral DAILY    gabapentin (NEURONTIN) capsule 300 mg  300 mg Oral TID    metoprolol succinate (TOPROL-XL) XL tablet 100 mg  100 mg Oral DAILY    thiamine HCL (B-1) tablet 100 mg  100 mg Oral DAILY    traZODone (DESYREL) tablet 50 mg  50 mg Oral QHS PRN    acetaminophen (TYLENOL) tablet 650 mg  650 mg Oral Q6H PRN    sevelamer carbonate (RENVELA) oral powder 0.8 g  0.8 g Oral TID WITH MEALS    ferrous sulfate tablet 325 mg  1 Tablet Oral BID WITH MEALS    iron sucrose (VENOFER) injection 100 mg  100 mg IntraVENous DAILY        Objective:   Vitals:  Visit Vitals  /88   Pulse 77 Comment: resting   Temp 97.6 °F (36.4 °C)   Resp 16   Ht 5' 11\" (1.803 m)   Wt 96.2 kg (212 lb)   SpO2 98%   BMI 29.57 kg/m²     Temp (24hrs), Av °F (36.7 °C), Min:97.6 °F (36.4 °C), Max:98.3 °F (36.8 °C)    O2 Flow Rate (L/min): 3 l/min O2 Device: None (Room air)    Last 24hr Input/Output:    Intake/Output Summary (Last 24 hours) at 9/3/2022 1037  Last data filed at 9/3/2022 0430  Gross per 24 hour   Intake --   Output 600 ml   Net -600 ml          Physical Exam:  General:Alert and oriented x3, , No distress,  HD in progress  HEENT: Eyes are PERRL. Conjunctiva without pallor ,erythema. The sclerae without icterus.  .   Neck:Supple, RT IJ temp HD catheter  Lungs : decreased breathing sounds basilar   CVS: RRR, S1 S2 normal, No rub, SM2  Abdomen: Soft, Non tender, ND,  bowel sounds present  Extremities: no edema  Skin: No rash   Neurologic: non focal, AAO x 3  Psych: normal affect       [] Telemetry Reviewed     [] NSR [] PAC/PVCs   [] Afib  [] Paced   [] NSVT   [] Felix [] NGT  [] Intubated on vent    Lab Data Reviewed:    Recent Results (from the past 24 hour(s))   GLUCOSE, POC    Collection Time: 22 12:02 PM   Result Value Ref Range    Glucose (POC) 111 65 - 117 mg/dL    Performed by Emi Meth    ECHO ADULT COMPLETE    Collection Time: 22  3:01 PM   Result Value Ref Range    IVSd 1.6 (A) 0.6 - 1.0 cm    LVIDd 5.0 4.2 - 5.9 cm    LVIDs 4.3 cm    LVOT Diameter 2.2 cm    LVPWd 1.4 (A) 0.6 - 1.0 cm    LVOT Peak Gradient 3 mmHg    LVOT Peak Velocity 0.8 m/s    RV Free Wall Peak S' 9 cm/s    LA Diameter 3.5 cm    LA Volume A/L 124 mL    LA Volume 2C 128 (A) 18 - 58 mL    LA Volume 4C 102 (A) 18 - 58 mL    AV Area by Peak Velocity 3.4 cm2    AV Peak Gradient 3 mmHg    AV Peak Velocity 0.9 m/s    MV A Velocity 0.81 m/s    MV E Wave Deceleration Time 210.1 ms    MV E Velocity 0.58 m/s    LV E' Lateral Velocity 4 cm/s    LV E' Septal Velocity 4 cm/s    MV PHT 60.9 ms    MV Area by PHT 3.6 cm2    TAPSE 2.1 1.7 cm    TR Peak Gradient 17 mmHg    TR Max Velocity 2.07 m/s    Fractional Shortening 2D 14 28 - 44 %    LVIDd Index 2.30 cm/m2    LVIDs Index 1.98 cm/m2    LV RWT Ratio 0.56     LV Mass 2D 322.6 (A) 88 - 224 g    LV Mass 2D Index 148.7 (A) 49 - 115 g/m2    MV E/A 0.72     E/E' Ratio (Averaged) 14.50     E/E' Lateral 14.50     E/E' Septal 14.50     LA Volume Index A/L 57 16 - 34 mL/m2    LVOT Area 3.8 cm2    LA Volume Index 2C 59 (A) 16 - 34 mL/m2    LA Volume Index 4C 47 (A) 16 - 34 mL/m2    LA Size Index 1.61 cm/m2    AV Velocity Ratio 0.89     GLENN/BSA Peak Velocity 1.6 cm2/m2   GLUCOSE, POC    Collection Time: 09/02/22  5:12 PM   Result Value Ref Range    Glucose (POC) 119 (H) 65 - 117 mg/dL    Performed by Bernice Sever    GLUCOSE, POC    Collection Time: 09/02/22  9:07 PM   Result Value Ref Range    Glucose (POC) 103 65 - 117 mg/dL    Performed by Rosita Rodrigues (PCT)    RENAL FUNCTION PANEL    Collection Time: 09/03/22  4:54 AM   Result Value Ref Range    Sodium 136 136 - 145 mmol/L    Potassium 5.2 (H) 3.5 - 5.1 mmol/L    Chloride 104 97 - 108 mmol/L    CO2 23 21 - 32 mmol/L    Anion gap 9 5 - 15 mmol/L    Glucose 109 (H) 65 - 100 mg/dL    BUN 53 (H) 6 - 20 MG/DL    Creatinine 6.84 (H) 0.70 - 1.30 MG/DL    BUN/Creatinine ratio 8 (L) 12 - 20      GFR est AA 10 (L) >60 ml/min/1.73m2    GFR est non-AA 8 (L) >60 ml/min/1.73m2    Calcium 7.8 (L) 8.5 - 10.1 MG/DL    Phosphorus 6.3 (H) 2.6 - 4.7 MG/DL    Albumin 2.5 (L) 3.5 - 5.0 g/dL   CBC WITH AUTOMATED DIFF    Collection Time: 09/03/22  4:54 AM   Result Value Ref Range    WBC 5.1 4.1 - 11.1 K/uL    RBC 3.04 (L) 4.10 - 5.70 M/uL    HGB 9.4 (L) 12.1 - 17.0 g/dL    HCT 29.3 (L) 36.6 - 50.3 %    MCV 96.4 80.0 - 99.0 FL    MCH 30.9 26.0 - 34.0 PG    MCHC 32.1 30.0 - 36.5 g/dL    RDW 13.1 11.5 - 14.5 %    PLATELET 413 636 - 213 K/uL    MPV 9.4 8.9 - 12.9 FL    NRBC 0.0 0  WBC    ABSOLUTE NRBC 0.00 0.00 - 0.01 K/uL    NEUTROPHILS 48 32 - 75 %    LYMPHOCYTES 36 12 - 49 %    MONOCYTES 11 5 - 13 %    EOSINOPHILS 4 0 - 7 %    BASOPHILS 0 0 - 1 %    IMMATURE GRANULOCYTES 1 (H) 0.0 - 0.5 %    ABS. NEUTROPHILS 2.5 1.8 - 8.0 K/UL    ABS. LYMPHOCYTES 1.9 0.8 - 3.5 K/UL    ABS. MONOCYTES 0.6 0.0 - 1.0 K/UL    ABS. EOSINOPHILS 0.2 0.0 - 0.4 K/UL    ABS. BASOPHILS 0.0 0.0 - 0.1 K/UL    ABS. IMM.  GRANS. 0.0 0.00 - 0.04 K/UL    DF AUTOMATED           Assessment/Plan:     Acute kidney injury on CKD3  Hyperkalemia  Metabolic acidosis  Hyperphosphatemia  Nephrotic syndrome  Rapidly progressive with nephrotic range proteinuria-serology negative except positive Hep C Ab in Jan 2022-urine no active sediment--UPCR 5.6  -might be related to Hep-C , other Ddx:  COVAN vs diabetic nephropathy, FSGS , MM, Membranous GN , IgA nephropathy, Heroin/coccaine nephropathy   Spoke to patient, he agrees with both renal biopsy and starting HD.   -s/p kidney biopsy 9/1and HD catheter placement 8/31  -started  HD 8/31, second HD t9/1, HD today 9/3  -next HD monday  Renal diet-low potassium  Avoid nephrotoxins  Dose meds per GFR  BP control   HD catheter needs to be changed to tunnelled prior to discharge    Secondary hyperparathyroidism  Started renvela  Start calcitriol  F/up vit D level     Anemia  Iron sat 17%, ferritin 306  Start IV Iron     HTN-metoprolol, Amlodipine  Combined systolic and diastolic HF, cardiology recommendation appreciated  DM2-A1C 6  Hep C , recommend to consult hepatology  Gout-uric acid 7-added diclofenac gel for rt knee pain  ____________________________________________    Total time spent with patient:  [] 15   [] 25   [] 35   []  __ minutes    [] Critical Care Provided    Care Plan discussed with:    [] Patient   [] Family    [] Care Manager   [] Consultant/Specialist :      []   >50% of visit spent in counseling and coordination of care   (Discussed [] CODE status,  [] Care Plan, [] D/C Planning)    ___________________________________________________    Attending Physician: MD Khoi Goodrich

## 2022-09-03 NOTE — CONSULTS
Consult Date: 9/3/2022    Consults orthopedic consult for bilateral knee pain    Subjective   orthopedic services consulted for bilateral knee pain and this 20-year-old male. He states his knees have been recently bothering him as well as his low back. He states they were very swollen and uncomfortable to him over the course of the past week. He states his pain over the past few days is much improved and his swelling has decreased. He notes an intermittent history of gout. He denies feeling unwell. He states he is able to walk with a little bit of soreness in both knees. Denies any numbness or tingling down either extremity.     Past Medical History:   Diagnosis Date    Diabetes ([address]     Gout     Hypertension       Past Surgical History:   Procedure Laterality Date    HX ORTHOPAEDIC  rt hip replacement    HX ORTHOPAEDIC  rt thigh woo     Family History   Problem Relation Age of Onset    Diabetes Other     Hypertension Other       Social History     Tobacco Use    Smoking status: Every Day     Packs/day: 1.00     Types: Cigarettes    Smokeless tobacco: Never   Substance Use Topics    Alcohol use: Not Currently       Current Facility-Administered Medications   Medication Dose Route Frequency Provider Last Rate Last Admin    heparin (porcine) 1,000 unit/mL injection 2,600 Units  2,600 Units IntraCATHeter PRN Levi Chatman MD   2,600 Units at 09/03/22 0912    diclofenac (VOLTAREN) 1 % topical gel 2 g  2 g Topical BID Genoveva Peck MD   2 g at 09/02/22 1713    calcitRIOL (ROCALTROL) capsule 0.25 mcg  0.25 mcg Oral DAILY Genoveva Peck MD   0.25 mcg at 09/02/22 1236    0.9% sodium chloride infusion  25 mL/hr IntraVENous RAD CONTINUOUS Dhaval Hartley MD   Held at 09/01/22 1720    isosorbide dinitrate (ISORDIL) tablet 20 mg  20 mg Oral BID Kandace Rea MD   20 mg at 09/02/22 1713    hydrALAZINE (APRESOLINE) tablet 25 mg  25 mg Oral BID Kandace Rea MD   25 mg at 09/02/22 1713    sodium chloride (NS) flush 5-40 mL  5-40 mL IntraVENous Q8H IrvingHill langford MD   10 mL at 09/03/22 0648    sodium chloride (NS) flush 5-40 mL  5-40 mL IntraVENous PRN Monica Wolfe MD        glucose chewable tablet 16 g  4 Tablet Oral PRN Cassie Villatoro MD        glucagon (GLUCAGEN) injection 1 mg  1 mg IntraMUSCular PRN Cassie Villatoro MD        dextrose 10 % infusion 0-250 mL  0-250 mL IntraVENous PRN Cassie Villatoro MD        insulin lispro (HUMALOG) injection   SubCUTAneous AC&HS Cassie Villatoro MD        amLODIPine (NORVASC) tablet 5 mg  5 mg Oral DAILY Kofi Baxter MD   5 mg at 78/43/15 5360    folic acid (FOLVITE) tablet 1 mg  1 mg Oral DAILY Kofi Baxter MD   1 mg at 09/02/22 8892    gabapentin (NEURONTIN) capsule 300 mg  300 mg Oral TID Kofi Baxter MD   300 mg at 09/02/22 2105    metoprolol succinate (TOPROL-XL) XL tablet 100 mg  100 mg Oral DAILY Kofi Baxter MD   100 mg at 09/02/22 5053    thiamine HCL (B-1) tablet 100 mg  100 mg Oral DAILY Kofi Baxter MD   100 mg at 09/02/22 3849    traZODone (DESYREL) tablet 50 mg  50 mg Oral QHS PRN Kofi Baxter MD   50 mg at 09/02/22 2308    acetaminophen (TYLENOL) tablet 650 mg  650 mg Oral Q6H PRN Kofi Baxter MD        sevelamer carbonate (RENVELA) oral powder 0.8 g  0.8 g Oral TID WITH MEALS Nancy Tracey MD   0.8 g at 09/02/22 1713    ferrous sulfate tablet 325 mg  1 Tablet Oral BID WITH MEALS Nancy Tracey MD   325 mg at 09/02/22 1713    iron sucrose (VENOFER) injection 100 mg  100 mg IntraVENous DAILY Rajan Leary MD   100 mg at 09/02/22 0384        Review of Systems: Otherwise negative except for as noted in the HPI.     Objective     Vital signs for last 24 hours:  Visit Vitals  /82   Pulse 81   Temp 97.8 °F (36.6 °C)   Resp 15   Ht 5' 11\" (1.803 m)   Wt 212 lb (96.2 kg)   SpO2 93%   BMI 29.57 kg/m²       Intake/Output this shift:  Current Shift: 09/03 0701 - 09/03 1900  In: -   Out: 1500   Last 3 Shifts: 09/01 1901 - 09/03 0700  In: -   Out: 800 [Urine:800]    Data Review:   Recent Results (from the past 24 hour(s))   GLUCOSE, POC    Collection Time: 09/02/22 12:02 PM   Result Value Ref Range    Glucose (POC) 111 65 - 117 mg/dL    Performed by Bernice Sever    ECHO ADULT COMPLETE    Collection Time: 09/02/22  3:01 PM   Result Value Ref Range    IVSd 1.6 (A) 0.6 - 1.0 cm    LVIDd 5.0 4.2 - 5.9 cm    LVIDs 4.3 cm    LVOT Diameter 2.2 cm    LVPWd 1.4 (A) 0.6 - 1.0 cm    LVOT Peak Gradient 3 mmHg    LVOT Peak Velocity 0.8 m/s    RV Free Wall Peak S' 9 cm/s    LA Diameter 3.5 cm    LA Volume A/L 124 mL    LA Volume 2C 128 (A) 18 - 58 mL    LA Volume 4C 102 (A) 18 - 58 mL    AV Area by Peak Velocity 3.4 cm2    AV Peak Gradient 3 mmHg    AV Peak Velocity 0.9 m/s    MV A Velocity 0.81 m/s    MV E Wave Deceleration Time 210.1 ms    MV E Velocity 0.58 m/s    LV E' Lateral Velocity 4 cm/s    LV E' Septal Velocity 4 cm/s    MV PHT 60.9 ms    MV Area by PHT 3.6 cm2    TAPSE 2.1 1.7 cm    TR Peak Gradient 17 mmHg    TR Max Velocity 2.07 m/s    Fractional Shortening 2D 14 28 - 44 %    LVIDd Index 2.30 cm/m2    LVIDs Index 1.98 cm/m2    LV RWT Ratio 0.56     LV Mass 2D 322.6 (A) 88 - 224 g    LV Mass 2D Index 148.7 (A) 49 - 115 g/m2    MV E/A 0.72     E/E' Ratio (Averaged) 14.50     E/E' Lateral 14.50     E/E' Septal 14.50     LA Volume Index A/L 57 16 - 34 mL/m2    LVOT Area 3.8 cm2    LA Volume Index 2C 59 (A) 16 - 34 mL/m2    LA Volume Index 4C 47 (A) 16 - 34 mL/m2    LA Size Index 1.61 cm/m2    AV Velocity Ratio 0.89     GLENN/BSA Peak Velocity 1.6 cm2/m2   GLUCOSE, POC    Collection Time: 09/02/22  5:12 PM   Result Value Ref Range    Glucose (POC) 119 (H) 65 - 117 mg/dL    Performed by Bernice Sever    GLUCOSE, POC    Collection Time: 09/02/22  9:07 PM   Result Value Ref Range    Glucose (POC) 103 65 - 117 mg/dL    Performed by Rosita Rodrigues (PCT)    RENAL FUNCTION PANEL    Collection Time: 09/03/22  4:54 AM   Result Value Ref Range    Sodium 136 136 - 145 mmol/L    Potassium 5.2 (H) 3.5 - 5.1 mmol/L    Chloride 104 97 - 108 mmol/L    CO2 23 21 - 32 mmol/L    Anion gap 9 5 - 15 mmol/L    Glucose 109 (H) 65 - 100 mg/dL    BUN 53 (H) 6 - 20 MG/DL    Creatinine 6.84 (H) 0.70 - 1.30 MG/DL    BUN/Creatinine ratio 8 (L) 12 - 20      GFR est AA 10 (L) >60 ml/min/1.73m2    GFR est non-AA 8 (L) >60 ml/min/1.73m2    Calcium 7.8 (L) 8.5 - 10.1 MG/DL    Phosphorus 6.3 (H) 2.6 - 4.7 MG/DL    Albumin 2.5 (L) 3.5 - 5.0 g/dL   CBC WITH AUTOMATED DIFF    Collection Time: 09/03/22  4:54 AM   Result Value Ref Range    WBC 5.1 4.1 - 11.1 K/uL    RBC 3.04 (L) 4.10 - 5.70 M/uL    HGB 9.4 (L) 12.1 - 17.0 g/dL    HCT 29.3 (L) 36.6 - 50.3 %    MCV 96.4 80.0 - 99.0 FL    MCH 30.9 26.0 - 34.0 PG    MCHC 32.1 30.0 - 36.5 g/dL    RDW 13.1 11.5 - 14.5 %    PLATELET 210 290 - 088 K/uL    MPV 9.4 8.9 - 12.9 FL    NRBC 0.0 0  WBC    ABSOLUTE NRBC 0.00 0.00 - 0.01 K/uL    NEUTROPHILS 48 32 - 75 %    LYMPHOCYTES 36 12 - 49 %    MONOCYTES 11 5 - 13 %    EOSINOPHILS 4 0 - 7 %    BASOPHILS 0 0 - 1 %    IMMATURE GRANULOCYTES 1 (H) 0.0 - 0.5 %    ABS. NEUTROPHILS 2.5 1.8 - 8.0 K/UL    ABS. LYMPHOCYTES 1.9 0.8 - 3.5 K/UL    ABS. MONOCYTES 0.6 0.0 - 1.0 K/UL    ABS. EOSINOPHILS 0.2 0.0 - 0.4 K/UL    ABS. BASOPHILS 0.0 0.0 - 0.1 K/UL    ABS. IMM. GRANS. 0.0 0.00 - 0.04 K/UL    DF AUTOMATED         Physical Exam patient lying comfortably in his hospital bed. Alert and oriented x3, no acute distress  Musculoskeletal: Bilateral knees with varus alignment. He is able to achieve full extension with very mild discomfort. Patient tolerates flexion to approximately 100 degrees before becoming uncomfortable. Knee is ligamentously stable. Left knee has a small soft tissue prominence to the anterolateral joint line consistent with possible parameniscal cyst.  No significant effusion to either knee. No warmth or erythema.   Calves are soft and nontender    Radiographs: Bilateral knee x-rays were reviewed from 27 August.  Patient has moderate degenerative changes to both knees with some joint space narrowing and osteophytic lipping. Chondrocalcinosis is noted bilaterally    Assessment: Atraumatic bilateral knee pain    -Acute exacerbation of bilateral osteoarthritis    -Possible gout    Plan: No orthopedic intervention is felt necessary at this time. Patient's clinical course is improving. He was encouraged to continue using the topical Voltaren gel to both knees as needed. If patient is still hindered by the knee discomfort a short course of oral steroids may help this if felt to be medically appropriate by the medicine team.      Once patient is more clinically optimized, if knee pain continues we would be happy to see him in an outpatient capacity to discuss further management. Orthopedics will be available as needed moving forward. You for this consultation.

## 2022-09-03 NOTE — PROGRESS NOTES
Patient off the unit during my rounds today. Echo looks essentially unchanged. He is requiring dialysis current and renal biopsy results awaited. No plans for cath until renal situation has declared itself.   Will follow

## 2022-09-03 NOTE — PROGRESS NOTES
[facility]  Hospitalist Group                                                                                          Hospitalist Progress Note  Jesse Meza MD  Answering service: [phone] OR [phone] from in house phone        Date of Service:  9/3/2022  NAME:  Amanda Manriquez  :  1965  MRN:  [id number]      Admission Summary:   Amanda Manriquez is a 65 y.o. male with past medical history of CKD, type 2 diabetes mellitus, hypertension, gout, heroin/ cocaine/ Fentanyl abuse, tobacco abuse presented as a direct admission/ transfer from [facility] ED to [facility]) with chief complaints of  lower back, right hip, bilateral knee pain and swelling. Patient notes onset of symptoms starting about 1 week ago, constant, moderate severity, worsening, without specific aggravating/ alleviating factors, with associated swelling in both legs. On arrival in the ED, initial recorded vital signs were T 98.1 F, /107m HR 91, RR 13, O2sat 95% on room air. Abnormal labs included BUN 78, creatinine 9.08, K 6.1, CO2 20, , , CRP 9.23, proBNP > 35,000. CT abdomen/pelvis without IV contrast showed mild bilateral perinephric fat stranding suggesting CKD with no acute process noted. Patient was given Sallye Light and then transferred to [facility]. Interval history / Subjective:   Patient reports he is hungry today. Knee pain has continued, stable from previous. No new concerns or complaints.       Assessment & Plan:     #ESRD (end stage renal disease)   Rapidly progressive renal failure, possible COVAN v diabetic nephropathy per nephrology.   - HD started , biopsy   Plan:   - HD per nephrology   - F/u ipTH, vit D, Hep C  - Continue renvela  -Appreciated nephrology recommendations     #Bilateral lower extremity edema   #HFrEF, EF 25-50%  - NYHA class II   -elevated proBNP 35,000 (albeit without overt pulmonary edema noted on chest xray)  -venous duplex BLE negative for DVT  Plan:   - Consult cardiology, appreciate recommendations   - On metop, hydral, ISDN  - Holding ARB/ACE/aldactone due to renal failure  - place on strict Is and Os and daily weights  - keep BLE elevated at rest  - F/u ECHO    #Hepatitis C   - Hep C positive, with Quant 7.2 million   Plan:   - Consult hepatology     #Arthralgia   #History of gout  -prior h/o gout but uric acid level still within normal limits  -CRP 9.23; may suggest acute inflammatory/ polyarthritis  - L knee with worsening medial effusion, and pt states pain has been worsening over past week   - US left knee with effusion  - Case discussed with ortho on call who feels that in the absence of signs of infectious arthritis, can be managed conservatively without tap   Plan:   - Ice application prn   - Diclofenac gel   - PT eval     #Elevated CK level  - Initial ; possibly secondary to acute renal failure   - Improved to 526  - monitor    #Secondary Hyperparathyroidism  - Likely 2/2 ESRD  - Plan for PCP, nephro f/u      #Acute right hip pain   -order Tylenol for mild/ moderate pain     #History of Heroin/ cocaine/ Fentanyl abuse  -Negative UDS     #Tobacco abuse  -encourage smoking cessation     #Normocytic, normochromic anemia  -Hgb 9.6     #Type 2 diabetes mellitus: Blood sugar is stable. Hemoglobin A1c is 6    #Acute hyperkalemia,   -- Low K diet, HD to manag     VTE prophylaxis  -SCDs to BLEs     ADVANCED DIRECTIVE/ CODE STATUS:  FULL CODE as per discussion with patient.     Code status: FULL  Prophylaxis: SCDs  Care Plan discussed with: patient, nursing  Anticipated Disposition: [facility]  Date Reviewed: 7/12/2022            Codes Class Noted POA    Chronic combined systolic and diastolic CHF (congestive heart failure) (HCC) (Chronic) ICD-10-CM: I50.42  ICD-9-CM: 428.42, 428.0  9/2/2022 Unknown        Acute hyperkalemia ICD-10-CM: E87.5  ICD-9-CM: 276.7  8/31/2022 Unknown Arthralgia ICD-10-CM: M25.50  ICD-9-CM: 719.40  8/31/2022 Unknown        ESRD (end stage renal disease) ([facility] [address] ICD-10-CM: N18.6  ICD-9-CM: 585.6  8/31/2022 Unknown        Bilateral lower extremity edema ICD-10-CM: R60.0  ICD-9-CM: 782.3  8/31/2022 Unknown        Acute right hip pain ICD-10-CM: M25.551  ICD-9-CM: 719.45  8/31/2022 Unknown             Review of Systems:   A comprehensive review of systems was negative except for that written in the HPI. Vital Signs:    Last 24hrs VS reviewed since prior progress note. Most recent are:  Visit Vitals  /87   Pulse 79   Temp 97.5 °F (36.4 °C)   Resp 14   Ht 5' 11\" (1.803 m)   Wt 96.2 kg (212 lb)   SpO2 93%   BMI 29.57 kg/m²         Intake/Output Summary (Last 24 hours) at 9/3/2022 1738  Last data filed at 9/3/2022 1035  Gross per 24 hour   Intake --   Output 2100 ml   Net -2100 ml        Physical Examination:     I had a face to face encounter with this patient and independently examined them on 9/3/2022 as outlined below:          Constitutional:  No acute distress, cooperative, pleasant    ENT:  Oral mucosa moist, oropharynx benign. Resp:  CTA bilaterally. No wheezing/rhonchi/rales. No accessory muscle use. CV:  Regular rhythm, normal rate, no murmurs, gallops, rubs    GI:  Soft, non distended, non tender. normoactive bowel sounds, no hepatosplenomegaly     Musculoskeletal:  2+ pulses throughout. L knee with medial effusion, no erythema    Neurologic:  Moves all extremities.   AAOx3, CN II-XII reviewed            Data Review:    Review and/or order of clinical lab test      Labs:     Recent Labs     09/03/22 0454 09/01/22  0445   WBC 5.1 4.6   HGB 9.4* 9.4*   HCT 29.3* 29.1*    215     Recent Labs     09/03/22  0454 09/02/22  0150 09/01/22  0445    135* 137   K 5.2* 5.2* 4.5    104 105   CO2 23 25 28   BUN 53* 38* 57*   CREA 6.84* 5.62* 6.67*   * 117* 106*   CA 7.8* 7.3*  7.5* 7.2*   MG  --   --  1.8   PHOS 6.3* 5.3*  5.1* --      Recent Labs     09/03/22  0454 09/02/22  0150 09/01/22  0445   ALT  --   --  17   AP  --   --  157*   TBILI  --   --  0.3   TP  --   --  8.0   ALB 2.5* 2.5* 2.6*   GLOB  --   --  5.4*     No results for input(s): INR, PTP, APTT, INREXT, INREXT in the last 72 hours. No results for input(s): FE, TIBC, PSAT, FERR in the last 72 hours. No results found for: FOL, RBCF   No results for input(s): PH, PCO2, PO2 in the last 72 hours.   Recent Labs     09/02/22  0745   *     No results found for: CHOL, CHOLX, CHLST, CHOLV, HDL, HDLP, LDL, LDLC, DLDLP, TGLX, TRIGL, TRIGP, CHHD, CHHDX  Lab Results   Component Value Date/Time    Glucose (POC) 113 09/03/2022 04:57 PM    Glucose (POC) 105 09/03/2022 11:27 AM    Glucose (POC) 103 09/02/2022 09:07 PM    Glucose (POC) 119 (H) 09/02/2022 05:12 PM    Glucose (POC) 111 09/02/2022 12:02 PM     Lab Results   Component Value Date/Time    Color YELLOW/STRAW 08/30/2022 01:48 PM    Appearance CLEAR 08/30/2022 01:48 PM    Specific gravity 1.015 08/30/2022 01:48 PM    Specific gravity 1.030 07/01/2015 01:41 PM    pH (UA) 7.5 08/30/2022 01:48 PM    Protein 300 (A) 08/30/2022 01:48 PM    Glucose Negative 08/30/2022 01:48 PM    Ketone Negative 08/30/2022 01:48 PM    Bilirubin Negative 08/30/2022 01:48 PM    Urobilinogen 1.0 08/30/2022 01:48 PM    Nitrites Negative 08/30/2022 01:48 PM    Leukocyte Esterase Negative 08/30/2022 01:48 PM    Epithelial cells FEW 08/30/2022 01:48 PM    Bacteria Negative 08/30/2022 01:48 PM    WBC 0-4 08/30/2022 01:48 PM    RBC 0-5 08/30/2022 01:48 PM         Medications Reviewed:     Current Facility-Administered Medications   Medication Dose Route Frequency    heparin (porcine) 1,000 unit/mL injection 2,600 Units  2,600 Units IntraCATHeter PRN    diclofenac (VOLTAREN) 1 % topical gel 2 g  2 g Topical BID    calcitRIOL (ROCALTROL) capsule 0.25 mcg  0.25 mcg Oral DAILY    0.9% sodium chloride infusion  25 mL/hr IntraVENous RAD CONTINUOUS isosorbide dinitrate (ISORDIL) tablet 20 mg  20 mg Oral BID    hydrALAZINE (APRESOLINE) tablet 25 mg  25 mg Oral BID    sodium chloride (NS) flush 5-40 mL  5-40 mL IntraVENous Q8H    sodium chloride (NS) flush 5-40 mL  5-40 mL IntraVENous PRN    glucose chewable tablet 16 g  4 Tablet Oral PRN    glucagon (GLUCAGEN) injection 1 mg  1 mg IntraMUSCular PRN    dextrose 10 % infusion 0-250 mL  0-250 mL IntraVENous PRN    insulin lispro (HUMALOG) injection   SubCUTAneous AC&HS    amLODIPine (NORVASC) tablet 5 mg  5 mg Oral DAILY    folic acid (FOLVITE) tablet 1 mg  1 mg Oral DAILY    gabapentin (NEURONTIN) capsule 300 mg  300 mg Oral TID    metoprolol succinate (TOPROL-XL) XL tablet 100 mg  100 mg Oral DAILY    thiamine HCL (B-1) tablet 100 mg  100 mg Oral DAILY    traZODone (DESYREL) tablet 50 mg  50 mg Oral QHS PRN    acetaminophen (TYLENOL) tablet 650 mg  650 mg Oral Q6H PRN    sevelamer carbonate (RENVELA) oral powder 0.8 g  0.8 g Oral TID WITH MEALS    ferrous sulfate tablet 325 mg  1 Tablet Oral BID WITH MEALS    iron sucrose (VENOFER) injection 100 mg  100 mg IntraVENous DAILY     ______________________________________________________________________  EXPECTED LENGTH OF STAY: - - -  ACTUAL LENGTH OF STAY:          4                 Rosey Lange MD

## 2022-09-03 NOTE — PROGRESS NOTES
Problem: Mobility Impaired (Adult and Pediatric)  Goal: *Acute Goals and Plan of Care (Insert Text)  Description: FUNCTIONAL STATUS PRIOR TO ADMISSION: Patient was independent and active without use of DME.    HOME SUPPORT PRIOR TO ADMISSION: The patient lived with roomates but did not require assist. Pt lives in ? Group home, reporting taking classes regarding drug rehab. States he can enter the home through the bottom level, but then has steps to access his room. Physical Therapy Goals  Initiated 9/3/2022    2. Patient will transfer from bed to chair and chair to bed with modified independence using the least restrictive device within 7 day(s). 3.  Patient will perform sit to stand with modified independence within 7 day(s). 4.  Patient will ambulate with modified independence for 150  feet with the least restrictive device within 7 day(s). 5.  Patient will ascend/descend 10 stairs with  handrail(s) with modified independence within 7 day(s). Outcome: Progressing Towards Goal    PHYSICAL THERAPY EVALUATION  Patient: Steve Thomas (43 y.o. male)  Date: 9/3/2022  Primary Diagnosis: ESRD (end stage renal disease) ([facility] [address] [N18.6]  Arthralgia [M25.50]  Acute hyperkalemia [E87.5]  Bilateral lower extremity edema [R60.0]  Acute right hip pain [M25.551]       Precautions:          ASSESSMENT  Based on the objective data described below, the patient presents with overall decreased functional mobility tolerance 2/2 pain and decreased endurance. Pt requires supervision throughout session for transfers and ambulation, but only tolerating short distance amb. Will benefit from continued acute PT services to addressed mobility deficits and progress to safe discharge home. May benefit from [address] [place] [address] services upon discharge. Current Level of Function Impacting Discharge (mobility/balance): SBA    Functional Outcome Measure:   The patient scored Total: 50/100 on the Barthel Index outcome measure which is indicative of being partially dependent in basic self-care. Other factors to consider for discharge: PLOF, level of assist available upon discharge     Patient will benefit from skilled therapy intervention to address the above noted impairments. PLAN :  Recommendations and Planned Interventions: bed mobility training, transfer training, gait training, therapeutic exercises, and therapeutic activities      Frequency/Duration: Patient will be followed by physical therapy:  5 times a week to address goals. Recommendation for discharge: (in order for the patient to meet his/her long term goals)  To be determined: +/- HHPT    This discharge recommendation:  Has not yet been discussed the attending provider and/or case management    IF patient discharges home will need the following DME: rolling walker- placed order in chart         SUBJECTIVE:   Patient stated Agreeable to participate with PT. Nicolette Camargo    OBJECTIVE DATA SUMMARY:   HISTORY:    Past Medical History:   Diagnosis Date    Diabetes ([facility] [address]     Gout     Hypertension      Past Surgical History:   Procedure Laterality Date    HX ORTHOPAEDIC  rt hip replacement    HX ORTHOPAEDIC  rt thigh woo       Personal factors and/or comorbidities impacting plan of care: PLOF, medical hx    Home Situation  Home Environment: Private residence  # Steps to Enter: 0  One/Two Story Residence: Two story  Living Alone: No  Support Systems:  (Roommates)  Patient Expects to be Discharged to[de-identified] Home  Current DME Used/Available at Home: None  Tub or Shower Type: Tub  - Pt typically enters through first level (states person who runs the home prefers this vs entering in front door). Through 1st level , has steps to bedroom. If enters through front door, no steps. EXAMINATION/PRESENTATION/DECISION MAKING:   Critical Behavior:   AAO x 4           Hearing: intact       Range Of Motion:  AROM: Within functional limits           PROM: Within functional limits           Strength:    Strength:  Within functional limits                                 Functional Mobility:  Bed Mobility:     Supine to Sit: Modified independent   - Requires increased time to complete     Transfers:  Sit to Stand: Supervision  Stand to Sit: Supervision         - Vc'ing for hand placement              Balance:   Sitting: Intact  Standing: Intact; With support  Ambulation/Gait Training:  Distance (ft): 30 Feet (ft)  Assistive Device: Walker, rolling;Gait belt  Ambulation - Level of Assistance: Stand-by assistance     Gait Description (WDL): Exceptions to [facility]           Base of Support: Narrowed  Stance: Right decreased; Left decreased  Speed/Daria: Slow;Shuffled  Step Length: Left shortened;Right shortened                  Functional Measure:  Barthel Index:    Bathin  Bladder: 5  Bowels: 5  Groomin  Dressin  Feedin  Mobility: 0  Stairs: 0  Toilet Use: 10  Transfer (Bed to Chair and Back): 10  Total: 50/100       The Barthel ADL Index: Guidelines  1. The index should be used as a record of what a patient does, not as a record of what a patient could do. 2. The main aim is to establish degree of independence from any help, physical or verbal, however minor and for whatever reason. 3. The need for supervision renders the patient not independent. 4. A patient's performance should be established using the best available evidence. Asking the patient, friends/relatives and nurses are the usual sources, but direct observation and common sense are also important. However direct testing is not needed. 5. Usually the patient's performance over the preceding 24-48 hours is important, but occasionally longer periods will be relevant. 6. Middle categories imply that the patient supplies over 50 per cent of the effort. 7. Use of aids to be independent is allowed.     Score Interpretation (from [facility] 83)    Independent   60-79 Minimally independent   40-59 Partially dependent   20-39 Very dependent   <20 Totally dependent -Sonido Yang., Barthel, D.W. (5049). Functional evaluation: the Barthel Index. [address] ([address] (1997). The Barthel activities of daily living index: self-reporting versus actual performance in the old (> or = 75 years). Journal of [facility]4 [facility] 45(7), 14 [facility], JAQUAN, Vicki Hassan., Thaddeus Rodriguez. (1999). Measuring the change in disability after inpatient rehabilitation; comparison of the responsiveness of the Barthel Index and Functional Herald Measure. Journal of Neurology, Neurosurgery, and Psychiatry, 66(4), 514-707. Jyoti Partida, NWillyJ.A, ISABELLA Moreno, & Amanda Tenorio, M.A. (2004) Assessment of post-stroke quality of life in cost-effectiveness studies: The usefulness of the Barthel Index and the EuroQoL-5D. Quality of Life Research, 15, 253-60        Physical Therapy Evaluation Charge Determination   History Examination Presentation Decision-Making   LOW Complexity : Zero comorbidities / personal factors that will impact the outcome / POC LOW Complexity : 1-2 Standardized tests and measures addressing body structure, function, activity limitation and / or participation in recreation  LOW Complexity : Stable, uncomplicated  LOW Complexity : FOTO score of       Based on the above components, the patient evaluation is determined to be of the following complexity level: LOW     Pain Rating:  No reports of pain     Activity Tolerance:   Fair    After treatment patient left in no apparent distress:   Sitting in chair and Call bell within reach    COMMUNICATION/EDUCATION:   The patients plan of care was discussed with: Registered nurse. Fall prevention education was provided and the patient/caregiver indicated understanding., Patient/family have participated as able in goal setting and plan of care. , and Patient/family agree to work toward stated goals and plan of care.     Thank you for this referral.  Danny tran Roselyn Cook, PT   Time Calculation: 24 mins

## 2022-09-03 NOTE — PROCEDURES
Hemodialysis / [phone]    Vitals Pre Post Assessment Pre Post   /92 135/86 LOC A/O x 3 A/O x 3   HR 94 74 Lungs Clear/ denies SOB clear   Resp 16 16 Cardiac HRR HRR   Temp 97.6 97.5 Skin WDI/ limbs darker in color WDI   Weight 96kg 94.5 kg Edema Gen puffiness None noted   Tele status remote remote Pain denies denies     Orders   Duration: Start: 6478 End: 1035 Total: 3 hrs   Dialyzer: Dialyzer/Set Up Inspection: Enrico Ramosole ([id number]/ [id number]) (09/03/22 0730)   K Bath: Dialysate K (mEq/L): 2 (09/03/22 0730)   Ca Bath: Dialysate CA (mEq/L): 2.5 (09/03/22 0730)   Na: Dialysate NA (mEq/L): 138 (09/03/22 0730)   Bicarb: Dialysate HCO3 (mEq/L): 35 (09/03/22 0730)   Target Fluid Removal: Goal/Amount of Fluid to Remove (mL): 1500 mL (09/03/22 0730)     Access   Type & Location: Rt neck cath   Comments:      CHG drsg CDI, no redness or drainage noted. Ports cleaned w/ alcohol and CHG. +aspir/ NS flushes. Labs   HBsAg (Antigen) / date:  Neg 8/31/22                                             HBsAb (Antibody) / date:  IMM 8/31/22   Source: EPIC   Obtained/Reviewed  Critical Results Called HGB   Date Value Ref Range Status   09/03/2022 9.4 (L) 12.1 - 17.0 g/dL Final     Potassium   Date Value Ref Range Status   09/03/2022 5.2 (H) 3.5 - 5.1 mmol/L Final     Calcium   Date Value Ref Range Status   09/03/2022 7.8 (L) 8.5 - 10.1 MG/DL Final     BUN   Date Value Ref Range Status   09/03/2022 53 (H) 6 - 20 MG/DL Final     Creatinine   Date Value Ref Range Status   09/03/2022 6.84 (H) 0.70 - 1.30 MG/DL Final        Meds Given   Name Dose Route   Heparin 1300 units ICD red port   Heparin 1300 units ICD blue port          Adequacy / Fluid    Total Liters Process: 50 L   Net Fluid Removed: 1500 ml      Comments   Time Out Done:   (Time) 0730 mjb   Admitting Diagnosis: Hyperkalemia/ hyperphosphatemia   Consent obtained/signed: Informed Consent Verified: Yes (09/03/22 0730)   Machine / RO # Machine Number: [id number]/ [id number] (09/03/22 0730)   Primary Nurse Rpt Pre: Michael Marte, RN   Primary Nurse Rpt Post: Jodi Painter RN   Pt Education: Discussed cath care and procedure   Care Plan: Continue to do HD txs as ordered   Pts outpatient clinic: TBD     Tx Summary   Comments: Tolerated tx well. At end, blood in circuit returned w/ 300ml NS. Ports flushed w/ NS and capped w/ Qsyte/Purehubs. Clamps secured. CHG drsg still CDI.     Returned to rm [phone] by hosp staff

## 2022-09-04 LAB
ALBUMIN SERPL-MCNC: 2.8 G/DL (ref 3.5–5)
ANION GAP SERPL CALC-SCNC: 9 MMOL/L (ref 5–15)
BASOPHILS # BLD: 0 K/UL (ref 0–0.1)
BASOPHILS NFR BLD: 0 % (ref 0–1)
BUN SERPL-MCNC: 39 MG/DL (ref 6–20)
BUN/CREAT SERPL: 7 (ref 12–20)
CALCIUM SERPL-MCNC: 8.3 MG/DL (ref 8.5–10.1)
CHLORIDE SERPL-SCNC: 102 MMOL/L (ref 97–108)
CO2 SERPL-SCNC: 23 MMOL/L (ref 21–32)
CREAT SERPL-MCNC: 5.51 MG/DL (ref 0.7–1.3)
DIFFERENTIAL METHOD BLD: ABNORMAL
EOSINOPHIL # BLD: 0.2 K/UL (ref 0–0.4)
EOSINOPHIL NFR BLD: 4 % (ref 0–7)
ERYTHROCYTE [DISTWIDTH] IN BLOOD BY AUTOMATED COUNT: 12.9 % (ref 11.5–14.5)
GLUCOSE BLD STRIP.AUTO-MCNC: 114 MG/DL (ref 65–117)
GLUCOSE BLD STRIP.AUTO-MCNC: 118 MG/DL (ref 65–117)
GLUCOSE BLD STRIP.AUTO-MCNC: 126 MG/DL (ref 65–117)
GLUCOSE BLD STRIP.AUTO-MCNC: 93 MG/DL (ref 65–117)
GLUCOSE SERPL-MCNC: 119 MG/DL (ref 65–100)
HCT VFR BLD AUTO: 31.1 % (ref 36.6–50.3)
HGB BLD-MCNC: 10.1 G/DL (ref 12.1–17)
IMM GRANULOCYTES # BLD AUTO: 0 K/UL (ref 0–0.04)
IMM GRANULOCYTES NFR BLD AUTO: 0 % (ref 0–0.5)
LYMPHOCYTES # BLD: 1.7 K/UL (ref 0.8–3.5)
LYMPHOCYTES NFR BLD: 29 % (ref 12–49)
MCH RBC QN AUTO: 30.3 PG (ref 26–34)
MCHC RBC AUTO-ENTMCNC: 32.5 G/DL (ref 30–36.5)
MCV RBC AUTO: 93.4 FL (ref 80–99)
MONOCYTES # BLD: 0.6 K/UL (ref 0–1)
MONOCYTES NFR BLD: 10 % (ref 5–13)
NEUTS SEG # BLD: 3.4 K/UL (ref 1.8–8)
NEUTS SEG NFR BLD: 57 % (ref 32–75)
NRBC # BLD: 0 K/UL (ref 0–0.01)
NRBC BLD-RTO: 0 PER 100 WBC
PHOSPHATE SERPL-MCNC: 6.2 MG/DL (ref 2.6–4.7)
PLATELET # BLD AUTO: 230 K/UL (ref 150–400)
PMV BLD AUTO: 9.3 FL (ref 8.9–12.9)
POTASSIUM SERPL-SCNC: 5.8 MMOL/L (ref 3.5–5.1)
RBC # BLD AUTO: 3.33 M/UL (ref 4.1–5.7)
SERVICE CMNT-IMP: ABNORMAL
SERVICE CMNT-IMP: ABNORMAL
SERVICE CMNT-IMP: NORMAL
SERVICE CMNT-IMP: NORMAL
SODIUM SERPL-SCNC: 134 MMOL/L (ref 136–145)
WBC # BLD AUTO: 5.9 K/UL (ref 4.1–11.1)

## 2022-09-04 PROCEDURE — 65270000046 HC RM TELEMETRY

## 2022-09-04 PROCEDURE — 74011250637 HC RX REV CODE- 250/637: Performed by: INTERNAL MEDICINE

## 2022-09-04 PROCEDURE — 74011000250 HC RX REV CODE- 250: Performed by: FAMILY MEDICINE

## 2022-09-04 PROCEDURE — 99223 1ST HOSP IP/OBS HIGH 75: CPT | Performed by: INTERNAL MEDICINE

## 2022-09-04 PROCEDURE — 36415 COLL VENOUS BLD VENIPUNCTURE: CPT

## 2022-09-04 PROCEDURE — 82962 GLUCOSE BLOOD TEST: CPT

## 2022-09-04 PROCEDURE — 74011250636 HC RX REV CODE- 250/636: Performed by: INTERNAL MEDICINE

## 2022-09-04 PROCEDURE — 80069 RENAL FUNCTION PANEL: CPT

## 2022-09-04 PROCEDURE — 74011250637 HC RX REV CODE- 250/637: Performed by: FAMILY MEDICINE

## 2022-09-04 PROCEDURE — 85025 COMPLETE CBC W/AUTO DIFF WBC: CPT

## 2022-09-04 RX ADMIN — ISOSORBIDE DINITRATE 20 MG: 20 TABLET ORAL at 09:07

## 2022-09-04 RX ADMIN — ISOSORBIDE DINITRATE 20 MG: 20 TABLET ORAL at 17:58

## 2022-09-04 RX ADMIN — HYDRALAZINE HYDROCHLORIDE 25 MG: 25 TABLET, FILM COATED ORAL at 17:58

## 2022-09-04 RX ADMIN — GABAPENTIN 300 MG: 300 CAPSULE ORAL at 21:29

## 2022-09-04 RX ADMIN — SODIUM CHLORIDE, PRESERVATIVE FREE 10 ML: 5 INJECTION INTRAVENOUS at 16:30

## 2022-09-04 RX ADMIN — SEVELAMER CARBONATE FOR ORAL SUSPENSION 0.8 G: 800 POWDER, FOR SUSPENSION ORAL at 09:07

## 2022-09-04 RX ADMIN — SODIUM CHLORIDE, PRESERVATIVE FREE 10 ML: 5 INJECTION INTRAVENOUS at 05:13

## 2022-09-04 RX ADMIN — FERROUS SULFATE TAB 325 MG (65 MG ELEMENTAL FE) 325 MG: 325 (65 FE) TAB at 09:07

## 2022-09-04 RX ADMIN — GABAPENTIN 300 MG: 300 CAPSULE ORAL at 16:30

## 2022-09-04 RX ADMIN — CALCITRIOL CAPSULES 0.25 MCG 0.25 MCG: 0.25 CAPSULE ORAL at 09:07

## 2022-09-04 RX ADMIN — FOLIC ACID 1 MG: 1 TABLET ORAL at 09:07

## 2022-09-04 RX ADMIN — IRON SUCROSE 100 MG: 20 INJECTION, SOLUTION INTRAVENOUS at 09:07

## 2022-09-04 RX ADMIN — AMLODIPINE BESYLATE 5 MG: 5 TABLET ORAL at 09:07

## 2022-09-04 RX ADMIN — DICLOFENAC SODIUM 2 G: 10 GEL TOPICAL at 17:59

## 2022-09-04 RX ADMIN — SODIUM CHLORIDE, PRESERVATIVE FREE 10 ML: 5 INJECTION INTRAVENOUS at 21:37

## 2022-09-04 RX ADMIN — HYDRALAZINE HYDROCHLORIDE 25 MG: 25 TABLET, FILM COATED ORAL at 09:07

## 2022-09-04 RX ADMIN — SODIUM CHLORIDE, PRESERVATIVE FREE 10 ML: 5 INJECTION INTRAVENOUS at 04:21

## 2022-09-04 RX ADMIN — Medication 100 MG: at 09:07

## 2022-09-04 RX ADMIN — DICLOFENAC SODIUM 2 G: 10 GEL TOPICAL at 09:09

## 2022-09-04 RX ADMIN — SODIUM ZIRCONIUM CYCLOSILICATE 10 G: 10 POWDER, FOR SUSPENSION ORAL at 13:18

## 2022-09-04 RX ADMIN — METOPROLOL SUCCINATE 100 MG: 50 TABLET, EXTENDED RELEASE ORAL at 09:07

## 2022-09-04 RX ADMIN — SEVELAMER CARBONATE FOR ORAL SUSPENSION 0.8 G: 800 POWDER, FOR SUSPENSION ORAL at 17:58

## 2022-09-04 RX ADMIN — GABAPENTIN 300 MG: 300 CAPSULE ORAL at 09:07

## 2022-09-04 RX ADMIN — FERROUS SULFATE TAB 325 MG (65 MG ELEMENTAL FE) 325 MG: 325 (65 FE) TAB at 17:58

## 2022-09-04 NOTE — PROGRESS NOTES
Problem: Falls - Risk of  Goal: *Absence of Falls  Description: Document Dipesh Haddad Fall Risk and appropriate interventions in the flowsheet.   Outcome: Progressing Towards Goal  Note: Fall Risk Interventions:  Mobility Interventions: Communicate number of staff needed for ambulation/transfer         Medication Interventions: Evaluate medications/consider consulting pharmacy    Elimination Interventions: Bed/chair exit alarm              Problem: Patient Education: Go to Patient Education Activity  Goal: Patient/Family Education  Outcome: Progressing Towards Goal     Problem: Patient Education: Go to Patient Education Activity  Goal: Patient/Family Education  Outcome: Progressing Towards Goal     Problem: Heart Failure: Day 1  Goal: Off Pathway (Use only if patient is Off Pathway)  Outcome: Progressing Towards Goal  Goal: Activity/Safety  Outcome: Progressing Towards Goal  Goal: Consults, if ordered  Outcome: Progressing Towards Goal  Goal: Diagnostic Test/Procedures  Outcome: Progressing Towards Goal  Goal: Nutrition/Diet  Outcome: Progressing Towards Goal  Goal: Discharge Planning  Outcome: Progressing Towards Goal  Goal: Medications  Outcome: Progressing Towards Goal  Goal: Respiratory  Outcome: Progressing Towards Goal  Goal: Treatments/Interventions/Procedures  Outcome: Progressing Towards Goal  Goal: Psychosocial  Outcome: Progressing Towards Goal  Goal: *Oxygen saturation within defined limits  Outcome: Progressing Towards Goal  Goal: *Hemodynamically stable  Outcome: Progressing Towards Goal  Goal: *Optimal pain control at patient's stated goal  Outcome: Progressing Towards Goal  Goal: *Anxiety reduced or absent  Outcome: Progressing Towards Goal     Problem: Heart Failure: Day 2  Goal: Off Pathway (Use only if patient is Off Pathway)  Outcome: Progressing Towards Goal  Goal: Activity/Safety  Outcome: Progressing Towards Goal  Goal: Consults, if ordered  Outcome: Progressing Towards Goal  Goal: Diagnostic Test/Procedures  Outcome: Progressing Towards Goal  Goal: Nutrition/Diet  Outcome: Progressing Towards Goal  Goal: Discharge Planning  Outcome: Progressing Towards Goal  Goal: Medications  Outcome: Progressing Towards Goal  Goal: Respiratory  Outcome: Progressing Towards Goal  Goal: Treatments/Interventions/Procedures  Outcome: Progressing Towards Goal  Goal: Psychosocial  Outcome: Progressing Towards Goal  Goal: *Oxygen saturation within defined limits  Outcome: Progressing Towards Goal  Goal: *Hemodynamically stable  Outcome: Progressing Towards Goal  Goal: *Optimal pain control at patient's stated goal  Outcome: Progressing Towards Goal  Goal: *Anxiety reduced or absent  Outcome: Progressing Towards Goal  Goal: *Demonstrates progressive activity  Outcome: Progressing Towards Goal     Problem: Heart Failure: Day 3  Goal: Off Pathway (Use only if patient is Off Pathway)  Outcome: Progressing Towards Goal  Goal: Activity/Safety  Outcome: Progressing Towards Goal  Goal: Diagnostic Test/Procedures  Outcome: Progressing Towards Goal  Goal: Nutrition/Diet  Outcome: Progressing Towards Goal  Goal: Discharge Planning  Outcome: Progressing Towards Goal  Goal: Medications  Outcome: Progressing Towards Goal  Goal: Respiratory  Outcome: Progressing Towards Goal  Goal: Treatments/Interventions/Procedures  Outcome: Progressing Towards Goal  Goal: Psychosocial  Outcome: Progressing Towards Goal  Goal: *Oxygen saturation within defined limits  Outcome: Progressing Towards Goal  Goal: *Hemodynamically stable  Outcome: Progressing Towards Goal  Goal: *Optimal pain control at patient's stated goal  Outcome: Progressing Towards Goal  Goal: *Anxiety reduced or absent  Outcome: Progressing Towards Goal  Goal: *Demonstrates progressive activity  Outcome: Progressing Towards Goal     Problem: Heart Failure: Day 4  Goal: Off Pathway (Use only if patient is Off Pathway)  Outcome: Progressing Towards Goal  Goal: Activity/Safety  Outcome: Progressing Towards Goal  Goal: Diagnostic Test/Procedures  Outcome: Progressing Towards Goal  Goal: Nutrition/Diet  Outcome: Progressing Towards Goal  Goal: Discharge Planning  Outcome: Progressing Towards Goal  Goal: Medications  Outcome: Progressing Towards Goal  Goal: Respiratory  Outcome: Progressing Towards Goal  Goal: Treatments/Interventions/Procedures  Outcome: Progressing Towards Goal  Goal: Psychosocial  Outcome: Progressing Towards Goal  Goal: *Oxygen saturation within defined limits  Outcome: Progressing Towards Goal  Goal: *Hemodynamically stable  Outcome: Progressing Towards Goal  Goal: *Optimal pain control at patient's stated goal  Outcome: Progressing Towards Goal  Goal: *Anxiety reduced or absent  Outcome: Progressing Towards Goal  Goal: *Demonstrates progressive activity  Outcome: Progressing Towards Goal     Problem: Heart Failure: Day 5  Goal: Off Pathway (Use only if patient is Off Pathway)  Outcome: Progressing Towards Goal  Goal: Activity/Safety  Outcome: Progressing Towards Goal  Goal: Diagnostic Test/Procedures  Outcome: Progressing Towards Goal  Goal: Nutrition/Diet  Outcome: Progressing Towards Goal  Goal: Discharge Planning  Outcome: Progressing Towards Goal  Goal: Medications  Outcome: Progressing Towards Goal  Goal: Respiratory  Outcome: Progressing Towards Goal  Goal: Treatments/Interventions/Procedures  Outcome: Progressing Towards Goal  Goal: Psychosocial  Outcome: Progressing Towards Goal

## 2022-09-04 NOTE — PROGRESS NOTES
Bedside shift change report given to Brady Conner RN (oncoming nurse) by Arnulfo Carpio RN (offgoing nurse). Report included the following information SBAR.

## 2022-09-04 NOTE — CONSULTS
[address] [facility], MD, FACP, [place], [place]      JENNY Burks, [facility]     Jina Sanders [facility]   REZA Leach-VALERIE Tran, [facility]       Dhaval Tobin [facility] [address]    at [address], [facility] Monisha Thompson  22.    [phone]    FAX: [address] [place] [address], [address]    [phone]    FAX: [phone]       HEPATOLOGY CONSULT NOTE  I was asked to see this patient in consultation for management of elevated ALP. I have reviewed the Emergency room note, Hospital admission note, Notes by all other physicians who have seen the patient during this hospitalization to date. I have reviewed the problem list, all past medical history and the reason for this hospitalization. I have reviewed the allergies and the medications the patient was taking at home prior to this hospitalization. HISTORY:  The patient is a 65 y.o. Black male with CKD on HD who utilized IVD for many years but finally stopped in 2/2022. He was found to have chronic HCV several years but was never treated because of ongoing IVDA. Imaging of the liver with CT scan demonstrated no evidence for cirrhosis. Hepatology Plan: Will be glad to treat HCV once he is discharged from this hospitalization. Rheumatoid factor and cryoglobulins  Other HBV serologies    ASSESSMENT AND PLAN:  Chronic HCV   Chronic HCV of unclear severity. Liver transaminases are normal.  ALP is elevated. Liver function is normal.  Serum albumin is depressed. The platelet count is   normal.      Based upon laboratory studies and imaging the patient does not appear to have advanced liver disease. HCV viral load is log 6.8 intU.   HCV genotype is pending. HBsurface antigen is negative. Will need to perform an assessment of liver fibrosis. Will do this with HCV Fibrosure. Chronic HCV Treatment  The patient has not been treated for HCV. The patient has HCV genotype that is not yet defined. Discussed the treatment alternatives. The SVR/cure rate for HCV now exceeds 97% without significant side effects for most patients with HCV. The specific treatment is dependent upon genotype, viral load and histology. The patient should be treated with Mavyret (glecaprevir and piprentasvir) or Epclusa (sofosbuvir and velpatasvir)   The SVR/cure rate for is over 95%. Low serum albumin  The low serum albumin is probably nutritional and is unlikely to reflect more advanced liver disease. 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 Urine protein is 300+  Will check rheumatoid factor, cryoglobulins and urine for protein. If cryoglobulins are positive I would initiate HCV treatment sooner than later to help kidney function resolve. SYSTEM REVIEW:  Constitution systems: Negative for fever, chills, weight gain, weight loss. Eyes: Negative for visual changes. ENT: Negative for sore throat, painful swallowing. Respiratory: Negative for cough, hemoptysis, SOB. Cardiology: Negative for chest pain, palpitations. GI:  Negative for constipation or diarrhea. : Negative for urinary frequency, dysuria, hematuria, nocturia. Skin: Numerous scars all over upper and lower extremities from IVDA. Hematology: Negative for easy bruising, blood clots. Musculo-skelatal: Negative for back pain, muscle pain, weakness. Neurologic: Negative for headaches, dizziness, vertigo, memory problems not related to HE. Psychology: Negative for anxiety, depression. FAMILY HISTORY:  The father  of murder. The mother  of DM. There is no family history of liver disease.       SOCIAL HISTORY:  The patient has never been . The patient has no children. The patient currently smokes 1 pack of tobacco daily. The patient has never consumed significant amounts of alcohol. The patient does not work outside the home. PHYSICAL EXAMINATION:  VS: per nursing note  General:  No acute distress. Eyes:  Sclera anicteric. ENT:  No oral lesions. Thyroid normal.  Nodes:  No adenopathy. Skin:  No spider angiomata. No jaundice. Respiratory:  Lungs clear to auscultation. Cardiovascular:  Regular heart rate. Abdomen:  Soft non-tender, No obvious ascites. Extremities:  No lower extremity edema. Neurologic:  Alert and oriented. Cranial nerves grossly intact. No asterixis. LABORATORY:   Latest Reference Range & Units 8/30/22 13:18 8/31/22 05:05 9/1/22 04:45 9/2/22 01:50 9/3/22 04:54   WBC 4.1 - 11.1 K/uL 5.7 6.0 4.6  5.1   HGB 12.1 - 17.0 g/dL 10.7 (L) 9.6 (L) 9.4 (L)  9.4 (L)   PLATELET 982 - 781 K/uL 206 179 215  223   INR 0.9 - 1.1    1.1      Sodium 136 - 145 mmol/L 136 138 137 135 (L) 136   Potassium 3.5 - 5.1 mmol/L 6.1 (H) 5.6 (H) 4.5 5.2 (H) 5.2 (H)   Chloride 97 - 108 mmol/L 102 109 (H) 105 104 104   CO2 21 - 32 mmol/L 20 (L) 17 (L) 28 25 23   Glucose 65 - 100 mg/dL 127 (H) 96 106 (H) 117 (H) 109 (H)   BUN 6 - 20 MG/DL 78 (H) 83 (H) 57 (H) 38 (H) 53 (H)   Creatinine 0.70 - 1.30 MG/DL 9.08 (H) 9.03 (H) 6.67 (H) 5.62 (H) 6.84 (H)   Bilirubin, total 0.2 - 1.0 MG/DL 0.7 0.5 0.3     Albumin 3.5 - 5.0 g/dL 3.0 (L) 2.5 (L) 2.6 (L) 2.5 (L) 2.5 (L)   ALT 12 - 78 U/L 16 18 17     AST 15 - 37 U/L 15 23 18     Alk.  phosphatase 45 - 117 U/L 175 (H) 151 (H) 157 (H)     Iron % saturation 20 - 50 %  17 (L)      Ferritin 26 - 388 NG/ML  306      (L): Data is abnormally low  (H): Data is abnormally high      Caitlyn Spicer MD  [address] [place], [place] [address]Monisha  22.  [phone]  FAX:  [phone]  [facility]

## 2022-09-04 NOTE — PROGRESS NOTES
Renal Progress Note    NAME:  Breana Kidd   :   1965   MRN:   [id number]     Date/Time:  2022 1:05 PM  Subjective:        seen on HD, HD RN Radha De Santiago at bedside  Had renal biopsy this am, patient has generalized bodyache. No N/V/SOB     c/o left knee pain and LBP. No SOB, renal Bx result is pending.  Plan for HD in am. PTH 1300  9/3 seen on HD, no complaint, BP stable   seen and examined, no new complaint, K high today      Medications reviewed:  Current Facility-Administered Medications   Medication Dose Route Frequency    sodium zirconium cyclosilicate (LOKELMA) powder packet 10 g  10 g Oral NOW    heparin (porcine) 1,000 unit/mL injection 2,600 Units  2,600 Units IntraCATHeter PRN    diclofenac (VOLTAREN) 1 % topical gel 2 g  2 g Topical BID    calcitRIOL (ROCALTROL) capsule 0.25 mcg  0.25 mcg Oral DAILY    0.9% sodium chloride infusion  25 mL/hr IntraVENous RAD CONTINUOUS    isosorbide dinitrate (ISORDIL) tablet 20 mg  20 mg Oral BID    hydrALAZINE (APRESOLINE) tablet 25 mg  25 mg Oral BID    sodium chloride (NS) flush 5-40 mL  5-40 mL IntraVENous Q8H    sodium chloride (NS) flush 5-40 mL  5-40 mL IntraVENous PRN    glucose chewable tablet 16 g  4 Tablet Oral PRN    glucagon (GLUCAGEN) injection 1 mg  1 mg IntraMUSCular PRN    dextrose 10 % infusion 0-250 mL  0-250 mL IntraVENous PRN    insulin lispro (HUMALOG) injection   SubCUTAneous AC&HS    amLODIPine (NORVASC) tablet 5 mg  5 mg Oral DAILY    folic acid (FOLVITE) tablet 1 mg  1 mg Oral DAILY    gabapentin (NEURONTIN) capsule 300 mg  300 mg Oral TID    metoprolol succinate (TOPROL-XL) XL tablet 100 mg  100 mg Oral DAILY    thiamine HCL (B-1) tablet 100 mg  100 mg Oral DAILY    traZODone (DESYREL) tablet 50 mg  50 mg Oral QHS PRN    acetaminophen (TYLENOL) tablet 650 mg  650 mg Oral Q6H PRN    sevelamer carbonate (RENVELA) oral powder 0.8 g  0.8 g Oral TID WITH MEALS    ferrous sulfate tablet 325 mg  1 Tablet Oral BID WITH MEALS iron sucrose (VENOFER) injection 100 mg  100 mg IntraVENous DAILY        Objective:   Vitals:  Visit Vitals  BP (!) 150/97   Pulse 73   Temp 97.4 °F (36.3 °C)   Resp 12   Ht 5' 11\" (1.803 m)   Wt 98 kg (216 lb)   SpO2 94%   BMI 30.13 kg/m²     Temp (24hrs), Av.7 °F (36.5 °C), Min:97.4 °F (36.3 °C), Max:98.3 °F (36.8 °C)    O2 Flow Rate (L/min): 3 l/min O2 Device: None (Room air)    Last 24hr Input/Output:  No intake or output data in the 24 hours ending 22 1055       Physical Exam:  General:Alert and oriented x3, , No distress,   HEENT: Eyes are PERRL. Conjunctiva without pallor ,erythema. The sclerae without icterus.  .   Neck:Supple, RT IJ temp HD catheter  Lungs : decreased breathing sounds basilar   CVS: RRR, S1 S2 normal, No rub, SM2  Abdomen: Soft, Non tender, ND,  bowel sounds present  Extremities: no edema  Skin: No rash   Neurologic: non focal, AAO x 3  Psych: normal affect       [] Telemetry Reviewed     [] NSR [] PAC/PVCs   [] Afib  [] Paced   [] NSVT   [] Felix [] NGT  [] Intubated on vent    Lab Data Reviewed:    Recent Results (from the past 24 hour(s))   GLUCOSE, POC    Collection Time: 22 11:27 AM   Result Value Ref Range    Glucose (POC) 105 65 - 117 mg/dL    Performed by [employer]  PCT    GLUCOSE, POC    Collection Time: 22  4:57 PM   Result Value Ref Range    Glucose (POC) 113 65 - 117 mg/dL    Performed by [email or web address]  PCT    GLUCOSE, POC    Collection Time: 22  9:20 PM   Result Value Ref Range    Glucose (POC) 129 (H) 65 - 117 mg/dL    Performed by Christophe James    RENAL FUNCTION PANEL    Collection Time: 22 12:46 AM   Result Value Ref Range    Sodium 134 (L) 136 - 145 mmol/L    Potassium 5.8 (H) 3.5 - 5.1 mmol/L    Chloride 102 97 - 108 mmol/L    CO2 23 21 - 32 mmol/L    Anion gap 9 5 - 15 mmol/L    Glucose 119 (H) 65 - 100 mg/dL    BUN 39 (H) 6 - 20 MG/DL    Creatinine 5.51 (H) 0.70 - 1.30 MG/DL    BUN/Creatinine ratio 7 (L) 12 - 20      GFR est AA 13 (L) >60 ml/min/1.73m2    GFR est non-AA 11 (L) >60 ml/min/1.73m2    Calcium 8.3 (L) 8.5 - 10.1 MG/DL    Phosphorus 6.2 (H) 2.6 - 4.7 MG/DL    Albumin 2.8 (L) 3.5 - 5.0 g/dL   CBC WITH AUTOMATED DIFF    Collection Time: 09/04/22 12:46 AM   Result Value Ref Range    WBC 5.9 4.1 - 11.1 K/uL    RBC 3.33 (L) 4.10 - 5.70 M/uL    HGB 10.1 (L) 12.1 - 17.0 g/dL    HCT 31.1 (L) 36.6 - 50.3 %    MCV 93.4 80.0 - 99.0 FL    MCH 30.3 26.0 - 34.0 PG    MCHC 32.5 30.0 - 36.5 g/dL    RDW 12.9 11.5 - 14.5 %    PLATELET 632 226 - 564 K/uL    MPV 9.3 8.9 - 12.9 FL    NRBC 0.0 0  WBC    ABSOLUTE NRBC 0.00 0.00 - 0.01 K/uL    NEUTROPHILS 57 32 - 75 %    LYMPHOCYTES 29 12 - 49 %    MONOCYTES 10 5 - 13 %    EOSINOPHILS 4 0 - 7 %    BASOPHILS 0 0 - 1 %    IMMATURE GRANULOCYTES 0 0.0 - 0.5 %    ABS. NEUTROPHILS 3.4 1.8 - 8.0 K/UL    ABS. LYMPHOCYTES 1.7 0.8 - 3.5 K/UL    ABS. MONOCYTES 0.6 0.0 - 1.0 K/UL    ABS. EOSINOPHILS 0.2 0.0 - 0.4 K/UL    ABS. BASOPHILS 0.0 0.0 - 0.1 K/UL    ABS. IMM. GRANS. 0.0 0.00 - 0.04 K/UL    DF AUTOMATED     GLUCOSE, POC    Collection Time: 09/04/22  7:57 AM   Result Value Ref Range    Glucose (POC) 93 65 - 117 mg/dL    Performed by Iris BANEAGS          Assessment/Plan:     Acute kidney injury on CKD3  Hyperkalemia  Metabolic acidosis  Hyperphosphatemia  Nephrotic syndrome  Rapidly progressive with nephrotic range proteinuria-serology negative except positive Hep C Ab in Jan 2022-urine no active sediment--UPCR 5.6  -might be related to Hep-C , other Ddx:  COVAN vs diabetic nephropathy, FSGS , MM, Membranous GN , IgA nephropathy, Heroin/coccaine nephropathy   Spoke to patient, he agrees with both renal biopsy and starting HD.    Renal diet-low potassium  Avoid nephrotoxins  Dose meds per GFR  BP control   -s/p kidney biopsy 9/1and HD catheter placement 8/31  -started  HD 8/31, second HD t9/1, HD today 9/3  -f/up  renal Bx result  -next HD monday  -HD catheter needs to be changed to tunnelled prior to discharge  -lokelma x1 today    Secondary hyperparathyroidism  Started renvela  Start calcitriol  F/up vit D level     Anemia  Hb>10  Iron sat 17%, ferritin 306  Po Iron     HTN-metoprolol, Amlodipine  Combined systolic and diastolic HF, cardiology recommendation appreciated  DM2-A1C 6  Hep C , recommend to consult hepatology  Gout-uric acid 7-added diclofenac gel for rt knee pain  ____________________________________________    Total time spent with patient:  [] 15   [] 25   [] 35   []  __ minutes    [] Critical Care Provided    Care Plan discussed with:    [] Patient   [] Family    [] Care Manager   [] Consultant/Specialist :      []   >50% of visit spent in counseling and coordination of care   (Discussed [] CODE status,  [] Care Plan, [] D/C Planning)    ___________________________________________________    Attending Physician: MD Khoi Post

## 2022-09-04 NOTE — PROGRESS NOTES
[facility] Cardiology Progress Note    Date of service: 9/4/2022    Subjective:   No new issues   Tolerated HD yesterday    Objective:    Visit Vitals  BP (!) 150/97   Pulse 71   Temp 97.4 °F (36.3 °C)   Resp 12   Ht 5' 11\" (1.803 m)   Wt 98 kg (216 lb)   SpO2 94%   BMI 30.13 kg/m²       Physical Exam  Constitutional:       Appearance: He is well-developed. HENT:      Head: Normocephalic and atraumatic. Eyes:      General: No scleral icterus. Conjunctiva/sclera: Conjunctivae normal.   Neck:      Vascular: No JVD. Cardiovascular:      Rate and Rhythm: Normal rate and regular rhythm. Heart sounds: Normal heart sounds. No murmur heard. No gallop. Pulmonary:      Effort: Pulmonary effort is normal. No respiratory distress. Breath sounds: Normal breath sounds. No stridor. No wheezing or rales. Abdominal:      General: There is no distension. Palpations: Abdomen is soft. Musculoskeletal:         General: No deformity. Skin:     General: Skin is warm and dry. Neurological:      Mental Status: He is alert and oriented to person, place, and time.    Psychiatric:         Behavior: Behavior normal.       Data reviewed:  Recent Results (from the past 12 hour(s))   GLUCOSE, POC    Collection Time: 09/03/22  9:20 PM   Result Value Ref Range    Glucose (POC) 129 (H) 65 - 117 mg/dL    Performed by [facility]    RENAL FUNCTION PANEL    Collection Time: 09/04/22 12:46 AM   Result Value Ref Range    Sodium 134 (L) 136 - 145 mmol/L    Potassium 5.8 (H) 3.5 - 5.1 mmol/L    Chloride 102 97 - 108 mmol/L    CO2 23 21 - 32 mmol/L    Anion gap 9 5 - 15 mmol/L    Glucose 119 (H) 65 - 100 mg/dL    BUN 39 (H) 6 - 20 MG/DL    Creatinine 5.51 (H) 0.70 - 1.30 MG/DL    BUN/Creatinine ratio 7 (L) 12 - 20      GFR est AA 13 (L) >60 ml/min/1.73m2    GFR est non-AA 11 (L) >60 ml/min/1.73m2    Calcium 8.3 (L) 8.5 - 10.1 MG/DL    Phosphorus 6.2 (H) 2.6 - 4.7 MG/DL    Albumin 2.8 (L) 3.5 - 5.0 g/dL   CBC WITH AUTOMATED DIFF    Collection Time: 09/04/22 12:46 AM   Result Value Ref Range    WBC 5.9 4.1 - 11.1 K/uL    RBC 3.33 (L) 4.10 - 5.70 M/uL    HGB 10.1 (L) 12.1 - 17.0 g/dL    HCT 31.1 (L) 36.6 - 50.3 %    MCV 93.4 80.0 - 99.0 FL    MCH 30.3 26.0 - 34.0 PG    MCHC 32.5 30.0 - 36.5 g/dL    RDW 12.9 11.5 - 14.5 %    PLATELET 078 896 - 617 K/uL    MPV 9.3 8.9 - 12.9 FL    NRBC 0.0 0  WBC    ABSOLUTE NRBC 0.00 0.00 - 0.01 K/uL    NEUTROPHILS 57 32 - 75 %    LYMPHOCYTES 29 12 - 49 %    MONOCYTES 10 5 - 13 %    EOSINOPHILS 4 0 - 7 %    BASOPHILS 0 0 - 1 %    IMMATURE GRANULOCYTES 0 0.0 - 0.5 %    ABS. NEUTROPHILS 3.4 1.8 - 8.0 K/UL    ABS. LYMPHOCYTES 1.7 0.8 - 3.5 K/UL    ABS. MONOCYTES 0.6 0.0 - 1.0 K/UL    ABS. EOSINOPHILS 0.2 0.0 - 0.4 K/UL    ABS. BASOPHILS 0.0 0.0 - 0.1 K/UL    ABS. IMM. GRANS. 0.0 0.00 - 0.04 K/UL    DF AUTOMATED     GLUCOSE, POC    Collection Time: 09/04/22  7:57 AM   Result Value Ref Range    Glucose (POC) 93 65 - 117 mg/dL    Performed by Song BANEGAS      08/30/22    ECHO ADULT COMPLETE 09/03/2022 9/3/2022    Interpretation Summary  Formatting of this result is different from the original.      Left Ventricle: Severely reduced left ventricular systolic function with a visually estimated EF of 30 - 35%. Left ventricle size is normal. Increased wall thickness. Findings consistent with moderate concentric hypertrophy. Severe global hypokinesis present. Grade II diastolic dysfunction with increased LAP. Left Atrium: Left atrium is moderately dilated. Right Atrium: Right atrium is mildly dilated.     Signed by: Aston Gee MD on 9/3/2022  8:02 AM          Assessment and Plan:    Chronic combined systolic and diastolic CHF with EF 96-20%, unclear if NICM or ICM, NYHA class II  Volume status likely complicated significantly by renal insufficiency   - continue medical therapies (metoprolol, hydralazine, ISDN)  - no ARB / ACEI / ARNI / aldactone given advanced renal failure    Cardiomyopathy  EF 30-35%  - Etiology unclear  - Defer cath until renal situation more clear to avoid nephrotoxic contrast    Acute on chronic renal insufficiency  Nephrology work-up in progress  - HD per Nephrology    Polysubstance abuse  Now abstinent  HTN  DM2  Anemia      Signed:  Buck Thomas MD  Interventional Cardiology  9/4/2022

## 2022-09-04 NOTE — PROGRESS NOTES
[address] [facility] Adult  Hospitalist Group                                                                                          Hospitalist Progress Note  Gaurav Gallo MD  Answering service: [phone] OR [phone] from in house phone        Date of Service:  2022  NAME:  Luma Ram  :  1965  MRN:  [id number]      Admission Summary:   Luma Ram is a 65 y.o. male with past medical history of CKD, type 2 diabetes mellitus, hypertension, gout, heroin/ cocaine/ Fentanyl abuse, tobacco abuse presented as a direct admission/ transfer from [facility]) ED to [facility]) with chief complaints of  lower back, right hip, bilateral knee pain and swelling. Patient notes onset of symptoms starting about 1 week ago, constant, moderate severity, worsening, without specific aggravating/ alleviating factors, with associated swelling in both legs. On arrival in the ED, initial recorded vital signs were T 98.1 F, /107m HR 91, RR 13, O2sat 95% on room air. Abnormal labs included BUN 78, creatinine 9.08, K 6.1, CO2 20, , , CRP 9.23, proBNP > 35,000. CT abdomen/pelvis without IV contrast showed mild bilateral perinephric fat stranding suggesting CKD with no acute process noted. Patient was given [facility] and then transferred to [facility] [place]. Interval history / Subjective:   States knees are bothering him but he otherwise feels well. No other concerns or complaints today.       Assessment & Plan:     #ESRD (end stage renal disease)   Rapidly progressive renal failure, possible COVAN v diabetic nephropathy per nephrology.   - HD started , biopsy   Plan:   - HD per nephrology   - F/u ipTH, vit D, Hep C  - Continue renvela  -Appreciated nephrology recommendations     #Bilateral lower extremity edema   #HFrEF, EF 25-50%  - NYHA class II   -elevated proBNP 35,000 (albeit without overt pulmonary edema noted on chest xray)  -venous duplex BLE negative for DVT  Plan:   - Consult cardiology, appreciate recommendations   - On metop, hydral, ISDN  - Holding ARB/ACE/aldactone due to renal failure  - place on strict Is and Os and daily weights  - keep BLE elevated at rest  - F/u ECHO    #Hepatitis C   - Hep C positive, with Quant 7.2 million   Plan:   - Consult hepatology     #Arthralgia   #History of gout  -prior h/o gout but uric acid level still within normal limits  -CRP 9.23; may suggest acute inflammatory/ polyarthritis  - L knee with worsening medial effusion, and pt states pain has been worsening over past week   - US left knee with effusion  - Case discussed with ortho on call who feels that in the absence of signs of infectious arthritis, can be managed conservatively without tap   Plan:   - Ice application prn   - Diclofenac gel   - PT eval     #Elevated CK level  - Initial ; possibly secondary to acute renal failure   - Improved to 526  - monitor    #Secondary Hyperparathyroidism  - Likely 2/2 ESRD  - Plan for PCP, nephro f/u      #Acute right hip pain   -order Tylenol for mild/ moderate pain     #History of Heroin/ cocaine/ Fentanyl abuse  -Negative UDS     #Tobacco abuse  -encourage smoking cessation     #Normocytic, normochromic anemia  -Hgb 9.6     #Type 2 diabetes mellitus: Blood sugar is stable. Hemoglobin A1c is 6    #Acute hyperkalemia,   -- Low K diet, HD to manag     VTE prophylaxis  -SCDs to BLEs     ADVANCED DIRECTIVE/ CODE STATUS:  FULL CODE as per discussion with patient.     Code status: FULL  Prophylaxis: SCDs  Care Plan discussed with: patient, nursing  Anticipated Disposition: [place] [facility]  Date Reviewed: 7/12/2022            Codes Class Noted POA    Chronic combined systolic and diastolic CHF (congestive heart failure) (HCC) (Chronic) ICD-10-CM: I50.42  ICD-9-CM: 428.42, 428.0  9/2/2022 Unknown        Acute hyperkalemia ICD-10-CM: E87.5  ICD-9-CM: 276.7  8/31/2022 Unknown        Arthralgia ICD-10-CM: M25.50  ICD-9-CM: 719.40  8/31/2022 Unknown        ESRD (end stage renal disease) ([facility] [address] ICD-10-CM: N18.6  ICD-9-CM: 585.6  8/31/2022 Unknown        Bilateral lower extremity edema ICD-10-CM: R60.0  ICD-9-CM: 782.3  8/31/2022 Unknown        Acute right hip pain ICD-10-CM: M25.551  ICD-9-CM: 719.45  8/31/2022 Unknown             Review of Systems:   A comprehensive review of systems was negative except for that written in the HPI. Vital Signs:    Last 24hrs VS reviewed since prior progress note. Most recent are:  Visit Vitals  BP (!) 150/97   Pulse 71   Temp 97.4 °F (36.3 °C)   Resp 12   Ht 5' 11\" (1.803 m)   Wt 98 kg (216 lb)   SpO2 94%   BMI 30.13 kg/m²         Intake/Output Summary (Last 24 hours) at 9/4/2022 0844  Last data filed at 9/3/2022 1035  Gross per 24 hour   Intake --   Output 1500 ml   Net -1500 ml        Physical Examination:     I had a face to face encounter with this patient and independently examined them on 9/4/2022 as outlined below:          Constitutional:  No acute distress, cooperative, pleasant    ENT:  Oral mucosa moist, oropharynx benign. Resp:  CTA bilaterally. No wheezing/rhonchi/rales. No accessory muscle use. CV:  Regular rhythm, normal rate, no murmurs, gallops, rubs    GI:  Soft, non distended, non tender. normoactive bowel sounds, no hepatosplenomegaly     Musculoskeletal:  2+ pulses throughout. L knee with medial effusion, no erythema    Neurologic:  Moves all extremities.   AAOx3, CN II-XII reviewed            Data Review:    Review and/or order of clinical lab test      Labs:     Recent Labs     09/04/22  0046 09/03/22  0454   WBC 5.9 5.1   HGB 10.1* 9.4*   HCT 31.1* 29.3*    223     Recent Labs     09/04/22  0046 09/03/22  0454 09/02/22  0150   * 136 135*   K 5.8* 5.2* 5.2*    104 104   CO2 23 23 25   BUN 39* 53* 38*   CREA 5.51* 6.84* 5.62*   * 109* 117*   CA 8.3* 7.8* 7.3*  7.5*   PHOS 6.2* 6.3* 5.3*  5.1*     Recent Labs 09/04/22  0046 09/03/22  0454 09/02/22  0150   ALB 2.8* 2.5* 2.5*     No results for input(s): INR, PTP, APTT, INREXT, INREXT in the last 72 hours. No results for input(s): FE, TIBC, PSAT, FERR in the last 72 hours. No results found for: FOL, RBCF   No results for input(s): PH, PCO2, PO2 in the last 72 hours.   Recent Labs     09/02/22  0745   *     No results found for: CHOL, CHOLX, CHLST, CHOLV, HDL, HDLP, LDL, LDLC, DLDLP, TGLX, TRIGL, TRIGP, CHHD, CHHDX  Lab Results   Component Value Date/Time    Glucose (POC) 93 09/04/2022 07:57 AM    Glucose (POC) 129 (H) 09/03/2022 09:20 PM    Glucose (POC) 113 09/03/2022 04:57 PM    Glucose (POC) 105 09/03/2022 11:27 AM    Glucose (POC) 103 09/02/2022 09:07 PM     Lab Results   Component Value Date/Time    Color YELLOW/STRAW 08/30/2022 01:48 PM    Appearance CLEAR 08/30/2022 01:48 PM    Specific gravity 1.015 08/30/2022 01:48 PM    Specific gravity 1.030 07/01/2015 01:41 PM    pH (UA) 7.5 08/30/2022 01:48 PM    Protein 300 (A) 08/30/2022 01:48 PM    Glucose Negative 08/30/2022 01:48 PM    Ketone Negative 08/30/2022 01:48 PM    Bilirubin Negative 08/30/2022 01:48 PM    Urobilinogen 1.0 08/30/2022 01:48 PM    Nitrites Negative 08/30/2022 01:48 PM    Leukocyte Esterase Negative 08/30/2022 01:48 PM    Epithelial cells FEW 08/30/2022 01:48 PM    Bacteria Negative 08/30/2022 01:48 PM    WBC 0-4 08/30/2022 01:48 PM    RBC 0-5 08/30/2022 01:48 PM         Medications Reviewed:     Current Facility-Administered Medications   Medication Dose Route Frequency    heparin (porcine) 1,000 unit/mL injection 2,600 Units  2,600 Units IntraCATHeter PRN    diclofenac (VOLTAREN) 1 % topical gel 2 g  2 g Topical BID    calcitRIOL (ROCALTROL) capsule 0.25 mcg  0.25 mcg Oral DAILY    0.9% sodium chloride infusion  25 mL/hr IntraVENous RAD CONTINUOUS    isosorbide dinitrate (ISORDIL) tablet 20 mg  20 mg Oral BID    hydrALAZINE (APRESOLINE) tablet 25 mg  25 mg Oral BID    sodium chloride (NS) flush 5-40 mL  5-40 mL IntraVENous Q8H    sodium chloride (NS) flush 5-40 mL  5-40 mL IntraVENous PRN    glucose chewable tablet 16 g  4 Tablet Oral PRN    glucagon (GLUCAGEN) injection 1 mg  1 mg IntraMUSCular PRN    dextrose 10 % infusion 0-250 mL  0-250 mL IntraVENous PRN    insulin lispro (HUMALOG) injection   SubCUTAneous AC&HS    amLODIPine (NORVASC) tablet 5 mg  5 mg Oral DAILY    folic acid (FOLVITE) tablet 1 mg  1 mg Oral DAILY    gabapentin (NEURONTIN) capsule 300 mg  300 mg Oral TID    metoprolol succinate (TOPROL-XL) XL tablet 100 mg  100 mg Oral DAILY    thiamine HCL (B-1) tablet 100 mg  100 mg Oral DAILY    traZODone (DESYREL) tablet 50 mg  50 mg Oral QHS PRN    acetaminophen (TYLENOL) tablet 650 mg  650 mg Oral Q6H PRN    sevelamer carbonate (RENVELA) oral powder 0.8 g  0.8 g Oral TID WITH MEALS    ferrous sulfate tablet 325 mg  1 Tablet Oral BID WITH MEALS    iron sucrose (VENOFER) injection 100 mg  100 mg IntraVENous DAILY     ______________________________________________________________________  EXPECTED LENGTH OF STAY: - - -  ACTUAL LENGTH OF STAY:          5                 Rosey Aranda MD

## 2022-09-05 LAB
ALBUMIN SERPL-MCNC: 2.6 G/DL (ref 3.5–5)
ANION GAP SERPL CALC-SCNC: 9 MMOL/L (ref 5–15)
BASOPHILS # BLD: 0 K/UL (ref 0–0.1)
BASOPHILS NFR BLD: 0 % (ref 0–1)
BUN SERPL-MCNC: 56 MG/DL (ref 6–20)
BUN/CREAT SERPL: 8 (ref 12–20)
CALCIUM SERPL-MCNC: 7.9 MG/DL (ref 8.5–10.1)
CHLORIDE SERPL-SCNC: 103 MMOL/L (ref 97–108)
CO2 SERPL-SCNC: 24 MMOL/L (ref 21–32)
CREAT SERPL-MCNC: 6.72 MG/DL (ref 0.7–1.3)
DIFFERENTIAL METHOD BLD: ABNORMAL
EOSINOPHIL # BLD: 0.2 K/UL (ref 0–0.4)
EOSINOPHIL NFR BLD: 4 % (ref 0–7)
ERYTHROCYTE [DISTWIDTH] IN BLOOD BY AUTOMATED COUNT: 12.9 % (ref 11.5–14.5)
GLUCOSE BLD STRIP.AUTO-MCNC: 117 MG/DL (ref 65–117)
GLUCOSE BLD STRIP.AUTO-MCNC: 150 MG/DL (ref 65–117)
GLUCOSE BLD STRIP.AUTO-MCNC: 181 MG/DL (ref 65–117)
GLUCOSE SERPL-MCNC: 103 MG/DL (ref 65–100)
HBV SURFACE AB SER QL: REACTIVE
HBV SURFACE AB SER-ACNC: 25.46 MIU/ML
HCT VFR BLD AUTO: 30.2 % (ref 36.6–50.3)
HGB BLD-MCNC: 10 G/DL (ref 12.1–17)
IMM GRANULOCYTES # BLD AUTO: 0 K/UL (ref 0–0.04)
IMM GRANULOCYTES NFR BLD AUTO: 1 % (ref 0–0.5)
LYMPHOCYTES # BLD: 2.2 K/UL (ref 0.8–3.5)
LYMPHOCYTES NFR BLD: 42 % (ref 12–49)
MCH RBC QN AUTO: 31.3 PG (ref 26–34)
MCHC RBC AUTO-ENTMCNC: 33.1 G/DL (ref 30–36.5)
MCV RBC AUTO: 94.7 FL (ref 80–99)
MONOCYTES # BLD: 0.5 K/UL (ref 0–1)
MONOCYTES NFR BLD: 10 % (ref 5–13)
NEUTS SEG # BLD: 2.3 K/UL (ref 1.8–8)
NEUTS SEG NFR BLD: 43 % (ref 32–75)
NRBC # BLD: 0 K/UL (ref 0–0.01)
NRBC BLD-RTO: 0 PER 100 WBC
PHOSPHATE SERPL-MCNC: 6.7 MG/DL (ref 2.6–4.7)
PLATELET # BLD AUTO: 257 K/UL (ref 150–400)
PMV BLD AUTO: 9.6 FL (ref 8.9–12.9)
POTASSIUM SERPL-SCNC: 5.3 MMOL/L (ref 3.5–5.1)
RBC # BLD AUTO: 3.19 M/UL (ref 4.1–5.7)
SERVICE CMNT-IMP: ABNORMAL
SERVICE CMNT-IMP: ABNORMAL
SERVICE CMNT-IMP: NORMAL
SODIUM SERPL-SCNC: 136 MMOL/L (ref 136–145)
URATE SERPL-MCNC: 4.5 MG/DL (ref 3.5–7.2)
WBC # BLD AUTO: 5.2 K/UL (ref 4.1–11.1)

## 2022-09-05 PROCEDURE — 84550 ASSAY OF BLOOD/URIC ACID: CPT

## 2022-09-05 PROCEDURE — 80069 RENAL FUNCTION PANEL: CPT

## 2022-09-05 PROCEDURE — 82595 ASSAY OF CRYOGLOBULIN: CPT

## 2022-09-05 PROCEDURE — 74011250637 HC RX REV CODE- 250/637: Performed by: INTERNAL MEDICINE

## 2022-09-05 PROCEDURE — 82962 GLUCOSE BLOOD TEST: CPT

## 2022-09-05 PROCEDURE — 74011000250 HC RX REV CODE- 250: Performed by: FAMILY MEDICINE

## 2022-09-05 PROCEDURE — 74011250637 HC RX REV CODE- 250/637: Performed by: FAMILY MEDICINE

## 2022-09-05 PROCEDURE — 86706 HEP B SURFACE ANTIBODY: CPT

## 2022-09-05 PROCEDURE — 85025 COMPLETE CBC W/AUTO DIFF WBC: CPT

## 2022-09-05 PROCEDURE — 94760 N-INVAS EAR/PLS OXIMETRY 1: CPT

## 2022-09-05 PROCEDURE — 86431 RHEUMATOID FACTOR QUANT: CPT

## 2022-09-05 PROCEDURE — 74011636637 HC RX REV CODE- 636/637: Performed by: STUDENT IN AN ORGANIZED HEALTH CARE EDUCATION/TRAINING PROGRAM

## 2022-09-05 PROCEDURE — 36591 DRAW BLOOD OFF VENOUS DEVICE: CPT

## 2022-09-05 PROCEDURE — 65270000046 HC RM TELEMETRY

## 2022-09-05 PROCEDURE — 86704 HEP B CORE ANTIBODY TOTAL: CPT

## 2022-09-05 RX ORDER — PREDNISONE 20 MG/1
40 TABLET ORAL
Status: COMPLETED | OUTPATIENT
Start: 2022-09-05 | End: 2022-09-07

## 2022-09-05 RX ADMIN — PREDNISONE 40 MG: 20 TABLET ORAL at 10:18

## 2022-09-05 RX ADMIN — GABAPENTIN 300 MG: 300 CAPSULE ORAL at 08:24

## 2022-09-05 RX ADMIN — AMLODIPINE BESYLATE 5 MG: 5 TABLET ORAL at 08:24

## 2022-09-05 RX ADMIN — SODIUM CHLORIDE, PRESERVATIVE FREE 10 ML: 5 INJECTION INTRAVENOUS at 22:00

## 2022-09-05 RX ADMIN — SEVELAMER CARBONATE FOR ORAL SUSPENSION 0.8 G: 800 POWDER, FOR SUSPENSION ORAL at 17:26

## 2022-09-05 RX ADMIN — SEVELAMER CARBONATE FOR ORAL SUSPENSION 0.8 G: 800 POWDER, FOR SUSPENSION ORAL at 13:21

## 2022-09-05 RX ADMIN — HYDRALAZINE HYDROCHLORIDE 25 MG: 25 TABLET, FILM COATED ORAL at 17:27

## 2022-09-05 RX ADMIN — TRAZODONE HYDROCHLORIDE 50 MG: 50 TABLET ORAL at 21:37

## 2022-09-05 RX ADMIN — SODIUM CHLORIDE, PRESERVATIVE FREE 10 ML: 5 INJECTION INTRAVENOUS at 06:00

## 2022-09-05 RX ADMIN — ISOSORBIDE DINITRATE 20 MG: 20 TABLET ORAL at 17:27

## 2022-09-05 RX ADMIN — GABAPENTIN 300 MG: 300 CAPSULE ORAL at 21:37

## 2022-09-05 RX ADMIN — FERROUS SULFATE TAB 325 MG (65 MG ELEMENTAL FE) 325 MG: 325 (65 FE) TAB at 08:23

## 2022-09-05 RX ADMIN — METOPROLOL SUCCINATE 100 MG: 50 TABLET, EXTENDED RELEASE ORAL at 08:23

## 2022-09-05 RX ADMIN — DICLOFENAC SODIUM 2 G: 10 GEL TOPICAL at 08:21

## 2022-09-05 RX ADMIN — TRAZODONE HYDROCHLORIDE 50 MG: 50 TABLET ORAL at 02:11

## 2022-09-05 RX ADMIN — DICLOFENAC SODIUM 2 G: 10 GEL TOPICAL at 17:28

## 2022-09-05 RX ADMIN — FERROUS SULFATE TAB 325 MG (65 MG ELEMENTAL FE) 325 MG: 325 (65 FE) TAB at 17:26

## 2022-09-05 RX ADMIN — HYDRALAZINE HYDROCHLORIDE 25 MG: 25 TABLET, FILM COATED ORAL at 08:24

## 2022-09-05 RX ADMIN — ISOSORBIDE DINITRATE 20 MG: 20 TABLET ORAL at 08:23

## 2022-09-05 RX ADMIN — SODIUM CHLORIDE, PRESERVATIVE FREE 10 ML: 5 INJECTION INTRAVENOUS at 13:22

## 2022-09-05 RX ADMIN — CALCITRIOL CAPSULES 0.25 MCG 0.25 MCG: 0.25 CAPSULE ORAL at 08:23

## 2022-09-05 RX ADMIN — Medication 100 MG: at 08:24

## 2022-09-05 RX ADMIN — GABAPENTIN 300 MG: 300 CAPSULE ORAL at 13:21

## 2022-09-05 RX ADMIN — SEVELAMER CARBONATE FOR ORAL SUSPENSION 0.8 G: 800 POWDER, FOR SUSPENSION ORAL at 08:22

## 2022-09-05 RX ADMIN — FOLIC ACID 1 MG: 1 TABLET ORAL at 08:23

## 2022-09-05 NOTE — PROGRESS NOTES
Transition of Care Plan  RUR 15%    Disposition  Home    Transportation  Will need ride- has medicaid    DME  RW  referral sent to [facility] [phone]   Service agreement competed and referral sent to [facility] order demographics and agreement attached  RW in patient's room    Hemodialysis   Out patient  TTS [id number] arranged transport with [address] [phone] for first outpatient dialysis 9/6/22 for 10 am  . Confirmation # [id number]    Medical follow up   PCP and specialist    CM will continue to follow patient until discharge  CM will assist with ride home as patient does not have anyone to drive him home. He lives with a roommate. I patient does not discharge today, CM will need to follow up with Catrachita Vaughn and [facility] .

## 2022-09-05 NOTE — PROGRESS NOTES
[facility] Adult  Hospitalist Group                                                                                          Hospitalist Progress Note  Sid Wells MD  Answering service: [phone] from in house phone        Date of Service:  2022  NAME:  Brian Calvillo  :  1965  MRN:  [id number]      Admission Summary:   Brian Calvillo is a 65 y.o. male with past medical history of CKD, type 2 diabetes mellitus, hypertension, gout, heroin/ cocaine/ Fentanyl abuse, tobacco abuse presented as a direct admission/ transfer from [facility] ED to [facility]) with chief complaints of  lower back, right hip, bilateral knee pain and swelling. Patient notes onset of symptoms starting about 1 week ago, constant, moderate severity, worsening, without specific aggravating/ alleviating factors, with associated swelling in both legs. On arrival in the ED, initial recorded vital signs were T 98.1 F, /107m HR 91, RR 13, O2sat 95% on room air. Abnormal labs included BUN 78, creatinine 9.08, K 6.1, CO2 20, , , CRP 9.23, proBNP > 35,000. CT abdomen/pelvis without IV contrast showed mild bilateral perinephric fat stranding suggesting CKD with no acute process noted. [facility] then [facility] to [address] [place] [address].             Interval history / Subjective:   Continues to have knee pain, unclear if symptoms are similar to previous gout flares or not      Assessment & Plan:     #ESRD (end stage renal disease)   Rapidly progressive renal failure, possible COVAN v diabetic nephropathy per nephrology.   - HD started , biopsy   Plan:   - HD per nephrology   - F/u ipTH, vit D, Hep C  - Continue renvela  -Appreciated nephrology recommendations  - CM to help coordinate ongoing HD in community      #Bilateral lower extremity edema   #HFrEF, EF 30-35%  - NYHA class II   -elevated proBNP 35,000 (albeit without overt pulmonary edema noted on chest xray)  -venous duplex BLE negative for DVT  - ECHO with EF 30-35% with severe global hypokensis present, diastolic dysfunction   Plan:   - Consult cardiology, appreciate recommendations   - On metop, hydral, ISDN  - Holding ARB/ACE/aldactone due to renal failure  - place on strict Is and Os and daily weights  - keep BLE elevated at rest  - Will need cath once medically stable     #Hepatitis C   - Hep C positive, with Quant 7.2 million   Plan:   - Hepatology consulted, appreciate any recommendations     #Arthralgia   #History of gout  -prior h/o gout but uric acid level still within normal limits  -CRP 9.23; may suggest acute inflammatory/ polyarthritis  - L knee with worsening medial effusion, and pt states pain has been worsening over past week   - US left knee with effusion  - Case discussed with ortho on call who feels that in the absence of signs of infectious arthritis, can be managed conservatively without tap   Plan:   - Ice application prn   - Diclofenac gel   - PT eval   - Will initiate short burst of steroids today for management of possible gout flare     #Elevated CK level  - Initial ; possibly secondary to acute renal failure   - Improved to 526  - monitor    #Secondary Hyperparathyroidism  - Likely 2/2 ESRD  - Plan for PCP, nephro f/u      #Acute right hip pain   -order Tylenol for mild/ moderate pain     #History of Heroin/ cocaine/ Fentanyl abuse  -Negative UDS     #Tobacco abuse  -encourage smoking cessation     #Normocytic, normochromic anemia  -Hgb 9.6     #Type 2 diabetes mellitus: Blood sugar is stable. Hemoglobin A1c is 6    #Acute hyperkalemia,   -- Low K diet, HD to manag     VTE prophylaxis  -SCDs to BLEs     ADVANCED DIRECTIVE/ CODE STATUS:  FULL CODE as per discussion with patient.     Code status: FULL  Prophylaxis: SCDs  Care Plan discussed with: patient, nursing  Anticipated Disposition: 48h, will discuss dispo with [facility] Problems  Date Reviewed: 7/12/2022            Codes Class Noted POA    Chronic combined systolic and diastolic CHF (congestive heart failure) (HCC) (Chronic) ICD-10-CM: I50.42  ICD-9-CM: 428.42, 428.0  9/2/2022 Unknown        Acute hyperkalemia ICD-10-CM: E87.5  ICD-9-CM: 276.7  8/31/2022 Unknown        Arthralgia ICD-10-CM: M25.50  ICD-9-CM: 719.40  8/31/2022 Unknown        ESRD (end stage renal disease) ([facility] [address] ICD-10-CM: N18.6  ICD-9-CM: 585.6  8/31/2022 Unknown        Bilateral lower extremity edema ICD-10-CM: R60.0  ICD-9-CM: 782.3  8/31/2022 Unknown        Acute right hip pain ICD-10-CM: M25.551  ICD-9-CM: 719.45  8/31/2022 Unknown             Review of Systems:   A comprehensive review of systems was negative except for that written in the HPI. Vital Signs:    Last 24hrs VS reviewed since prior progress note. Most recent are:  Visit Vitals  BP (!) 148/85 (BP 1 Location: Right upper arm)   Pulse 69   Temp 98 °F (36.7 °C)   Resp 13   Ht 5' 11\" (1.803 m)   Wt 85.3 kg (188 lb)   SpO2 94%   BMI 26.22 kg/m²         Intake/Output Summary (Last 24 hours) at 9/5/2022 0800  Last data filed at 9/5/2022 3607  Gross per 24 hour   Intake 120 ml   Output 900 ml   Net -780 ml        Physical Examination:     I had a face to face encounter with this patient and independently examined them on 9/5/2022 as outlined below:          Constitutional:  No acute distress, cooperative, pleasant    ENT:  Oral mucosa moist, oropharynx benign. Resp:  CTA bilaterally. No wheezing/rhonchi/rales. No accessory muscle use. CV:  Regular rhythm, normal rate, no murmurs, gallops, rubs    GI:  Soft, non distended, non tender. normoactive bowel sounds, no hepatosplenomegaly     Musculoskeletal:  2+ pulses throughout. L knee with medial effusion, no erythema    Neurologic:  Moves all extremities.   AAOx3, CN II-XII reviewed            Data Review:    Review and/or order of clinical lab test      Labs:     Recent Labs     09/05/22  0215 09/04/22  0046   WBC 5.2 5.9 HGB 10.0* 10.1*   HCT 30.2* 31.1*    230     Recent Labs     09/05/22  0608 09/04/22  0046 09/03/22  0454    134* 136   K 5.3* 5.8* 5.2*    102 104   CO2 24 23 23   BUN 56* 39* 53*   CREA 6.72* 5.51* 6.84*   * 119* 109*   CA 7.9* 8.3* 7.8*   PHOS 6.7* 6.2* 6.3*     Recent Labs     09/05/22  0608 09/04/22  0046 09/03/22  0454   ALB 2.6* 2.8* 2.5*     No results for input(s): INR, PTP, APTT, INREXT, INREXT in the last 72 hours. No results for input(s): FE, TIBC, PSAT, FERR in the last 72 hours. No results found for: FOL, RBCF   No results for input(s): PH, PCO2, PO2 in the last 72 hours. No results for input(s): CPK, CKNDX, TROIQ in the last 72 hours.     No lab exists for component: CPKMB    No results found for: CHOL, CHOLX, CHLST, CHOLV, HDL, HDLP, LDL, LDLC, DLDLP, TGLX, TRIGL, TRIGP, CHHD, CHHDX  Lab Results   Component Value Date/Time    Glucose (POC) 114 09/04/2022 09:35 PM    Glucose (POC) 126 (H) 09/04/2022 04:54 PM    Glucose (POC) 118 (H) 09/04/2022 11:54 AM    Glucose (POC) 93 09/04/2022 07:57 AM    Glucose (POC) 129 (H) 09/03/2022 09:20 PM     Lab Results   Component Value Date/Time    Color YELLOW/STRAW 08/30/2022 01:48 PM    Appearance CLEAR 08/30/2022 01:48 PM    Specific gravity 1.015 08/30/2022 01:48 PM    Specific gravity 1.030 07/01/2015 01:41 PM    pH (UA) 7.5 08/30/2022 01:48 PM    Protein 300 (A) 08/30/2022 01:48 PM    Glucose Negative 08/30/2022 01:48 PM    Ketone Negative 08/30/2022 01:48 PM    Bilirubin Negative 08/30/2022 01:48 PM    Urobilinogen 1.0 08/30/2022 01:48 PM    Nitrites Negative 08/30/2022 01:48 PM    Leukocyte Esterase Negative 08/30/2022 01:48 PM    Epithelial cells FEW 08/30/2022 01:48 PM    Bacteria Negative 08/30/2022 01:48 PM    WBC 0-4 08/30/2022 01:48 PM    RBC 0-5 08/30/2022 01:48 PM         Medications Reviewed:     Current Facility-Administered Medications   Medication Dose Route Frequency    heparin (porcine) 1,000 unit/mL injection 2,600 Units  2,600 Units IntraCATHeter PRN    diclofenac (VOLTAREN) 1 % topical gel 2 g  2 g Topical BID    calcitRIOL (ROCALTROL) capsule 0.25 mcg  0.25 mcg Oral DAILY    0.9% sodium chloride infusion  25 mL/hr IntraVENous RAD CONTINUOUS    isosorbide dinitrate (ISORDIL) tablet 20 mg  20 mg Oral BID    hydrALAZINE (APRESOLINE) tablet 25 mg  25 mg Oral BID    sodium chloride (NS) flush 5-40 mL  5-40 mL IntraVENous Q8H    sodium chloride (NS) flush 5-40 mL  5-40 mL IntraVENous PRN    glucose chewable tablet 16 g  4 Tablet Oral PRN    glucagon (GLUCAGEN) injection 1 mg  1 mg IntraMUSCular PRN    dextrose 10 % infusion 0-250 mL  0-250 mL IntraVENous PRN    insulin lispro (HUMALOG) injection   SubCUTAneous AC&HS    amLODIPine (NORVASC) tablet 5 mg  5 mg Oral DAILY    folic acid (FOLVITE) tablet 1 mg  1 mg Oral DAILY    gabapentin (NEURONTIN) capsule 300 mg  300 mg Oral TID    metoprolol succinate (TOPROL-XL) XL tablet 100 mg  100 mg Oral DAILY    thiamine HCL (B-1) tablet 100 mg  100 mg Oral DAILY    traZODone (DESYREL) tablet 50 mg  50 mg Oral QHS PRN    acetaminophen (TYLENOL) tablet 650 mg  650 mg Oral Q6H PRN    sevelamer carbonate (RENVELA) oral powder 0.8 g  0.8 g Oral TID WITH MEALS    ferrous sulfate tablet 325 mg  1 Tablet Oral BID WITH MEALS    iron sucrose (VENOFER) injection 100 mg  100 mg IntraVENous DAILY     ______________________________________________________________________  EXPECTED LENGTH OF STAY: - - -  ACTUAL LENGTH OF STAY:          6                 Quita Garcia MD

## 2022-09-05 NOTE — PROGRESS NOTES
Cardiology Progress Note                                        Admit Date: 8/30/2022    Assessment/Plan:     Chronic combined systolic and diastolic CHF with EF 24-87%, unclear if NICM or ICM, NYHA class II  Volume status likely complicated significantly by renal insufficiency   - continue medical therapies (metoprolol, hydralazine, ISDN)  - no ARB / ACEI / ARNI / aldactone given advanced renal failure     Cardiomyopathy  EF 30-35%  - Etiology unclear  - Defer cath until renal situation more clear to avoid nephrotoxic contrast     Acute on chronic renal insufficiency  Nephrology work-up in progress  - HD per Nephrology    Net out 200      Polysubstance abuse  Now abstinent  HTN  DM2  Anemia  HCV. Increased alt  Bilaeral knee pain warm to touch  ? Gout. Check UA  Eval per medicine       Aleksandra Dai is a 65 y.o. male with     PROBLEM LIST:  Patient Active Problem List    Diagnosis Date Noted    Chronic combined systolic and diastolic CHF (congestive heart failure) ([facility] [address] 09/02/2022    Acute hyperkalemia 08/31/2022    Arthralgia 08/31/2022    ESRD (end stage renal disease) ([facility] [address] 08/31/2022    Bilateral lower extremity edema 08/31/2022    Acute right hip pain 08/31/2022    BHASKAR (acute kidney injury) ([facility] [address] 01/11/2022    COVID-19 01/11/2022    Smoker 11/09/2016    CAP (community acquired pneumonia) 11/08/2016    Sepsis ([facility] [address] 11/08/2016    Cellulitis 11/19/2015    DM type 2 (diabetes mellitus, type 2) ([facility] [address] 11/19/2015    HTN (hypertension) 11/19/2015    Gout 11/19/2015    Neuropathy 11/19/2015         Subjective:     Aleksandra Dai denies chest pain.   Bilateral knee pain     Visit Vitals  BP (!) 131/92   Pulse 68   Temp 98.1 °F (36.7 °C)   Resp 16   Ht 5' 11\" (1.803 m)   Wt 85.3 kg (188 lb)   SpO2 95%   BMI 26.22 kg/m²       Intake/Output Summary (Last 24 hours) at 9/5/2022 0840  Last data filed at 9/5/2022 0820  Gross per 24 hour   Intake 120 ml   Output 1100 ml   Net -980 ml       Objective:      Physical Exam:  HEENT: Perrla, EOMI  Neck: No JVD,  No thyroidmegaly  Resp: CTA bilaterally;  No wheezes or rales  CV: RRR s1s2 No murmur no s3  Abd:Soft, Nontender  Ext: No edema  Neuro: Alert and oriented; Nonfocal  Skin: Warm, Dry, Intact  Pulses: 2+ DP/PT/Rad      Telemetry: normal sinus rhythm    Current Facility-Administered Medications   Medication Dose Route Frequency    predniSONE (DELTASONE) tablet 40 mg  40 mg Oral DAILY WITH BREAKFAST    heparin (porcine) 1,000 unit/mL injection 2,600 Units  2,600 Units IntraCATHeter PRN    diclofenac (VOLTAREN) 1 % topical gel 2 g  2 g Topical BID    calcitRIOL (ROCALTROL) capsule 0.25 mcg  0.25 mcg Oral DAILY    0.9% sodium chloride infusion  25 mL/hr IntraVENous RAD CONTINUOUS    isosorbide dinitrate (ISORDIL) tablet 20 mg  20 mg Oral BID    hydrALAZINE (APRESOLINE) tablet 25 mg  25 mg Oral BID    sodium chloride (NS) flush 5-40 mL  5-40 mL IntraVENous Q8H    sodium chloride (NS) flush 5-40 mL  5-40 mL IntraVENous PRN    glucose chewable tablet 16 g  4 Tablet Oral PRN    glucagon (GLUCAGEN) injection 1 mg  1 mg IntraMUSCular PRN    dextrose 10 % infusion 0-250 mL  0-250 mL IntraVENous PRN    insulin lispro (HUMALOG) injection   SubCUTAneous AC&HS    amLODIPine (NORVASC) tablet 5 mg  5 mg Oral DAILY    folic acid (FOLVITE) tablet 1 mg  1 mg Oral DAILY    gabapentin (NEURONTIN) capsule 300 mg  300 mg Oral TID    metoprolol succinate (TOPROL-XL) XL tablet 100 mg  100 mg Oral DAILY    thiamine HCL (B-1) tablet 100 mg  100 mg Oral DAILY    traZODone (DESYREL) tablet 50 mg  50 mg Oral QHS PRN    acetaminophen (TYLENOL) tablet 650 mg  650 mg Oral Q6H PRN    sevelamer carbonate (RENVELA) oral powder 0.8 g  0.8 g Oral TID WITH MEALS    ferrous sulfate tablet 325 mg  1 Tablet Oral BID WITH MEALS    iron sucrose (VENOFER) injection 100 mg  100 mg IntraVENous DAILY         Data Review:   Labs:    Recent Results (from the past 24 hour(s))   GLUCOSE, POC    Collection Time: 09/04/22 11:54 AM Result Value Ref Range    Glucose (POC) 118 (H) 65 - 117 mg/dL    Performed by Maya BANEGAS    GLUCOSE, POC    Collection Time: 09/04/22  4:54 PM   Result Value Ref Range    Glucose (POC) 126 (H) 65 - 117 mg/dL    Performed by Erin KATHLEEN    GLUCOSE, POC    Collection Time: 09/04/22  9:35 PM   Result Value Ref Range    Glucose (POC) 114 65 - 117 mg/dL    Performed by Xavier Piña    CBC WITH AUTOMATED DIFF    Collection Time: 09/05/22  2:15 AM   Result Value Ref Range    WBC 5.2 4.1 - 11.1 K/uL    RBC 3.19 (L) 4.10 - 5.70 M/uL    HGB 10.0 (L) 12.1 - 17.0 g/dL    HCT 30.2 (L) 36.6 - 50.3 %    MCV 94.7 80.0 - 99.0 FL    MCH 31.3 26.0 - 34.0 PG    MCHC 33.1 30.0 - 36.5 g/dL    RDW 12.9 11.5 - 14.5 %    PLATELET 892 704 - 415 K/uL    MPV 9.6 8.9 - 12.9 FL    NRBC 0.0 0  WBC    ABSOLUTE NRBC 0.00 0.00 - 0.01 K/uL    NEUTROPHILS 43 32 - 75 %    LYMPHOCYTES 42 12 - 49 %    MONOCYTES 10 5 - 13 %    EOSINOPHILS 4 0 - 7 %    BASOPHILS 0 0 - 1 %    IMMATURE GRANULOCYTES 1 (H) 0.0 - 0.5 %    ABS. NEUTROPHILS 2.3 1.8 - 8.0 K/UL    ABS. LYMPHOCYTES 2.2 0.8 - 3.5 K/UL    ABS. MONOCYTES 0.5 0.0 - 1.0 K/UL    ABS. EOSINOPHILS 0.2 0.0 - 0.4 K/UL    ABS. BASOPHILS 0.0 0.0 - 0.1 K/UL    ABS. IMM. GRANS. 0.0 0.00 - 0.04 K/UL    DF AUTOMATED     HEP B SURFACE AB    Collection Time: 09/05/22  2:15 AM   Result Value Ref Range    Hepatitis B surface Ab 25.46 mIU/mL    Hep B surface Ab Interp.  REACTIVE (A) NR     RENAL FUNCTION PANEL    Collection Time: 09/05/22  6:08 AM   Result Value Ref Range    Sodium 136 136 - 145 mmol/L    Potassium 5.3 (H) 3.5 - 5.1 mmol/L    Chloride 103 97 - 108 mmol/L    CO2 24 21 - 32 mmol/L    Anion gap 9 5 - 15 mmol/L    Glucose 103 (H) 65 - 100 mg/dL    BUN 56 (H) 6 - 20 MG/DL    Creatinine 6.72 (H) 0.70 - 1.30 MG/DL    BUN/Creatinine ratio 8 (L) 12 - 20      GFR est AA 10 (L) >60 ml/min/1.73m2    GFR est non-AA 9 (L) >60 ml/min/1.73m2    Calcium 7.9 (L) 8.5 - 10.1 MG/DL    Phosphorus 6.7 (H) 2.6 - 4.7 MG/DL    Albumin 2.6 (L) 3.5 - 5.0 g/dL

## 2022-09-05 NOTE — PROGRESS NOTES
1300: Patient has IR consultation for Tunnel Catheter Placement. Unable to speak with IR.    1930: Bedside and Verbal shift change report given to JAMIE Jones (oncoming nurse) by Shahrzad Roberts (offgoing nurse). Report included the following information SBAR, Kardex, ED Summary, Procedure Summary, Intake/Output, MAR, Recent Results, and Cardiac Rhythm NSR .

## 2022-09-05 NOTE — PROGRESS NOTES
Renal Progress Note    NAME:  Kacie Eid   :   1965   MRN:   [id number]     Date/Time:  2022 1:05 PM  Subjective:        seen on HD, HD RN Ursula Littlejohn at bedside  Had renal biopsy this am, patient has generalized bodyache. No N/V/SOB     c/o left knee pain and LBP. No SOB, renal Bx result is pending.  Plan for HD in am. PTH 1300  9/3 seen on HD, no complaint, BP stable   seen and examined, no new complaint, K high today   c/o left knee pain, no SOB, HD later today      Medications reviewed:  Current Facility-Administered Medications   Medication Dose Route Frequency    predniSONE (DELTASONE) tablet 40 mg  40 mg Oral DAILY WITH BREAKFAST    heparin (porcine) 1,000 unit/mL injection 2,600 Units  2,600 Units IntraCATHeter PRN    diclofenac (VOLTAREN) 1 % topical gel 2 g  2 g Topical BID    calcitRIOL (ROCALTROL) capsule 0.25 mcg  0.25 mcg Oral DAILY    0.9% sodium chloride infusion  25 mL/hr IntraVENous RAD CONTINUOUS    isosorbide dinitrate (ISORDIL) tablet 20 mg  20 mg Oral BID    hydrALAZINE (APRESOLINE) tablet 25 mg  25 mg Oral BID    sodium chloride (NS) flush 5-40 mL  5-40 mL IntraVENous Q8H    sodium chloride (NS) flush 5-40 mL  5-40 mL IntraVENous PRN    glucose chewable tablet 16 g  4 Tablet Oral PRN    glucagon (GLUCAGEN) injection 1 mg  1 mg IntraMUSCular PRN    dextrose 10 % infusion 0-250 mL  0-250 mL IntraVENous PRN    insulin lispro (HUMALOG) injection   SubCUTAneous AC&HS    amLODIPine (NORVASC) tablet 5 mg  5 mg Oral DAILY    folic acid (FOLVITE) tablet 1 mg  1 mg Oral DAILY    gabapentin (NEURONTIN) capsule 300 mg  300 mg Oral TID    metoprolol succinate (TOPROL-XL) XL tablet 100 mg  100 mg Oral DAILY    thiamine HCL (B-1) tablet 100 mg  100 mg Oral DAILY    traZODone (DESYREL) tablet 50 mg  50 mg Oral QHS PRN    acetaminophen (TYLENOL) tablet 650 mg  650 mg Oral Q6H PRN    sevelamer carbonate (RENVELA) oral powder 0.8 g  0.8 g Oral TID WITH MEALS    ferrous sulfate tablet 325 mg  1 Tablet Oral BID WITH MEALS        Objective:   Vitals:  Visit Vitals  /88 (BP 1 Location: Right upper arm, BP Patient Position: At rest)   Pulse 65   Temp 97.6 °F (36.4 °C)   Resp 15   Ht 5' 11\" (1.803 m)   Wt 85.3 kg (188 lb)   SpO2 96%   BMI 26.22 kg/m²     Temp (24hrs), Av °F (36.7 °C), Min:97.6 °F (36.4 °C), Max:98.6 °F (37 °C)    O2 Flow Rate (L/min): 3 l/min O2 Device: None (Room air)    Last 24hr Input/Output:    Intake/Output Summary (Last 24 hours) at 2022 1201  Last data filed at 2022 0820  Gross per 24 hour   Intake 369 ml   Output 1100 ml   Net -731 ml          Physical Exam:  General:Alert and oriented x3, , No distress,   HEENT: Eyes are PERRL. Conjunctiva without pallor ,erythema. The sclerae without icterus.  .   Neck:Supple, RT IJ temp HD catheter  Lungs : decreased breathing sounds basilar   CVS: RRR, S1 S2 normal, No rub, SM2  Abdomen: Soft, Non tender, ND,  bowel sounds present  Extremities: no edema  Skin: No rash   Neurologic: non focal, AAO x 3  Psych: normal affect       [] Telemetry Reviewed     [] NSR [] PAC/PVCs   [] Afib  [] Paced   [] NSVT   [] Felix [] NGT  [] Intubated on vent    Lab Data Reviewed:    Recent Results (from the past 24 hour(s))   GLUCOSE, POC    Collection Time: 22  4:54 PM   Result Value Ref Range    Glucose (POC) 126 (H) 65 - 117 mg/dL    Performed by SON Gan  PCT    GLUCOSE, POC    Collection Time: 22  9:35 PM   Result Value Ref Range    Glucose (POC) 114 65 - 117 mg/dL    Performed by Whit Cook    CBC WITH AUTOMATED DIFF    Collection Time: 22  2:15 AM   Result Value Ref Range    WBC 5.2 4.1 - 11.1 K/uL    RBC 3.19 (L) 4.10 - 5.70 M/uL    HGB 10.0 (L) 12.1 - 17.0 g/dL    HCT 30.2 (L) 36.6 - 50.3 %    MCV 94.7 80.0 - 99.0 FL    MCH 31.3 26.0 - 34.0 PG    MCHC 33.1 30.0 - 36.5 g/dL    RDW 12.9 11.5 - 14.5 %    PLATELET 068 226 - 412 K/uL    MPV 9.6 8.9 - 12.9 FL    NRBC 0.0 0  WBC    ABSOLUTE NRBC 0.00 0.00 - 0.01 K/uL    NEUTROPHILS 43 32 - 75 %    LYMPHOCYTES 42 12 - 49 %    MONOCYTES 10 5 - 13 %    EOSINOPHILS 4 0 - 7 %    BASOPHILS 0 0 - 1 %    IMMATURE GRANULOCYTES 1 (H) 0.0 - 0.5 %    ABS. NEUTROPHILS 2.3 1.8 - 8.0 K/UL    ABS. LYMPHOCYTES 2.2 0.8 - 3.5 K/UL    ABS. MONOCYTES 0.5 0.0 - 1.0 K/UL    ABS. EOSINOPHILS 0.2 0.0 - 0.4 K/UL    ABS. BASOPHILS 0.0 0.0 - 0.1 K/UL    ABS. IMM. GRANS. 0.0 0.00 - 0.04 K/UL    DF AUTOMATED     HEP B SURFACE AB    Collection Time: 09/05/22  2:15 AM   Result Value Ref Range    Hepatitis B surface Ab 25.46 mIU/mL    Hep B surface Ab Interp. REACTIVE (A) NR     RENAL FUNCTION PANEL    Collection Time: 09/05/22  6:08 AM   Result Value Ref Range    Sodium 136 136 - 145 mmol/L    Potassium 5.3 (H) 3.5 - 5.1 mmol/L    Chloride 103 97 - 108 mmol/L    CO2 24 21 - 32 mmol/L    Anion gap 9 5 - 15 mmol/L    Glucose 103 (H) 65 - 100 mg/dL    BUN 56 (H) 6 - 20 MG/DL    Creatinine 6.72 (H) 0.70 - 1.30 MG/DL    BUN/Creatinine ratio 8 (L) 12 - 20      GFR est AA 10 (L) >60 ml/min/1.73m2    GFR est non-AA 9 (L) >60 ml/min/1.73m2    Calcium 7.9 (L) 8.5 - 10.1 MG/DL    Phosphorus 6.7 (H) 2.6 - 4.7 MG/DL    Albumin 2.6 (L) 3.5 - 5.0 g/dL   URIC ACID    Collection Time: 09/05/22  6:08 AM   Result Value Ref Range    Uric acid 4.5 3.5 - 7.2 MG/DL         Assessment/Plan:     Acute kidney injury on CKD3  Hyperkalemia  Metabolic acidosis  Hyperphosphatemia  Nephrotic syndrome  Rapidly progressive with nephrotic range proteinuria-serology negative except positive Hep C Ab in Jan 2022-urine no active sediment--UPCR 5.6  -might be related to Hep-C , other Ddx:  COVAN vs diabetic nephropathy, FSGS , MM, Membranous GN , IgA nephropathy, Heroin/coccaine nephropathy   Spoke to patient, he agrees with both renal biopsy and starting HD.    Renal diet-low potassium  Avoid nephrotoxins  Dose meds per GFR  BP control   -s/p kidney biopsy 9/1and HD catheter placement 8/31  -started  HD 8/31, second HD 9/1  -f/up  renal Bx result  -HD today, has been set up for outpatient HD [address] for [facility]  -IR to place tunnelled HD catheter  -s/p lokelma 9/4    Secondary hyperparathyroidism-PTH 1300  Started renvela  Start calcitriol  vit D level 26     Anemia  Hb>10  Iron sat 17%, ferritin 306  Po Iron     HTN-metoprolol, Amlodipine  Combined systolic and diastolic HF, cardiology recommendation appreciated  DM2-A1C 6  Hep C , recommend to consult hepatology  Gout-uric acid 7-added diclofenac gel for rt knee pain  ____________________________________________    Total time spent with patient:  [] 15   [] 25   [] 35   []  __ minutes    [] Critical Care Provided    Care Plan discussed with:    [x] Patient   [] Family    [] Care Manager   [] Consultant/Specialist :  Dr Nanda Longo      []   >50% of visit spent in counseling and coordination of care   (Discussed [] CODE status,  [] Care Plan, [] D/C Planning)    ___________________________________________________    Attending Physician: MD Khoi Gordon

## 2022-09-06 ENCOUNTER — HOSPITAL ENCOUNTER (INPATIENT)
Dept: INTERVENTIONAL RADIOLOGY/VASCULAR | Age: 57
Discharge: HOME | DRG: 194 | End: 2022-09-06
Attending: INTERNAL MEDICINE
Payer: MEDICAID

## 2022-09-06 VITALS
SYSTOLIC BLOOD PRESSURE: 145 MMHG | OXYGEN SATURATION: 92 % | RESPIRATION RATE: 11 BRPM | DIASTOLIC BLOOD PRESSURE: 90 MMHG | HEART RATE: 88 BPM

## 2022-09-06 LAB
ALBUMIN SERPL-MCNC: 2.9 G/DL (ref 3.5–5)
ANION GAP SERPL CALC-SCNC: 10 MMOL/L (ref 5–15)
BUN SERPL-MCNC: 71 MG/DL (ref 6–20)
BUN/CREAT SERPL: 10 (ref 12–20)
CALCIUM SERPL-MCNC: 7.6 MG/DL (ref 8.5–10.1)
CHLORIDE SERPL-SCNC: 102 MMOL/L (ref 97–108)
CO2 SERPL-SCNC: 22 MMOL/L (ref 21–32)
CREAT SERPL-MCNC: 7.04 MG/DL (ref 0.7–1.3)
GLUCOSE BLD STRIP.AUTO-MCNC: 133 MG/DL (ref 65–117)
GLUCOSE BLD STRIP.AUTO-MCNC: 147 MG/DL (ref 65–117)
GLUCOSE BLD STRIP.AUTO-MCNC: 160 MG/DL (ref 65–117)
GLUCOSE BLD STRIP.AUTO-MCNC: 218 MG/DL (ref 65–117)
GLUCOSE SERPL-MCNC: 155 MG/DL (ref 65–100)
HCV GENTYP SERPL NAA+PROBE: NORMAL
HCV GENTYP SERPL NAA+PROBE: NORMAL
HCV RNA SERPL NAA+PROBE-ACNC: NORMAL IU/ML
HCV RNA SERPL NAA+PROBE-LOG IU: 6.86 LOG10 IU/ML
PHOSPHATE SERPL-MCNC: 6 MG/DL (ref 2.6–4.7)
PLEASE NOTE, 550474: NORMAL
POTASSIUM SERPL-SCNC: 6.4 MMOL/L (ref 3.5–5.1)
SERVICE CMNT-IMP: ABNORMAL
SODIUM SERPL-SCNC: 134 MMOL/L (ref 136–145)
TEST INFORMATION, 550045: NORMAL

## 2022-09-06 PROCEDURE — 77030010507 HC ADH SKN DERMBND J&J -B

## 2022-09-06 PROCEDURE — 65270000046 HC RM TELEMETRY

## 2022-09-06 PROCEDURE — 74011250636 HC RX REV CODE- 250/636: Performed by: RADIOLOGY

## 2022-09-06 PROCEDURE — 74011000250 HC RX REV CODE- 250: Performed by: FAMILY MEDICINE

## 2022-09-06 PROCEDURE — 2709999900 HC NON-CHARGEABLE SUPPLY

## 2022-09-06 PROCEDURE — 74011250636 HC RX REV CODE- 250/636: Performed by: PHYSICIAN ASSISTANT

## 2022-09-06 PROCEDURE — 02PAX3Z REMOVAL OF INFUSION DEVICE FROM HEART, EXTERNAL APPROACH: ICD-10-PCS | Performed by: STUDENT IN AN ORGANIZED HEALTH CARE EDUCATION/TRAINING PROGRAM

## 2022-09-06 PROCEDURE — 36558 INSERT TUNNELED CV CATH: CPT

## 2022-09-06 PROCEDURE — 74011250636 HC RX REV CODE- 250/636: Performed by: STUDENT IN AN ORGANIZED HEALTH CARE EDUCATION/TRAINING PROGRAM

## 2022-09-06 PROCEDURE — 74011000250 HC RX REV CODE- 250: Performed by: PHYSICIAN ASSISTANT

## 2022-09-06 PROCEDURE — 74011250637 HC RX REV CODE- 250/637: Performed by: FAMILY MEDICINE

## 2022-09-06 PROCEDURE — 80069 RENAL FUNCTION PANEL: CPT

## 2022-09-06 PROCEDURE — 74011250637 HC RX REV CODE- 250/637: Performed by: INTERNAL MEDICINE

## 2022-09-06 PROCEDURE — 90935 HEMODIALYSIS ONE EVALUATION: CPT

## 2022-09-06 PROCEDURE — 74011636637 HC RX REV CODE- 636/637: Performed by: FAMILY MEDICINE

## 2022-09-06 PROCEDURE — 74011250636 HC RX REV CODE- 250/636: Performed by: INTERNAL MEDICINE

## 2022-09-06 PROCEDURE — 74011636637 HC RX REV CODE- 636/637: Performed by: STUDENT IN AN ORGANIZED HEALTH CARE EDUCATION/TRAINING PROGRAM

## 2022-09-06 PROCEDURE — 36415 COLL VENOUS BLD VENIPUNCTURE: CPT

## 2022-09-06 PROCEDURE — 0JH63XZ INSERTION OF TUNNELED VASCULAR ACCESS DEVICE INTO CHEST SUBCUTANEOUS TISSUE AND FASCIA, PERCUTANEOUS APPROACH: ICD-10-PCS | Performed by: STUDENT IN AN ORGANIZED HEALTH CARE EDUCATION/TRAINING PROGRAM

## 2022-09-06 PROCEDURE — 5A1D70Z PERFORMANCE OF URINARY FILTRATION, INTERMITTENT, LESS THAN 6 HOURS PER DAY: ICD-10-PCS | Performed by: INTERNAL MEDICINE

## 2022-09-06 PROCEDURE — 82962 GLUCOSE BLOOD TEST: CPT

## 2022-09-06 PROCEDURE — C1750 CATH, HEMODIALYSIS,LONG-TERM: HCPCS

## 2022-09-06 PROCEDURE — 74011000250 HC RX REV CODE- 250: Performed by: STUDENT IN AN ORGANIZED HEALTH CARE EDUCATION/TRAINING PROGRAM

## 2022-09-06 PROCEDURE — C1769 GUIDE WIRE: HCPCS

## 2022-09-06 PROCEDURE — 02H633Z INSERTION OF INFUSION DEVICE INTO RIGHT ATRIUM, PERCUTANEOUS APPROACH: ICD-10-PCS | Performed by: STUDENT IN AN ORGANIZED HEALTH CARE EDUCATION/TRAINING PROGRAM

## 2022-09-06 RX ORDER — FENTANYL CITRATE 50 UG/ML
12.5-2 INJECTION, SOLUTION INTRAMUSCULAR; INTRAVENOUS
Status: DISCONTINUED | OUTPATIENT
Start: 2022-09-06 | End: 2022-09-06

## 2022-09-06 RX ORDER — METHYLPREDNISOLONE ACETATE 40 MG/ML
40 INJECTION, SUSPENSION INTRA-ARTICULAR; INTRALESIONAL; INTRAMUSCULAR; SOFT TISSUE ONCE
Status: COMPLETED | OUTPATIENT
Start: 2022-09-06 | End: 2022-09-06

## 2022-09-06 RX ORDER — HEPARIN SODIUM 1000 [USP'U]/ML
10000 INJECTION, SOLUTION INTRAVENOUS; SUBCUTANEOUS
Status: DISCONTINUED | OUTPATIENT
Start: 2022-09-06 | End: 2022-09-06

## 2022-09-06 RX ORDER — FLUMAZENIL 0.1 MG/ML
0.5 INJECTION INTRAVENOUS
Status: DISCONTINUED | OUTPATIENT
Start: 2022-09-06 | End: 2022-09-06

## 2022-09-06 RX ORDER — LIDOCAINE HYDROCHLORIDE 20 MG/ML
20 INJECTION, SOLUTION INFILTRATION; PERINEURAL
Status: COMPLETED | OUTPATIENT
Start: 2022-09-06 | End: 2022-09-06

## 2022-09-06 RX ORDER — MIDAZOLAM HYDROCHLORIDE 1 MG/ML
.5-5 INJECTION, SOLUTION INTRAMUSCULAR; INTRAVENOUS
Status: DISCONTINUED | OUTPATIENT
Start: 2022-09-06 | End: 2022-09-06

## 2022-09-06 RX ORDER — SODIUM CHLORIDE 9 MG/ML
25 INJECTION, SOLUTION INTRAVENOUS CONTINUOUS
Status: DISCONTINUED | OUTPATIENT
Start: 2022-09-06 | End: 2022-09-06

## 2022-09-06 RX ORDER — LIDOCAINE HYDROCHLORIDE 10 MG/ML
2 INJECTION, SOLUTION EPIDURAL; INFILTRATION; INTRACAUDAL; PERINEURAL ONCE
Status: COMPLETED | OUTPATIENT
Start: 2022-09-06 | End: 2022-09-06

## 2022-09-06 RX ORDER — NALOXONE HYDROCHLORIDE 1 MG/ML
0.4 INJECTION INTRAMUSCULAR; INTRAVENOUS; SUBCUTANEOUS
Status: DISCONTINUED | OUTPATIENT
Start: 2022-09-06 | End: 2022-09-06

## 2022-09-06 RX ORDER — ALBUTEROL SULFATE 0.83 MG/ML
10 SOLUTION RESPIRATORY (INHALATION) ONCE
Status: DISCONTINUED | OUTPATIENT
Start: 2022-09-06 | End: 2022-09-06

## 2022-09-06 RX ORDER — CALCIUM GLUCONATE 20 MG/ML
1 INJECTION, SOLUTION INTRAVENOUS ONCE
Status: COMPLETED | OUTPATIENT
Start: 2022-09-06 | End: 2022-09-06

## 2022-09-06 RX ADMIN — CALCITRIOL CAPSULES 0.25 MCG 0.25 MCG: 0.25 CAPSULE ORAL at 16:43

## 2022-09-06 RX ADMIN — PREDNISONE 40 MG: 20 TABLET ORAL at 07:58

## 2022-09-06 RX ADMIN — HEPARIN SODIUM 2600 UNITS: 1000 INJECTION INTRAVENOUS; SUBCUTANEOUS at 11:54

## 2022-09-06 RX ADMIN — ISOSORBIDE DINITRATE 20 MG: 20 TABLET ORAL at 17:00

## 2022-09-06 RX ADMIN — TRAZODONE HYDROCHLORIDE 50 MG: 50 TABLET ORAL at 21:55

## 2022-09-06 RX ADMIN — ACETAMINOPHEN 650 MG: 325 TABLET ORAL at 17:00

## 2022-09-06 RX ADMIN — FOLIC ACID 1 MG: 1 TABLET ORAL at 16:44

## 2022-09-06 RX ADMIN — SODIUM CHLORIDE, PRESERVATIVE FREE 10 ML: 5 INJECTION INTRAVENOUS at 05:08

## 2022-09-06 RX ADMIN — CALCIUM GLUCONATE 1000 MG: 20 INJECTION, SOLUTION INTRAVENOUS at 16:44

## 2022-09-06 RX ADMIN — SODIUM CHLORIDE 25 ML/HR: 9 INJECTION, SOLUTION INTRAVENOUS at 15:30

## 2022-09-06 RX ADMIN — GABAPENTIN 300 MG: 300 CAPSULE ORAL at 21:49

## 2022-09-06 RX ADMIN — SEVELAMER CARBONATE FOR ORAL SUSPENSION 0.8 G: 800 POWDER, FOR SUSPENSION ORAL at 17:00

## 2022-09-06 RX ADMIN — SODIUM CHLORIDE, PRESERVATIVE FREE 10 ML: 5 INJECTION INTRAVENOUS at 16:47

## 2022-09-06 RX ADMIN — Medication 100 MG: at 16:44

## 2022-09-06 RX ADMIN — DICLOFENAC SODIUM 2 G: 10 GEL TOPICAL at 09:00

## 2022-09-06 RX ADMIN — SEVELAMER CARBONATE FOR ORAL SUSPENSION 0.8 G: 800 POWDER, FOR SUSPENSION ORAL at 07:58

## 2022-09-06 RX ADMIN — GABAPENTIN 300 MG: 300 CAPSULE ORAL at 07:58

## 2022-09-06 RX ADMIN — LIDOCAINE HYDROCHLORIDE 2 ML: 10 INJECTION, SOLUTION EPIDURAL; INFILTRATION; INTRACAUDAL; PERINEURAL at 16:45

## 2022-09-06 RX ADMIN — FERROUS SULFATE TAB 325 MG (65 MG ELEMENTAL FE) 325 MG: 325 (65 FE) TAB at 16:43

## 2022-09-06 RX ADMIN — GABAPENTIN 300 MG: 300 CAPSULE ORAL at 16:43

## 2022-09-06 RX ADMIN — FENTANYL CITRATE 100 MCG: 50 INJECTION, SOLUTION INTRAMUSCULAR; INTRAVENOUS at 15:42

## 2022-09-06 RX ADMIN — LIDOCAINE HYDROCHLORIDE 15 ML: 20 INJECTION, SOLUTION INFILTRATION; PERINEURAL at 15:49

## 2022-09-06 RX ADMIN — METHYLPREDNISOLONE ACETATE 40 MG: 40 INJECTION, SUSPENSION INTRA-ARTICULAR; INTRALESIONAL; INTRAMUSCULAR; INTRASYNOVIAL; SOFT TISSUE at 16:45

## 2022-09-06 RX ADMIN — Medication 1 UNITS: at 21:50

## 2022-09-06 RX ADMIN — HYDRALAZINE HYDROCHLORIDE 25 MG: 25 TABLET, FILM COATED ORAL at 17:00

## 2022-09-06 RX ADMIN — MIDAZOLAM HYDROCHLORIDE 2 MG: 1 INJECTION, SOLUTION INTRAMUSCULAR; INTRAVENOUS at 15:42

## 2022-09-06 RX ADMIN — DICLOFENAC SODIUM 2 G: 10 GEL TOPICAL at 18:00

## 2022-09-06 NOTE — PROGRESS NOTES
Bedside, Verbal, and Written shift change report given to Gokul Rowley RN (oncoming nurse) by Wicho Miller RN (offgoing nurse). Report included the following information SBAR, Kardex, ED Summary, OR Summary, Procedure Summary, Intake/Output, MAR, and Accordion.

## 2022-09-06 NOTE — PROGRESS NOTES
Problem: Falls - Risk of  Goal: *Absence of Falls  Description: Document Keivn Vazquez Fall Risk and appropriate interventions in the flowsheet.   Outcome: Progressing Towards Goal  Note: Fall Risk Interventions:  Mobility Interventions: Bed/chair exit alarm         Medication Interventions: Evaluate medications/consider consulting pharmacy    Elimination Interventions: Call light in reach              Problem: Patient Education: Go to Patient Education Activity  Goal: Patient/Family Education  Outcome: Progressing Towards Goal     Problem: Patient Education: Go to Patient Education Activity  Goal: Patient/Family Education  Outcome: Progressing Towards Goal     Problem: Heart Failure: Day 1  Goal: Off Pathway (Use only if patient is Off Pathway)  Outcome: Progressing Towards Goal  Goal: Activity/Safety  Outcome: Progressing Towards Goal  Goal: Consults, if ordered  Outcome: Progressing Towards Goal  Goal: Diagnostic Test/Procedures  Outcome: Progressing Towards Goal  Goal: Nutrition/Diet  Outcome: Progressing Towards Goal  Goal: Discharge Planning  Outcome: Progressing Towards Goal  Goal: Medications  Outcome: Progressing Towards Goal  Goal: Respiratory  Outcome: Progressing Towards Goal  Goal: Treatments/Interventions/Procedures  Outcome: Progressing Towards Goal  Goal: Psychosocial  Outcome: Progressing Towards Goal  Goal: *Oxygen saturation within defined limits  Outcome: Progressing Towards Goal  Goal: *Hemodynamically stable  Outcome: Progressing Towards Goal  Goal: *Optimal pain control at patient's stated goal  Outcome: Progressing Towards Goal  Goal: *Anxiety reduced or absent  Outcome: Progressing Towards Goal     Problem: Heart Failure: Day 2  Goal: Off Pathway (Use only if patient is Off Pathway)  Outcome: Progressing Towards Goal  Goal: Activity/Safety  Outcome: Progressing Towards Goal  Goal: Consults, if ordered  Outcome: Progressing Towards Goal  Goal: Diagnostic Test/Procedures  Outcome: Progressing Towards Goal  Goal: Nutrition/Diet  Outcome: Progressing Towards Goal  Goal: Discharge Planning  Outcome: Progressing Towards Goal  Goal: Medications  Outcome: Progressing Towards Goal  Goal: Respiratory  Outcome: Progressing Towards Goal  Goal: Treatments/Interventions/Procedures  Outcome: Progressing Towards Goal  Goal: Psychosocial  Outcome: Progressing Towards Goal  Goal: *Oxygen saturation within defined limits  Outcome: Progressing Towards Goal  Goal: *Hemodynamically stable  Outcome: Progressing Towards Goal  Goal: *Optimal pain control at patient's stated goal  Outcome: Progressing Towards Goal  Goal: *Anxiety reduced or absent  Outcome: Progressing Towards Goal  Goal: *Demonstrates progressive activity  Outcome: Progressing Towards Goal     Problem: Heart Failure: Day 3  Goal: Off Pathway (Use only if patient is Off Pathway)  Outcome: Progressing Towards Goal  Goal: Activity/Safety  Outcome: Progressing Towards Goal  Goal: Diagnostic Test/Procedures  Outcome: Progressing Towards Goal  Goal: Nutrition/Diet  Outcome: Progressing Towards Goal  Goal: Discharge Planning  Outcome: Progressing Towards Goal  Goal: Medications  Outcome: Progressing Towards Goal  Goal: Respiratory  Outcome: Progressing Towards Goal  Goal: Treatments/Interventions/Procedures  Outcome: Progressing Towards Goal  Goal: Psychosocial  Outcome: Progressing Towards Goal  Goal: *Oxygen saturation within defined limits  Outcome: Progressing Towards Goal  Goal: *Hemodynamically stable  Outcome: Progressing Towards Goal  Goal: *Optimal pain control at patient's stated goal  Outcome: Progressing Towards Goal  Goal: *Anxiety reduced or absent  Outcome: Progressing Towards Goal  Goal: *Demonstrates progressive activity  Outcome: Progressing Towards Goal     Problem: Heart Failure: Day 4  Goal: Off Pathway (Use only if patient is Off Pathway)  Outcome: Progressing Towards Goal  Goal: Activity/Safety  Outcome: Progressing Towards Goal  Goal: Diagnostic Test/Procedures  Outcome: Progressing Towards Goal  Goal: Nutrition/Diet  Outcome: Progressing Towards Goal  Goal: Discharge Planning  Outcome: Progressing Towards Goal  Goal: Medications  Outcome: Progressing Towards Goal  Goal: Respiratory  Outcome: Progressing Towards Goal  Goal: Treatments/Interventions/Procedures  Outcome: Progressing Towards Goal  Goal: Psychosocial  Outcome: Progressing Towards Goal  Goal: *Oxygen saturation within defined limits  Outcome: Progressing Towards Goal  Goal: *Hemodynamically stable  Outcome: Progressing Towards Goal  Goal: *Optimal pain control at patient's stated goal  Outcome: Progressing Towards Goal  Goal: *Anxiety reduced or absent  Outcome: Progressing Towards Goal  Goal: *Demonstrates progressive activity  Outcome: Progressing Towards Goal     Problem: Heart Failure: Day 5  Goal: Off Pathway (Use only if patient is Off Pathway)  Outcome: Progressing Towards Goal  Goal: Activity/Safety  Outcome: Progressing Towards Goal  Goal: Diagnostic Test/Procedures  Outcome: Progressing Towards Goal  Goal: Nutrition/Diet  Outcome: Progressing Towards Goal  Goal: Discharge Planning  Outcome: Progressing Towards Goal  Goal: Medications  Outcome: Progressing Towards Goal  Goal: Respiratory  Outcome: Progressing Towards Goal  Goal: Treatments/Interventions/Procedures  Outcome: Progressing Towards Goal  Goal: Psychosocial  Outcome: Progressing Towards Goal

## 2022-09-06 NOTE — ROUTINE PROCESS
1500: Pt arrives via bed to angio department accompanied by transport for permacath procedure. All assessments completed and consent was reviewed. Education given was regarding procedure, moderate sedation, post-procedure care and  management/follow-up. Opportunity for questions was provided and all questions and concerns were addressed. 1620: TRANSFER - OUT REPORT:    Verbal report given to Alexia AYALA(name) on Edilma Parent  being transferred to (unit) for routine progression of care       Report consisted of patients Situation, Background, Assessment and   Recommendations(SBAR). Information from the following report(s) SBAR, Procedure Summary, and MAR was reviewed with the receiving nurse. Lines:   Peripheral IV 09/01/22 Left Antecubital (Active)   Site Assessment Intact 09/06/22 0800   Phlebitis Assessment 0 09/06/22 0800   Infiltration Assessment 0 09/06/22 0800   Dressing Status Intact 09/06/22 0800   Dressing Type Transparent 09/06/22 0800   Hub Color/Line Status Pink 09/06/22 0800   Action Taken Open ports on tubing capped 09/06/22 0800   Alcohol Cap Used Yes 09/06/22 0800        Opportunity for questions and clarification was provided.       Patient transported with:   [facility]

## 2022-09-06 NOTE — PROGRESS NOTES
Transition of Care  RUR 17% Moderate  Disposition Home  DME Walker  Transportation Medicaid transport  Follow Up PCP, Specialist  *Woody e discharge*    CM contacted [facility] to cancel scheduled dialysis appt today [phone] also sent via [employer]. CM contacted [facility] [phone] to cancel scheduled transportation. CM to monitor.      Steve Diaz MS

## 2022-09-06 NOTE — PROCEDURES
Knee Joint Injection Procedural Report    Indications: Symptom relief from osteoarthritis    Preprocedural Diagnosis: Left knee DJD    Postprocedural Diagnosis: Left knee DJD    Provider: LITTLE Edward     Anesthesia: none    Allergy:   Allergies   Allergen Reactions    Shellfish Containing Products Swelling       Procedure Details: The risks,benefits and alternatives of a joint aspiration were explained and consent was obtained for the procedure. The aspiration is being done for therapeutic purposes. The area was cleansed using  alcohol swab. Left knee joint was injected with 2cc 1% lidocaine and 1cc 40mg Depo-Medrol. The patient tolerated the procedure well. Orders on Fluid: no aspiration performed    Estimated Blood Loss:   none    Specimens:none         Complications:  None; patient tolerated the procedure well.              Signed By: LITTLE Edward

## 2022-09-06 NOTE — PROGRESS NOTES
Ortho Daily Progress Note      Patient: Lori Galeana                   MRN: [id number]  Sex: male  YOB: 1965           Age: 65 y.o. Subjective: Continued complaints of rather global joint pains (shoulders, knee/ankles), biggest complaint is bilateral knee pain L>R, no pain at rest, increased pain with WB activity, has not gotten much relief from oral steroids or topical diclofenac. Knee pain limiting his activity. Visit Vitals  BP (!) 158/91 (BP 1 Location: Right upper arm, BP Patient Position: At rest)   Pulse 77   Temp 97.7 °F (36.5 °C)   Resp 12   Ht 5' 11\" (1.803 m)   Wt 86.6 kg (190 lb 14.7 oz)   SpO2 93%   BMI 26.63 kg/m²        Lab Results:  HGB   Date/Time Value Ref Range Status   09/05/2022 02:15 AM 10.0 (L) 12.1 - 17.0 g/dL Final     INR   Date/Time Value Ref Range Status   08/31/2022 11:03 AM 1.1 0.9 - 1.1   Final     Comment:     A single therapeutic range for Vit K antagonists may not be optimal for all indications - see June, 2008 issue of Chest, American College of Chest Physicians Evidence-Based Clinical Practice Guidelines, 8th Edition. Physical Exam:    Pleasant 63yo male, cooperative, NAD, both knees examined, no significant effusion, ROM well-maintained bilaterally, no significant warmth/redness, NVI    Xrays:     XR KNEE LT 3 V 8/30/2022 12:26  INDICATION: Left knee pain after trauma. COMPARISON: None. FINDINGS: Three views of the left knee demonstrate no fracture or other acute  osseous or articular abnormality. There is no effusion. Marked chondrocalcinosis  without any erosive change. IMPRESSION  No acute abnormality. Chondrocalcinosis. XR KNEE RT 3 V 8/30/2022 12:26  INDICATION: Right knee pain and swelling. COMPARISON: None. FINDINGS: Three views of the right knee demonstrate no fracture or other acute  osseous or articular abnormality. There is no effusion.  Calcification is seen in  the meniscus compatible with chondrocalcinosis. Lower extremity edema is noted. IMPRESSION  Lower extremity edema. Evidence of chondrocalcinosis. No acute  osseous abnormality. Plan:    Discussed treatment options with patient, hospitalist López Jimenez), and ortho Janet Barron). No sign of septic joint or effusion to aspirate. Likely exacerbation of underlying degenerative changes in the setting of BHASKAR on CKD. Will inject left knee today. Otherwise follow-up in office for ongoing management.     [address] [place] [address]  9/6/2022   1:53 PM      .

## 2022-09-06 NOTE — PROCEDURES
Interventional Radiology  Procedure Note        9/6/2022 4:23 PM    Patient: Rosita Saenz     Informed consent obtained    Diagnosis: BHASKAR    Procedure(s): Conversion of nontunneled to tunneled hemodialysis catheter    Specimens removed:  none    Complications: None    Primary Physician: Jeanne Newman MD    Recommendations: N/A    Discharge Disposition: Stable; return to floor    Full dictated report to follow    Jeanne Newman MD  Interventional Radiology  [facility].  4:23 PM, 9/6/2022

## 2022-09-06 NOTE — PROGRESS NOTES
Physical therapy:    Attempted PT session. Pt off the floor at this time. Will defer and continue to follow.  Thank you    Beatrice Hinojosa, PT, DPT

## 2022-09-06 NOTE — PROCEDURES
Hemodialysis / [phone]    Vitals Pre Post Assessment Pre Post   /96 159/101 LOC A/O x 3 A/O x 3   HR 79 80 Lungs Clear/ denies SOB clear   Resp 16 16 Cardiac HRR HRR   Temp 97.4 97.5 Skin WDI/ swollen/ tender knees WDI   Weight 98 kg 96.5 kg Edema Gen puffiness None noted   Tele status remote remote Pain 8/10 knee pain from gout. MD aware 8/10 knee pain. RN made aware     Orders   Duration: Start: 7025 End: 1250 Total: 3.5 hrs   Dialyzer: Dialyzer/Set Up Inspection: Norbert Meza ([id number]/ [id number]) (09/06/22 0915)   K Bath: Dialysate K (mEq/L): 2 (09/06/22 0915)   Ca Bath: Dialysate CA (mEq/L): 2.5 (09/06/22 0915)   Na: Dialysate NA (mEq/L): 138 (09/06/22 0915)   Bicarb: Dialysate HCO3 (mEq/L): 35 (09/06/22 0915)   Target Fluid Removal: Goal/Amount of Fluid to Remove (mL): 1500 mL (09/06/22 0915)     Access   Type & Location: Rt neck cath   Comments:   CHG drsg changed after cleaning insertion site w/ alcohol and CHG. No redness or drainage. Ports cleaned w/ alcohol and CHG. +aspir/ NS flushes.                                      Labs   HBsAg (Antigen) / date:    Neg  8/31/22                                           HBsAb (Antibody) / date: Imm  8/31/22   Source: EPIC   Obtained/Reviewed  Critical Results Called HGB   Date Value Ref Range Status   09/05/2022 10.0 (L) 12.1 - 17.0 g/dL Final     Potassium   Date Value Ref Range Status   09/06/2022 6.4 (H) 3.5 - 5.1 mmol/L Final     Calcium   Date Value Ref Range Status   09/06/2022 7.6 (L) 8.5 - 10.1 MG/DL Final     BUN   Date Value Ref Range Status   09/06/2022 71 (H) 6 - 20 MG/DL Final     Creatinine   Date Value Ref Range Status   09/06/2022 7.04 (H) 0.70 - 1.30 MG/DL Final        Meds Given   Name Dose Route   Heparin 1300 units Red port ICD   Heparin 1300 units Blue port ICD          Adequacy / Fluid    Total Liters Process: 60 L   Net Fluid Removed: 1500 ml      Comments   Time Out Done:   (Time) 0915 mjb   Admitting Diagnosis: Hyperkalemia and hyperphosphatemia   Consent obtained/signed: Informed Consent Verified: Yes (09/06/22 0915)   Machine / RO # Machine Number: [id number]/ [id number] (09/06/22 0915)   Primary Nurse Rpt Pre: Rajesh Ernst RN   Primary Nurse Rpt Post: Sherry Chirinos RN   Pt Education: Discussed compliance/ renal diet and procedure   Care Plan: Continue to do HD txs as ordered   Pts outpatient clinic: [facility]/ [address] [place] [address] Summary   Comments: Tolerated tx well. At end, blood in circuit returned w/ 300ml NS. Ports flushed w/ NS and dwelled w/ hep. Qsyte/ PureHub caps applied. CHG drsg still CDI. Returned to rm [phone] by Octavio Aj.

## 2022-09-06 NOTE — PROGRESS NOTES
Problem: Heart Failure: Day 5  Goal: Medications  Outcome: Progressing Towards Goal  Goal: Respiratory  Outcome: Progressing Towards Goal  Goal: Treatments/Interventions/Procedures  Outcome: Progressing Towards Goal

## 2022-09-06 NOTE — H&P
Radiology History and Physical    Patient: Edilma Parent 65 y.o. male       Chief Complaint: No chief complaint on file. History of Present Illness: 65year old male with indwelling right IJ nontunneled hemodialysis catheter    History:    Past Medical History:   Diagnosis Date    Diabetes ([address]     Gout     Hypertension      Family History   Problem Relation Age of Onset    Diabetes Other     Hypertension Other      Social History     Socioeconomic History    Marital status: SINGLE     Spouse name: Not on file    Number of children: Not on file    Years of education: Not on file    Highest education level: Not on file   Occupational History    Not on file   Tobacco Use    Smoking status: Every Day     Packs/day: 1.00     Types: Cigarettes    Smokeless tobacco: Never   Vaping Use    Vaping Use: Never used   Substance and Sexual Activity    Alcohol use: Not Currently    Drug use: Not Currently     Types: Opiates     Comment: IV fentanly use today 1/11    Sexual activity: Not Currently   Other Topics Concern    Not on file   Social History Narrative    Mr Demario Weiss used to work as a  at a hospital. He suffered a fall from a bridge and suffered many injuries, requiring neck and hip surgery. Had TBI    He is now on SSI. On discussion 11/9/16, he names his mother Liu Norton as his MPOA      Social Determinants of Health     Financial Resource Strain: Not on file   Food Insecurity: Not on file   Transportation Needs: Not on file   Physical Activity: Not on file   Stress: Not on file   Social Connections: Not on file   Intimate Partner Violence: Not on file   Housing Stability: Not on file       Allergies: Allergies   Allergen Reactions    Shellfish Containing Products Swelling       Current Medications:  No current facility-administered medications for this encounter. No current outpatient medications on file.      Facility-Administered Medications Ordered in Other Encounters Medication Dose Route Frequency    calcium gluconate 1 gram in sodium chloride (ISO-OSM) 50 mL infusion  1 g IntraVENous ONCE    dextrose 10 % infusion 125-250 mL  125-250 mL IntraVENous PRN    lidocaine (PF) (XYLOCAINE) 10 mg/mL (1 %) injection 2 mL  2 mL Intra artICUlar ONCE    methylPREDNISolone acetate (DEPO-MEDROL) 40 mg/mL injection 40 mg  40 mg Intra artICUlar ONCE    predniSONE (DELTASONE) tablet 40 mg  40 mg Oral DAILY WITH BREAKFAST    heparin (porcine) 1,000 unit/mL injection 2,600 Units  2,600 Units IntraCATHeter PRN    diclofenac (VOLTAREN) 1 % topical gel 2 g  2 g Topical BID    calcitRIOL (ROCALTROL) capsule 0.25 mcg  0.25 mcg Oral DAILY    0.9% sodium chloride infusion  25 mL/hr IntraVENous RAD CONTINUOUS    isosorbide dinitrate (ISORDIL) tablet 20 mg  20 mg Oral BID    hydrALAZINE (APRESOLINE) tablet 25 mg  25 mg Oral BID    sodium chloride (NS) flush 5-40 mL  5-40 mL IntraVENous Q8H    sodium chloride (NS) flush 5-40 mL  5-40 mL IntraVENous PRN    glucose chewable tablet 16 g  4 Tablet Oral PRN    glucagon (GLUCAGEN) injection 1 mg  1 mg IntraMUSCular PRN    dextrose 10 % infusion 0-250 mL  0-250 mL IntraVENous PRN    insulin lispro (HUMALOG) injection   SubCUTAneous AC&HS    amLODIPine (NORVASC) tablet 5 mg  5 mg Oral DAILY    folic acid (FOLVITE) tablet 1 mg  1 mg Oral DAILY    gabapentin (NEURONTIN) capsule 300 mg  300 mg Oral TID    metoprolol succinate (TOPROL-XL) XL tablet 100 mg  100 mg Oral DAILY    thiamine HCL (B-1) tablet 100 mg  100 mg Oral DAILY    traZODone (DESYREL) tablet 50 mg  50 mg Oral QHS PRN    acetaminophen (TYLENOL) tablet 650 mg  650 mg Oral Q6H PRN    sevelamer carbonate (RENVELA) oral powder 0.8 g  0.8 g Oral TID WITH MEALS    ferrous sulfate tablet 325 mg  1 Tablet Oral BID WITH MEALS        Physical Exam:  Blood pressure (!) 145/90, pulse 88, resp. rate 11, SpO2 92 %.   GENERAL: alert, cooperative, no distress, appears stated age,   LUNG: Nonlabored respiration on room air  HEART: regular rate and rhythm    Alerts:    Hospital Problems  Date Reviewed: 7/12/2022   None      Laboratory:      Recent Labs     09/06/22  0056 09/05/22  0608 09/05/22  0215   HGB  --   --  10.0*   HCT  --   --  30.2*   WBC  --   --  5.2   PLT  --   --  257   BUN 71*   < >  --    CREA 7.04*   < >  --    K 6.4*   < >  --     < > = values in this interval not displayed. Plan of Care/Planned Procedure:  Risks, benefits, and alternatives reviewed with patient and he agrees to proceed with the procedure. Conversion of nontunneled to tunneled hemodialysis catheter    Deemed appropriate for moderate sedation with versed and fentanyl.     Zurdo Randle MD  Interventional Radiology  [facility]  4:22 PM, 9/6/2022

## 2022-09-06 NOTE — PROGRESS NOTES
[facility] Adult  Hospitalist Group                                                                                          Hospitalist Progress Note  Leigh Barnard MD  Answering service: [phone] OR [phone] from in house phone        Date of Service:  2022  NAME:  Charmaine Alonzo  :  1965  MRN:  [id number]      Admission Summary:   Charmaine Alonzo is a 65 y.o. male with past medical history of CKD, type 2 diabetes mellitus, hypertension, gout, heroin/ cocaine/ Fentanyl abuse, tobacco abuse presented as a direct admission/ transfer from [facility] ED to [facility]) with chief complaints of  lower back, right hip, bilateral knee pain and swelling. Patient notes onset of symptoms starting about 1 week ago, constant, moderate severity, worsening, without specific aggravating/ alleviating factors, with associated swelling in both legs. On arrival in the ED, initial recorded vital signs were T 98.1 F, /107m HR 91, RR 13, O2sat 95% on room air. Abnormal labs included BUN 78, creatinine 9.08, K 6.1, CO2 20, , , CRP 9.23, proBNP > 35,000. CT abdomen/pelvis without IV contrast showed mild bilateral perinephric fat stranding suggesting CKD with no acute process noted. Patient was given [facility] and then transferred to [facility] [place]. Interval history / Subjective:   Ready to go home, excited to potentially go home tomorrow.  About to get knee injection for intolerable knee pain      Assessment & Plan:     #ESRD (end stage renal disease)   Rapidly progressive renal failure, possible COVAN v diabetic nephropathy per nephrology.   - HD started , biopsy   Plan:   - HD per nephrology   - F/u ipTH, vit D, Hep C  - Continue renvela  -Appreciated nephrology recommendations  - CM to help coordinate ongoing HD in community   - Tunnel cath      #Bilateral lower extremity edema   #HFrEF, EF 30-35%  - NYHA class II   -elevated proBNP 35,000 (albeit without overt pulmonary edema noted on chest xray)  -venous duplex BLE negative for DVT  - ECHO with EF 30-35% with severe global hypokensis present, diastolic dysfunction   Plan:   - Consult cardiology, appreciate recommendations   - On metop, hydral, ISDN  - Holding ARB/ACE/aldactone due to renal failure  - place on strict Is and Os and daily weights  - keep BLE elevated at rest  - Will need cath once medically stable     #Hepatitis C   - Hep C positive, with Quant 7.2 million   Plan:   - Hepatology consulted, appreciate any recommendations     #Arthralgia   #History of gout  -prior h/o gout but uric acid level still within normal limits  -CRP 9.23; may suggest acute inflammatory/ polyarthritis  - L knee with worsening medial effusion, and pt states pain has been worsening over past week   - US left knee with effusion   Plan:   - Ice application prn   - Diclofenac gel   - PT eval   - Continue pred for knee pain x 5 days  - Ortho consulted     Concern for Sleep apnea  Patient assessed. High suspicion of sleep apnea due to the following reasons. Will refer for sleep evaluation. [x] Heart Failure  [] Hypertention  [] Atrial Fibrillation  [] BMI > 30  [] History of stroke  [x] Diabetes  [] Heavy snoring  [] Witnessed apnea  [] Hypoxemia      #Elevated CK level  - Initial ; possibly secondary to acute renal failure   - Improved to 526  - monitor    #Secondary Hyperparathyroidism  - Likely 2/2 ESRD  - Plan for PCP, nephro f/u      #Acute right hip pain   -order Tylenol for mild/ moderate pain     #History of Heroin/ cocaine/ Fentanyl abuse  -Negative UDS     #Tobacco abuse  -encourage smoking cessation     #Normocytic, normochromic anemia  -Hgb 9.6     #Type 2 diabetes mellitus: Blood sugar is stable. Hemoglobin A1c is 6    #Acute hyperkalemia,   -- Low K diet, HD to manag     VTE prophylaxis  -SCDs to BLEs     ADVANCED DIRECTIVE/ CODE STATUS:  FULL CODE as per discussion with patient.     Code status: FULL  Prophylaxis: SCDs  Care Plan discussed with: patient, nursing  Anticipated Disposition: 24h, will discuss dispo with      Hospital Problems  Date Reviewed: 7/12/2022            Codes Class Noted POA    Chronic combined systolic and diastolic CHF (congestive heart failure) (HCC) (Chronic) ICD-10-CM: I50.42  ICD-9-CM: 428.42, 428.0  9/2/2022 Unknown        Acute hyperkalemia ICD-10-CM: E87.5  ICD-9-CM: 276.7  8/31/2022 Unknown        Arthralgia ICD-10-CM: M25.50  ICD-9-CM: 719.40  8/31/2022 Unknown        ESRD (end stage renal disease) ([facility] [address] ICD-10-CM: N18.6  ICD-9-CM: 585.6  8/31/2022 Unknown        Bilateral lower extremity edema ICD-10-CM: R60.0  ICD-9-CM: 782.3  8/31/2022 Unknown        Acute right hip pain ICD-10-CM: M25.551  ICD-9-CM: 719.45  8/31/2022 Unknown             Review of Systems:   A comprehensive review of systems was negative except for that written in the HPI. Vital Signs:    Last 24hrs VS reviewed since prior progress note. Most recent are:  Visit Vitals  BP (!) 142/93 (BP 1 Location: Left upper arm, BP Patient Position: Semi fowlers)   Pulse 76   Temp 97.8 °F (36.6 °C)   Resp 18   Ht 5' 11\" (1.803 m)   Wt 86.6 kg (190 lb 14.7 oz)   SpO2 96%   BMI 26.63 kg/m²         Intake/Output Summary (Last 24 hours) at 9/6/2022 1003  Last data filed at 9/5/2022 1547  Gross per 24 hour   Intake 480 ml   Output --   Net 480 ml        Physical Examination:     I had a face to face encounter with this patient and independently examined them on 9/6/2022 as outlined below:          Constitutional:  No acute distress, cooperative, pleasant    ENT:  Oral mucosa moist, oropharynx benign. Resp:  CTA bilaterally. No wheezing/rhonchi/rales. No accessory muscle use. CV:  Regular rhythm, normal rate, no murmurs, gallops, rubs    GI:  Soft, non distended, non tender. normoactive bowel sounds, no hepatosplenomegaly     Musculoskeletal:  2+ pulses throughout. Neurologic:  Moves all extremities.   AAOx3, ROMMEL II-XII reviewed            Data Review:    Review and/or order of clinical lab test      Labs:     Recent Labs     09/05/22  0215 09/04/22  0046   WBC 5.2 5.9   HGB 10.0* 10.1*   HCT 30.2* 31.1*    230     Recent Labs     09/06/22  0056 09/05/22  0608 09/04/22  0046   * 136 134*   K 6.4* 5.3* 5.8*    103 102   CO2 22 24 23   BUN 71* 56* 39*   CREA 7.04* 6.72* 5.51*   * 103* 119*   CA 7.6* 7.9* 8.3*   PHOS 6.0* 6.7* 6.2*   URICA  --  4.5  --      Recent Labs     09/06/22  0056 09/05/22  0608 09/04/22  0046   ALB 2.9* 2.6* 2.8*     No results for input(s): INR, PTP, APTT, INREXT, INREXT in the last 72 hours. No results for input(s): FE, TIBC, PSAT, FERR in the last 72 hours. No results found for: FOL, RBCF   No results for input(s): PH, PCO2, PO2 in the last 72 hours. No results for input(s): CPK, CKNDX, TROIQ in the last 72 hours.     No lab exists for component: CPKMB    No results found for: CHOL, CHOLX, CHLST, CHOLV, HDL, HDLP, LDL, LDLC, DLDLP, TGLX, TRIGL, TRIGP, CHHD, CHHDX  Lab Results   Component Value Date/Time    Glucose (POC) 133 (H) 09/06/2022 08:08 AM    Glucose (POC) 150 (H) 09/05/2022 09:37 PM    Glucose (POC) 181 (H) 09/05/2022 05:10 PM    Glucose (POC) 117 09/05/2022 12:06 PM    Glucose (POC) 114 09/04/2022 09:35 PM     Lab Results   Component Value Date/Time    Color YELLOW/STRAW 08/30/2022 01:48 PM    Appearance CLEAR 08/30/2022 01:48 PM    Specific gravity 1.015 08/30/2022 01:48 PM    Specific gravity 1.030 07/01/2015 01:41 PM    pH (UA) 7.5 08/30/2022 01:48 PM    Protein 300 (A) 08/30/2022 01:48 PM    Glucose Negative 08/30/2022 01:48 PM    Ketone Negative 08/30/2022 01:48 PM    Bilirubin Negative 08/30/2022 01:48 PM    Urobilinogen 1.0 08/30/2022 01:48 PM    Nitrites Negative 08/30/2022 01:48 PM    Leukocyte Esterase Negative 08/30/2022 01:48 PM    Epithelial cells FEW 08/30/2022 01:48 PM    Bacteria Negative 08/30/2022 01:48 PM    WBC 0-4 08/30/2022 01:48 PM RBC 0-5 08/30/2022 01:48 PM         Medications Reviewed:     Current Facility-Administered Medications   Medication Dose Route Frequency    calcium gluconate 1 gram in sodium chloride (ISO-OSM) 50 mL infusion  1 g IntraVENous ONCE    dextrose 10 % infusion 125-250 mL  125-250 mL IntraVENous PRN    predniSONE (DELTASONE) tablet 40 mg  40 mg Oral DAILY WITH BREAKFAST    heparin (porcine) 1,000 unit/mL injection 2,600 Units  2,600 Units IntraCATHeter PRN    diclofenac (VOLTAREN) 1 % topical gel 2 g  2 g Topical BID    calcitRIOL (ROCALTROL) capsule 0.25 mcg  0.25 mcg Oral DAILY    0.9% sodium chloride infusion  25 mL/hr IntraVENous RAD CONTINUOUS    isosorbide dinitrate (ISORDIL) tablet 20 mg  20 mg Oral BID    hydrALAZINE (APRESOLINE) tablet 25 mg  25 mg Oral BID    sodium chloride (NS) flush 5-40 mL  5-40 mL IntraVENous Q8H    sodium chloride (NS) flush 5-40 mL  5-40 mL IntraVENous PRN    glucose chewable tablet 16 g  4 Tablet Oral PRN    glucagon (GLUCAGEN) injection 1 mg  1 mg IntraMUSCular PRN    dextrose 10 % infusion 0-250 mL  0-250 mL IntraVENous PRN    insulin lispro (HUMALOG) injection   SubCUTAneous AC&HS    amLODIPine (NORVASC) tablet 5 mg  5 mg Oral DAILY    folic acid (FOLVITE) tablet 1 mg  1 mg Oral DAILY    gabapentin (NEURONTIN) capsule 300 mg  300 mg Oral TID    metoprolol succinate (TOPROL-XL) XL tablet 100 mg  100 mg Oral DAILY    thiamine HCL (B-1) tablet 100 mg  100 mg Oral DAILY    traZODone (DESYREL) tablet 50 mg  50 mg Oral QHS PRN    acetaminophen (TYLENOL) tablet 650 mg  650 mg Oral Q6H PRN    sevelamer carbonate (RENVELA) oral powder 0.8 g  0.8 g Oral TID WITH MEALS    ferrous sulfate tablet 325 mg  1 Tablet Oral BID WITH MEALS     ______________________________________________________________________  EXPECTED LENGTH OF STAY: - - -  ACTUAL LENGTH OF STAY:          7                 Angel Mitchell MD

## 2022-09-06 NOTE — PROGRESS NOTES
Faculty or Preceptor Review of Student Work    9/6/2022  - Shift times - 0700 to 1300    The student documentation of patient care for Edilma Ku has been reviewed and approved. All medications have been administered under the direct supervision of the faculty or preceptor.     Judson Ryan RN

## 2022-09-06 NOTE — PROGRESS NOTES
Renal Progress Note    NAME:  Zane Jensen   :   1965   MRN:   [id number]     Date/Time:  2022 1:05 PM  Subjective:        seen on HD, HD RN Teresita Mei at bedside  Had renal biopsy this am, patient has generalized bodyache. No N/V/SOB     c/o left knee pain and LBP. No SOB, renal Bx result is pending. Plan for HD in am. PTH 1300  9/3 seen on HD, no complaint, BP stable   seen and examined, no new complaint, K high today   c/o left knee pain, no SOB, HD later today   seen on HD in HD unit 531. C/o knee pain unable to walk. BP stable, renal Bx still not available. Plan for permcath later today.  K high this am      Medications reviewed:  Current Facility-Administered Medications   Medication Dose Route Frequency    calcium gluconate 1 gram in sodium chloride (ISO-OSM) 50 mL infusion  1 g IntraVENous ONCE    dextrose 10 % infusion 125-250 mL  125-250 mL IntraVENous PRN    predniSONE (DELTASONE) tablet 40 mg  40 mg Oral DAILY WITH BREAKFAST    heparin (porcine) 1,000 unit/mL injection 2,600 Units  2,600 Units IntraCATHeter PRN    diclofenac (VOLTAREN) 1 % topical gel 2 g  2 g Topical BID    calcitRIOL (ROCALTROL) capsule 0.25 mcg  0.25 mcg Oral DAILY    0.9% sodium chloride infusion  25 mL/hr IntraVENous RAD CONTINUOUS    isosorbide dinitrate (ISORDIL) tablet 20 mg  20 mg Oral BID    hydrALAZINE (APRESOLINE) tablet 25 mg  25 mg Oral BID    sodium chloride (NS) flush 5-40 mL  5-40 mL IntraVENous Q8H    sodium chloride (NS) flush 5-40 mL  5-40 mL IntraVENous PRN    glucose chewable tablet 16 g  4 Tablet Oral PRN    glucagon (GLUCAGEN) injection 1 mg  1 mg IntraMUSCular PRN    dextrose 10 % infusion 0-250 mL  0-250 mL IntraVENous PRN    insulin lispro (HUMALOG) injection   SubCUTAneous AC&HS    amLODIPine (NORVASC) tablet 5 mg  5 mg Oral DAILY    folic acid (FOLVITE) tablet 1 mg  1 mg Oral DAILY    gabapentin (NEURONTIN) capsule 300 mg  300 mg Oral TID    metoprolol succinate (TOPROL-XL) XL tablet 100 mg  100 mg Oral DAILY    thiamine HCL (B-1) tablet 100 mg  100 mg Oral DAILY    traZODone (DESYREL) tablet 50 mg  50 mg Oral QHS PRN    acetaminophen (TYLENOL) tablet 650 mg  650 mg Oral Q6H PRN    sevelamer carbonate (RENVELA) oral powder 0.8 g  0.8 g Oral TID WITH MEALS    ferrous sulfate tablet 325 mg  1 Tablet Oral BID WITH MEALS        Objective:   Vitals:  Visit Vitals  BP (!) 152/93   Pulse 79 Comment: Dr Peewee Batres here   Temp 97.4 °F (36.3 °C)   Resp 16   Ht 5' 11\" (1.803 m)   Wt 86.6 kg (190 lb 14.7 oz)   SpO2 96%   BMI 26.63 kg/m²     Temp (24hrs), Av.9 °F (36.6 °C), Min:97.3 °F (36.3 °C), Max:98.6 °F (37 °C)    O2 Flow Rate (L/min): 3 l/min O2 Device: None (Room air)    Last 24hr Input/Output:    Intake/Output Summary (Last 24 hours) at 2022 1053  Last data filed at 2022 1547  Gross per 24 hour   Intake 480 ml   Output --   Net 480 ml          Physical Exam:  General:Alert and oriented x3, , No distress, seen on HD  HEENT: Eyes are PERRL. Conjunctiva without pallor ,erythema. The sclerae without icterus.  .   Neck:Supple, RT IJ temp HD catheter  Lungs : decreased breathing sounds basilar   CVS: RRR, S1 S2 normal, No rub, SM2  Abdomen: Soft, Non tender, ND,  bowel sounds present  Extremities: no edema  Skin: No rash   Neurologic: non focal, AAO x 3  Psych: normal affect       [] Telemetry Reviewed     [] NSR [] PAC/PVCs   [] Afib  [] Paced   [] NSVT   [] Felix [] NGT  [] Intubated on vent    Lab Data Reviewed:    Recent Results (from the past 24 hour(s))   GLUCOSE, POC    Collection Time: 22 12:06 PM   Result Value Ref Range    Glucose (POC) 117 65 - 117 mg/dL    Performed by Merjennifre [facility]    GLUCOSE, POC    Collection Time: 22  5:10 PM   Result Value Ref Range    Glucose (POC) 181 (H) 65 - 117 mg/dL    Performed by Merl Patella    GLUCOSE, POC    Collection Time: 22  9:37 PM   Result Value Ref Range    Glucose (POC) 150 (H) 65 - 117 mg/dL    Performed by Vigo Karen    RENAL FUNCTION PANEL    Collection Time: 09/06/22 12:56 AM   Result Value Ref Range    Sodium 134 (L) 136 - 145 mmol/L    Potassium 6.4 (H) 3.5 - 5.1 mmol/L    Chloride 102 97 - 108 mmol/L    CO2 22 21 - 32 mmol/L    Anion gap 10 5 - 15 mmol/L    Glucose 155 (H) 65 - 100 mg/dL    BUN 71 (H) 6 - 20 MG/DL    Creatinine 7.04 (H) 0.70 - 1.30 MG/DL    BUN/Creatinine ratio 10 (L) 12 - 20      GFR est AA 10 (L) >60 ml/min/1.73m2    GFR est non-AA 8 (L) >60 ml/min/1.73m2    Calcium 7.6 (L) 8.5 - 10.1 MG/DL    Phosphorus 6.0 (H) 2.6 - 4.7 MG/DL    Albumin 2.9 (L) 3.5 - 5.0 g/dL   GLUCOSE, POC    Collection Time: 09/06/22  8:08 AM   Result Value Ref Range    Glucose (POC) 133 (H) 65 - 117 mg/dL    Performed by Ana Javier          Assessment/Plan:     Acute kidney injury on CKD3  Hyperkalemia  Metabolic acidosis  Hyperphosphatemia  Nephrotic syndrome  Rapidly progressive with nephrotic range proteinuria-serology negative except positive Hep C Ab in Jan 2022-urine no active sediment--UPCR 5.6  -might be related to Hep-C , other Ddx:  COVAN vs diabetic nephropathy, FSGS , MM, Membranous GN , IgA nephropathy, Heroin/coccaine nephropathy   Spoke to patient, he agrees with both renal biopsy and starting HD.    Renal diet-low potassium  Avoid nephrotoxins  Dose meds per GFR  BP control   -s/p kidney biopsy 9/1and HD catheter placement 8/31  -started  HD 8/31  -f/up  renal Bx result  -HD today, has been set up for outpatient HD [address] for TTHS-did not get HD yesterday  -IR to place tunnelled HD catheter today  -s/p lokelma 9/4  -K high today, did not get HD yesterday, HD today in progress, low K diet    Secondary hyperparathyroidism-PTH 1300  Started renvela  Started calcitriol  vit D level 26     Anemia  Hb>10  Iron sat 17%, ferritin 306  Po Iron    Knee pain, eval per primary team     HTN-metoprolol, Amlodipine  Combined systolic and diastolic HF, cardiology recommendation appreciated  DM2-A1C 6  Hep C , recommend to consult hepatology  Gout-uric acid 7-added diclofenac gel for rt knee pain  ____________________________________________    Total time spent with patient:  [] 15   [] 25   [] 35   []  __ minutes    [] Critical Care Provided    Care Plan discussed with:    [x] Patient   [] Family    [] Care Manager   [] Consultant/Specialist :  Dr Kim Mejia      []   >50% of visit spent in counseling and coordination of care   (Discussed [] CODE status,  [] Care Plan, [] D/C Planning)    ___________________________________________________    Attending Physician: MD Khoi Clark

## 2022-09-07 VITALS
BODY MASS INDEX: 29.51 KG/M2 | HEART RATE: 80 BPM | HEIGHT: 71 IN | SYSTOLIC BLOOD PRESSURE: 143 MMHG | OXYGEN SATURATION: 95 % | WEIGHT: 210.76 LBS | TEMPERATURE: 98.2 F | RESPIRATION RATE: 12 BRPM | DIASTOLIC BLOOD PRESSURE: 96 MMHG

## 2022-09-07 LAB
ALBUMIN SERPL-MCNC: 2.8 G/DL (ref 3.5–5)
ANION GAP SERPL CALC-SCNC: 12 MMOL/L (ref 5–15)
BUN SERPL-MCNC: 56 MG/DL (ref 6–20)
BUN/CREAT SERPL: 10 (ref 12–20)
CALCIUM SERPL-MCNC: 7.1 MG/DL (ref 8.5–10.1)
CHLORIDE SERPL-SCNC: 100 MMOL/L (ref 97–108)
CO2 SERPL-SCNC: 20 MMOL/L (ref 21–32)
CREAT SERPL-MCNC: 5.51 MG/DL (ref 0.7–1.3)
GLUCOSE BLD STRIP.AUTO-MCNC: 157 MG/DL (ref 65–117)
GLUCOSE BLD STRIP.AUTO-MCNC: 196 MG/DL (ref 65–117)
GLUCOSE SERPL-MCNC: 156 MG/DL (ref 65–100)
HBV CORE AB SERPL QL IA: POSITIVE
PHOSPHATE SERPL-MCNC: 5.5 MG/DL (ref 2.6–4.7)
POTASSIUM SERPL-SCNC: 5.4 MMOL/L (ref 3.5–5.1)
SERVICE CMNT-IMP: ABNORMAL
SERVICE CMNT-IMP: ABNORMAL
SODIUM SERPL-SCNC: 132 MMOL/L (ref 136–145)

## 2022-09-07 PROCEDURE — 80069 RENAL FUNCTION PANEL: CPT

## 2022-09-07 PROCEDURE — 74011636637 HC RX REV CODE- 636/637: Performed by: STUDENT IN AN ORGANIZED HEALTH CARE EDUCATION/TRAINING PROGRAM

## 2022-09-07 PROCEDURE — 74011250637 HC RX REV CODE- 250/637: Performed by: FAMILY MEDICINE

## 2022-09-07 PROCEDURE — 36415 COLL VENOUS BLD VENIPUNCTURE: CPT

## 2022-09-07 PROCEDURE — 97116 GAIT TRAINING THERAPY: CPT

## 2022-09-07 PROCEDURE — 74011250637 HC RX REV CODE- 250/637: Performed by: INTERNAL MEDICINE

## 2022-09-07 PROCEDURE — 82962 GLUCOSE BLOOD TEST: CPT

## 2022-09-07 PROCEDURE — 74011000250 HC RX REV CODE- 250: Performed by: FAMILY MEDICINE

## 2022-09-07 RX ORDER — HYDRALAZINE HYDROCHLORIDE 25 MG/1
25 TABLET, FILM COATED ORAL 2 TIMES DAILY
Qty: 60 TABLET | Refills: 0 | Status: SHIPPED | OUTPATIENT
Start: 2022-09-07

## 2022-09-07 RX ORDER — LANOLIN ALCOHOL/MO/W.PET/CERES
325 CREAM (GRAM) TOPICAL
Qty: 30 TABLET | Refills: 0 | Status: SHIPPED | OUTPATIENT
Start: 2022-09-07

## 2022-09-07 RX ORDER — SEVELAMER CARBONATE FOR ORAL SUSPENSION 800 MG/1
0.8 POWDER, FOR SUSPENSION ORAL
Qty: 90 PACKET | Refills: 0 | Status: SHIPPED | OUTPATIENT
Start: 2022-09-07

## 2022-09-07 RX ORDER — DICLOFENAC SODIUM 10 MG/G
2 GEL TOPICAL 2 TIMES DAILY
Qty: 50 G | Refills: 0 | Status: SHIPPED | OUTPATIENT
Start: 2022-09-07

## 2022-09-07 RX ORDER — ISOSORBIDE DINITRATE 20 MG/1
20 TABLET ORAL 2 TIMES DAILY
Qty: 60 TABLET | Refills: 0 | Status: SHIPPED | OUTPATIENT
Start: 2022-09-07

## 2022-09-07 RX ORDER — CALCITRIOL 0.25 UG/1
0.25 CAPSULE ORAL DAILY
Qty: 30 CAPSULE | Refills: 0 | Status: SHIPPED | OUTPATIENT
Start: 2022-09-07

## 2022-09-07 RX ADMIN — GABAPENTIN 300 MG: 300 CAPSULE ORAL at 08:50

## 2022-09-07 RX ADMIN — PREDNISONE 40 MG: 20 TABLET ORAL at 08:50

## 2022-09-07 RX ADMIN — METOPROLOL SUCCINATE 100 MG: 50 TABLET, EXTENDED RELEASE ORAL at 08:50

## 2022-09-07 RX ADMIN — SODIUM ZIRCONIUM CYCLOSILICATE 5 G: 5 POWDER, FOR SUSPENSION ORAL at 14:09

## 2022-09-07 RX ADMIN — GABAPENTIN 300 MG: 300 CAPSULE ORAL at 14:09

## 2022-09-07 RX ADMIN — CALCITRIOL CAPSULES 0.25 MCG 0.25 MCG: 0.25 CAPSULE ORAL at 08:50

## 2022-09-07 RX ADMIN — FOLIC ACID 1 MG: 1 TABLET ORAL at 08:50

## 2022-09-07 RX ADMIN — SODIUM CHLORIDE, PRESERVATIVE FREE 10 ML: 5 INJECTION INTRAVENOUS at 06:37

## 2022-09-07 RX ADMIN — HYDRALAZINE HYDROCHLORIDE 25 MG: 25 TABLET, FILM COATED ORAL at 08:50

## 2022-09-07 RX ADMIN — Medication 100 MG: at 08:50

## 2022-09-07 RX ADMIN — SEVELAMER CARBONATE FOR ORAL SUSPENSION 0.8 G: 800 POWDER, FOR SUSPENSION ORAL at 11:51

## 2022-09-07 RX ADMIN — ISOSORBIDE DINITRATE 20 MG: 20 TABLET ORAL at 08:50

## 2022-09-07 RX ADMIN — AMLODIPINE BESYLATE 5 MG: 5 TABLET ORAL at 08:50

## 2022-09-07 RX ADMIN — FERROUS SULFATE TAB 325 MG (65 MG ELEMENTAL FE) 325 MG: 325 (65 FE) TAB at 08:50

## 2022-09-07 RX ADMIN — SEVELAMER CARBONATE FOR ORAL SUSPENSION 0.8 G: 800 POWDER, FOR SUSPENSION ORAL at 08:50

## 2022-09-07 RX ADMIN — SODIUM CHLORIDE, PRESERVATIVE FREE 10 ML: 5 INJECTION INTRAVENOUS at 14:09

## 2022-09-07 RX ADMIN — DICLOFENAC SODIUM 2 G: 10 GEL TOPICAL at 08:52

## 2022-09-07 NOTE — PROGRESS NOTES
Problem: Falls - Risk of  Goal: *Absence of Falls  Description: Document Yamilet Morrissey Fall Risk and appropriate interventions in the flowsheet.   Outcome: Progressing Towards Goal  Note: Fall Risk Interventions:  Mobility Interventions: Assess mobility with egress test, Communicate number of staff needed for ambulation/transfer         Medication Interventions: Evaluate medications/consider consulting pharmacy    Elimination Interventions: Call light in reach              Problem: Heart Failure: Day 5  Goal: Activity/Safety  Outcome: Progressing Towards Goal  Goal: Diagnostic Test/Procedures  Outcome: Progressing Towards Goal

## 2022-09-07 NOTE — PROGRESS NOTES
S Cardiology Progress Note    Date of service: 9/7/2022    Subjective:   No acute events overnight    Objective:    Visit Vitals  /86 (BP 1 Location: Right upper arm, BP Patient Position: At rest)   Pulse 64   Temp 97.5 °F (36.4 °C)   Resp 13   Ht 5' 11\" (1.803 m)   Wt 95.6 kg (210 lb 12.2 oz)   SpO2 98%   BMI 29.40 kg/m²     Physical Exam  GEN: NAD, appears stated age  HEENT: EOMI  NECK: Normal JVP   CV: RRR, normal S1 and S2, no M/R/G  LUNGS: CTAB, no W/R/R  ABD: NABS, soft, NT/ND  EXT: No edema, chronic vascular changes in b/l LE  PSYCH: Mood and affect normal  NEURO: Alert, MAEW, face symmetrical, speech intact    Data reviewed:  Recent Results (from the past 12 hour(s))   RENAL FUNCTION PANEL    Collection Time: 09/07/22  6:52 AM   Result Value Ref Range    Sodium 132 (L) 136 - 145 mmol/L    Potassium 5.4 (H) 3.5 - 5.1 mmol/L    Chloride 100 97 - 108 mmol/L    CO2 20 (L) 21 - 32 mmol/L    Anion gap 12 5 - 15 mmol/L    Glucose 156 (H) 65 - 100 mg/dL    BUN 56 (H) 6 - 20 MG/DL    Creatinine 5.51 (H) 0.70 - 1.30 MG/DL    BUN/Creatinine ratio 10 (L) 12 - 20      GFR est AA 13 (L) >60 ml/min/1.73m2    GFR est non-AA 11 (L) >60 ml/min/1.73m2    Calcium 7.1 (L) 8.5 - 10.1 MG/DL    Phosphorus 5.5 (H) 2.6 - 4.7 MG/DL    Albumin 2.8 (L) 3.5 - 5.0 g/dL   GLUCOSE, POC    Collection Time: 09/07/22  8:11 AM   Result Value Ref Range    Glucose (POC) 157 (H) 65 - 117 mg/dL    Performed by Orest Notice      08/30/22    ECHO ADULT COMPLETE 09/03/2022 9/3/2022    Interpretation Summary  Formatting of this result is different from the original.      Left Ventricle: Severely reduced left ventricular systolic function with a visually estimated EF of 30 - 35%. Left ventricle size is normal. Increased wall thickness. Findings consistent with moderate concentric hypertrophy. Severe global hypokinesis present. Grade II diastolic dysfunction with increased LAP. Left Atrium: Left atrium is moderately dilated. Right Atrium: Right atrium is mildly dilated. Signed by: Kaitlin Cerna MD on 9/3/2022  8:02 AM          Assessment and Plan:    Chronic combined systolic and diastolic CHF with EF 01-38%, likely mixed cardiomyopathy, NYHA class II  - no ARB / ACEI / ARNI / aldactone given advanced renal failure    Cardiomyopathy  EF 30-35%  - Etiology unclear  - Defer cath until renal situation more clear to avoid nephrotoxic contrast; will discuss nephrology prognosis for renal recovery  - LifeVest consult; discussed risks/benefits/alternatives and he agrees to LifeVest    Acute on chronic renal insufficiency  Nephrology work-up in progress  - HD per Nephrology    Polysubstance abuse  Now abstinent  HTN  DM2  Anemia      Signed:  Harvie Goodpasture.  Kelvin Mckeon, [address] [facility]  09/07/22

## 2022-09-07 NOTE — PROGRESS NOTES
Problem: Mobility Impaired (Adult and Pediatric)  Goal: *Acute Goals and Plan of Care (Insert Text)  Description: FUNCTIONAL STATUS PRIOR TO ADMISSION: Patient was independent and active without use of DME.    HOME SUPPORT PRIOR TO ADMISSION: The patient lived with roomates but did not require assist. Pt lives in ? Group home, reporting taking classes regarding drug rehab. States he can enter the home through the bottom level, but then has steps to access his room. Physical Therapy Goals  Initiated 9/3/2022    2. Patient will transfer from bed to chair and chair to bed with modified independence using the least restrictive device within 7 day(s). 3.  Patient will perform sit to stand with modified independence within 7 day(s). 4.  Patient will ambulate with modified independence for 150  feet with the least restrictive device within 7 day(s). 5.  Patient will ascend/descend 10 stairs with  handrail(s) with modified independence within 7 day(s). Outcome: Progressing Towards Goal   PHYSICAL THERAPY TREATMENT  Patient: Ramon Luis (89 y.o. male)  Date: 9/7/2022  Diagnosis: ESRD (end stage renal disease) ([facility] [address] [N18.6]  Arthralgia [M25.50]  Acute hyperkalemia [E87.5]  Bilateral lower extremity edema [R60.0]  Acute right hip pain [M25.551] <principal problem not specified>      Precautions:    Chart, physical therapy assessment, plan of care and goals were reviewed. ASSESSMENT  Patient continues with skilled PT services and is progressing towards goals. Pt received supine in bed and agreeable to therapy. Pt tolerated session well, but continues to be limited by reports of L knee instability during activity. Pt reported improvement in L knee pain, but states it feels like \"it's shifting. \"  Pt tolerated progressive gait training x 50 ft with RW with CGA-SBA demonstrating step to and step through gait pattern. Pt instructed on standing therex with support of RW or cabinet.  Pt remained seated in the chair with all needs met. Pt will benefit from [facility] upon discharge. .     Current Level of Function Impacting Discharge (mobility/balance): SBA gait x 50 ft with RW    Other factors to consider for discharge:          PLAN :  Patient continues to benefit from skilled intervention to address the above impairments. Continue treatment per established plan of care. to address goals. Recommendation for discharge: (in order for the patient to meet his/her long term goals)  Physical therapy at least 2 days/week in the home     This discharge recommendation:  Has been made in collaboration with the attending provider and/or case management    IF patient discharges home will need the following DME: patient owns DME required for discharge       SUBJECTIVE:   Patient stated I'm feeling better, but my L knee feels unstable.     OBJECTIVE DATA SUMMARY:   Critical Behavior:              Functional Mobility Training:  Bed Mobility:     Supine to Sit: Supervision     Scooting: Supervision        Transfers:  Sit to Stand: Stand-by assistance  Stand to Sit: Stand-by assistance                             Balance:  Sitting: Intact  Standing: Intact; With support  Ambulation/Gait Training:  Distance (ft): 50 Feet (ft)  Assistive Device: Gait belt;Walker, rolling  Ambulation - Level of Assistance: Stand-by assistance        Gait Abnormalities: Decreased step clearance        Base of Support: Widened;Narrowed     Speed/Daria: Pace decreased (<100 feet/min)  Step Length: Right shortened;Left shortened       Therapeutic Exercises:   Mini squats, standing marching with RW  Pain Rating:  Pt reported minimal L knee pain this date    Activity Tolerance:   Good    After treatment patient left in no apparent distress:   Call bell within reach, Side rails x 3, and seated EOB with breakfast tray in place    COMMUNICATION/COLLABORATION:   The patients plan of care was discussed with: Registered nurse.      Colten Hopper, PT, DPT   Time Calculation: 10 mins

## 2022-09-07 NOTE — PROGRESS NOTES
Bedside shift change report given to [facility] (oncoming nurse) by Jem (offgoing nurse). Report included the following information SBAR, Kardex, Procedure Summary, MAR, and Recent Results.

## 2022-09-07 NOTE — PROGRESS NOTES
Comprehensive Nutrition Assessment    Type and Reason for Visit: Initial (LOS)    Nutrition Recommendations/Plan:   Continue with No Added Salt, Low K+, Low Phos diet order  Will order Nepro BID + snacks       Malnutrition Assessment:  Malnutrition Status:  No malnutrition (09/07/22 1025)         Nutrition Assessment:    66 yo male admitted for ESRD. Pmhx: CKD, DM, HTN, gout, drug abuse. Dialysis catheter placed 8/31 and pt started on HD. Will continue outpatient HD after discharge. Also noted to be + for Hep C. Spoke with pt at bedside. He reports good PO intakes/appetite. He has been eating 100% all meals and c/o still being hungry all day. Discussed ONS options, pt has never tried one before but agreeable to Nepro. Also agreeable to receiving snacks in between meals. Pt asking about K+ content of foods because he was told his K+ is high. Provided pt with education on K+ content and Phos content of foods due to elevated phos as well. Pt verbalized understanding. Weight hx in EMR indicates weight gain PTA. Initial weight this admission was stated so actual standing scale weight of 95 kg is more accurate.     Labs K+ 5.4, phos 5.5      Nutritionally Significant Medications:  Calcitriol, FeSu, folic acid, Sevelamer, thiamine, Humalog      Estimated Daily Nutrient Needs:  Energy Requirements Based On: Formula  Weight Used for Energy Requirements: Current  Energy (kcal/day): 2350 (MSJ x AF 1.3)  Weight Used for Protein Requirements: Current  Protein (g/day): 124 (1.3 gm/kg)  Method Used for Fluid Requirements: Standard renal  Fluid (ml/day):      Nutrition Related Findings:   Edema: trace BLE  Last BM: 09/05/22, Soft    Wounds: None      Current Nutrition Therapies:  Diet: No added salt, Low Phos, Low K+  Supplements: none  Meal intake: Patient Vitals for the past 168 hrs:   % Diet Eaten   09/06/22 1812 76 - 100%   09/05/22 0820 76 - 100%   09/04/22 1321 76 - 100%   08/31/22 1203 76 - 100%     Supplement intake: No data found. Nutrition Support: none      Anthropometric Measures:  Height: 5' 11\" (180.3 cm)  Ideal Body Weight (IBW): 172 lbs (78 kg)     Current Body Wt:  95.6 kg (210 lb 12.2 oz), 122.5 % IBW. Standing scale  Current BMI (kg/m2): 29.4        Weight Adjustment: No adjustment                 BMI Category: Overweight (BMI 25.0-29. 9)    Wt Readings from Last 10 Encounters:   09/07/22 95.6 kg (210 lb 12.2 oz)   08/30/22 103 kg (227 lb)   08/11/22 103 kg (227 lb)   07/12/22 103 kg (227 lb)   03/05/22 85.7 kg (188 lb 14.4 oz)   01/13/22 75.8 kg (167 lb)   10/05/21 83.9 kg (185 lb)   05/20/21 83.6 kg (184 lb 3.2 oz)   05/05/21 80.3 kg (177 lb 1.6 oz)   11/10/16 88.1 kg (194 lb 4.8 oz)           Nutrition Diagnosis:   Increased nutrient needs related to increased demand for energy/nutrients as evidenced by dialysis    Nutrition Interventions:   Food and/or Nutrient Delivery: Continue current diet, Start oral nutrition supplement  Nutrition Education/Counseling: Education initiated  Coordination of Nutrition Care: Continue to monitor while inpatient       Goals:     Goals: other (specify)  Specify Other Goals: PO intakes > 75% meals + ONS to meet estimated protein needs over next 5-7 days    Nutrition Monitoring and Evaluation:   Behavioral-Environmental Outcomes: None identified  Food/Nutrient Intake Outcomes: Food and nutrient intake, Supplement intake  Physical Signs/Symptoms Outcomes: Biochemical data, GI status, Weight    Discharge Planning:    Continue current diet, Continue oral nutrition supplement    Blas Brown RD  Available via [employer]

## 2022-09-07 NOTE — PROGRESS NOTES
Transition of Care  RUR 16% Moderate  Disposition Home with home health-[facility] Home Care SN, PT  DME Walker, Life Vest  Transportation Family/Friend   Follow Up PCP, Specialist    Patient discussed in IDRs as possible DC today. CM notified [facility] via careport. Will need to confirm chair time tomorrow and arrange transport. CM later received consult for life vest. CM faxed medicals to Doroteo Gaines at [phone] and placed follow up call to rep Kristina Martin [phone]. CM to monitor. Ernie Aleman, MS    12:15 [address] has patient scheduled for tomorrow 9/8/22 at 11:30. Patient will need to arrive by 11:00 am. CM contacted [facility] transport [phone] and arranged transport for 10:30 am /Trip # [id number]. Ernie Aleman MS    4:00 CM participated in [facility] Discharge with bedside nursing and   attending, that includes: Follow up appointments with PCP or specialist  Review of medications  Dispatch health information  Education on symptom management    All questions answered and patient concerns addressed SMAART-E checklist completed and placed in appropriate folder. Ernie Aleman MS    4:32 AVS and last dialysis flow sheet faxed to [facility] at [phone].      Ernie Aleman MS

## 2022-09-07 NOTE — DISCHARGE SUMMARY
Discharge Summary       PATIENT ID: Fawn Rogel  MRN: [id number]   YOB: 1965    DATE OF ADMISSION: 8/30/2022 10:12 PM    DATE OF DISCHARGE: 09/07/22    PRIMARY CARE PROVIDER: Macrina Danielson MD     ATTENDING PHYSICIAN: Caitlin Rudolph MD   DISCHARGING PROVIDER: Caitlin Rudolph MD    To contact this individual call [phone] and ask the  to page. If unavailable ask to be transferred the Adult Hospitalist Department. CONSULTATIONS: IP CONSULT TO NEPHROLOGY  IP CONSULT TO INTERVENTIONAL RADIOLOGY  IP CONSULT TO INTERVENTIONAL RADIOLOGY  IP CONSULT TO HEPATOLOGY  IP CONSULT TO INTERVENTIONAL RADIOLOGY  IP CONSULT TO CARDIOLOGY  IP CONSULT TO ORTHOPEDIC SURGERY    PROCEDURES/SURGERIES: * No surgery found *    ADMITTING DIAGNOSES & HOSPITAL COURSE:   ESRD  Acute hyperkalemia  Bilateral lower extremity edema  Arthralgia  Elevated CK level  Acute right hip pain  History of drug use  Tobacco use  Normocytic normochromic anemia  Type 2 diabetes mellitus  HFrEF    Fawn Rogel is a 65 y.o. male who presented with pain and was found to have end-stage renal disease requiring initiation of hemodialysis. Patient started on MWF HD, and perm-cath placed prior to discharge. Pt will continue dialysis as an outpatient and follow-up with nephrology. Pt also noted to have HFrEF, NYHA Class II. Cardiology was consulted and his medications were optimized. He will likely need a cath as an outpatient and will follow-up with Cardiology. Pt was diagnosed with hepatitis C during admission and will follow-up with Hepatology as an outpatient. He also had significant knee pain during his admission and was given an injection of his left knee by orthopedic surgery.          DISCHARGE DIAGNOSES / PLAN:      ESRD  Bilateral lower extremity edema  HFrEF, ejection fraction 30 to 35%  Hepatitis C infection  Concern for sleep apnea  Elevated CK level  Secondary hyperparathyroidism  Acute right hip pain  History of drug use  Tobacco abuse  Normocytic normochromic anemia  Type 2 diabetes mellitus  Hyperkalemia       PENDING TEST RESULTS:   At the time of discharge the following test results are still pending: none    FOLLOW UP APPOINTMENTS:    Follow-up Information       Follow up With Specialties Details Why Contact Info    [address] follow up with PCP scheduled for Tuesday, September 13th, 2022 at 1:00 p.m. [facility]  [address]  [facility] [phone]      [facility]  Go on 9/8/2022 Arrive 9/8/22 by 11:00am    Routine Schedule:Tuesday, Thursday, Saturday, 11:30 am  [facility]: [phone] [address] [place] [address]  [phone] (Phone#)  [phone] (Fax#)    Korin Florence  [facility] Vascular Surgery, Interventional Cardiology Physician, Cardiovascular Disease Physician Schedule an appointment as soon as possible for a visit in 1 week(s)  [address] [place] [address] 14 [facility] [address] [place] [address]      Adalberto Fishman MD Nephrology Schedule an appointment as soon as possible for a visit in 2 week(s)  [facility]  [facility]  Schedule an appointment as soon as possible for a visit in 1 week(s) Hepatitis C infection [facility] 9     [facility] [address] Follow up [address] [address]  [phone]             ADDITIONAL CARE RECOMMENDATIONS:   Please be sure to attend all follow-up appointments, and call and make appointments if they are not already scheduled. Do not skip dialysis! You are supposed to go 3 times every week (Tuesday, Thursday, Saturday).      DIET: Renal Diet    ACTIVITY: Activity as tolerated    WOUND CARE: none    EQUIPMENT needed: none      DISCHARGE MEDICATIONS:  Discharge Medication List as of 9/7/2022  4:20 PM        START taking these medications    Details   calcitRIOL (ROCALTROL) 0.25 mcg capsule Take 1 Capsule by mouth daily. , Normal, Disp-30 Capsule, R-0      diclofenac (VOLTAREN) 1 % gel Apply 2 g to affected area two (2) times a day., Normal, Disp-50 g, R-0      ferrous sulfate 325 mg (65 mg iron) tablet Take 1 Tablet by mouth Daily (before breakfast). , Normal, Disp-30 Tablet, R-0      hydrALAZINE (APRESOLINE) 25 mg tablet Take 1 Tablet by mouth two (2) times a day., Normal, Disp-60 Tablet, R-0      isosorbide dinitrate (ISORDIL) 20 mg tablet Take 1 Tablet by mouth two (2) times a day., Normal, Disp-60 Tablet, R-0      sevelamer carbonate (RENVELA) 0.8 gram pwpk oral powder Take 1 Packet by mouth three (3) times daily (with meals). , Normal, Disp-90 Packet, R-0           CONTINUE these medications which have NOT CHANGED    Details   traZODone (DESYREL) 50 mg tablet Historical Med      metoprolol succinate (TOPROL-XL) 100 mg tablet Historical Med      gabapentin (NEURONTIN) 300 mg capsule Take 300 mg by mouth three (3) times daily. , Historical Med      amLODIPine (NORVASC) 5 mg tablet Take 1 Tablet by mouth daily. , Print, Disp-30 Tablet, R-2      thiamine HCL (B-1) 100 mg tablet Take 1 Tablet by mouth daily. , Normal, Disp-30 Tablet, R-0      folic acid (FOLVITE) 1 mg tablet Take 1 Tablet by mouth daily. , Normal, Disp-30 Tablet, R-0           STOP taking these medications       Suboxone 4-1 mg film sublingual film Comments:   Reason for Stopping:         Suboxone 8-2 mg film sublingaul film Comments:   Reason for Stopping:         allopurinoL (ZYLOPRIM) 300 mg tablet Comments:   Reason for Stopping:         furosemide (LASIX) 40 mg tablet Comments:   Reason for Stopping:         QUEtiapine (SEROquel) 100 mg tablet Comments:   Reason for Stopping:         pantoprazole (Protonix) 40 mg tablet Comments:   Reason for Stopping:         guaiFENesin (ROBITUSSIN) 100 mg/5 mL liquid Comments:   Reason for Stopping: OTHER Comments:   Reason for Stopping:                 NOTIFY YOUR PHYSICIAN FOR ANY OF THE FOLLOWING:   Fever over 101 degrees for 24 hours. Chest pain, shortness of breath, fever, chills, nausea, vomiting, diarrhea, change in mentation, falling, weakness, bleeding. Severe pain or pain not relieved by medications. Or, any other signs or symptoms that you may have questions about. DISPOSITION:   x Home With:   OT  PT  HH  RN       Long term SNF/Inpatient Rehab    Independent/assisted living    Hospice    Other:       PATIENT CONDITION AT DISCHARGE:     Functional status    Poor    x Deconditioned     Independent      Cognition   x  Lucid     Forgetful     Dementia      Catheters/lines (plus indication)    Felix     PICC     PEG    x Perm-cath      Code status    x Full code     DNR      PHYSICAL EXAMINATION AT DISCHARGE:  Visit Vitals  BP (!) 143/96 (BP 1 Location: Right upper arm, BP Patient Position: Sitting)   Pulse 80   Temp 98.2 °F (36.8 °C)   Resp 12   Ht 5' 11\" (1.803 m)   Wt 95.6 kg (210 lb 12.2 oz)   SpO2 95%   BMI 29.40 kg/m²        Constitutional:  No acute distress, cooperative, pleasant    ENT:  Oral mucosa moist, oropharynx benign. Resp:  CTA bilaterally. No wheezing/rhonchi/rales. No accessory muscle use. CV:  Regular rhythm, normal rate, no murmurs, gallops, rubs    GI:  Soft, non distended, non tender. normoactive bowel sounds, no hepatosplenomegaly     Musculoskeletal:  No edema, warm, 2+ pulses throughout    Neurologic:  Moves all extremities.   AAOx3, CN II-XII reviewed           CHRONIC MEDICAL DIAGNOSES:  Problem List as of 9/7/2022 Date Reviewed: 7/12/2022            Codes Class Noted - Resolved    Chronic combined systolic and diastolic CHF (congestive heart failure) (HCC) (Chronic) ICD-10-CM: I50.42  ICD-9-CM: 428.42, 428.0  9/2/2022 - Present        Acute hyperkalemia ICD-10-CM: E87.5  ICD-9-CM: 276.7  8/31/2022 - Present        Arthralgia ICD-10-CM: M25.50  ICD-9-CM: 719.40 8/31/2022 - Present        ESRD (end stage renal disease) ([facility].) ICD-10-CM: N18.6  ICD-9-CM: 585.6  8/31/2022 - Present        Bilateral lower extremity edema ICD-10-CM: R60.0  ICD-9-CM: 782.3  8/31/2022 - Present        Acute right hip pain ICD-10-CM: M25.551  ICD-9-CM: 719.45  8/31/2022 - Present        BHASKAR (acute kidney injury) ([facility].) ICD-10-CM: N17.9  ICD-9-CM: 584.9  1/11/2022 - Present        COVID-19 ICD-10-CM: U07.1  ICD-9-CM: 079.89  1/11/2022 - Present        Smoker ICD-10-CM: Y17.263  ICD-9-CM: 305.1  11/9/2016 - Present        CAP (community acquired pneumonia) ICD-10-CM: J18.9  ICD-9-CM: 749  11/8/2016 - Present        Sepsis ([place] [address].) ICD-10-CM: A41.9  ICD-9-CM: 038.9, 995.91  11/8/2016 - Present        Cellulitis ICD-10-CM: L03.90  ICD-9-CM: 682.9  11/19/2015 - Present        DM type 2 (diabetes mellitus, type 2) ([place] [address].) ICD-10-CM: E11.9  ICD-9-CM: 250.00  11/19/2015 - Present        HTN (hypertension) ICD-10-CM: I10  ICD-9-CM: 401.9  11/19/2015 - Present        Gout ICD-10-CM: M10.9  ICD-9-CM: 274.9  11/19/2015 - Present        Neuropathy ICD-10-CM: G62.9  ICD-9-CM: 355.9  11/19/2015 - Present        RESOLVED: PNA (pneumonia) ICD-10-CM: J18.9  ICD-9-CM: 412  11/8/2016 - 11/9/2016           Greater than 30 minutes were spent with the patient on counseling and coordination of care    Signed:   Silvia Dodd MD  9/7/2022  8:23 AM

## 2022-09-07 NOTE — PROGRESS NOTES
Renal Progress Note    NAME:  Radha Zhou   :   1965   MRN:   [id number]     Date/Time:  2022 1:05 PM  Subjective:        seen on HD, HD JAMIE Altman Later at bedside  Had renal biopsy this am, patient has generalized bodyache. No N/V/SOB     c/o left knee pain and LBP. No SOB, renal Bx result is pending. Plan for HD in am. PTH 1300  9/3 seen on HD, no complaint, BP stable   seen and examined, no new complaint, K high today   c/o left knee pain, no SOB, HD later today   seen on HD in HD unit 531. C/o knee pain unable to walk. BP stable, renal Bx still not available. Plan for permcath later today. K high this am   patient has no complaint, had tunnelled HD catheter yesterday.  Plan for DC home today      Medications reviewed:  Current Facility-Administered Medications   Medication Dose Route Frequency    dextrose 10 % infusion 125-250 mL  125-250 mL IntraVENous PRN    heparin (porcine) 1,000 unit/mL injection 2,600 Units  2,600 Units IntraCATHeter PRN    diclofenac (VOLTAREN) 1 % topical gel 2 g  2 g Topical BID    calcitRIOL (ROCALTROL) capsule 0.25 mcg  0.25 mcg Oral DAILY    0.9% sodium chloride infusion  25 mL/hr IntraVENous RAD CONTINUOUS    isosorbide dinitrate (ISORDIL) tablet 20 mg  20 mg Oral BID    hydrALAZINE (APRESOLINE) tablet 25 mg  25 mg Oral BID    sodium chloride (NS) flush 5-40 mL  5-40 mL IntraVENous Q8H    sodium chloride (NS) flush 5-40 mL  5-40 mL IntraVENous PRN    glucose chewable tablet 16 g  4 Tablet Oral PRN    glucagon (GLUCAGEN) injection 1 mg  1 mg IntraMUSCular PRN    dextrose 10 % infusion 0-250 mL  0-250 mL IntraVENous PRN    insulin lispro (HUMALOG) injection   SubCUTAneous AC&HS    amLODIPine (NORVASC) tablet 5 mg  5 mg Oral DAILY    folic acid (FOLVITE) tablet 1 mg  1 mg Oral DAILY    gabapentin (NEURONTIN) capsule 300 mg  300 mg Oral TID    metoprolol succinate (TOPROL-XL) XL tablet 100 mg  100 mg Oral DAILY    thiamine HCL (B-1) tablet 100 mg  100 mg Oral DAILY    traZODone (DESYREL) tablet 50 mg  50 mg Oral QHS PRN    acetaminophen (TYLENOL) tablet 650 mg  650 mg Oral Q6H PRN    sevelamer carbonate (RENVELA) oral powder 0.8 g  0.8 g Oral TID WITH MEALS    ferrous sulfate tablet 325 mg  1 Tablet Oral BID WITH MEALS        Objective:   Vitals:  Visit Vitals  /81 (BP 1 Location: Right upper arm, BP Patient Position: At rest)   Pulse 70   Temp 97.9 °F (36.6 °C)   Resp 12   Ht 5' 11\" (1.803 m)   Wt 95.6 kg (210 lb 12.2 oz)   SpO2 93%   BMI 29.40 kg/m²     Temp (24hrs), Av.5 °F (36.4 °C), Min:96.5 °F (35.8 °C), Max:97.9 °F (36.6 °C)    O2 Flow Rate (L/min): 3 l/min O2 Device: None (Room air)    Last 24hr Input/Output:    Intake/Output Summary (Last 24 hours) at 2022 1220  Last data filed at 2022 9198  Gross per 24 hour   Intake 240 ml   Output 1750 ml   Net -1510 ml          Physical Exam:  General:Alert and oriented x3, , No distress, seen on HD  HEENT: Eyes are PERRL. Conjunctiva without pallor ,erythema. The sclerae without icterus.  .   Neck:Supple, RT IJ perm HD catheter  Lungs : decreased breathing sounds basilar   CVS: RRR, S1 S2 normal, No rub, SM2  Abdomen: Soft, Non tender, ND,  bowel sounds present  Extremities: no edema  Skin: No rash   Neurologic: non focal, AAO x 3  Psych: normal affect       [] Telemetry Reviewed     [] NSR [] PAC/PVCs   [] Afib  [] Paced   [] NSVT   [] Felix [] NGT  [] Intubated on vent    Lab Data Reviewed:    Recent Results (from the past 24 hour(s))   GLUCOSE, POC    Collection Time: 22  1:03 PM   Result Value Ref Range    Glucose (POC) 147 (H) 65 - 117 mg/dL    Performed by Merl [facility]    GLUCOSE, POC    Collection Time: 22  4:31 PM   Result Value Ref Range    Glucose (POC) 160 (H) 65 - 117 mg/dL    Performed by Merjennifer Patella    GLUCOSE, POC    Collection Time: 22  9:20 PM   Result Value Ref Range    Glucose (POC) 218 (H) 65 - 117 mg/dL    Performed by Judson Isidro (PCT)    RENAL FUNCTION PANEL    Collection Time: 09/07/22  6:52 AM   Result Value Ref Range    Sodium 132 (L) 136 - 145 mmol/L    Potassium 5.4 (H) 3.5 - 5.1 mmol/L    Chloride 100 97 - 108 mmol/L    CO2 20 (L) 21 - 32 mmol/L    Anion gap 12 5 - 15 mmol/L    Glucose 156 (H) 65 - 100 mg/dL    BUN 56 (H) 6 - 20 MG/DL    Creatinine 5.51 (H) 0.70 - 1.30 MG/DL    BUN/Creatinine ratio 10 (L) 12 - 20      GFR est AA 13 (L) >60 ml/min/1.73m2    GFR est non-AA 11 (L) >60 ml/min/1.73m2    Calcium 7.1 (L) 8.5 - 10.1 MG/DL    Phosphorus 5.5 (H) 2.6 - 4.7 MG/DL    Albumin 2.8 (L) 3.5 - 5.0 g/dL   GLUCOSE, POC    Collection Time: 09/07/22  8:11 AM   Result Value Ref Range    Glucose (POC) 157 (H) 65 - 117 mg/dL    Performed by Alexa Knox    GLUCOSE, POC    Collection Time: 09/07/22 11:42 AM   Result Value Ref Range    Glucose (POC) 196 (H) 65 - 117 mg/dL    Performed by Alexa Knox          Assessment/Plan:     Acute kidney injury on CKD3  Hyperkalemia  Metabolic acidosis  Hyperphosphatemia  Nephrotic syndrome  Rapidly progressive with nephrotic range proteinuria-serology negative except positive Hep C Ab in Jan 2022-urine no active sediment--UPCR 5.6  -might be related to Hep-C , other Ddx:  COVAN vs diabetic nephropathy, FSGS , MM, Membranous GN , IgA nephropathy, Heroin/coccaine nephropathy   Spoke to patient, he agrees with both renal biopsy and starting HD.    Renal diet-low potassium  Avoid nephrotoxins  Dose meds per GFR  BP control   -s/p kidney biopsy 9/1and HD catheter placement 8/31  -started  HD 8/31  -f/up  renal Bx result  -HD today, has been set up for outpatient HD [address] for TTHS-did not get HD yesterday  -IR to place tunnelled HD catheter today  -s/p lokelma 9/4  -low K diet, lokelma x1 today prior to discharge    Secondary hyperparathyroidism-PTH 1300  Started renvela  Started calcitriol  vit D level 26     Anemia  Hb>10  Iron sat 17%, ferritin 306  Po Iron    Knee pain, eval per primary team HTN-metoprolol, Amlodipine  Combined systolic and diastolic HF, cardiology recommendation appreciated  DM2-A1C 6  Hep C , recommend to consult hepatology  Gout-uric acid 7-added diclofenac gel for rt knee pain  ____________________________________________    Total time spent with patient:  [] 15   [] 25   [] 35   []  __ minutes    [] Critical Care Provided    Care Plan discussed with:    [x] Patient   [] Family    [] Care Manager   [] Consultant/Specialist :  Dr Leopold Crandall      []   >50% of visit spent in counseling and coordination of care   (Discussed [] CODE status,  [] Care Plan, [] D/C Planning)    ___________________________________________________    Attending Physician: Casey Gleason, [facility]

## 2022-09-08 ENCOUNTER — TELEPHONE (OUTPATIENT)
Dept: CASE MANAGEMENT | Age: 57
End: 2022-09-08

## 2022-09-08 NOTE — TELEPHONE ENCOUNTER
HF NN attempted to reach patient for post discharge phone call. Left message on voicemail with no patient identifying information.

## 2022-09-09 ENCOUNTER — TELEPHONE (OUTPATIENT)
Dept: CASE MANAGEMENT | Age: 57
End: 2022-09-09

## 2022-09-09 ENCOUNTER — DOCUMENTATION ONLY (OUTPATIENT)
Dept: HEMATOLOGY | Age: 57
End: 2022-09-09

## 2022-09-09 NOTE — PROGRESS NOTES
Called patient to schedule TC follow up with Dr. Marcela Rosas. Patient was D/C from [facility] on 9/7/22. Available date for appt is 9/30/22. Patient will call back to schedule a time.

## 2022-09-09 NOTE — TELEPHONE ENCOUNTER
Received return phone call from Mr. Vaibhav Jimenez. He states he is having some trouble getting his medications but think he will be able to get them today. Recommended if he is again hospitalized at [facility] he can have them filled before he leaves. Reviewed follow up appointment with PCP. Noted that he has dialysis at the same time. He will reschedule the appointment. Reviewed signs and symptoms of heart failure and when to call the physician.   Mr. Vaibhav Jimenez states home health will be at his home between 1 and 2 pm.

## 2022-09-11 LAB — CRYOGLOB SER QL 1D COLD INC: NORMAL

## 2022-09-14 LAB — CANNABINOIDS BLD CFM-MCNC: 11 U (ref 0–15)

## 2022-09-15 ENCOUNTER — DOCUMENTATION ONLY (OUTPATIENT)
Dept: HEMATOLOGY | Age: 57
End: 2022-09-15

## 2022-09-15 NOTE — PROGRESS NOTES
LM asking patient to call back to schedule TC follow up appointment with Dr. Veronika CARABALLO/VALERIE from [facility] on 9/7/22.

## 2022-09-16 ENCOUNTER — DOCUMENTATION ONLY (OUTPATIENT)
Dept: HEMATOLOGY | Age: 57
End: 2022-09-16

## 2022-09-16 NOTE — PROGRESS NOTES
Attempted to contact patient x3 to schedule TC follow up appt. To date, patient has not responded. Appointment scheduled for 10/17/22 at 7:40 with Marc Mtz. Appt reminder mailed to patient. Also, left message informing patient of appointment date and time.

## 2022-12-12 ENCOUNTER — HOSPITAL ENCOUNTER (EMERGENCY)
Age: 57
Discharge: HOME | End: 2022-12-12
Attending: EMERGENCY MEDICINE
Payer: MEDICAID

## 2022-12-12 VITALS
HEART RATE: 92 BPM | BODY MASS INDEX: 29.4 KG/M2 | OXYGEN SATURATION: 97 % | RESPIRATION RATE: 16 BRPM | TEMPERATURE: 97.9 F | HEIGHT: 71 IN | DIASTOLIC BLOOD PRESSURE: 85 MMHG | SYSTOLIC BLOOD PRESSURE: 129 MMHG

## 2022-12-12 DIAGNOSIS — M25.562 ACUTE PAIN OF LEFT KNEE: Primary | ICD-10-CM

## 2022-12-12 DIAGNOSIS — M10.9 ACUTE GOUT OF LEFT KNEE, UNSPECIFIED CAUSE: ICD-10-CM

## 2022-12-12 PROCEDURE — 99283 EMERGENCY DEPT VISIT LOW MDM: CPT

## 2022-12-12 RX ORDER — ACETAMINOPHEN 325 MG/1
650 TABLET ORAL
Qty: 20 TABLET | Refills: 0 | Status: SHIPPED | OUTPATIENT
Start: 2022-12-12

## 2022-12-12 RX ORDER — PREDNISONE 10 MG/1
TABLET ORAL
Qty: 21 TABLET | Refills: 0 | Status: SHIPPED | OUTPATIENT
Start: 2022-12-12

## 2022-12-12 NOTE — ED PROVIDER NOTES
EMERGENCY DEPARTMENT HISTORY AND PHYSICAL EXAM      Please note that this dictation was completed with [employer], the computer voice recognition software. Quite often unanticipated grammatical, syntax, homophones, and other interpretive errors are inadvertently transcribed by the computer software. Please disregard these errors. Please excuse any errors that have escaped final proofreading. Date: 12/12/2022  Patient Name: Megan Manuel    History of Presenting Illness     Chief Complaint   Patient presents with    Knee Pain       History Provided By: Patient    HPI: Megan Manuel, 65 y.o. male with a history of DM, gout, HTN, ESRD on dialysis M, W, F, polysubstance abuse and opiate dependence others presents ambulatory to the ED with cc of almost a week of 10 out of 10 constant, achy left knee pain that is much worse with moving. He tells me he does have a history of gout. He denies any specific injury. He tells me a week or so ago he had some mild left knee pain. He tells me he needed to move some furniture and his pain worsened after that. He denies any specific injury. He did not fall. He denies any other joint pain. There are no other complaints, changes, or physical findings at this time. PCP: Yeison Fowler MD    Current Outpatient Medications   Medication Sig Dispense Refill    predniSONE (STERAPRED DS) 10 mg dose pack Per Dose Pack instructions 21 Tablet 0    acetaminophen (TYLENOL) 325 mg tablet Take 2 Tablets by mouth every six (6) hours as needed for Pain. 20 Tablet 0    calcitRIOL (ROCALTROL) 0.25 mcg capsule Take 1 Capsule by mouth daily. 30 Capsule 0    diclofenac (VOLTAREN) 1 % gel Apply 2 g to affected area two (2) times a day. 50 g 0    ferrous sulfate 325 mg (65 mg iron) tablet Take 1 Tablet by mouth Daily (before breakfast). 30 Tablet 0    hydrALAZINE (APRESOLINE) 25 mg tablet Take 1 Tablet by mouth two (2) times a day.  60 Tablet 0    isosorbide dinitrate (ISORDIL) 20 mg tablet Take 1 Tablet by mouth two (2) times a day. 60 Tablet 0    sevelamer carbonate (RENVELA) 0.8 gram pwpk oral powder Take 1 Packet by mouth three (3) times daily (with meals). 90 Packet 0    traZODone (DESYREL) 50 mg tablet       metoprolol succinate (TOPROL-XL) 100 mg tablet       gabapentin (NEURONTIN) 300 mg capsule Take 300 mg by mouth three (3) times daily. amLODIPine (NORVASC) 5 mg tablet Take 1 Tablet by mouth daily. 30 Tablet 2    thiamine HCL (B-1) 100 mg tablet Take 1 Tablet by mouth daily. 30 Tablet 0    folic acid (FOLVITE) 1 mg tablet Take 1 Tablet by mouth daily. 30 Tablet 0     Past History     Past Medical History:  Past Medical History:   Diagnosis Date    Diabetes ([facility] [address]     Gout     Hypertension        Past Surgical History:  Past Surgical History:   Procedure Laterality Date    HX ORTHOPAEDIC  rt hip replacement    HX ORTHOPAEDIC  rt thigh woo    IR INSERT NON TUNL CVC OVER 5 YRS  8/31/2022    IR INSERT TUNL CVC W/O PORT OVER 5 YR  9/6/2022       Family History:  Family History   Problem Relation Age of Onset    Diabetes Other     Hypertension Other        Social History:  Social History     Tobacco Use    Smoking status: Every Day     Packs/day: 1.00     Types: Cigarettes    Smokeless tobacco: Never   Vaping Use    Vaping Use: Never used   Substance Use Topics    Alcohol use: Not Currently    Drug use: Not Currently     Types: Opiates     Comment: IV fentanly use today 1/11       Allergies: Allergies   Allergen Reactions    Shellfish Containing Products Swelling     Review of Systems   Review of Systems   Constitutional:  Negative for fever. HENT:  Negative for sore throat. Eyes:  Negative for pain. Respiratory:  Negative for shortness of breath. Cardiovascular:  Negative for chest pain. Gastrointestinal:  Negative for abdominal pain. Genitourinary:  Negative for flank pain. Musculoskeletal:  Negative for back pain. Left knee pain   Skin:  Negative for rash. Neurological:  Negative for headaches. Physical Exam   Physical Exam  Vitals and nursing note reviewed. Constitutional:       General: He is not in acute distress. Appearance: He is well-developed. He is not toxic-appearing. HENT:      Head: Normocephalic and atraumatic. No right periorbital erythema or left periorbital erythema. Right Ear: External ear normal.      Left Ear: External ear normal.      Nose: Nose normal.      Mouth/Throat:      Mouth: Mucous membranes are moist.   Eyes:      General: No scleral icterus. Conjunctiva/sclera: Conjunctivae normal.      Pupils: Pupils are equal, round, and reactive to light. Cardiovascular:      Rate and Rhythm: Normal rate. Pulmonary:      Effort: Pulmonary effort is normal. No respiratory distress. Chest:      Comments: Dialysis port in the right upper chest  Abdominal:      Palpations: Abdomen is soft. Tenderness: There is no abdominal tenderness. Musculoskeletal:         General: Normal range of motion. Cervical back: Normal range of motion. Left knee: Effusion present. Comments:   LEFT KNEE:  A suprapatellar effusion is apparent  No bruising or redness  Able to flex knee to 90 degrees with some discomfort  No lower extremity edema  Diffuse anterior tenderness  Provocative maneuvers deferred   Skin:     Findings: No rash. Neurological:      Mental Status: He is alert and oriented to person, place, and time. He is not disoriented. Cranial Nerves: No cranial nerve deficit. Sensory: No sensory deficit. Psychiatric:         Speech: Speech normal.     Diagnostic Study Results     Labs -   No results found for this or any previous visit (from the past 12 hour(s)). Radiologic Studies -   No orders to display     CT Results  (Last 48 hours)      None          CXR Results  (Last 48 hours)      None          Medical Decision Making   I am the first provider for this patient.     I reviewed the vital signs, available nursing notes, past medical history, past surgical history, family history and social history. Vital Signs-Reviewed the patient's vital signs. Patient Vitals for the past 12 hrs:   Temp Pulse Resp BP SpO2   12/12/22 1527 97.9 °F (36.6 °C) 92 16 129/85 97 %       Pulse Oximetry Analysis - 97% on RA    Records Reviewed: Nursing Notes, Old Medical Records, Previous Radiology Studies, Previous Laboratory Studies, and     Provider Notes (Medical Decision Making): Afebrile and in no acute distress. Patient presents ambulatory with left knee pain that has been going on for about a week. Denies any specific injury. He does have a history of gout and attends dialysis thrice weekly. On exam, there is an effusion that is apparent. There is no bruising or redness. Patient is able to flex his knee to 90 degrees with some discomfort. Additional and deferred. Presentation is not consistent with septic arthritis. Will offer course of prednisone and acetaminophen (up-to-date states no dosage adjustment required for thrice weekly dialysis). Refer to primary care/Ortho. ED Course:   Initial assessment performed. The patients presenting problems have been discussed, and they are in agreement with the care plan formulated and outlined with them. I have encouraged them to ask questions as they arise throughout their visit. Disposition:  Discharge    PLAN:  1. Discharge Medication List as of 12/12/2022  4:39 PM        START taking these medications    Details   predniSONE (STERAPRED DS) 10 mg dose pack Per Dose Pack instructions, Normal, Disp-21 Tablet, R-0      acetaminophen (TYLENOL) 325 mg tablet Take 2 Tablets by mouth every six (6) hours as needed for Pain., Normal, Disp-20 Tablet, R-0           CONTINUE these medications which have NOT CHANGED    Details   calcitRIOL (ROCALTROL) 0.25 mcg capsule Take 1 Capsule by mouth daily. , Normal, Disp-30 Capsule, R-0      diclofenac (VOLTAREN) 1 % gel Apply 2 g to affected area two (2) times a day., Normal, Disp-50 g, R-0      ferrous sulfate 325 mg (65 mg iron) tablet Take 1 Tablet by mouth Daily (before breakfast). , Normal, Disp-30 Tablet, R-0      hydrALAZINE (APRESOLINE) 25 mg tablet Take 1 Tablet by mouth two (2) times a day., Normal, Disp-60 Tablet, R-0      isosorbide dinitrate (ISORDIL) 20 mg tablet Take 1 Tablet by mouth two (2) times a day., Normal, Disp-60 Tablet, R-0      sevelamer carbonate (RENVELA) 0.8 gram pwpk oral powder Take 1 Packet by mouth three (3) times daily (with meals). , Normal, Disp-90 Packet, R-0      traZODone (DESYREL) 50 mg tablet Historical Med      metoprolol succinate (TOPROL-XL) 100 mg tablet Historical Med      gabapentin (NEURONTIN) 300 mg capsule Take 300 mg by mouth three (3) times daily. , Historical Med      amLODIPine (NORVASC) 5 mg tablet Take 1 Tablet by mouth daily. , Print, Disp-30 Tablet, R-2      thiamine HCL (B-1) 100 mg tablet Take 1 Tablet by mouth daily. , Normal, Disp-30 Tablet, R-0      folic acid (FOLVITE) 1 mg tablet Take 1 Tablet by mouth daily. , Normal, Disp-30 Tablet, R-0           2. Follow-up Information       Follow up With Specialties Details Why Contact Info    Yessi [address]  Call  ORTHO: call to schedule follow up Samreen  [phone]          Return to ED if worse     Diagnosis     Clinical Impression:   1. Acute pain of left knee    2.  Acute gout of left knee, unspecified cause

## 2022-12-12 NOTE — ED NOTES
Pt arrives to the ED AAOX4 with a c/c of left knee pain associated with swelling onset 1 week ago. Pt denies any injury, but states pt started after he moved some heavy furniture. Pt is noted in stable condition, now in ED room with side rail up, bed to lowest position, and call light within reach. Will continue to monitor and wait for ED provider evaluation. Emergency Department Nursing Plan of Care       The Nursing Plan of Care is developed from the Nursing assessment and Emergency Department Attending provider initial evaluation. The plan of care may be reviewed in the ED Provider note.     The Plan of Care was developed with the following considerations:   Patient / Family readiness to learn indicated by:verbalized understanding  Persons(s) to be included in education: patient  Barriers to Learning/Limitations:No    Signed     Jayleen Virgen    12/12/2022   3:51 PM

## 2022-12-12 NOTE — ED TRIAGE NOTES
Pt arrived to ED complaining of left knee pain and swelling x 1 week. Pt reports he noticed the swelling 1 week ago and reports an increase of pain after having to move furniture. Pt denies any fall or injury to the knee. Pt reports using topical ointment of the knee w/ some relief.

## 2023-10-18 LAB
ESTIMATED AVERAGE GLUCOSE: NORMAL
HBA1C MFR BLD: 4.7 %

## 2024-05-16 ENCOUNTER — HOSPITAL ENCOUNTER (EMERGENCY)
Facility: HOSPITAL | Age: 59
Discharge: HOME | End: 2024-05-16
Attending: STUDENT IN AN ORGANIZED HEALTH CARE EDUCATION/TRAINING PROGRAM
Payer: MEDICAID

## 2024-05-16 ENCOUNTER — APPOINTMENT (OUTPATIENT)
Facility: HOSPITAL | Age: 59
End: 2024-05-16
Payer: MEDICAID

## 2024-05-16 VITALS
HEART RATE: 95 BPM | HEIGHT: 70 IN | WEIGHT: 160.5 LBS | BODY MASS INDEX: 22.98 KG/M2 | SYSTOLIC BLOOD PRESSURE: 153 MMHG | DIASTOLIC BLOOD PRESSURE: 79 MMHG | RESPIRATION RATE: 15 BRPM | OXYGEN SATURATION: 100 % | TEMPERATURE: 98.7 F

## 2024-05-16 DIAGNOSIS — M16.0 OSTEOARTHRITIS OF BOTH HIPS, UNSPECIFIED OSTEOARTHRITIS TYPE: Primary | ICD-10-CM

## 2024-05-16 DIAGNOSIS — S46.912S STRAIN OF LEFT SHOULDER, SEQUELA: ICD-10-CM

## 2024-05-16 PROCEDURE — 6370000000 HC RX 637 (ALT 250 FOR IP): Performed by: STUDENT IN AN ORGANIZED HEALTH CARE EDUCATION/TRAINING PROGRAM

## 2024-05-16 PROCEDURE — 73030 X-RAY EXAM OF SHOULDER: CPT

## 2024-05-16 PROCEDURE — 72170 X-RAY EXAM OF PELVIS: CPT

## 2024-05-16 PROCEDURE — 99283 EMERGENCY DEPT VISIT LOW MDM: CPT

## 2024-05-16 RX ORDER — LIDOCAINE 4 G/G
1 PATCH TOPICAL DAILY
Qty: 30 PATCH | Refills: 0 | Status: SHIPPED | OUTPATIENT
Start: 2024-05-16 | End: 2024-06-15

## 2024-05-16 RX ORDER — ACETAMINOPHEN 500 MG
1000 TABLET ORAL
Status: COMPLETED | OUTPATIENT
Start: 2024-05-16 | End: 2024-05-16

## 2024-05-16 RX ORDER — METHOCARBAMOL 750 MG/1
750 TABLET, FILM COATED ORAL 4 TIMES DAILY
Qty: 80 TABLET | Refills: 0 | Status: SHIPPED | OUTPATIENT
Start: 2024-05-16 | End: 2024-06-05

## 2024-05-16 RX ORDER — ACETAMINOPHEN 500 MG
1000 TABLET ORAL 3 TIMES DAILY PRN
Qty: 100 TABLET | Refills: 0 | Status: SHIPPED | OUTPATIENT
Start: 2024-05-16

## 2024-05-16 RX ADMIN — ACETAMINOPHEN 1000 MG: 500 TABLET ORAL at 11:16

## 2024-05-16 ASSESSMENT — PAIN DESCRIPTION - ORIENTATION: ORIENTATION: RIGHT;LOWER

## 2024-05-16 ASSESSMENT — PAIN DESCRIPTION - LOCATION: LOCATION: SHOULDER;HIP

## 2024-05-16 ASSESSMENT — PAIN - FUNCTIONAL ASSESSMENT: PAIN_FUNCTIONAL_ASSESSMENT: 0-10

## 2024-05-16 ASSESSMENT — PAIN DESCRIPTION - DESCRIPTORS: DESCRIPTORS: ACHING;SORE

## 2024-05-16 ASSESSMENT — PAIN SCALES - GENERAL: PAINLEVEL_OUTOF10: 10

## 2024-05-16 ASSESSMENT — PAIN DESCRIPTION - FREQUENCY: FREQUENCY: CONTINUOUS

## 2024-05-16 NOTE — ED TRIAGE NOTES
Pt presents to the ED with a limp c/o right hip and right shoulder pain. Pt reports he has not been taking his binder medications for Dialysis and has not been taking medications for Gout as prescribed.    Pt is Dialysis patient at [facility], T, Th, Sat. Pt was unable to make dialysis today d/t pain. Pt denies SOB, CP, weakness, n/v/d.

## 2024-07-10 ENCOUNTER — OFFICE VISIT (OUTPATIENT)
Facility: CLINIC | Age: 59
End: 2024-07-10
Payer: MEDICAID

## 2024-07-10 VITALS
BODY MASS INDEX: 22.71 KG/M2 | TEMPERATURE: 98.2 F | OXYGEN SATURATION: 95 % | RESPIRATION RATE: 16 BRPM | SYSTOLIC BLOOD PRESSURE: 107 MMHG | WEIGHT: 158.6 LBS | HEIGHT: 70 IN | HEART RATE: 80 BPM | DIASTOLIC BLOOD PRESSURE: 74 MMHG

## 2024-07-10 DIAGNOSIS — Z99.2 HEMODIALYSIS STATUS (HCC): ICD-10-CM

## 2024-07-10 DIAGNOSIS — G62.9 NEUROPATHY: Primary | ICD-10-CM

## 2024-07-10 DIAGNOSIS — M1A.09X0 CHRONIC GOUT OF MULTIPLE SITES, UNSPECIFIED CAUSE: ICD-10-CM

## 2024-07-10 DIAGNOSIS — Z99.2 END STAGE RENAL DISEASE ON DIALYSIS (HCC): ICD-10-CM

## 2024-07-10 DIAGNOSIS — N18.6 END STAGE RENAL DISEASE ON DIALYSIS (HCC): ICD-10-CM

## 2024-07-10 PROBLEM — M25.50 ARTHRALGIA: Status: RESOLVED | Noted: 2022-08-31 | Resolved: 2024-07-10

## 2024-07-10 PROBLEM — E87.5 ACUTE HYPERKALEMIA: Status: RESOLVED | Noted: 2022-08-31 | Resolved: 2024-07-10

## 2024-07-10 PROBLEM — R60.0 BILATERAL LOWER EXTREMITY EDEMA: Status: RESOLVED | Noted: 2022-08-31 | Resolved: 2024-07-10

## 2024-07-10 PROBLEM — U07.1 COVID-19: Status: RESOLVED | Noted: 2022-01-11 | Resolved: 2024-07-10

## 2024-07-10 PROBLEM — N17.9 AKI (ACUTE KIDNEY INJURY) (HCC): Status: RESOLVED | Noted: 2022-01-11 | Resolved: 2024-07-10

## 2024-07-10 PROBLEM — M25.551 ACUTE RIGHT HIP PAIN: Status: RESOLVED | Noted: 2022-08-31 | Resolved: 2024-07-10

## 2024-07-10 PROCEDURE — 3078F DIAST BP <80 MM HG: CPT | Performed by: FAMILY MEDICINE

## 2024-07-10 PROCEDURE — 3074F SYST BP LT 130 MM HG: CPT | Performed by: FAMILY MEDICINE

## 2024-07-10 PROCEDURE — 99204 OFFICE O/P NEW MOD 45 MIN: CPT | Performed by: FAMILY MEDICINE

## 2024-07-10 RX ORDER — FERROUS SULFATE 325(65) MG
325 TABLET ORAL
Qty: 30 TABLET | Refills: 3 | Status: SHIPPED | OUTPATIENT
Start: 2024-07-10

## 2024-07-10 RX ORDER — PREDNISONE 10 MG/1
TABLET ORAL
Qty: 1 EACH | Refills: 0 | Status: SHIPPED | OUTPATIENT
Start: 2024-07-10

## 2024-07-10 RX ORDER — ISOSORBIDE DINITRATE 20 MG/1
20 TABLET ORAL 2 TIMES DAILY
Qty: 60 TABLET | Refills: 2 | Status: SHIPPED | OUTPATIENT
Start: 2024-07-10

## 2024-07-10 RX ORDER — GABAPENTIN 300 MG/1
300 CAPSULE ORAL DAILY
Qty: 30 CAPSULE | Refills: 2 | Status: SHIPPED | OUTPATIENT
Start: 2024-07-10 | End: 2024-10-08

## 2024-07-10 RX ORDER — AMLODIPINE BESYLATE 10 MG/1
10 TABLET ORAL DAILY
Qty: 30 TABLET | Refills: 2 | Status: SHIPPED | OUTPATIENT
Start: 2024-07-10

## 2024-07-10 RX ORDER — NICOTINE 21 MG/24HR
1 PATCH, TRANSDERMAL 24 HOURS TRANSDERMAL DAILY
COMMUNITY
Start: 2023-10-31 | End: 2024-07-10

## 2024-07-10 RX ORDER — VIT B COMP NO.3/FOLIC/C/BIOTIN 1 MG-60 MG
1 TABLET ORAL DAILY
COMMUNITY
Start: 2023-10-30 | End: 2024-10-29

## 2024-07-10 RX ORDER — PHENOL 1.4 %
AEROSOL, SPRAY (ML) MUCOUS MEMBRANE
COMMUNITY
End: 2024-07-10

## 2024-07-10 RX ORDER — AMLODIPINE BESYLATE 10 MG/1
10 TABLET ORAL DAILY
COMMUNITY
Start: 2024-05-06 | End: 2024-07-10 | Stop reason: SDUPTHER

## 2024-07-10 RX ORDER — TRAZODONE HYDROCHLORIDE 50 MG/1
50 TABLET ORAL NIGHTLY
Qty: 30 TABLET | Refills: 2 | Status: SHIPPED | OUTPATIENT
Start: 2024-07-10

## 2024-07-10 RX ORDER — NALOXONE HYDROCHLORIDE 4 MG/.1ML
SPRAY NASAL
COMMUNITY
Start: 2023-10-30

## 2024-07-10 SDOH — ECONOMIC STABILITY: FOOD INSECURITY: WITHIN THE PAST 12 MONTHS, THE FOOD YOU BOUGHT JUST DIDN'T LAST AND YOU DIDN'T HAVE MONEY TO GET MORE.: SOMETIMES TRUE

## 2024-07-10 SDOH — ECONOMIC STABILITY: HOUSING INSECURITY
IN THE LAST 12 MONTHS, WAS THERE A TIME WHEN YOU DID NOT HAVE A STEADY PLACE TO SLEEP OR SLEPT IN A SHELTER (INCLUDING NOW)?: YES

## 2024-07-10 SDOH — ECONOMIC STABILITY: INCOME INSECURITY: HOW HARD IS IT FOR YOU TO PAY FOR THE VERY BASICS LIKE FOOD, HOUSING, MEDICAL CARE, AND HEATING?: SOMEWHAT HARD

## 2024-07-10 SDOH — ECONOMIC STABILITY: FOOD INSECURITY: WITHIN THE PAST 12 MONTHS, YOU WORRIED THAT YOUR FOOD WOULD RUN OUT BEFORE YOU GOT MONEY TO BUY MORE.: SOMETIMES TRUE

## 2024-07-10 ASSESSMENT — PATIENT HEALTH QUESTIONNAIRE - PHQ9
SUM OF ALL RESPONSES TO PHQ QUESTIONS 1-9: 2
1. LITTLE INTEREST OR PLEASURE IN DOING THINGS: SEVERAL DAYS
SUM OF ALL RESPONSES TO PHQ QUESTIONS 1-9: 2
2. FEELING DOWN, DEPRESSED OR HOPELESS: SEVERAL DAYS
SUM OF ALL RESPONSES TO PHQ9 QUESTIONS 1 & 2: 2

## 2024-07-10 NOTE — PATIENT INSTRUCTIONS
[facility] Housing Resources*  (Call [employer]/211 if need more resources)      Homeless Connection Line (Local)  What they offer: The line facilitates access to resources and shelter alternatives for those who are currently homeless or 3 days or less away from losing their housing.  They also provide information for the inclement weather shelters when available.    Areas served: [place], [place], [place], [place], [place], [place], [place],  [facility]  Phone Number: [phone] **Contact this number first. It is a centralized point of entry for services.    Hours of Operation: Monday - Friday 8AM - 9PM; Saturday & Sunday 1PM - 9PM    Housing Resource Line  What they offer: Centralized line to help residents connect to programs and services that will help address their housing needs.            Needs addressed by HRL: Financial Assistance, Financial Education, Homebuyer Education, Emergency Assistance, Foreclosure Prevention, Legal Support, Locating Rental Options, Rehabs & Repairs, Ramps  Serve residents of [facility], [facility], [facility], [facility], [facility], [facility], [facility], [facility], and the [facility].  Website:    Phone Number: [phone]  Hours of Operation: Monday-Friday, 8:30AM-4:30PM     [facility]  What they offer: [employer] is the regional planning and coordinating agency for homeless services.  Website:  https://www.[employer].org/get-help  [facility] Street Sheets - Street Sheets are a valuable resource for those seeking resources. These sheets are located on the [employer] home page.  Phone Number: 302.792.7394     [employer] Homeless - National Call Center  What they offer: Free 24/7 access to trained counselors for local resources and assistance  Website: https://www.[place].gov/homeless/nationalcallcenter.asp  Phone Number: 753.144.3494 (text messaging accepted)  Medicaid

## 2024-07-10 NOTE — PROGRESS NOTES
Chief Complaint   Patient presents with    Establish Care     Patient states that he is here establishing care and is in need of a refill of his medication.    New Patient

## 2024-07-10 NOTE — PROGRESS NOTES
Leandra Perez is a 59 y.o. male who complains of   Chief Complaint   Patient presents with    Establish Care     Patient states that he is here establishing care and is in need of a refill of his medication.    New Patient   Arthritis - states gets flares every once in a while, takes prednisone regularly to keep it from flaring but has been out.  L wrist has been swollen, painful for past 2 weeks.  Is improving some but still weak, painful with any grasp.  Pt reports being on prednisone, not on current med list or in packet.      Currently lives in a rooming house, trying to find somewhere in low income housing.      ESRD on HD - has HD T-Th-S    CHF - hx of per chart, pt states doesn't see Cards but was told in the past his heart is weak.    Hx of substance abuse - was previously on suboxone, no longer.  States currently only smoking cigarettes.  Does take gabapentin daily to help with pain.    Reviewed and agree with Nurse Note and duplicated in this note.  Reviewed PmHx, RxHx, FmHx, SocHx, AllgHx and updated and dated in the chart.    Allergies   Allergen Reactions    Shellfish Allergy Swelling     Family History   Problem Relation Age of Onset    Diabetes Other     Hypertension Other        Past Medical History:   Diagnosis Date    Diabetes (HCC)     Gout     History of renal dialysis     Hypertension     Osteoarthritis       Social History     Socioeconomic History    Marital status: Single     Spouse name: None    Number of children: None    Years of education: None    Highest education level: None   Tobacco Use    Smoking status: Some Days     Current packs/day: 1.00     Types: Cigarettes    Smokeless tobacco: Never   Substance and Sexual Activity    Alcohol use: Not Currently    Drug use: Never   Social History Narrative    Mr Perez used to work as a  at a hospital. He suffered a fall from a bridge and suffered many injuries, requiring neck and hip surgery. Had TBI  He is now on SSI.    On

## 2024-08-30 LAB — HBA1C MFR BLD HPLC: 4.8 %

## 2025-02-08 ENCOUNTER — APPOINTMENT (OUTPATIENT)
Facility: HOSPITAL | Age: 60
DRG: 139 | End: 2025-02-08
Payer: MEDICAID

## 2025-02-08 ENCOUNTER — HOSPITAL ENCOUNTER (INPATIENT)
Facility: HOSPITAL | Age: 60
LOS: 8 days | Discharge: NURSING, REHAB OR LONG-TERM CARE | DRG: 139 | End: 2025-02-16
Attending: EMERGENCY MEDICINE | Admitting: INTERNAL MEDICINE
Payer: MEDICAID

## 2025-02-08 DIAGNOSIS — F19.90 SUBSTANCE USE DISORDER: ICD-10-CM

## 2025-02-08 DIAGNOSIS — M79.89 LEG SWELLING: ICD-10-CM

## 2025-02-08 DIAGNOSIS — R09.02 HYPOXEMIA: ICD-10-CM

## 2025-02-08 DIAGNOSIS — J10.1 INFLUENZA A: Primary | ICD-10-CM

## 2025-02-08 DIAGNOSIS — R06.02 SHORTNESS OF BREATH: ICD-10-CM

## 2025-02-08 PROBLEM — R41.0 ACUTE DELIRIUM: Status: ACTIVE | Noted: 2025-02-08

## 2025-02-08 LAB
ALBUMIN SERPL-MCNC: 2.9 G/DL (ref 3.5–5)
ALBUMIN/GLOB SERPL: 0.4 (ref 1.1–2.2)
ALP SERPL-CCNC: 83 U/L (ref 45–117)
ALT SERPL-CCNC: 9 U/L (ref 12–78)
ANION GAP SERPL CALC-SCNC: 11 MMOL/L (ref 2–12)
AST SERPL-CCNC: 19 U/L (ref 15–37)
BASOPHILS # BLD: 0.01 K/UL (ref 0–0.1)
BASOPHILS NFR BLD: 0.2 % (ref 0–1)
BILIRUB SERPL-MCNC: 0.6 MG/DL (ref 0.2–1)
BUN SERPL-MCNC: 29 MG/DL (ref 6–20)
BUN/CREAT SERPL: 4 (ref 12–20)
CALCIUM SERPL-MCNC: 9 MG/DL (ref 8.5–10.1)
CHLORIDE SERPL-SCNC: 93 MMOL/L (ref 97–108)
CO2 SERPL-SCNC: 28 MMOL/L (ref 21–32)
COMMENT:: NORMAL
CREAT SERPL-MCNC: 8.05 MG/DL (ref 0.7–1.3)
DIFFERENTIAL METHOD BLD: ABNORMAL
EOSINOPHIL # BLD: 0.01 K/UL (ref 0–0.4)
EOSINOPHIL NFR BLD: 0.2 % (ref 0–7)
ERYTHROCYTE [DISTWIDTH] IN BLOOD BY AUTOMATED COUNT: 18.9 % (ref 11.5–14.5)
FLUAV RNA SPEC QL NAA+PROBE: DETECTED
FLUBV RNA SPEC QL NAA+PROBE: NOT DETECTED
GLOBULIN SER CALC-MCNC: 6.6 G/DL (ref 2–4)
GLUCOSE SERPL-MCNC: 116 MG/DL (ref 65–100)
HCT VFR BLD AUTO: 39.3 % (ref 36.6–50.3)
HGB BLD-MCNC: 12.6 G/DL (ref 12.1–17)
IMM GRANULOCYTES # BLD AUTO: 0.02 K/UL (ref 0–0.04)
IMM GRANULOCYTES NFR BLD AUTO: 0.5 % (ref 0–0.5)
LACTATE SERPL-SCNC: 1.2 MMOL/L (ref 0.4–2)
LYMPHOCYTES # BLD: 0.92 K/UL (ref 0.8–3.5)
LYMPHOCYTES NFR BLD: 20.8 % (ref 12–49)
MCH RBC QN AUTO: 30.8 PG (ref 26–34)
MCHC RBC AUTO-ENTMCNC: 32.1 G/DL (ref 30–36.5)
MCV RBC AUTO: 96.1 FL (ref 80–99)
MONOCYTES # BLD: 0.44 K/UL (ref 0–1)
MONOCYTES NFR BLD: 9.9 % (ref 5–13)
NEUTS SEG # BLD: 3.03 K/UL (ref 1.8–8)
NEUTS SEG NFR BLD: 68.4 % (ref 32–75)
NRBC # BLD: 0 K/UL (ref 0–0.01)
NRBC BLD-RTO: 0 PER 100 WBC
PLATELET # BLD AUTO: 142 K/UL (ref 150–400)
PMV BLD AUTO: 10.3 FL (ref 8.9–12.9)
POTASSIUM SERPL-SCNC: 3.7 MMOL/L (ref 3.5–5.1)
PROT SERPL-MCNC: 9.5 G/DL (ref 6.4–8.2)
RBC # BLD AUTO: 4.09 M/UL (ref 4.1–5.7)
SARS-COV-2 RNA RESP QL NAA+PROBE: NOT DETECTED
SODIUM SERPL-SCNC: 132 MMOL/L (ref 136–145)
SOURCE: ABNORMAL
SPECIMEN HOLD: NORMAL
TROPONIN I SERPL HS-MCNC: 20 NG/L (ref 0–76)
WBC # BLD AUTO: 4.4 K/UL (ref 4.1–11.1)

## 2025-02-08 PROCEDURE — 85025 COMPLETE CBC W/AUTO DIFF WBC: CPT

## 2025-02-08 PROCEDURE — 36415 COLL VENOUS BLD VENIPUNCTURE: CPT

## 2025-02-08 PROCEDURE — 6370000000 HC RX 637 (ALT 250 FOR IP): Performed by: EMERGENCY MEDICINE

## 2025-02-08 PROCEDURE — 2060000000 HC ICU INTERMEDIATE R&B

## 2025-02-08 PROCEDURE — 87040 BLOOD CULTURE FOR BACTERIA: CPT

## 2025-02-08 PROCEDURE — 80053 COMPREHEN METABOLIC PANEL: CPT

## 2025-02-08 PROCEDURE — 71045 X-RAY EXAM CHEST 1 VIEW: CPT

## 2025-02-08 PROCEDURE — 99285 EMERGENCY DEPT VISIT HI MDM: CPT

## 2025-02-08 PROCEDURE — 84484 ASSAY OF TROPONIN QUANT: CPT

## 2025-02-08 PROCEDURE — 87636 SARSCOV2 & INF A&B AMP PRB: CPT

## 2025-02-08 PROCEDURE — 83605 ASSAY OF LACTIC ACID: CPT

## 2025-02-08 PROCEDURE — 93005 ELECTROCARDIOGRAM TRACING: CPT | Performed by: EMERGENCY MEDICINE

## 2025-02-08 RX ORDER — OSELTAMIVIR PHOSPHATE 75 MG/1
75 CAPSULE ORAL
Status: COMPLETED | OUTPATIENT
Start: 2025-02-08 | End: 2025-02-08

## 2025-02-08 RX ADMIN — OSELTAMIVIR PHOSPHATE 75 MG: 75 CAPSULE ORAL at 22:06

## 2025-02-08 ASSESSMENT — PAIN - FUNCTIONAL ASSESSMENT: PAIN_FUNCTIONAL_ASSESSMENT: NONE - DENIES PAIN

## 2025-02-09 ENCOUNTER — APPOINTMENT (OUTPATIENT)
Facility: HOSPITAL | Age: 60
DRG: 139 | End: 2025-02-09
Attending: INTERNAL MEDICINE
Payer: MEDICAID

## 2025-02-09 ENCOUNTER — APPOINTMENT (OUTPATIENT)
Facility: HOSPITAL | Age: 60
DRG: 139 | End: 2025-02-09
Payer: MEDICAID

## 2025-02-09 LAB
ALBUMIN SERPL-MCNC: 2.6 G/DL (ref 3.5–5)
ALBUMIN/GLOB SERPL: 0.5 (ref 1.1–2.2)
ALP SERPL-CCNC: 67 U/L (ref 45–117)
ALT SERPL-CCNC: 7 U/L (ref 12–78)
AMMONIA PLAS-SCNC: 27 UMOL/L
ANION GAP SERPL CALC-SCNC: 9 MMOL/L (ref 2–12)
APAP SERPL-MCNC: <2 UG/ML (ref 10–30)
AST SERPL-CCNC: 18 U/L (ref 15–37)
BASOPHILS # BLD: 0.04 K/UL (ref 0–0.1)
BASOPHILS NFR BLD: 1 % (ref 0–1)
BILIRUB SERPL-MCNC: 0.6 MG/DL (ref 0.2–1)
BUN SERPL-MCNC: 33 MG/DL (ref 6–20)
BUN/CREAT SERPL: 4 (ref 12–20)
CALCIUM SERPL-MCNC: 8.7 MG/DL (ref 8.5–10.1)
CHLORIDE SERPL-SCNC: 96 MMOL/L (ref 97–108)
CO2 SERPL-SCNC: 28 MMOL/L (ref 21–32)
CREAT SERPL-MCNC: 8.69 MG/DL (ref 0.7–1.3)
CRP SERPL-MCNC: 26.4 MG/DL (ref 0–0.3)
DIFFERENTIAL METHOD BLD: ABNORMAL
EKG ATRIAL RATE: 106 BPM
EKG DIAGNOSIS: NORMAL
EKG P AXIS: 41 DEGREES
EKG P-R INTERVAL: 134 MS
EKG Q-T INTERVAL: 338 MS
EKG QRS DURATION: 66 MS
EKG QTC CALCULATION (BAZETT): 448 MS
EKG R AXIS: 10 DEGREES
EKG T AXIS: 83 DEGREES
EKG VENTRICULAR RATE: 106 BPM
EOSINOPHIL # BLD: 0.04 K/UL (ref 0–0.4)
EOSINOPHIL NFR BLD: 1 % (ref 0–7)
ERYTHROCYTE [DISTWIDTH] IN BLOOD BY AUTOMATED COUNT: 18.9 % (ref 11.5–14.5)
EST. AVERAGE GLUCOSE BLD GHB EST-MCNC: 103 MG/DL
FOLATE SERPL-MCNC: 36.8 NG/ML (ref 5–21)
GLOBULIN SER CALC-MCNC: 5.5 G/DL (ref 2–4)
GLUCOSE BLD STRIP.AUTO-MCNC: 109 MG/DL (ref 65–117)
GLUCOSE BLD STRIP.AUTO-MCNC: 119 MG/DL (ref 65–117)
GLUCOSE BLD STRIP.AUTO-MCNC: 90 MG/DL (ref 65–117)
GLUCOSE BLD STRIP.AUTO-MCNC: 96 MG/DL (ref 65–117)
GLUCOSE SERPL-MCNC: 84 MG/DL (ref 65–100)
HBA1C MFR BLD: 5.2 % (ref 4–5.6)
HCT VFR BLD AUTO: 35.4 % (ref 36.6–50.3)
HGB BLD-MCNC: 11.3 G/DL (ref 12.1–17)
IMM GRANULOCYTES # BLD AUTO: 0 K/UL
IMM GRANULOCYTES NFR BLD AUTO: 0 %
LYMPHOCYTES # BLD: 1.05 K/UL (ref 0.8–3.5)
LYMPHOCYTES NFR BLD: 27 % (ref 12–49)
MAGNESIUM SERPL-MCNC: 2 MG/DL (ref 1.6–2.4)
MCH RBC QN AUTO: 31.1 PG (ref 26–34)
MCHC RBC AUTO-ENTMCNC: 31.9 G/DL (ref 30–36.5)
MCV RBC AUTO: 97.5 FL (ref 80–99)
MONOCYTES # BLD: 0.39 K/UL (ref 0–1)
MONOCYTES NFR BLD: 10 % (ref 5–13)
NEUTS SEG # BLD: 2.38 K/UL (ref 1.8–8)
NEUTS SEG NFR BLD: 61 % (ref 32–75)
NRBC # BLD: 0 K/UL (ref 0–0.01)
NRBC BLD-RTO: 0 PER 100 WBC
NT PRO BNP: 4487 PG/ML
PHOSPHATE SERPL-MCNC: 4.6 MG/DL (ref 2.6–4.7)
PLATELET # BLD AUTO: 154 K/UL (ref 150–400)
PMV BLD AUTO: 11.2 FL (ref 8.9–12.9)
POTASSIUM SERPL-SCNC: 3.4 MMOL/L (ref 3.5–5.1)
PROT SERPL-MCNC: 8.1 G/DL (ref 6.4–8.2)
RBC # BLD AUTO: 3.63 M/UL (ref 4.1–5.7)
RBC MORPH BLD: ABNORMAL
SALICYLATES SERPL-MCNC: <1.7 MG/DL (ref 2.8–20)
SERVICE CMNT-IMP: ABNORMAL
SERVICE CMNT-IMP: NORMAL
SODIUM SERPL-SCNC: 133 MMOL/L (ref 136–145)
TROPONIN I SERPL HS-MCNC: 23 NG/L (ref 0–76)
TSH SERPL DL<=0.05 MIU/L-ACNC: 0.55 UIU/ML (ref 0.36–3.74)
VIT B12 SERPL-MCNC: 610 PG/ML (ref 193–986)
WBC # BLD AUTO: 3.9 K/UL (ref 4.1–11.1)

## 2025-02-09 PROCEDURE — 84484 ASSAY OF TROPONIN QUANT: CPT

## 2025-02-09 PROCEDURE — 82962 GLUCOSE BLOOD TEST: CPT

## 2025-02-09 PROCEDURE — 6360000002 HC RX W HCPCS: Performed by: INTERNAL MEDICINE

## 2025-02-09 PROCEDURE — 82746 ASSAY OF FOLIC ACID SERUM: CPT

## 2025-02-09 PROCEDURE — 99222 1ST HOSP IP/OBS MODERATE 55: CPT | Performed by: SPECIALIST

## 2025-02-09 PROCEDURE — 84443 ASSAY THYROID STIM HORMONE: CPT

## 2025-02-09 PROCEDURE — 82607 VITAMIN B-12: CPT

## 2025-02-09 PROCEDURE — 6370000000 HC RX 637 (ALT 250 FOR IP): Performed by: INTERNAL MEDICINE

## 2025-02-09 PROCEDURE — 2580000003 HC RX 258: Performed by: INTERNAL MEDICINE

## 2025-02-09 PROCEDURE — 36415 COLL VENOUS BLD VENIPUNCTURE: CPT

## 2025-02-09 PROCEDURE — 93010 ELECTROCARDIOGRAM REPORT: CPT | Performed by: SPECIALIST

## 2025-02-09 PROCEDURE — 80143 DRUG ASSAY ACETAMINOPHEN: CPT

## 2025-02-09 PROCEDURE — 70450 CT HEAD/BRAIN W/O DYE: CPT

## 2025-02-09 PROCEDURE — 86140 C-REACTIVE PROTEIN: CPT

## 2025-02-09 PROCEDURE — 2060000000 HC ICU INTERMEDIATE R&B

## 2025-02-09 PROCEDURE — 83735 ASSAY OF MAGNESIUM: CPT

## 2025-02-09 PROCEDURE — 2500000003 HC RX 250 WO HCPCS: Performed by: INTERNAL MEDICINE

## 2025-02-09 PROCEDURE — 80179 DRUG ASSAY SALICYLATE: CPT

## 2025-02-09 PROCEDURE — 83880 ASSAY OF NATRIURETIC PEPTIDE: CPT

## 2025-02-09 PROCEDURE — 82140 ASSAY OF AMMONIA: CPT

## 2025-02-09 PROCEDURE — 6360000004 HC RX CONTRAST MEDICATION: Performed by: INTERNAL MEDICINE

## 2025-02-09 PROCEDURE — 84100 ASSAY OF PHOSPHORUS: CPT

## 2025-02-09 PROCEDURE — 85025 COMPLETE CBC W/AUTO DIFF WBC: CPT

## 2025-02-09 PROCEDURE — 80053 COMPREHEN METABOLIC PANEL: CPT

## 2025-02-09 PROCEDURE — 83036 HEMOGLOBIN GLYCOSYLATED A1C: CPT

## 2025-02-09 PROCEDURE — 71275 CT ANGIOGRAPHY CHEST: CPT

## 2025-02-09 RX ORDER — LANOLIN ALCOHOL/MO/W.PET/CERES
100 CREAM (GRAM) TOPICAL DAILY
Status: DISCONTINUED | OUTPATIENT
Start: 2025-02-09 | End: 2025-02-16 | Stop reason: HOSPADM

## 2025-02-09 RX ORDER — OSELTAMIVIR PHOSPHATE 30 MG/1
30 CAPSULE ORAL ONCE
Status: COMPLETED | OUTPATIENT
Start: 2025-02-09 | End: 2025-02-09

## 2025-02-09 RX ORDER — NIFEDIPINE 30 MG/1
30 TABLET, EXTENDED RELEASE ORAL DAILY
Status: DISCONTINUED | OUTPATIENT
Start: 2025-02-09 | End: 2025-02-12

## 2025-02-09 RX ORDER — PANTOPRAZOLE SODIUM 40 MG/1
40 TABLET, DELAYED RELEASE ORAL
Status: DISCONTINUED | OUTPATIENT
Start: 2025-02-09 | End: 2025-02-16 | Stop reason: HOSPADM

## 2025-02-09 RX ORDER — PSEUDOEPHEDRINE HCL 30 MG
100 TABLET ORAL 2 TIMES DAILY
COMMUNITY
Start: 2025-02-03 | End: 2025-03-05

## 2025-02-09 RX ORDER — ENOXAPARIN SODIUM 100 MG/ML
40 INJECTION SUBCUTANEOUS DAILY
Status: DISCONTINUED | OUTPATIENT
Start: 2025-02-09 | End: 2025-02-09 | Stop reason: ALTCHOICE

## 2025-02-09 RX ORDER — BUMETANIDE 0.25 MG/ML
1 INJECTION, SOLUTION INTRAMUSCULAR; INTRAVENOUS 2 TIMES DAILY
Status: DISCONTINUED | OUTPATIENT
Start: 2025-02-09 | End: 2025-02-16 | Stop reason: HOSPADM

## 2025-02-09 RX ORDER — NEPHROCAP 1 MG
1 CAP ORAL DAILY
Status: DISCONTINUED | OUTPATIENT
Start: 2025-02-09 | End: 2025-02-16 | Stop reason: HOSPADM

## 2025-02-09 RX ORDER — ACETAMINOPHEN 325 MG/1
650 TABLET ORAL EVERY 6 HOURS PRN
Status: DISCONTINUED | OUTPATIENT
Start: 2025-02-09 | End: 2025-02-16 | Stop reason: HOSPADM

## 2025-02-09 RX ORDER — NIFEDIPINE 30 MG/1
30 TABLET, EXTENDED RELEASE ORAL DAILY
Status: ON HOLD | COMMUNITY
Start: 2025-02-03 | End: 2025-02-15 | Stop reason: HOSPADM

## 2025-02-09 RX ORDER — CARVEDILOL 25 MG/1
25 TABLET ORAL EVERY 12 HOURS
Status: ON HOLD | COMMUNITY
Start: 2025-02-03 | End: 2025-02-15 | Stop reason: HOSPADM

## 2025-02-09 RX ORDER — OXYCODONE HYDROCHLORIDE 5 MG/1
5 TABLET ORAL EVERY 4 HOURS PRN
Status: DISCONTINUED | OUTPATIENT
Start: 2025-02-09 | End: 2025-02-16 | Stop reason: HOSPADM

## 2025-02-09 RX ORDER — SENNOSIDES A AND B 8.6 MG/1
17.2 TABLET, FILM COATED ORAL NIGHTLY
COMMUNITY
Start: 2025-02-03 | End: 2025-03-05

## 2025-02-09 RX ORDER — FOLIC ACID 1 MG/1
1 TABLET ORAL DAILY
Status: DISCONTINUED | OUTPATIENT
Start: 2025-02-09 | End: 2025-02-16 | Stop reason: HOSPADM

## 2025-02-09 RX ORDER — SODIUM CHLORIDE 0.9 % (FLUSH) 0.9 %
5-40 SYRINGE (ML) INJECTION EVERY 12 HOURS SCHEDULED
Status: DISCONTINUED | OUTPATIENT
Start: 2025-02-09 | End: 2025-02-16 | Stop reason: HOSPADM

## 2025-02-09 RX ORDER — ACETAMINOPHEN 650 MG/1
650 SUPPOSITORY RECTAL EVERY 6 HOURS PRN
Status: DISCONTINUED | OUTPATIENT
Start: 2025-02-09 | End: 2025-02-16 | Stop reason: HOSPADM

## 2025-02-09 RX ORDER — BUPRENORPHINE HYDROCHLORIDE AND NALOXONE HYDROCHLORIDE DIHYDRATE 8; 2 MG/1; MG/1
1 TABLET SUBLINGUAL 3 TIMES DAILY
Status: DISCONTINUED | OUTPATIENT
Start: 2025-02-09 | End: 2025-02-16 | Stop reason: HOSPADM

## 2025-02-09 RX ORDER — TRAZODONE HYDROCHLORIDE 50 MG/1
50 TABLET ORAL NIGHTLY
Status: DISCONTINUED | OUTPATIENT
Start: 2025-02-09 | End: 2025-02-16 | Stop reason: HOSPADM

## 2025-02-09 RX ORDER — LIDOCAINE 4 G/G
1 PATCH TOPICAL DAILY
Status: DISCONTINUED | OUTPATIENT
Start: 2025-02-09 | End: 2025-02-16 | Stop reason: HOSPADM

## 2025-02-09 RX ORDER — OMEPRAZOLE 40 MG/1
40 CAPSULE, DELAYED RELEASE ORAL
COMMUNITY
Start: 2025-02-03 | End: 2025-03-05

## 2025-02-09 RX ORDER — IPRATROPIUM BROMIDE AND ALBUTEROL SULFATE 2.5; .5 MG/3ML; MG/3ML
1 SOLUTION RESPIRATORY (INHALATION) EVERY 4 HOURS PRN
Status: DISCONTINUED | OUTPATIENT
Start: 2025-02-09 | End: 2025-02-16 | Stop reason: HOSPADM

## 2025-02-09 RX ORDER — OSELTAMIVIR PHOSPHATE 30 MG/1
30 CAPSULE ORAL EVERY EVENING
Status: DISCONTINUED | OUTPATIENT
Start: 2025-02-10 | End: 2025-02-13 | Stop reason: ALTCHOICE

## 2025-02-09 RX ORDER — HYDROMORPHONE HYDROCHLORIDE 1 MG/ML
1 INJECTION, SOLUTION INTRAMUSCULAR; INTRAVENOUS; SUBCUTANEOUS EVERY 4 HOURS PRN
Status: DISCONTINUED | OUTPATIENT
Start: 2025-02-09 | End: 2025-02-16 | Stop reason: HOSPADM

## 2025-02-09 RX ORDER — VIT B COMP NO.3/FOLIC/C/BIOTIN 1 MG-60 MG
1 TABLET ORAL DAILY
COMMUNITY
Start: 2025-02-03 | End: 2025-03-05

## 2025-02-09 RX ORDER — HEPARIN SODIUM 5000 [USP'U]/ML
5000 INJECTION, SOLUTION INTRAVENOUS; SUBCUTANEOUS EVERY 8 HOURS SCHEDULED
Status: DISCONTINUED | OUTPATIENT
Start: 2025-02-09 | End: 2025-02-16 | Stop reason: HOSPADM

## 2025-02-09 RX ORDER — POTASSIUM CHLORIDE 750 MG/1
40 TABLET, EXTENDED RELEASE ORAL PRN
Status: DISCONTINUED | OUTPATIENT
Start: 2025-02-09 | End: 2025-02-11

## 2025-02-09 RX ORDER — MAGNESIUM SULFATE IN WATER 40 MG/ML
2000 INJECTION, SOLUTION INTRAVENOUS PRN
Status: DISCONTINUED | OUTPATIENT
Start: 2025-02-09 | End: 2025-02-11

## 2025-02-09 RX ORDER — OSELTAMIVIR PHOSPHATE 30 MG/1
30 CAPSULE ORAL DAILY
Status: DISCONTINUED | OUTPATIENT
Start: 2025-02-09 | End: 2025-02-09 | Stop reason: DRUGHIGH

## 2025-02-09 RX ORDER — SODIUM CHLORIDE 0.9 % (FLUSH) 0.9 %
5-40 SYRINGE (ML) INJECTION PRN
Status: DISCONTINUED | OUTPATIENT
Start: 2025-02-09 | End: 2025-02-16 | Stop reason: HOSPADM

## 2025-02-09 RX ORDER — METHOCARBAMOL 500 MG/1
1000 TABLET, FILM COATED ORAL 3 TIMES DAILY PRN
Status: DISCONTINUED | OUTPATIENT
Start: 2025-02-09 | End: 2025-02-16 | Stop reason: HOSPADM

## 2025-02-09 RX ORDER — LIDOCAINE 50 MG/G
1 PATCH TOPICAL DAILY
COMMUNITY
Start: 2025-02-03 | End: 2025-03-05

## 2025-02-09 RX ORDER — ISOSORBIDE DINITRATE 10 MG/1
20 TABLET ORAL 2 TIMES DAILY
Status: DISCONTINUED | OUTPATIENT
Start: 2025-02-09 | End: 2025-02-16 | Stop reason: HOSPADM

## 2025-02-09 RX ORDER — ONDANSETRON 4 MG/1
4 TABLET, ORALLY DISINTEGRATING ORAL EVERY 8 HOURS PRN
Status: DISCONTINUED | OUTPATIENT
Start: 2025-02-09 | End: 2025-02-16 | Stop reason: HOSPADM

## 2025-02-09 RX ORDER — ONDANSETRON 2 MG/ML
4 INJECTION INTRAMUSCULAR; INTRAVENOUS EVERY 6 HOURS PRN
Status: DISCONTINUED | OUTPATIENT
Start: 2025-02-09 | End: 2025-02-16 | Stop reason: HOSPADM

## 2025-02-09 RX ORDER — FERROUS SULFATE 325(65) MG
325 TABLET ORAL
Status: DISCONTINUED | OUTPATIENT
Start: 2025-02-09 | End: 2025-02-16 | Stop reason: HOSPADM

## 2025-02-09 RX ORDER — CALCITRIOL 0.25 UG/1
0.25 CAPSULE, LIQUID FILLED ORAL DAILY
Status: DISCONTINUED | OUTPATIENT
Start: 2025-02-09 | End: 2025-02-16 | Stop reason: HOSPADM

## 2025-02-09 RX ORDER — POTASSIUM CHLORIDE 7.45 MG/ML
10 INJECTION INTRAVENOUS PRN
Status: DISCONTINUED | OUTPATIENT
Start: 2025-02-09 | End: 2025-02-11

## 2025-02-09 RX ORDER — INSULIN LISPRO 100 [IU]/ML
0-8 INJECTION, SOLUTION INTRAVENOUS; SUBCUTANEOUS
Status: DISCONTINUED | OUTPATIENT
Start: 2025-02-09 | End: 2025-02-16 | Stop reason: HOSPADM

## 2025-02-09 RX ORDER — CARVEDILOL 12.5 MG/1
25 TABLET ORAL EVERY 12 HOURS
Status: DISCONTINUED | OUTPATIENT
Start: 2025-02-09 | End: 2025-02-10

## 2025-02-09 RX ORDER — IOPAMIDOL 755 MG/ML
100 INJECTION, SOLUTION INTRAVASCULAR
Status: COMPLETED | OUTPATIENT
Start: 2025-02-09 | End: 2025-02-09

## 2025-02-09 RX ORDER — SODIUM CHLORIDE 9 MG/ML
INJECTION, SOLUTION INTRAVENOUS PRN
Status: DISCONTINUED | OUTPATIENT
Start: 2025-02-09 | End: 2025-02-16 | Stop reason: HOSPADM

## 2025-02-09 RX ORDER — NICOTINE 21 MG/24HR
1 PATCH, TRANSDERMAL 24 HOURS TRANSDERMAL DAILY
Status: DISCONTINUED | OUTPATIENT
Start: 2025-02-09 | End: 2025-02-16 | Stop reason: HOSPADM

## 2025-02-09 RX ORDER — AMLODIPINE BESYLATE 5 MG/1
10 TABLET ORAL DAILY
Status: DISCONTINUED | OUTPATIENT
Start: 2025-02-09 | End: 2025-02-09

## 2025-02-09 RX ORDER — METHOCARBAMOL 500 MG/1
1000 TABLET, FILM COATED ORAL 3 TIMES DAILY PRN
COMMUNITY
Start: 2025-02-03 | End: 2025-03-05

## 2025-02-09 RX ORDER — BUPRENORPHINE AND NALOXONE 8; 2 MG/1; MG/1
1 FILM, SOLUBLE BUCCAL; SUBLINGUAL 3 TIMES DAILY
Status: ON HOLD | COMMUNITY
Start: 2025-02-03 | End: 2025-02-15 | Stop reason: HOSPADM

## 2025-02-09 RX ORDER — NIFEDIPINE 30 MG
TABLET, EXTENDED RELEASE ORAL
Status: ON HOLD | COMMUNITY
Start: 2025-01-28 | End: 2025-02-15 | Stop reason: HOSPADM

## 2025-02-09 RX ORDER — POLYETHYLENE GLYCOL 3350 17 G/17G
17 POWDER, FOR SOLUTION ORAL DAILY PRN
Status: DISCONTINUED | OUTPATIENT
Start: 2025-02-09 | End: 2025-02-16 | Stop reason: HOSPADM

## 2025-02-09 RX ORDER — SEVELAMER CARBONATE 0.8 G/1
0.8 POWDER, FOR SUSPENSION ORAL
Status: DISCONTINUED | OUTPATIENT
Start: 2025-02-09 | End: 2025-02-16 | Stop reason: HOSPADM

## 2025-02-09 RX ADMIN — SEVELAMER CARBONATE 0.8 G: 800 FOR SUSPENSION ORAL at 16:40

## 2025-02-09 RX ADMIN — SODIUM CHLORIDE, PRESERVATIVE FREE 10 ML: 5 INJECTION INTRAVENOUS at 21:44

## 2025-02-09 RX ADMIN — WATER 1000 MG: 1 INJECTION INTRAMUSCULAR; INTRAVENOUS; SUBCUTANEOUS at 21:45

## 2025-02-09 RX ADMIN — IOPAMIDOL 100 ML: 755 INJECTION, SOLUTION INTRAVENOUS at 11:43

## 2025-02-09 RX ADMIN — BUMETANIDE 1 MG: 0.25 INJECTION INTRAMUSCULAR; INTRAVENOUS at 09:36

## 2025-02-09 RX ADMIN — ISOSORBIDE DINITRATE 20 MG: 20 TABLET ORAL at 09:35

## 2025-02-09 RX ADMIN — HEPARIN SODIUM 5000 UNITS: 5000 INJECTION INTRAVENOUS; SUBCUTANEOUS at 13:40

## 2025-02-09 RX ADMIN — HEPARIN SODIUM 5000 UNITS: 5000 INJECTION INTRAVENOUS; SUBCUTANEOUS at 09:51

## 2025-02-09 RX ADMIN — Medication 1 MG: at 09:34

## 2025-02-09 RX ADMIN — TRAZODONE HYDROCHLORIDE 50 MG: 50 TABLET ORAL at 21:57

## 2025-02-09 RX ADMIN — BUPRENORPHINE HYDROCHLORIDE AND NALOXONE HYDROCHLORIDE DIHYDRATE 1 TABLET: 8; 2 TABLET SUBLINGUAL at 21:41

## 2025-02-09 RX ADMIN — FERROUS SULFATE TAB 325 MG (65 MG ELEMENTAL FE) 325 MG: 325 (65 FE) TAB at 07:31

## 2025-02-09 RX ADMIN — AZITHROMYCIN MONOHYDRATE 500 MG: 500 INJECTION, POWDER, LYOPHILIZED, FOR SOLUTION INTRAVENOUS at 22:06

## 2025-02-09 RX ADMIN — CARVEDILOL 25 MG: 12.5 TABLET, FILM COATED ORAL at 16:40

## 2025-02-09 RX ADMIN — BUPRENORPHINE HYDROCHLORIDE AND NALOXONE HYDROCHLORIDE DIHYDRATE 1 TABLET: 8; 2 TABLET SUBLINGUAL at 09:35

## 2025-02-09 RX ADMIN — ISOSORBIDE DINITRATE 20 MG: 20 TABLET ORAL at 21:41

## 2025-02-09 RX ADMIN — HEPARIN SODIUM 5000 UNITS: 5000 INJECTION INTRAVENOUS; SUBCUTANEOUS at 21:58

## 2025-02-09 RX ADMIN — CALCITRIOL CAPSULES 0.25 MCG 0.25 MCG: 0.25 CAPSULE ORAL at 09:34

## 2025-02-09 RX ADMIN — SODIUM CHLORIDE, PRESERVATIVE FREE 10 ML: 5 INJECTION INTRAVENOUS at 09:38

## 2025-02-09 RX ADMIN — Medication 100 MG: at 09:34

## 2025-02-09 RX ADMIN — BUPRENORPHINE HYDROCHLORIDE AND NALOXONE HYDROCHLORIDE DIHYDRATE 1 TABLET: 8; 2 TABLET SUBLINGUAL at 13:40

## 2025-02-09 RX ADMIN — OSELTAMIVIR 30 MG: 30 CAPSULE ORAL at 09:35

## 2025-02-09 RX ADMIN — BUMETANIDE 1 MG: 0.25 INJECTION INTRAMUSCULAR; INTRAVENOUS at 21:52

## 2025-02-09 RX ADMIN — CARVEDILOL 25 MG: 12.5 TABLET, FILM COATED ORAL at 04:00

## 2025-02-09 RX ADMIN — PANTOPRAZOLE SODIUM 40 MG: 40 TABLET, DELAYED RELEASE ORAL at 07:31

## 2025-02-09 RX ADMIN — NIFEDIPINE 30 MG: 30 TABLET, FILM COATED, EXTENDED RELEASE ORAL at 09:35

## 2025-02-09 ASSESSMENT — PAIN DESCRIPTION - ORIENTATION: ORIENTATION: LOWER

## 2025-02-09 ASSESSMENT — PAIN DESCRIPTION - DESCRIPTORS: DESCRIPTORS: ACHING

## 2025-02-09 ASSESSMENT — PAIN DESCRIPTION - LOCATION: LOCATION: BACK

## 2025-02-09 ASSESSMENT — PAIN DESCRIPTION - PAIN TYPE: TYPE: CHRONIC PAIN

## 2025-02-09 ASSESSMENT — PAIN SCALES - GENERAL: PAINLEVEL_OUTOF10: 4

## 2025-02-09 NOTE — ED PROVIDER NOTES
[facility] EMERGENCY DEPARTMENT  EMERGENCY DEPARTMENT ENCOUNTER        CHIEF COMPLAINT       Chief Complaint   Patient presents with    Altered Mental Status         HISTORY OF PRESENT ILLNESS      59y M from [place] here with fever, fatigue, and body aches. Started in the past few days. Reports shortness of breath and requiring oxygen on arrival to the ED. No vomiting. Went to HD today and was able to complete without trouble.     Review of External Medical Records:     Nursing Notes were reviewed.    REVIEW OF SYSTEMS       Review of Systems   Constitutional: (-) weight loss.   HEENT: (-) stiff neck   Eyes: (-) discharge.   Respiratory: (-) cough.    Cardiovascular: (-) syncope.   Gastrointestinal: (-) blood in stool.   Genitourinary: (-) hematuria.  Musculoskeletal: (-) myalgias.   Neurological: (-) seizure.   Skin: (-) petechiae  Lymph/Immunologic: (-) enlarged lymph nodes  All other systems reviewed and are negative.            PAST MEDICAL HISTORY     Past Medical History:   Diagnosis Date    Diabetes (HCC)     Gout     History of renal dialysis     Hypertension     Osteoarthritis          SURGICAL HISTORY       Past Surgical History:   Procedure Laterality Date    CT BIOPSY RENAL  9/1/2022    CT BIOPSY RENAL 9/1/2022 [facility] RAD CT    IR NONTUNNELED VASCULAR CATHETER > 5 YEARS  8/31/2022    IR NONTUNNELED VASCULAR CATHETER 8/31/2022 [facility] RAD ANGIO IR    IR NONTUNNELED VASCULAR CATHETER > 5 YEARS  8/31/2022    IR TUNNELED CVC PLACE WO SQ PORT/PUMP > 5 YEARS  9/6/2022    IR TUNNELED CATHETER PLACEMENT GREATER THAN 5 YEARS 9/6/2022 [facility] RAD ANGIO IR    IR TUNNELED CVC PLACE WO SQ PORT/PUMP > 5 YEARS  9/6/2022    ORTHOPEDIC SURGERY  rt thigh neva    ORTHOPEDIC SURGERY  rt hip replacement         ALLERGIES     Shellfish allergy    FAMILY HISTORY       Family History   Problem Relation Age of Onset    Diabetes Other     Hypertension Other           SOCIAL HISTORY       Social History     Socioeconomic History

## 2025-02-09 NOTE — ED NOTES
Preventive Health Recommendations  Female Ages 18 to 20     Yearly exam:     See your health care provider every year in order to  o Review health changes.   o Discuss preventive care.    o Review your medicines if your doctor has prescribed any.      You should be tested each year for STDs (sexually transmitted diseases).       After age 20, talk to your provider about how often you should have cholesterol testing.      If you are at risk for diabetes, you should have a diabetes test (fasting glucose).     Shots:     Get a flu shot each year.     Get a tetanus shot every 10 years.     Consider getting the shot (vaccine) that prevents cervical cancer (Gardasil).    Nutrition:     Eat at least 5 servings of fruits and vegetables each day.    Eat whole-grain bread, whole-wheat pasta and brown rice instead of white grains and rice.    Get adequate Calcium and Vitamin D.     Lifestyle    Exercise at least 150 minutes a week each week (30 minutes a day, 5 days a week). This will help you control your weight and prevent disease.    No smoking.     Wear sunscreen to prevent skin cancer.    See your dentist every six months for an exam and cleaning.   Pt placed in hospital gown and hooked up x3 to cardiac monitor.

## 2025-02-09 NOTE — PLAN OF CARE
Problem: Safety - Adult  Goal: Free from fall injury  Outcome: Progressing  Flowsheets (Taken 2/9/2025 1340)  Free From Fall Injury: Instruct family/caregiver on patient safety     Problem: Chronic Conditions and Co-morbidities  Goal: Patient's chronic conditions and co-morbidity symptoms are monitored and maintained or improved  Outcome: Progressing  Flowsheets (Taken 2/9/2025 1340)  Care Plan - Patient's Chronic Conditions and Co-Morbidity Symptoms are Monitored and Maintained or Improved:   Monitor and assess patient's chronic conditions and comorbid symptoms for stability, deterioration, or improvement   Update acute care plan with appropriate goals if chronic or comorbid symptoms are exacerbated and prevent overall improvement and discharge   Collaborate with multidisciplinary team to address chronic and comorbid conditions and prevent exacerbation or deterioration     Problem: Discharge Planning  Goal: Discharge to home or other facility with appropriate resources  Outcome: Progressing  Flowsheets (Taken 2/9/2025 1340)  Discharge to home or other facility with appropriate resources:   Identify barriers to discharge with patient and caregiver   Arrange for needed discharge resources and transportation as appropriate   Identify discharge learning needs (meds, wound care, etc)     Problem: Skin/Tissue Integrity  Goal: Skin integrity remains intact  Description: 1.  Monitor for areas of redness and/or skin breakdown  2.  Assess vascular access sites hourly  3.  Every 4-6 hours minimum:  Change oxygen saturation probe site  4.  Every 4-6 hours:  If on nasal continuous positive airway pressure, respiratory therapy assess nares and determine need for appliance change or resting period  Outcome: Progressing

## 2025-02-09 NOTE — H&P
History and Physical    Date of Service:  2/9/2025  Primary Care Provider: Margo Bella MD  Source of information: The patient and review of EMR    Chief Complaint: Altered Mental Status      History of Presenting Illness:   Leandra Perez is a 60 y.o. male with past medical history significant for hypertension, type 2 diabetes, ESRD on hemodialysis Tuesday, Thursday and Saturday, chronic combined systolic and diastolic congestive heart failure, substance use disorder on maintenance dose of Suboxone, left renal mass presented at the emergency room with change in mental status.  Patient symptoms started in the morning and got progressively worse throughout the day.  Patient was able to complete his routine hemodialysis without any event.  Symptoms persisted after dialysis, became associated with shortness of breath for which patient required supplemental oxygen and because of the he was sent to the emergency room for further evaluation.  Patient is unable to provide good history because of the acuity of his condition.  He was recently admitted to [facility] from 10/24/2024 to 11/4/2024, patient was admitted and treated for metabolic encephalopathy, unprovoked seizure as well as T11-T12 discitis/osteomyelitis and was discharged to SNF.  Patient continued to require oxygen in the emergency room.  COVID-negative and flu positive, he was referred to the hospitalist service for admission.           REVIEW OF SYSTEMS:  Review of systems not obtained due to patient factors.     Past Medical History:   Diagnosis Date    Diabetes (HCC)     Gout     History of renal dialysis     Hypertension     Osteoarthritis       Past Surgical History:   Procedure Laterality Date    CT BIOPSY RENAL  9/1/2022    CT BIOPSY RENAL 9/1/2022 [facility] RAD CT    IR NONTUNNELED VASCULAR CATHETER > 5 YEARS  8/31/2022    IR NONTUNNELED VASCULAR CATHETER 8/31/2022 [facility] RAD ANGIO IR    IR NONTUNNELED VASCULAR CATHETER > 5 YEARS  8/31/2022    IR

## 2025-02-09 NOTE — CONSULTS
[facility] CARDIOLOGY      Cardiology Care Note                      [x]Initial visit     []Established visit     Patient Name: Leandra Perez - :1965 - MRN:[id number]         Reason for initial visit: Shortness of breath      HPI:   60 years old male with a past medical history markable for hypertension, diabetes, end-stage renal disease on hemodialysis, cardiomyopathy of unclear etiology as per [facility] echocardiogram of  revealing ejection fraction of 35 to 40% trace mitral regurgitation tricuspid regurgitation, also history of substance abuse and renal mass who presents with fever fatigue and bodyaches and acute delirium.    Patient eventually diagnosed with influenza A.      Cardiology consulted for history of cardiomyopathy              SUBJECTIVE: Mostly complains of cough he denies any chest pain or chest discomfort.  Shortness of breath reported.    Pertinent items are noted in the History of Present Illness.     OBJECTIVE: As above      /81   Pulse 83   Temp 98.8 °F (37.1 °C) (Oral)   Resp 16   SpO2 93%         Pulse Readings from Last 3 Encounters:   25 83   07/10/24 80   24 95         Wt Readings from Last 3 Encounters:   07/10/24 71.9 kg (158 lb 9.6 oz)   24 72.8 kg (160 lb 8 oz)   22 95.6 kg (210 lb 12.2 oz)         Temp Readings from Last 3 Encounters:   25 98.8 °F (37.1 °C) (Oral)   07/10/24 98.2 °F (36.8 °C) (Oral)   24 98.7 °F (37.1 °C) (Oral)                Assessment and Plan     1.  Cardiomyopathy: proBNP mildly elevated not unexpected given situation.  Troponin is normal x 2.    EKG with sinus tachycardia occasional PVCs and nonspecific ST-T changes but no acute ischemic changes.    Echo ordered    If blood pressure  is now on SSI.    On discussion 11/9/16, he names his mother Stephanie Perez as his MPOA      Social Determinants of Health     Financial Resource Strain: Medium Risk (7/10/2024)    Overall Financial Resource Strain (CARDIA)     Difficulty of Paying Living Expenses: Somewhat hard   Food Insecurity: Patient Unable To Answer (10/25/2024)    Received from [facility]    Hunger Vital Sign     Worried About Running Out of Food in the Last Year: Patient unable to answer     Ran Out of Food in the Last Year: Patient unable to answer   Transportation Needs: Patient Unable To Answer (10/25/2024)    Received from [facility]    PRAPARE - Transportation     Lack of Transportation (Medical): Patient unable to answer     Lack of Transportation (Non-Medical): Patient unable to answer   Physical Activity: Not on file   Stress: Not on file   Social Connections: Not on file   Intimate Partner Violence: Not on file   Housing Stability: Patient Declined (7/19/2024)    Received from [facility]    Housing Stability Vital Sign     In the last 12 months, was there a time when you were not able to pay the mortgage or rent on time?: Patient declined     Number of Times Moved in the Last Year: Not on file     At any time in the past 12 months, were you homeless or living in a shelter (including now)?: Patient declined   Recent Concern: Housing Stability - High Risk (7/10/2024)    Housing Stability Vital Sign     Unable to Pay for Housing in the Last Year: Not on file     Number of Places Lived in the Last Year: Not on file     Unstable Housing in the Last Year: Yes          Family History   Problem Relation Age of Onset    Diabetes Other     Hypertension Other                         Vitaliy Ray MD      [facility] CARDIOLOGY     [address] [place] [address], [address] [address] [phone]      CC:Margo Bella MD

## 2025-02-09 NOTE — CONSULTS
02/09/25        I have been asked to see this patient by Ana Díaz MD  for advice/opinion re: -----                                                        Assessment:         ESKD on hemodialysis  Metabolic encephalopathy  Hypoxic respiratory failure  Influenza A  CHF, systolic  Left renal mass  ?  Pulmonary mass  Tobacco use Discussion:     Usually dialysis on a Tuesday, Thursday and Saturday schedule  Primary nephrology nephrology-[facility]  K3.4 today, hemoglobin 11.3    Plan:   Next dialysis on Tuesday as per usual schedule.  No urgent need for dialysis today                 Thanks for consulting me. Renal service will follow patient with you.Please don't hesitate to contact me if any questions arise of if I can assist in any manner.      Huma Block MD  Cell no- [phone]  Available on perfect serve.          Signed By: Huma Block MD     February 9, 2025           Consult Date: 2/9/2025    Inpatient consult to Nephrology  Consult performed by: Huma Block MD  Consult ordered by: Mynor Caldera MD            Subjective   HISTORY OF PRESENTING ILLNESS :  Leandra Perez is a 60 y.o.,male ,Black /  with history of ESKD on hemodialysis, cardiomyopathy, low ejection fraction, mitral regurgitation, type 2 diabetes mellitus who presented to the ED with change in mental status.  Initial evaluation in ED suggestive of pneumonia, presence of pulmonary mass and he is positive for flu.  He was dialyzed yesterday and treatment was uneventful.  At time of evaluation, he is a bit lethargic but easily wakes up and has no new complaints    PMH:  Past Medical History:   Diagnosis Date    Diabetes (HCC)     Gout     History of renal dialysis     Hypertension     Osteoarthritis      PSH:  Past Surgical History:   Procedure Laterality Date    CT  Creatinine 8.69 (H) 0.70 - 1.30 MG/DL    BUN/Creatinine Ratio 4 (L) 12 - 20      Est, Glom Filt Rate 6 (L) >60 ml/min/1.73m2    Calcium 8.7 8.5 - 10.1 MG/DL    Total Bilirubin 0.6 0.2 - 1.0 MG/DL    ALT 7 (L) 12 - 78 U/L    AST 18 15 - 37 U/L    Alk Phosphatase 67 45 - 117 U/L    Total Protein 8.1 6.4 - 8.2 g/dL    Albumin 2.6 (L) 3.5 - 5.0 g/dL    Globulin 5.5 (H) 2.0 - 4.0 g/dL    Albumin/Globulin Ratio 0.5 (L) 1.1 - 2.2     C-Reactive Protein    Collection Time: 02/09/25  3:29 AM   Result Value Ref Range    CRP 26.40 (H) 0.00 - 0.30 mg/dL   Magnesium    Collection Time: 02/09/25  3:29 AM   Result Value Ref Range    Magnesium 2.0 1.6 - 2.4 mg/dL   Phosphorus    Collection Time: 02/09/25  3:29 AM   Result Value Ref Range    Phosphorus 4.6 2.6 - 4.7 MG/DL   Hemoglobin A1C    Collection Time: 02/09/25  3:59 AM   Result Value Ref Range    Hemoglobin A1C 5.2 4.0 - 5.6 %    Estimated Avg Glucose 103 mg/dL   Troponin    Collection Time: 02/09/25  3:59 AM   Result Value Ref Range    Troponin, High Sensitivity 23 0 - 76 ng/L   TSH    Collection Time: 02/09/25  3:59 AM   Result Value Ref Range    TSH, 3rd Generation 0.55 0.36 - 3.74 uIU/mL   Vitamin B12 & Folate    Collection Time: 02/09/25  3:59 AM   Result Value Ref Range    Vitamin B-12 610 193 - 986 pg/mL    Folate 36.8 (H) 5.0 - 21.0 ng/mL   Acetaminophen Level    Collection Time: 02/09/25  3:59 AM   Result Value Ref Range    Acetaminophen Level <2 (L) 10 - 30 ug/mL   Ammonia    Collection Time: 02/09/25  3:59 AM   Result Value Ref Range    Ammonia 27 <32 UMOL/L   Salicylate    Collection Time: 02/09/25  3:59 AM   Result Value Ref Range    Salicylate Lvl <1.7 (L) 2.8 - 20.0 MG/DL   POCT Glucose    Collection Time: 02/09/25  7:30 AM   Result Value Ref Range    POC Glucose 90 65 - 117 mg/dL    Performed by: Angie Hooper RN    POCT Glucose    Collection Time: 02/09/25 12:19 PM   Result Value Ref Range    POC Glucose 96 65 - 117 mg/dL    Performed by: Jinny Gunter

## 2025-02-09 NOTE — PROGRESS NOTES
[facility]  Hospitalist Group                                                                                          Hospitalist Progress Note  Ana Díaz MD  Answering service: [phone] OR [phone] from in house phone        Date of Service:  2025  NAME:  Leandra Perez  :  1965  MRN:  [id number]       Admission Summary:   Leandra Perez is a 60 y.o. male with past medical history significant for hypertension, type 2 diabetes, ESRD on hemodialysis Tuesday, Thursday and Saturday, chronic combined systolic and diastolic congestive heart failure, substance use disorder on maintenance dose of Suboxone, left renal mass presented at the emergency room with change in mental status.  Patient symptoms started in the morning and got progressively worse throughout the day.  Patient was able to complete his routine hemodialysis without any event.  Symptoms persisted after dialysis, became associated with shortness of breath for which patient required supplemental oxygen and because of the he was sent to the emergency room for further evaluation.  Patient is unable to provide good history because of the acuity of his condition.  He was recently admitted to [facility] from 10/24/[facility]24 to 2024, patient was admitted and treated for metabolic encephalopathy, unprovoked seizure as well as T11-T12 discitis/osteomyelitis and was discharged to SNF.  Patient continued to require oxygen in the emergency room.  COVID-negative and flu positive, he was referred to the hospitalist service for admission.       Interval history / Subjective:   Patient seen and examined in ED.   He feels weak.   \"I have not been feeling well for about a week, but they just sent me to the emergency room today\".     Assessment & Plan:           Acute delirium:  -Likely secondary to hypoxia, flu, CHF exacerbation;  -CT head: No acute intracranial abnormality;    Acute respiratory failure with hypoxia:  -Multifactorial:  dinitrate (ISORDIL) tablet 20 mg  20 mg Oral BID    lidocaine 4 % external patch 1 patch  1 patch Topical Daily    methocarbamol (ROBAXIN) tablet 1,000 mg  1,000 mg Oral TID PRN    NIFEdipine (PROCARDIA XL) extended release tablet 30 mg  30 mg Oral Daily    pantoprazole (PROTONIX) tablet 40 mg  40 mg Oral QAM AC    sevelamer (RENVELA) packet 0.8 g  0.8 g Oral TID WC    thiamine tablet 100 mg  100 mg Oral Daily    traZODone (DESYREL) tablet 50 mg  50 mg Oral Nightly    sodium chloride flush 0.9 % injection 5-40 mL  5-40 mL IntraVENous 2 times per day    sodium chloride flush 0.9 % injection 5-40 mL  5-40 mL IntraVENous PRN    0.9 % sodium chloride infusion   IntraVENous PRN    potassium chloride (KLOR-CON) extended release tablet 40 mEq  40 mEq Oral PRN    Or    potassium bicarb-citric acid (EFFER-K) effervescent tablet 40 mEq  40 mEq Oral PRN    Or    potassium chloride 10 mEq/100 mL IVPB (Peripheral Line)  10 mEq IntraVENous PRN    magnesium sulfate 2000 mg in 50 mL IVPB premix  2,000 mg IntraVENous PRN    ondansetron (ZOFRAN-ODT) disintegrating tablet 4 mg  4 mg Oral Q8H PRN    Or    ondansetron (ZOFRAN) injection 4 mg  4 mg IntraVENous Q6H PRN    polyethylene glycol (GLYCOLAX) packet 17 g  17 g Oral Daily PRN    acetaminophen (TYLENOL) tablet 650 mg  650 mg Oral Q6H PRN    Or    acetaminophen (TYLENOL) suppository 650 mg  650 mg Rectal Q6H PRN    bumetanide (BUMEX) injection 1 mg  1 mg IntraVENous BID    insulin lispro (HUMALOG,ADMELOG) injection vial 0-8 Units  0-8 Units SubCUTAneous 4x Daily AC & HS    nicotine (NICODERM CQ) 21 MG/24HR 1 patch  1 patch TransDERmal Daily    oxyCODONE (ROXICODONE) immediate release tablet 5 mg  5 mg Oral Q4H PRN    HYDROmorphone HCl PF (DILAUDID) injection 1 mg  1 mg IntraVENous Q4H PRN    ipratropium 0.5 mg-albuterol 2.5 mg (DUONEB) nebulizer solution 1 Dose  1 Dose Inhalation Q4H PRN    heparin (porcine) injection 5,000 Units  5,000 Units SubCUTAneous 3 times per day    [START

## 2025-02-09 NOTE — ED NOTES
Patient in through triage from [place] with complaints of delayed responses (ems reports ams) all day. Went to his dialysis apt which was unremarkable but continue to have fever and aches. +sob. Placed on 2L on arrival.

## 2025-02-09 NOTE — PROGRESS NOTES
Consult received via [employer].  ESKD on TTS dialysis schedule  No urgent dialysis needs  Full consult note to follow

## 2025-02-10 ENCOUNTER — APPOINTMENT (OUTPATIENT)
Facility: HOSPITAL | Age: 60
DRG: 139 | End: 2025-02-10
Attending: INTERNAL MEDICINE
Payer: MEDICAID

## 2025-02-10 ENCOUNTER — APPOINTMENT (OUTPATIENT)
Facility: HOSPITAL | Age: 60
DRG: 139 | End: 2025-02-10
Payer: MEDICAID

## 2025-02-10 PROBLEM — I42.9 CARDIOMYOPATHY (HCC): Status: ACTIVE | Noted: 2025-02-10

## 2025-02-10 LAB
ECHO AV AREA PEAK VELOCITY: 2.3 CM2
ECHO AV AREA VTI: 2.6 CM2
ECHO AV MEAN GRADIENT: 4 MMHG
ECHO AV MEAN VELOCITY: 0.9 M/S
ECHO AV PEAK GRADIENT: 8 MMHG
ECHO AV PEAK VELOCITY: 1.4 M/S
ECHO AV VELOCITY RATIO: 0.71
ECHO AV VTI: 25.7 CM
ECHO EST RA PRESSURE: 8 MMHG
ECHO LA VOL A-L A2C: 71 ML (ref 18–58)
ECHO LA VOL A-L A4C: 48 ML (ref 18–58)
ECHO LA VOL MOD A2C: 66 ML (ref 18–58)
ECHO LA VOL MOD A4C: 43 ML (ref 18–58)
ECHO LA VOLUME AREA LENGTH: 58 ML
ECHO LV E' LATERAL VELOCITY: 9.01 CM/S
ECHO LV E' SEPTAL VELOCITY: 6.78 CM/S
ECHO LV EDV A2C: 95 ML
ECHO LV EDV A4C: 114 ML
ECHO LV EDV BP: 104 ML (ref 67–155)
ECHO LV EJECTION FRACTION A2C: 59 %
ECHO LV EJECTION FRACTION A4C: 59 %
ECHO LV EJECTION FRACTION BIPLANE: 59 % (ref 55–100)
ECHO LV ESV A2C: 39 ML
ECHO LV ESV A4C: 47 ML
ECHO LV ESV BP: 43 ML (ref 22–58)
ECHO LVOT AREA: 3.5 CM2
ECHO LVOT AV VTI INDEX: 0.79
ECHO LVOT DIAM: 2.1 CM
ECHO LVOT MEAN GRADIENT: 2 MMHG
ECHO LVOT PEAK GRADIENT: 4 MMHG
ECHO LVOT PEAK VELOCITY: 1 M/S
ECHO LVOT SV: 70.3 ML
ECHO LVOT VTI: 20.3 CM
ECHO MV A VELOCITY: 0.75 M/S
ECHO MV AREA PHT: 3.3 CM2
ECHO MV AREA VTI: 3.5 CM2
ECHO MV E DECELERATION TIME (DT): 228.1 MS
ECHO MV E VELOCITY: 0.67 M/S
ECHO MV E/A RATIO: 0.89
ECHO MV E/E' LATERAL: 7.44
ECHO MV E/E' RATIO (AVERAGED): 8.66
ECHO MV E/E' SEPTAL: 9.88
ECHO MV LVOT VTI INDEX: 0.99
ECHO MV MAX VELOCITY: 0.9 M/S
ECHO MV MEAN GRADIENT: 1 MMHG
ECHO MV MEAN VELOCITY: 0.5 M/S
ECHO MV PEAK GRADIENT: 3 MMHG
ECHO MV PRESSURE HALF TIME (PHT): 66.2 MS
ECHO MV VTI: 20 CM
ECHO PULMONARY ARTERY SYSTOLIC PRESSURE (PASP): 40 MMHG
ECHO RIGHT VENTRICULAR SYSTOLIC PRESSURE (RVSP): 42 MMHG
ECHO RV TAPSE: 1.8 CM (ref 1.7–?)
ECHO TV REGURGITANT MAX VELOCITY: 2.93 M/S
ECHO TV REGURGITANT PEAK GRADIENT: 34 MMHG
GLUCOSE BLD STRIP.AUTO-MCNC: 101 MG/DL (ref 65–117)
GLUCOSE BLD STRIP.AUTO-MCNC: 164 MG/DL (ref 65–117)
GLUCOSE BLD STRIP.AUTO-MCNC: 87 MG/DL (ref 65–117)
GLUCOSE BLD STRIP.AUTO-MCNC: 92 MG/DL (ref 65–117)
SERVICE CMNT-IMP: ABNORMAL
SERVICE CMNT-IMP: NORMAL

## 2025-02-10 PROCEDURE — 2700000000 HC OXYGEN THERAPY PER DAY

## 2025-02-10 PROCEDURE — 94760 N-INVAS EAR/PLS OXIMETRY 1: CPT

## 2025-02-10 PROCEDURE — 6370000000 HC RX 637 (ALT 250 FOR IP): Performed by: INTERNAL MEDICINE

## 2025-02-10 PROCEDURE — 93306 TTE W/DOPPLER COMPLETE: CPT

## 2025-02-10 PROCEDURE — 93970 EXTREMITY STUDY: CPT

## 2025-02-10 PROCEDURE — 6360000002 HC RX W HCPCS: Performed by: INTERNAL MEDICINE

## 2025-02-10 PROCEDURE — 82962 GLUCOSE BLOOD TEST: CPT

## 2025-02-10 PROCEDURE — 2060000000 HC ICU INTERMEDIATE R&B

## 2025-02-10 PROCEDURE — 93306 TTE W/DOPPLER COMPLETE: CPT | Performed by: INTERNAL MEDICINE

## 2025-02-10 PROCEDURE — 2500000003 HC RX 250 WO HCPCS: Performed by: INTERNAL MEDICINE

## 2025-02-10 PROCEDURE — 2580000003 HC RX 258: Performed by: INTERNAL MEDICINE

## 2025-02-10 PROCEDURE — 99232 SBSQ HOSP IP/OBS MODERATE 35: CPT | Performed by: INTERNAL MEDICINE

## 2025-02-10 RX ORDER — CARVEDILOL 6.25 MG/1
6.25 TABLET ORAL EVERY 12 HOURS
Status: DISCONTINUED | OUTPATIENT
Start: 2025-02-10 | End: 2025-02-16 | Stop reason: HOSPADM

## 2025-02-10 RX ORDER — AMMONIUM LACTATE 12 G/100G
LOTION TOPICAL EVERY 12 HOURS
Status: DISCONTINUED | OUTPATIENT
Start: 2025-02-10 | End: 2025-02-16 | Stop reason: HOSPADM

## 2025-02-10 RX ORDER — CASTOR OIL AND BALSAM, PERU 788; 87 MG/G; MG/G
OINTMENT TOPICAL EVERY 12 HOURS
Status: DISCONTINUED | OUTPATIENT
Start: 2025-02-10 | End: 2025-02-16 | Stop reason: HOSPADM

## 2025-02-10 RX ADMIN — Medication: at 16:49

## 2025-02-10 RX ADMIN — Medication 100 MG: at 08:33

## 2025-02-10 RX ADMIN — AZITHROMYCIN MONOHYDRATE 500 MG: 500 INJECTION, POWDER, LYOPHILIZED, FOR SOLUTION INTRAVENOUS at 22:12

## 2025-02-10 RX ADMIN — BUPRENORPHINE HYDROCHLORIDE AND NALOXONE HYDROCHLORIDE DIHYDRATE 1 TABLET: 8; 2 TABLET SUBLINGUAL at 08:33

## 2025-02-10 RX ADMIN — BUMETANIDE 1 MG: 0.25 INJECTION INTRAMUSCULAR; INTRAVENOUS at 22:04

## 2025-02-10 RX ADMIN — ISOSORBIDE DINITRATE 20 MG: 20 TABLET ORAL at 22:03

## 2025-02-10 RX ADMIN — Medication: at 22:14

## 2025-02-10 RX ADMIN — BUPRENORPHINE HYDROCHLORIDE AND NALOXONE HYDROCHLORIDE DIHYDRATE 1 TABLET: 8; 2 TABLET SUBLINGUAL at 14:08

## 2025-02-10 RX ADMIN — OSELTAMIVIR PHOSPHATE 30 MG: 30 CAPSULE ORAL at 18:57

## 2025-02-10 RX ADMIN — SEVELAMER CARBONATE 0.8 G: 800 FOR SUSPENSION ORAL at 08:38

## 2025-02-10 RX ADMIN — FERROUS SULFATE TAB 325 MG (65 MG ELEMENTAL FE) 325 MG: 325 (65 FE) TAB at 06:35

## 2025-02-10 RX ADMIN — SEVELAMER CARBONATE 0.8 G: 800 FOR SUSPENSION ORAL at 12:33

## 2025-02-10 RX ADMIN — Medication 1 MG: at 08:34

## 2025-02-10 RX ADMIN — BUMETANIDE 1 MG: 0.25 INJECTION INTRAMUSCULAR; INTRAVENOUS at 08:33

## 2025-02-10 RX ADMIN — TRAZODONE HYDROCHLORIDE 50 MG: 50 TABLET ORAL at 22:03

## 2025-02-10 RX ADMIN — PANTOPRAZOLE SODIUM 40 MG: 40 TABLET, DELAYED RELEASE ORAL at 06:35

## 2025-02-10 RX ADMIN — CALCITRIOL CAPSULES 0.25 MCG 0.25 MCG: 0.25 CAPSULE ORAL at 08:34

## 2025-02-10 RX ADMIN — NIFEDIPINE 30 MG: 30 TABLET, FILM COATED, EXTENDED RELEASE ORAL at 08:33

## 2025-02-10 RX ADMIN — HEPARIN SODIUM 5000 UNITS: 5000 INJECTION INTRAVENOUS; SUBCUTANEOUS at 14:08

## 2025-02-10 RX ADMIN — SEVELAMER CARBONATE 0.8 G: 800 FOR SUSPENSION ORAL at 16:47

## 2025-02-10 RX ADMIN — CARVEDILOL 6.25 MG: 12.5 TABLET, FILM COATED ORAL at 16:48

## 2025-02-10 RX ADMIN — HEPARIN SODIUM 5000 UNITS: 5000 INJECTION INTRAVENOUS; SUBCUTANEOUS at 06:34

## 2025-02-10 RX ADMIN — SODIUM CHLORIDE, PRESERVATIVE FREE 10 ML: 5 INJECTION INTRAVENOUS at 08:42

## 2025-02-10 RX ADMIN — HEPARIN SODIUM 5000 UNITS: 5000 INJECTION INTRAVENOUS; SUBCUTANEOUS at 22:03

## 2025-02-10 RX ADMIN — BUPRENORPHINE HYDROCHLORIDE AND NALOXONE HYDROCHLORIDE DIHYDRATE 1 TABLET: 8; 2 TABLET SUBLINGUAL at 22:03

## 2025-02-10 RX ADMIN — ISOSORBIDE DINITRATE 20 MG: 20 TABLET ORAL at 08:34

## 2025-02-10 RX ADMIN — SODIUM CHLORIDE, PRESERVATIVE FREE 10 ML: 5 INJECTION INTRAVENOUS at 22:12

## 2025-02-10 RX ADMIN — WATER 1000 MG: 1 INJECTION INTRAMUSCULAR; INTRAVENOUS; SUBCUTANEOUS at 22:04

## 2025-02-10 ASSESSMENT — PAIN SCALES - GENERAL: PAINLEVEL_OUTOF10: 5

## 2025-02-10 ASSESSMENT — PAIN DESCRIPTION - LOCATION: LOCATION: BACK

## 2025-02-10 ASSESSMENT — PAIN DESCRIPTION - PAIN TYPE: TYPE: CHRONIC PAIN

## 2025-02-10 NOTE — PLAN OF CARE
Problem: Safety - Adult  Goal: Free from fall injury  2/10/2025 0115 by Chyna Fitzpatrick RN  Outcome: Progressing  Flowsheets (Taken 2/10/2025 0051)  Free From Fall Injury: Based on caregiver fall risk screen, instruct family/caregiver to ask for assistance with transferring infant if caregiver noted to have fall risk factors  2/9/2025 1502 by Jo-Ann Gordon RN  Outcome: Progressing  Flowsheets (Taken 2/9/2025 1340)  Free From Fall Injury: Instruct family/caregiver on patient safety     Problem: Chronic Conditions and Co-morbidities  Goal: Patient's chronic conditions and co-morbidity symptoms are monitored and maintained or improved  2/10/2025 0115 by Chyna Fitzpatrick RN  Outcome: Progressing  Flowsheets (Taken 2/9/2025 2030)  Care Plan - Patient's Chronic Conditions and Co-Morbidity Symptoms are Monitored and Maintained or Improved: Monitor and assess patient's chronic conditions and comorbid symptoms for stability, deterioration, or improvement  2/9/2025 1502 by Jo-Ann Gordon RN  Outcome: Progressing  Flowsheets (Taken 2/9/2025 1340)  Care Plan - Patient's Chronic Conditions and Co-Morbidity Symptoms are Monitored and Maintained or Improved:   Monitor and assess patient's chronic conditions and comorbid symptoms for stability, deterioration, or improvement   Update acute care plan with appropriate goals if chronic or comorbid symptoms are exacerbated and prevent overall improvement and discharge   Collaborate with multidisciplinary team to address chronic and comorbid conditions and prevent exacerbation or deterioration     Problem: Discharge Planning  Goal: Discharge to home or other facility with appropriate resources  2/10/2025 0115 by Chyna Fitzpatrick RN  Outcome: Progressing  Flowsheets (Taken 2/9/2025 2030)  Discharge to home or other facility with appropriate resources:   Identify barriers to discharge with patient and caregiver   Identify discharge learning needs (meds, wound care,

## 2025-02-10 NOTE — DISCHARGE INSTRUCTIONS
To access the American Heart Association's Interactive Workbook \"Healthier Living with Heart Failure - Managing Symptoms and Reducing Risk\"  Scan the QR code below.

## 2025-02-10 NOTE — PROGRESS NOTES
Name: Leandra Perez   MRN: [id number]  : 1965    Nephrology Progress Note    Assessment:  ESKD on hemodialysis: TTS at [address]  Metabolic encephalopathy  Hypoxic respiratory failure  Influenza A/Multilobar PNA  CHF, systolic  Left renal mass  ?  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  No MARTELL  IV Abx  Renvela  AM labs      Subjective:  Laying in bed.     ROS:   No nausea, no vomiting  No chest pain    Exam:  /71   Pulse 81   Temp 98.5 °F (36.9 °C) (Oral)   Resp 24   SpO2 95%     Gen: NAD  HEENT: No icterus, mmm,  AT/NC  Lungs/Chest wall: Clear. Equal BS. No respiratory distress  Cardiovascular: Regular rate, normal rhythm.   Abdomen/: Soft, NT,  BS+  Ext: No peripheral edema  CNS: alert and awake.     Current Facility-Administered Medications   Medication Dose Route Frequency Provider Last Rate Last Admin    carvedilol (COREG) tablet 6.25 mg  6.25 mg Oral Q12H Ana Díaz MD        ammonium lactate (LAC-HYDRIN) 12 % lotion   Topical Q12H Ana Díaz MD        balsum peru-castor oil (VENELEX) ointment   Topical Q12H Ana Díaz MD        Virt-Caps 1 mg  1 capsule Oral Daily Mynor Caldera MD   1 mg at 02/10/25 0834    buprenorphine-naloxone (SUBOXONE) 8-2 MG SL tablet 1 tablet  1 tablet SubLINGual TID Mynor Caldera MD   1 tablet at 02/10/25 0833    calcitRIOL (ROCALTROL) capsule 0.25 mcg  0.25 mcg Oral Daily Mynor Caldera MD   0.25 mcg at 02/10/25 0834    ferrous sulfate (IRON 325) tablet 325 mg  325 mg Oral QAM AC Mynor Caldera MD   325 mg at 02/10/25 0635    folic acid (FOLVITE) tablet 1 mg  1 mg Oral Daily Mynor Caldera MD   1 mg at 02/10/25 0834    isosorbide

## 2025-02-10 NOTE — NURSE NAVIGATOR
HEART FAILURE NURSE NAVIGATOR NOTE  [facility]    Patient chart was reviewed by Heart Failure (HF) Nurse Navigators for compliance of prescribed treatment with guidelines directed medical therapy (GDMT) and HF database completed.     Please, review beneath recommendations for symptomatic patients with HF with Reduced Ejection Fraction <= 40% (HFrEF)* and patients whose LVEF improved > 40% (HFimpEF)* for your consideration when taking care of this patient.    Current Medical Therapy:    Name Leandra Perez    1965   LVEF Echo Pending; previous EF 35-40% (echo dated )   NYHA Functional Class Documentation needed   ARNi/ACEi/ARB Not currently prescribed; see below recommendations; pls document contraindications   Beta-blocker Coreg 6.25 mg bid   Aldosterone Antagonist Not currently prescribed; see below recommendations; pls document contraindications   SGLT2 inhibitor Not currently prescribed; see below recommendations; pls document contraindications   Hydralazine/Isosorbide Dinitrate Isosorbide 20 mg bid   [place] [place]     Recommendations:    Please, add the following GDMT for HFrEF <= 40% [Class 1] or document in discharge summary/progress note why patient cannot take the medication:  ARNi/ACEi or ARB  Beta-blockers (carvedilol, sustained-release metoprolol succinate or bisoprolol)  Aldosterone antagonists GFR > 30 and K< 5  SGLT2 inhibitor  Hydralazine/Isosorbide dinitrate for  Americans with Class III/IV symptoms  Adjust diuretic dose at discharge if hospitalized for volume overload    Consider adding the following GDMT for HFrEF <= 40%, if appropriate [Class 2b]:  Ivabradine for patients on maximally tolerated beta-blocker dose in order to achieve HR 70-80bpm  Digoxin, goal level 0.5-0.9  Polyunsaturated fatty acids  Vericuguat  For patient with hyperkalemia while on RAASi > 5.5, consider adding potassium binders (patiromer, sodium  zirconium cycosilicate)    Note: the following medications may be potentially harmful in heart failure [Class 3]:  Calcium channel blockers (doxazosin, diltiazem, verapamil, nifedipine)  Antiarrhythmics (flecanide, disopyrimide, sotalol, dronedarone)  Diabetes medications (thiasolidinediones, saxagliptin, alogliptin)  NSAIDs and AUGUSTINE 2 inhibitors    Consider vaccinations for respiratory illnesses (flu, pneumonia, covid) [Class 2b]    For eligible patients with LVEF < 35% consider discharge with wearable defibrillation [Class 2b] and/or referral for ICD implantation [Class 1] for prevention of sudden cardiac death.    Due to severely reduced LVEF < 25% and/or recurrent hospitalizations this patient may benefit from referral to Advanced Heart Failure Program to assess suitability for advanced therapies, such as left-ventricular assist device, heart transplantation, palliative inotropes or palliation [Class 1]. To obtain in-patient consultation or refer to [facility] Advanced Heart Failure Center, call [phone].    Patient Education:     Heart failure education to be provided, including information about medical therapy, lifestyle modifications, diet and fluid restrictions, physical activity.  Educational resources to be provided: “AHA Discharge packet for patients diagnosed with Heart Failure\" booklet; Signs/Symptoms magnet; Weight Calendar; [facility] flyer.  Information to be provided about HF support group.  Learning about Self-Care for Heart failure on discharge instructions.  American Heart Association's Interactive Workbook \"Healthier Living with Heart Failure - Managing Symptoms and Reducing Risk\" (QR code for link to access added to Discharge Instructions.     Plan for Transitional Care:    Post discharge follow up phone call to be made within 48-72 hours of discharge.  Patient will follow-up with PCP.  Patient will follow-up with Primary Cardiologist.  Obstructive sleep apnea screening done and

## 2025-02-10 NOTE — PROGRESS NOTES
[facility] CARDIOLOGY  Cardiology Care Note                  []Initial visit     [x]Established visit     Patient Name: Leandra Perez - :1965 - MRN:[id number]  Primary Cardiologist: [facility] cardiology  Consulting Cardiologist: Rolando Ocampo MD     Reason for initial visit:SOB, hx of cardiomyopathy      SUBJECTIVE:  Pt mostly c/o low back pain. No chest pain. Has some mild SOB.  No BLE edema.         Assessment and Plan     SOB: suspect mostly due to influenza A and multilobar pneumonia (noted on CTA chest).  Echo is still pending.  Pro BNP is elevated but unreliable in setting of ESRD. Defer diuretics to renal.    Hx of Cardiomyopathy/systolic/diastolic CHF: NYHA score complicated by flu/pneumonia/PE.   Most recent  EF 35-40% with tr MR/TR in 2024 at [facility].  Uncertain etiology.  No known history of LHC. Continue Coreg, isordil.  As BP allows consider eventually changing procardia to hydralazine.  Will hold off on aldactone for now.     ESRD: on HD //Sat schedule.   Influenza A/Pneumonia: with 1.7 cm mass like consolidation RUL- management per primary team.   Tobacco use: advise Cessation  Anemia due to ESRD: hgb  11.3.       Will followup echo results.     Addendum:    I have personally reviewed the documentation and have seen the patient.  I agree with the plan.  Additionally, I have included further information about my assessment and plan below:    No chest pain.  Breathing over all stable.  Some lower back pain.  Compensated on exam with no lower extremity edema.  Reviewed recent results and findings  Assessment/Plan:  Shortness of breath.  Secondary to influenza and multilobar pneumonia.  Has known cardiomyopathy but no evidence of CHF currently.  No additional cardiac evaluation indicated at this time and will be available as needed.  Outpatient follow-up with his cardiology at [facility]  Cardiomyopathy EF 35-40%.  Continue his

## 2025-02-10 NOTE — WOUND CARE
Wound Care Note:     New consult placed by physician request for bilateral venous stasis    Chart shows:  Admitted for acute delirium  Past Medical History:   Diagnosis Date    Diabetes (HCC)     Gout     History of renal dialysis     Hypertension     Osteoarthritis      WBC = 3.9 on 2/9/25  Admitted from [facility]    Assessment:   Patient is A&O x 4, communicative, continent with some assistance needed in repositioning.    Bed: [place]  Diet: Adult diet regular; 3 carb choices  Patient reports no pain.      Bilateral heels and buttocks skin intact and without erythema.    1. POA bilateral lower legs with dry, flaky skin.  Lac-hydrin to be ordered.    2.  POA stage 2 sacral pressure injury is approximately 0.3 cm x 0.3 cm x 0.1 cm, wound bed is pink, non-blanchable, no drainage, wound edges are open.  Venelex ointment to be ordered.  Waffle cushion to be placed under sheet and above bed alarm.    Spoke with Dr. Díaz, notified of POA pressure injury; wound care orders obtained.    Patient repositioned on right side.     Recommendations:    Sacrum and bilateral heels- every 12 hours liberally apply Venelex ointment.    Bilateral lower legs- Daily cleanse with soap and water using a wash cloth to remove loose, dry skin.  Every 12 hours liberally apply Lac-Hydrin.    Skin Care & Pressure Prevention:  Minimize layers of linen/pads under patient to optimize support surface.    Turn/reposition approximately every 2 hours and offload heels.  Manage incontinence / promote continence   Nourishing Skin Cream to dry skin, minimize use of briefs when able    Discussed above plan with patient & CYRIL Busch    Transition of Care: Wound care will sign off.    Linda \"Denisa\" ISIDRA Mishra, RN, CWON  Certified Wound and Ostomy Nurse  office [phone]  Best way to contact me is through Perfect Serve

## 2025-02-10 NOTE — PROGRESS NOTES
[facility] Adult  Hospitalist Group                                                                                          Hospitalist Progress Note  Ana Díaz MD  Answering service: [phone] OR [phone] from in house phone        Date of Service:  2/10/2025  NAME:  Leandra Perez  :  1965  MRN:  [id number]       Admission Summary:   Leandra Perez is a 60 y.o. male with past medical history significant for hypertension, type 2 diabetes, ESRD on hemodialysis Tuesday, Thursday and Saturday, chronic combined systolic and diastolic congestive heart failure, substance use disorder on maintenance dose of Suboxone, left renal mass presented at the emergency room with change in mental status.  Patient symptoms started in the morning and got progressively worse throughout the day.  Patient was able to complete his routine hemodialysis without any event.  Symptoms persisted after dialysis, became associated with shortness of breath for which patient required supplemental oxygen and because of the he was sent to the emergency room for further evaluation.  Patient is unable to provide good history because of the acuity of his condition.  He was recently admitted to [facility] from 10/24/2024 to 2024, patient was admitted and treated for metabolic encephalopathy, unprovoked seizure as well as T11-T12 discitis/osteomyelitis and was discharged to SNF.  Patient continued to require oxygen in the emergency room.  COVID-negative and flu positive, he was referred to the hospitalist service for admission.       Interval history / Subjective:   Patient seen and examined in ED.   He feels improved.   Discussed with him that he has pneumonia and the flu.        Assessment & Plan:           Acute delirium:  -Likely secondary to hypoxia, flu, CHF exacerbation;  -CT head: No acute intracranial abnormality;    Acute respiratory failure with hypoxia:  -Multifactorial: Secondary to influenza A infection,

## 2025-02-10 NOTE — PLAN OF CARE
Problem: Safety - Adult  Goal: Free from fall injury  2/10/2025 0936 by Jo-Ann Gordon RN  Outcome: Progressing  Flowsheets (Taken 2/10/2025 0800)  Free From Fall Injury: Instruct family/caregiver on patient safety  2/10/2025 0115 by Chyna Fitzpatrick RN  Outcome: Progressing  Flowsheets (Taken 2/10/2025 0051)  Free From Fall Injury: Based on caregiver fall risk screen, instruct family/caregiver to ask for assistance with transferring infant if caregiver noted to have fall risk factors     Problem: Chronic Conditions and Co-morbidities  Goal: Patient's chronic conditions and co-morbidity symptoms are monitored and maintained or improved  2/10/2025 0936 by Jo-Ann Gordon RN  Outcome: Progressing  Flowsheets (Taken 2/10/2025 0800)  Care Plan - Patient's Chronic Conditions and Co-Morbidity Symptoms are Monitored and Maintained or Improved:   Monitor and assess patient's chronic conditions and comorbid symptoms for stability, deterioration, or improvement   Collaborate with multidisciplinary team to address chronic and comorbid conditions and prevent exacerbation or deterioration   Update acute care plan with appropriate goals if chronic or comorbid symptoms are exacerbated and prevent overall improvement and discharge  2/10/2025 0115 by Chyna Fitzpatrick RN  Outcome: Progressing  Flowsheets (Taken 2/9/2025 2030)  Care Plan - Patient's Chronic Conditions and Co-Morbidity Symptoms are Monitored and Maintained or Improved: Monitor and assess patient's chronic conditions and comorbid symptoms for stability, deterioration, or improvement     Problem: Discharge Planning  Goal: Discharge to home or other facility with appropriate resources  2/10/2025 0936 by Jo-Ann Gordon RN  Outcome: Progressing  Flowsheets (Taken 2/10/2025 0800)  Discharge to home or other facility with appropriate resources:   Identify barriers to discharge with patient and caregiver   Arrange for needed discharge resources and transportation

## 2025-02-11 LAB
ALBUMIN SERPL-MCNC: 2.4 G/DL (ref 3.5–5)
ALBUMIN/GLOB SERPL: 0.4 (ref 1.1–2.2)
ALP SERPL-CCNC: 64 U/L (ref 45–117)
ALT SERPL-CCNC: <6 U/L (ref 12–78)
ANION GAP SERPL CALC-SCNC: 14 MMOL/L (ref 2–12)
AST SERPL-CCNC: 17 U/L (ref 15–37)
BILIRUB SERPL-MCNC: 0.5 MG/DL (ref 0.2–1)
BUN SERPL-MCNC: 55 MG/DL (ref 6–20)
BUN/CREAT SERPL: 5 (ref 12–20)
CALCIUM SERPL-MCNC: 8.6 MG/DL (ref 8.5–10.1)
CHLORIDE SERPL-SCNC: 96 MMOL/L (ref 97–108)
CO2 SERPL-SCNC: 25 MMOL/L (ref 21–32)
CREAT SERPL-MCNC: 12.1 MG/DL (ref 0.7–1.3)
ERYTHROCYTE [DISTWIDTH] IN BLOOD BY AUTOMATED COUNT: 18.3 % (ref 11.5–14.5)
GLOBULIN SER CALC-MCNC: 5.4 G/DL (ref 2–4)
GLUCOSE BLD STRIP.AUTO-MCNC: 102 MG/DL (ref 65–117)
GLUCOSE BLD STRIP.AUTO-MCNC: 102 MG/DL (ref 65–117)
GLUCOSE BLD STRIP.AUTO-MCNC: 59 MG/DL (ref 65–117)
GLUCOSE BLD STRIP.AUTO-MCNC: 62 MG/DL (ref 65–117)
GLUCOSE BLD STRIP.AUTO-MCNC: 63 MG/DL (ref 65–117)
GLUCOSE BLD STRIP.AUTO-MCNC: 65 MG/DL (ref 65–117)
GLUCOSE BLD STRIP.AUTO-MCNC: 95 MG/DL (ref 65–117)
GLUCOSE BLD STRIP.AUTO-MCNC: 96 MG/DL (ref 65–117)
GLUCOSE SERPL-MCNC: 84 MG/DL (ref 65–100)
HCT VFR BLD AUTO: 33.9 % (ref 36.6–50.3)
HGB BLD-MCNC: 10.7 G/DL (ref 12.1–17)
MAGNESIUM SERPL-MCNC: 2.2 MG/DL (ref 1.6–2.4)
MCH RBC QN AUTO: 30.5 PG (ref 26–34)
MCHC RBC AUTO-ENTMCNC: 31.6 G/DL (ref 30–36.5)
MCV RBC AUTO: 96.6 FL (ref 80–99)
NRBC # BLD: 0 K/UL (ref 0–0.01)
NRBC BLD-RTO: 0 PER 100 WBC
PHOSPHATE SERPL-MCNC: 5.5 MG/DL (ref 2.6–4.7)
PLATELET # BLD AUTO: 221 K/UL (ref 150–400)
PMV BLD AUTO: 9.8 FL (ref 8.9–12.9)
POTASSIUM SERPL-SCNC: 4.5 MMOL/L (ref 3.5–5.1)
PROT SERPL-MCNC: 7.8 G/DL (ref 6.4–8.2)
RBC # BLD AUTO: 3.51 M/UL (ref 4.1–5.7)
SERVICE CMNT-IMP: ABNORMAL
SERVICE CMNT-IMP: NORMAL
SODIUM SERPL-SCNC: 135 MMOL/L (ref 136–145)
WBC # BLD AUTO: 4.5 K/UL (ref 4.1–11.1)

## 2025-02-11 PROCEDURE — 80307 DRUG TEST PRSMV CHEM ANLYZR: CPT

## 2025-02-11 PROCEDURE — 1100000000 HC RM PRIVATE

## 2025-02-11 PROCEDURE — 97161 PT EVAL LOW COMPLEX 20 MIN: CPT

## 2025-02-11 PROCEDURE — 82962 GLUCOSE BLOOD TEST: CPT

## 2025-02-11 PROCEDURE — 2700000000 HC OXYGEN THERAPY PER DAY

## 2025-02-11 PROCEDURE — 6360000002 HC RX W HCPCS: Performed by: INTERNAL MEDICINE

## 2025-02-11 PROCEDURE — 80053 COMPREHEN METABOLIC PANEL: CPT

## 2025-02-11 PROCEDURE — 85027 COMPLETE CBC AUTOMATED: CPT

## 2025-02-11 PROCEDURE — 6370000000 HC RX 637 (ALT 250 FOR IP): Performed by: INTERNAL MEDICINE

## 2025-02-11 PROCEDURE — 97530 THERAPEUTIC ACTIVITIES: CPT

## 2025-02-11 PROCEDURE — 2580000003 HC RX 258: Performed by: INTERNAL MEDICINE

## 2025-02-11 PROCEDURE — 90935 HEMODIALYSIS ONE EVALUATION: CPT

## 2025-02-11 PROCEDURE — 97165 OT EVAL LOW COMPLEX 30 MIN: CPT

## 2025-02-11 PROCEDURE — 94640 AIRWAY INHALATION TREATMENT: CPT

## 2025-02-11 PROCEDURE — 2500000003 HC RX 250 WO HCPCS: Performed by: INTERNAL MEDICINE

## 2025-02-11 PROCEDURE — 83735 ASSAY OF MAGNESIUM: CPT

## 2025-02-11 PROCEDURE — 84100 ASSAY OF PHOSPHORUS: CPT

## 2025-02-11 PROCEDURE — 94760 N-INVAS EAR/PLS OXIMETRY 1: CPT

## 2025-02-11 PROCEDURE — 5A1D70Z PERFORMANCE OF URINARY FILTRATION, INTERMITTENT, LESS THAN 6 HOURS PER DAY: ICD-10-PCS | Performed by: INTERNAL MEDICINE

## 2025-02-11 RX ORDER — LACTOBACILLUS RHAMNOSUS GG 10B CELL
1 CAPSULE ORAL
Status: DISCONTINUED | OUTPATIENT
Start: 2025-02-12 | End: 2025-02-16 | Stop reason: HOSPADM

## 2025-02-11 RX ORDER — GUAIFENESIN 600 MG/1
600 TABLET, EXTENDED RELEASE ORAL 2 TIMES DAILY
Status: DISCONTINUED | OUTPATIENT
Start: 2025-02-11 | End: 2025-02-16 | Stop reason: HOSPADM

## 2025-02-11 RX ORDER — DEXTROSE MONOHYDRATE 100 MG/ML
INJECTION, SOLUTION INTRAVENOUS CONTINUOUS PRN
Status: DISCONTINUED | OUTPATIENT
Start: 2025-02-11 | End: 2025-02-16 | Stop reason: HOSPADM

## 2025-02-11 RX ADMIN — BUMETANIDE 1 MG: 0.25 INJECTION INTRAMUSCULAR; INTRAVENOUS at 21:12

## 2025-02-11 RX ADMIN — ONDANSETRON 4 MG: 2 INJECTION INTRAMUSCULAR; INTRAVENOUS at 10:23

## 2025-02-11 RX ADMIN — Medication 100 MG: at 10:15

## 2025-02-11 RX ADMIN — HEPARIN SODIUM 5000 UNITS: 5000 INJECTION INTRAVENOUS; SUBCUTANEOUS at 06:35

## 2025-02-11 RX ADMIN — GUAIFENESIN 600 MG: 600 TABLET, EXTENDED RELEASE ORAL at 17:55

## 2025-02-11 RX ADMIN — PANTOPRAZOLE SODIUM 40 MG: 40 TABLET, DELAYED RELEASE ORAL at 06:36

## 2025-02-11 RX ADMIN — IPRATROPIUM BROMIDE AND ALBUTEROL SULFATE 1 DOSE: .5; 3 SOLUTION RESPIRATORY (INHALATION) at 10:28

## 2025-02-11 RX ADMIN — ISOSORBIDE DINITRATE 20 MG: 20 TABLET ORAL at 21:13

## 2025-02-11 RX ADMIN — SODIUM CHLORIDE, PRESERVATIVE FREE 10 ML: 5 INJECTION INTRAVENOUS at 10:16

## 2025-02-11 RX ADMIN — AZITHROMYCIN MONOHYDRATE 500 MG: 500 INJECTION, POWDER, LYOPHILIZED, FOR SOLUTION INTRAVENOUS at 21:20

## 2025-02-11 RX ADMIN — GUAIFENESIN 600 MG: 600 TABLET, EXTENDED RELEASE ORAL at 21:13

## 2025-02-11 RX ADMIN — BUMETANIDE 1 MG: 0.25 INJECTION INTRAMUSCULAR; INTRAVENOUS at 10:15

## 2025-02-11 RX ADMIN — BUPRENORPHINE HYDROCHLORIDE AND NALOXONE HYDROCHLORIDE DIHYDRATE 1 TABLET: 8; 2 TABLET SUBLINGUAL at 10:15

## 2025-02-11 RX ADMIN — Medication 1 MG: at 10:15

## 2025-02-11 RX ADMIN — CALCITRIOL CAPSULES 0.25 MCG 0.25 MCG: 0.25 CAPSULE ORAL at 10:15

## 2025-02-11 RX ADMIN — HEPARIN SODIUM 5000 UNITS: 5000 INJECTION INTRAVENOUS; SUBCUTANEOUS at 21:12

## 2025-02-11 RX ADMIN — Medication: at 12:31

## 2025-02-11 RX ADMIN — TRAZODONE HYDROCHLORIDE 50 MG: 50 TABLET ORAL at 21:13

## 2025-02-11 RX ADMIN — CARVEDILOL 6.25 MG: 12.5 TABLET, FILM COATED ORAL at 17:55

## 2025-02-11 RX ADMIN — HEPARIN SODIUM 5000 UNITS: 5000 INJECTION INTRAVENOUS; SUBCUTANEOUS at 14:38

## 2025-02-11 RX ADMIN — BUPRENORPHINE HYDROCHLORIDE AND NALOXONE HYDROCHLORIDE DIHYDRATE 1 TABLET: 8; 2 TABLET SUBLINGUAL at 14:37

## 2025-02-11 RX ADMIN — FERROUS SULFATE TAB 325 MG (65 MG ELEMENTAL FE) 325 MG: 325 (65 FE) TAB at 06:36

## 2025-02-11 RX ADMIN — WATER 1000 MG: 1 INJECTION INTRAMUSCULAR; INTRAVENOUS; SUBCUTANEOUS at 21:12

## 2025-02-11 RX ADMIN — OSELTAMIVIR PHOSPHATE 30 MG: 30 CAPSULE ORAL at 17:55

## 2025-02-11 RX ADMIN — BUPRENORPHINE HYDROCHLORIDE AND NALOXONE HYDROCHLORIDE DIHYDRATE 1 TABLET: 8; 2 TABLET SUBLINGUAL at 21:12

## 2025-02-11 NOTE — PLAN OF CARE
Problem: Safety - Adult  Goal: Free from fall injury  Outcome: Progressing  Flowsheets (Taken 2/11/2025 0800)  Free From Fall Injury: Instruct family/caregiver on patient safety     Problem: Chronic Conditions and Co-morbidities  Goal: Patient's chronic conditions and co-morbidity symptoms are monitored and maintained or improved  Outcome: Progressing     Problem: Discharge Planning  Goal: Discharge to home or other facility with appropriate resources  Outcome: Progressing     Problem: Skin/Tissue Integrity  Goal: Skin integrity remains intact  Description: 1.  Monitor for areas of redness and/or skin breakdown  2.  Assess vascular access sites hourly  3.  Every 4-6 hours minimum:  Change oxygen saturation probe site  4.  Every 4-6 hours:  If on nasal continuous positive airway pressure, respiratory therapy assess nares and determine need for appliance change or resting period  Outcome: Progressing  Flowsheets (Taken 2/11/2025 0800)  Skin Integrity Remains Intact: Monitor for areas of redness and/or skin breakdown     Problem: Pain  Goal: Verbalizes/displays adequate comfort level or baseline comfort level  Outcome: Progressing     Detail Level: Detailed

## 2025-02-11 NOTE — PROGRESS NOTES
[facility] Adult  Hospitalist Group                                                                                          Hospitalist Progress Note  Ana Díaz MD  Answering service: [phone] OR [phone] from in house phone        Date of Service:  2025  NAME:  Leandra Perez  :  1965  MRN:  [id number]       Admission Summary:   Leandra Perez is a 60 y.o. male with past medical history significant for hypertension, type 2 diabetes, ESRD on hemodialysis Tuesday, Thursday and Saturday, chronic combined systolic and diastolic congestive heart failure, substance use disorder on maintenance dose of Suboxone, left renal mass presented at the emergency room with change in mental status.  Patient symptoms started in the morning and got progressively worse throughout the day.  Patient was able to complete his routine hemodialysis without any event.  Symptoms persisted after dialysis, became associated with shortness of breath for which patient required supplemental oxygen and because of the he was sent to the emergency room for further evaluation.  Patient is unable to provide good history because of the acuity of his condition.  He was recently admitted to [facility] from 10/24/2024 to 2024, patient was admitted and treated for metabolic encephalopathy, unprovoked seizure as well as T11-T12 discitis/osteomyelitis and was discharged to SNF.  Patient continued to require oxygen in the emergency room.  COVID-negative and flu positive, he was referred to the hospitalist service for admission.       Interval history / Subjective:   Patient seen and examined.  Feels better.  He slept well.  He feels very weak, has had a poor appetite.  He is not desaturating.  He has not been out of bed yet.     Assessment & Plan:           Acute delirium: Resolved   -Likely secondary to hypoxia, flu, CHF exacerbation;  -CT head: No acute intracranial abnormality;    Acute respiratory failure with  hours) at 2/11/2025 1334  Last data filed at 2/10/2025 2320  Gross per 24 hour   Intake 120 ml   Output --   Net 120 ml        Physical Examination:     I had a face to face encounter with this patient and independently examined them on 2/11/2025 as outlined below:          General : alert x 3, awake, no acute distress,   HEENT:  EOMI, moist mucus membrane  Neck: supple, no JVD, no meningeal signs  Chest: Diminished breath sounds in both bases, end expiratory wheezing, diffuse rhonchi and bibasilar Rales;  CVS: S1 S2 heard, RRR with occasional premature beats  Abd: soft/ non tender, non distended, BS physiological,   Ext: no clubbing, no cyanosis, 2+ bilateral lower extremity edema  Neuro/Psych: pleasant mood and affect, CN 2-12 grossly intact, Strength 5/5 in all extremities,   Skin: warm     Data Review:    Review and/or order of clinical lab test  Review and/or order of tests in the radiology section of CPT  Review and/or order of tests in the medicine section of CPT      I have personally and independently reviewed all pertinent labs, diagnostic studies, imaging, and have provided independent interpretation of the same.     Labs:     Recent Labs     02/09/25 0329 02/11/25 0255   WBC 3.9* 4.5   HGB 11.3* 10.7*   HCT 35.4* 33.9*    221     Recent Labs     02/08/25 2039 02/09/25 0329 02/11/25  0255   * 133* 135*   K 3.7 3.4* 4.5   CL 93* 96* 96*   CO2 28 28 25   BUN 29* 33* 55*   MG  --  2.0 2.2   PHOS  --  4.6 5.5*     Recent Labs     02/08/25 2039 02/09/25 0329 02/11/25  0255   ALT 9* 7* <6*   GLOB 6.6* 5.5* 5.4*     No results for input(s): \"INR\", \"APTT\" in the last 72 hours.    Invalid input(s): \"PTP\"   No results for input(s): \"TIBC\" in the last 72 hours.    Invalid input(s): \"FE\", \"PSAT\", \"FERR\"   No results found for: \"RBCF\"   No results for input(s): \"PH\", \"PCO2\", \"PO2\" in the last 72 hours.  No results for input(s): \"CPK\" in the last 72 hours.    Invalid input(s): \"CPKMB\", \"CKNDX\",

## 2025-02-11 NOTE — PLAN OF CARE
Problem: Occupational Therapy - Adult  Goal: By Discharge: Performs self-care activities at highest level of function for planned discharge setting.  See evaluation for individualized goals.  Description: FUNCTIONAL STATUS PRIOR TO ADMISSION:  Patient was ambulatory using rollator    , Prior Level of Assist for ADLs: Independent,  ,  ,  ,  ,  , Prior Level of Assist for Homemaking: Independent,  , Prior Level of Assist for Transfers: Independent, Active : No     HOME SUPPORT: Patient lived with roommate but didn't require assistance.    Occupational Therapy Goals:  Initiated 2/11/2025  1.  Patient will perform grooming with Minimal Assist EOB within 7 day(s).  2.  Patient will perform bathing with Moderate Assist within 7 day(s).  3.  Patient will perform lower body dressing with Moderate Assist within 7 day(s).  4.  Patient will perform toilet transfers with Moderate Assist  within 7 day(s).  5.  Patient will perform all aspects of toileting with Moderate Assist within 7 day(s).  6.  Patient will participate in upper extremity therapeutic exercise/activities with Moderate Assist for 15 minutes within 7 day(s).    7.  Patient will utilize energy conservation techniques during functional activities with verbal cues within 7 day(s).   Outcome: Progressing   OCCUPATIONAL THERAPY EVALUATION    Patient: Leandra Perez (60 y.o. male)  Date: 2/11/2025  Primary Diagnosis: Hypoxemia [R09.02]  Acute delirium [R41.0]  Leg swelling [M79.89]  Influenza A [J10.1]         Precautions: Fall Risk, Isolation                  ASSESSMENT :  The patient is limited by decreased functional mobility, independence in ADLs, high-level IADLs, ROM, strength, body mechanics, activity tolerance, endurance, safety awareness, coordination, balance, posture, increased pain levels s/p acute delirium, multilobar pneumonia, flu +.  Prior to admission pt reports being independent with ADLS/IADLS with use of rollator.     On arrival pt was   rt thigh neva    ORTHOPEDIC SURGERY  rt hip replacement          Expanded or extensive additional review of patient history:   Social/Functional History  Lives With: Other (Comment) (roommate)  Type of Home: House  Home Layout: Two level, Able to Live on Main level with bedroom/bathroom  Home Access: Level entry  Bathroom Shower/Tub: Tub/Shower unit  Bathroom Equipment: None  Home Equipment: Rollator  Has the patient had two or more falls in the past year or any fall with injury in the past year?: Yes  Prior Level of Assist for ADLs: Independent  Prior Level of Assist for Homemaking: Independent  Prior Level of Assist for Ambulation: Independent community ambulator, with or without device  Prior Level of Assist for Transfers: Independent  Active : No          EXAMINATION OF PERFORMANCE DEFICITS:    Cognitive/Behavioral Status:  Orientation  Overall Orientation Status: Within Normal Limits  Orientation Level: Oriented X4             Range of Motion:   AROM: Generally decreased, functional         Strength:  Strength: Generally decreased, functional      Coordination:  Coordination: Generally decreased, functional            Tone & Sensation:   Tone: Normal  Sensation: Intact          Functional Mobility and Transfers for ADLs:    Bed Mobility:     Bed Mobility Training  Bed Mobility Training: Yes  Overall Level of Assistance: 2 Person assistance;Substantial/Maximal assistance  Supine to Sit: Substantial/Maximal assistance;2 Person assistance  Scootin Person assistance;Substantial/Maximal assistance    Transfers:      Functional Mobility: Maximum assistance            ADL Assessment:          Feeding: Independent       Grooming: Moderate assistance       UE Bathing: Moderate assistance            LE Bathing: Maximum assistance       UE Dressing: Moderate assistance       LE Dressing: Maximum assistance       Toileting: Maximum assistance            Functional Mobility: Maximum assistance            ADL

## 2025-02-11 NOTE — PROGRESS NOTES
1630 Pts BS was 69. Tech had given pt some juice, but pt was now refusing.     Went in and talked to pt they agreed to eat some saltines.     Made charge RN aware.     1648 Informed MD trying to get pts BS up.     1656 Was told that sugar is still 62/63. Made MD aware.     1700 Gave pt ice cream. Also made MD aware there needs to be some PRN orders.     Last glucose check was 102.

## 2025-02-11 NOTE — FLOWSHEET NOTE
Primary RN SBAR: CYRIL Rodgers    Incapacitated Nurse [facility]. provided: y  Patient Education provided: y-treatment plan for today  Preferred Education method and Primary language: Verbal/English  Hospital General Consent Verified: y  Hospital associated wait time; reason: n/a    Per [facility] -- Hep B Ag /Hep B Ab --> NEG/IMM on 2/6/2025    Pre-Dialysis     02/11/25 1315   Observations & Evaluations   Level of Consciousness 0   Oriented X 4   Respiratory Quality/Effort Unlabored;Dyspnea with exertion   O2 Device Nasal cannula   Bilateral Breath Sounds Clear;Diminished   Skin Color Other (comment)  (appropriate for ethnicity)   Skin Condition/Temp Dry;Warm   Abdomen Inspection Soft   Edema None   Vital Signs   /89   Temp 97.6 °F (36.4 °C)   Pulse 84   Respirations 16   SpO2 94 %   Pain Assessment   Pain Assessment None - Denies Pain   Technical Checks   Dialysis Machine No. 5   RO Machine Number 5   Dialyzer Lot No. [id number]   Tubing Lot Number [id number]   All Connections Secure Yes   NS Bag Yes   Saline Line Double Clamped Yes   Dialyzer Nipro ELISIO   Prime Volume (mL) 250 mL   ICEBOAT I;C;E;B;O;A;T   RO Machine Log Sheet Completed Yes   Machine Alarm Self Test Completed;Passed   Air Foam Detector Tested;Proper Function;pH Reading   Extracorporeal Circuit Tested for Integrity Yes   Machine Conductivity 13.7   Manual Ph 7.2   Bleach Test (Neg) Yes   Bath Temperature 96.8 °F (36 °C)   Dialysis Bath   K+ (Potassium) 3   Ca+ (Calcium) 2.5   Na+ (Sodium) 138   HCO3 (Bicarb) 37     Initiation of Dialysis     02/11/25 1324   Treatment   Time On 1324   Treatment Goal 2.5 L over 3.5 hours   Vital Signs   /80   Pulse 78   Respirations 13   Hemodialysis Central Access - Permanent/Tunneled Right Subclavian   Placement Date/Time: (c) 12/01/24 2000   Present on Admission/Arrival: Yes  Orientation: Right  Access Location: Subclavian   Continued need for line? Yes   Site Assessment Clean, dry & intact   Blue Lumen Status Brisk blood  ethnicity)   Skin Condition/Temp Dry;Warm   Appetite Poor   Abdomen Inspection Soft   Edema None   Vital Signs   BP (!) 164/87   Temp 97.9 °F (36.6 °C)   Pulse 85   Respirations 20   SpO2 94 %   Pain Assessment   Pain Assessment None - Denies Pain   Hemodialysis Central Access - Permanent/Tunneled Right Subclavian   Placement Date/Time: (c) 12/01/24 2000   Present on Admission/Arrival: Yes  Orientation: Right  Access Location: Subclavian   Continued need for line? Yes   Site Assessment Clean, dry & intact   Blue Lumen Status Flushed;Normal saline locked   Red Lumen Status Flushed;Normal saline locked   Line Care Connections checked and tightened;Ports disinfected   Dressing Type Bacteriocidal;Sterile dressing, transparent   Date of Last Dressing Change 02/11/25   During Hemodialysis Assessment   Comments HD stopped early d/t circuit clotted   Access Visible Yes   Ultrafiltration Removed (ml) 2810 ml   Post-Hemodialysis Assessment   Post-Treatment Procedures Blood returned;Catheter Capped, clamped with Saline x2 ports   Machine Disinfection Process Acid/Vinegar Clean;Heat Disinfect;Exterior Machine Disinfection   Rinseback Volume (ml) 250 ml   Blood Volume Processed (Liters) 64.7 L   Dialyzer Clearance Lightly streaked   Duration of Treatment (minutes) 195 minutes   Hemodialysis Intake (ml) 500 ml   Hemodialysis Output (ml) 2810 ml   NET Removed (ml) 2310   Tolerated Treatment Good   Physician Notified Yes   Patient Disposition Other (Comment)  (remains in room)   Provider Notification   Reason for Communication Evaluate  (ended treatment early d/t clotting of circuit)   Provider Name Ancelmo   Provider Notification Physician   Method of Communication Page   Response No new orders   Notification Time 1642     Primary RN SBAR: CYRIL Rodgers    Comments: RCW tunneled CVC present -- brisk blood return and flushes easily for both limbs; Unable to obtain ordered BFR, Dr. Ag notified; Pt was accessed, de-accessed, and

## 2025-02-11 NOTE — PLAN OF CARE
Problem: Physical Therapy - Adult  Goal: By Discharge: Performs mobility at highest level of function for planned discharge setting.  See evaluation for individualized goals.  Description: FUNCTIONAL STATUS PRIOR TO ADMISSION: Patient was modified independent using a rollator for functional mobility. Limited historian; reports that he was admitted from SNF but no longer working with therapy; unclear if he was mobilizing ad monty    HOME SUPPORT PRIOR TO ADMISSION: The patient lived with a roommate prior to [facility] resulting in current SNF admission.    Physical Therapy Goals  Initiated 2/11/2025  1.  Patient will move from supine to sit and sit to supine and scoot up and down in bed with minimal assistance within 7 day(s).    2.  Patient will perform sit to stand with minimal assistance within 7 day(s).  3.  Patient will transfer from bed to chair and chair to bed with minimal assistance using the least restrictive device within 7 day(s).  4.  Patient will ambulate with minimal assistance for 50 feet with the least restrictive device within 7 day(s).     Outcome: Progressing   PHYSICAL THERAPY EVALUATION    Patient: Leandra Perez (60 y.o. male)  Date: 2/11/2025  Primary Diagnosis: Hypoxemia [R09.02]  Acute delirium [R41.0]  Leg swelling [M79.89]  Influenza A [J10.1]       Precautions: Restrictions/Precautions: Fall Risk, Isolation                      ASSESSMENT :   DEFICITS/IMPAIRMENTS:   The patient presents with impaired functional mobility as compared to baseline level 2* c/o severe back pain, impaired balance, impaired gait, and generalized weakness/malaise leading to increased falls risk and dependency from baseline level. Prior to admission to [facility] and discharge to to SNF in November, he lived at home with a roommate and was mod WI with ADLs/mobility using a rollator.    Received pt supine in bed, agreeable to participation. He required up to max A x2 for partial supine<>sit  [id number].  4. Jocelyn Trujillo S, Dominick W, Seble GARCIA. AM-PAC Short Forms Manual 4.0. Revised 2/2020.                                                                                                                                                                                                                               Pain Rating:  10/10   Pain Intervention(s):   nursing notified, rest, and repositioning    Activity Tolerance:   Good, Fair , requires frequent rest breaks, observed shortness of breath on exertion, and desaturates with activity and requires oxygen    After treatment:   Patient left in no apparent distress in bed, Call bell within reach, Bed/ chair alarm activated, Side rails x3, and Heels elevated for pressure relief    COMMUNICATION/EDUCATION:   The patient's plan of care was discussed with: occupational therapist and registered nurse    Patient Education  Education Given To: Patient  Education Provided: Role of Therapy;Plan of Care  Education Method: Verbal  Education Outcome: Verbalized understanding;Continued education needed    Thank you for this referral.  Mery Lan, PT, DPT  Minutes: 13      Physical Therapy Evaluation Charge Determination   History Examination Presentation Decision-Making   MEDIUM  Complexity : 1-2 comorbidities / personal factors will impact the outcome/ POC  MEDIUM Complexity : 3 Standardized tests and measures addressin body structure, function, activity limitation and / or participation in recreation  LOW Complexity : Stable, uncomplicated  AM-PAC  HIGH    Based on the above components, the patient evaluation is determined to be of the following complexity level: Low

## 2025-02-11 NOTE — PROGRESS NOTES
[facility]  Andrés Ag MD  [phone]            Renal Progress Note    NAME:  Leandra Perez   :   1965   MRN:   [id number]     Date/Time:  2025 11:27 AM            DISCUSSION / PLAN :        ESRD on hemodialysis at [facility] 25th history-TTH S  Metabolic encephalopathy, resolved  Hypoxic respiratory failure  Multilobar pneumonia  Influenza A  CHF  Question pulmonary mass versus infection  Anemia of ESRD  Diabetes mellitus type 2    Plan:  HD TTHS  Hd today, 3K bath  No MARTELL  At antibiotics  Continue with Renvela  Renal diet             ___________________________________________________  Subjective:       -patient has had nausea and small vomiting.  Denies chest pain.  Will have dialysis today.  On Nc      Medications reviewed:  Current Facility-Administered Medications   Medication Dose Route Frequency    carvedilol (COREG) tablet 6.25 mg  6.25 mg Oral Q12H    ammonium lactate (LAC-HYDRIN) 12 % lotion   Topical Q12H    balsum peru-castor oil (VENELEX) ointment   Topical Q12H    Virt-Caps 1 mg  1 capsule Oral Daily    buprenorphine-naloxone (SUBOXONE) 8-2 MG SL tablet 1 tablet  1 tablet SubLINGual TID    calcitRIOL (ROCALTROL) capsule 0.25 mcg  0.25 mcg Oral Daily    ferrous sulfate (IRON 325) tablet 325 mg  325 mg Oral QAM AC    folic acid (FOLVITE) tablet 1 mg  1 mg Oral Daily    isosorbide dinitrate (ISORDIL) tablet 20 mg  20 mg Oral BID    lidocaine 4 % external patch 1 patch  1 patch Topical Daily    methocarbamol (ROBAXIN) tablet 1,000 mg  1,000 mg Oral TID PRN    NIFEdipine (PROCARDIA XL) extended release tablet 30 mg  30 mg Oral Daily    pantoprazole (PROTONIX) tablet 40 mg  40 mg Oral QAM AC    sevelamer (RENVELA) packet 0.8 g  0.8 g Oral TID WC    thiamine tablet 100 mg  100 mg Oral Daily    traZODone (DESYREL) tablet 50 mg  50 mg Oral Nightly    sodium chloride flush 0.9 % injection 5-40 mL  5-40 mL IntraVENous 2 times per day    sodium chloride flush 0.9 % injection 5-40

## 2025-02-12 LAB
ALBUMIN SERPL-MCNC: 2.5 G/DL (ref 3.5–5)
ALBUMIN/GLOB SERPL: 0.4 (ref 1.1–2.2)
ALP SERPL-CCNC: 71 U/L (ref 45–117)
ALT SERPL-CCNC: <6 U/L (ref 12–78)
AMPHET UR QL SCN: NEGATIVE
ANION GAP SERPL CALC-SCNC: 11 MMOL/L (ref 2–12)
AST SERPL-CCNC: 24 U/L (ref 15–37)
BARBITURATES UR QL SCN: NEGATIVE
BENZODIAZ UR QL: NEGATIVE
BILIRUB SERPL-MCNC: 0.6 MG/DL (ref 0.2–1)
BUN SERPL-MCNC: 33 MG/DL (ref 6–20)
BUN/CREAT SERPL: 4 (ref 12–20)
CALCIUM SERPL-MCNC: 9.2 MG/DL (ref 8.5–10.1)
CANNABINOIDS UR QL SCN: NEGATIVE
CHLORIDE SERPL-SCNC: 99 MMOL/L (ref 97–108)
CO2 SERPL-SCNC: 27 MMOL/L (ref 21–32)
COCAINE UR QL SCN: NEGATIVE
CREAT SERPL-MCNC: 8.3 MG/DL (ref 0.7–1.3)
ERYTHROCYTE [DISTWIDTH] IN BLOOD BY AUTOMATED COUNT: 18.1 % (ref 11.5–14.5)
GLOBULIN SER CALC-MCNC: 6.2 G/DL (ref 2–4)
GLUCOSE BLD STRIP.AUTO-MCNC: 110 MG/DL (ref 65–117)
GLUCOSE BLD STRIP.AUTO-MCNC: 111 MG/DL (ref 65–117)
GLUCOSE BLD STRIP.AUTO-MCNC: 112 MG/DL (ref 65–117)
GLUCOSE BLD STRIP.AUTO-MCNC: 117 MG/DL (ref 65–117)
GLUCOSE SERPL-MCNC: 104 MG/DL (ref 65–100)
HCT VFR BLD AUTO: 37.8 % (ref 36.6–50.3)
HGB BLD-MCNC: 11.9 G/DL (ref 12.1–17)
Lab: NORMAL
MAGNESIUM SERPL-MCNC: 2.2 MG/DL (ref 1.6–2.4)
MCH RBC QN AUTO: 31.1 PG (ref 26–34)
MCHC RBC AUTO-ENTMCNC: 31.5 G/DL (ref 30–36.5)
MCV RBC AUTO: 98.7 FL (ref 80–99)
METHADONE UR QL: NEGATIVE
NRBC # BLD: 0 K/UL (ref 0–0.01)
NRBC BLD-RTO: 0 PER 100 WBC
OPIATES UR QL: NEGATIVE
PCP UR QL: NEGATIVE
PHOSPHATE SERPL-MCNC: 5 MG/DL (ref 2.6–4.7)
PLATELET # BLD AUTO: 228 K/UL (ref 150–400)
PMV BLD AUTO: 9.3 FL (ref 8.9–12.9)
POTASSIUM SERPL-SCNC: 4.5 MMOL/L (ref 3.5–5.1)
PROT SERPL-MCNC: 8.7 G/DL (ref 6.4–8.2)
RBC # BLD AUTO: 3.83 M/UL (ref 4.1–5.7)
SERVICE CMNT-IMP: NORMAL
SODIUM SERPL-SCNC: 137 MMOL/L (ref 136–145)
WBC # BLD AUTO: 4.4 K/UL (ref 4.1–11.1)

## 2025-02-12 PROCEDURE — 6370000000 HC RX 637 (ALT 250 FOR IP): Performed by: INTERNAL MEDICINE

## 2025-02-12 PROCEDURE — 85027 COMPLETE CBC AUTOMATED: CPT

## 2025-02-12 PROCEDURE — 6360000002 HC RX W HCPCS: Performed by: INTERNAL MEDICINE

## 2025-02-12 PROCEDURE — 2500000003 HC RX 250 WO HCPCS: Performed by: INTERNAL MEDICINE

## 2025-02-12 PROCEDURE — 82962 GLUCOSE BLOOD TEST: CPT

## 2025-02-12 PROCEDURE — 83735 ASSAY OF MAGNESIUM: CPT

## 2025-02-12 PROCEDURE — 80053 COMPREHEN METABOLIC PANEL: CPT

## 2025-02-12 PROCEDURE — 1100000000 HC RM PRIVATE

## 2025-02-12 PROCEDURE — 99223 1ST HOSP IP/OBS HIGH 75: CPT | Performed by: INTERNAL MEDICINE

## 2025-02-12 PROCEDURE — 84100 ASSAY OF PHOSPHORUS: CPT

## 2025-02-12 RX ORDER — HYDRALAZINE HYDROCHLORIDE 50 MG/1
25 TABLET, FILM COATED ORAL EVERY 8 HOURS SCHEDULED
Status: DISCONTINUED | OUTPATIENT
Start: 2025-02-12 | End: 2025-02-12

## 2025-02-12 RX ORDER — AZITHROMYCIN 250 MG/1
500 TABLET, FILM COATED ORAL EVERY EVENING
Status: COMPLETED | OUTPATIENT
Start: 2025-02-12 | End: 2025-02-13

## 2025-02-12 RX ORDER — HYDRALAZINE HYDROCHLORIDE 25 MG/1
25 TABLET, FILM COATED ORAL EVERY 8 HOURS SCHEDULED
Status: DISCONTINUED | OUTPATIENT
Start: 2025-02-13 | End: 2025-02-16 | Stop reason: HOSPADM

## 2025-02-12 RX ADMIN — BUMETANIDE 1 MG: 0.25 INJECTION INTRAMUSCULAR; INTRAVENOUS at 10:12

## 2025-02-12 RX ADMIN — PANTOPRAZOLE SODIUM 40 MG: 40 TABLET, DELAYED RELEASE ORAL at 06:12

## 2025-02-12 RX ADMIN — BUMETANIDE 1 MG: 0.25 INJECTION INTRAMUSCULAR; INTRAVENOUS at 21:29

## 2025-02-12 RX ADMIN — BUPRENORPHINE HYDROCHLORIDE AND NALOXONE HYDROCHLORIDE DIHYDRATE 1 TABLET: 8; 2 TABLET SUBLINGUAL at 14:27

## 2025-02-12 RX ADMIN — BUPRENORPHINE HYDROCHLORIDE AND NALOXONE HYDROCHLORIDE DIHYDRATE 1 TABLET: 8; 2 TABLET SUBLINGUAL at 10:15

## 2025-02-12 RX ADMIN — WATER 1000 MG: 1 INJECTION INTRAMUSCULAR; INTRAVENOUS; SUBCUTANEOUS at 21:27

## 2025-02-12 RX ADMIN — BUPRENORPHINE HYDROCHLORIDE AND NALOXONE HYDROCHLORIDE DIHYDRATE 1 TABLET: 8; 2 TABLET SUBLINGUAL at 21:32

## 2025-02-12 RX ADMIN — CALCITRIOL CAPSULES 0.25 MCG 0.25 MCG: 0.25 CAPSULE ORAL at 10:15

## 2025-02-12 RX ADMIN — HEPARIN SODIUM 5000 UNITS: 5000 INJECTION INTRAVENOUS; SUBCUTANEOUS at 14:27

## 2025-02-12 RX ADMIN — TRAZODONE HYDROCHLORIDE 50 MG: 50 TABLET ORAL at 21:35

## 2025-02-12 RX ADMIN — ISOSORBIDE DINITRATE 20 MG: 20 TABLET ORAL at 10:15

## 2025-02-12 RX ADMIN — SODIUM CHLORIDE, PRESERVATIVE FREE 10 ML: 5 INJECTION INTRAVENOUS at 21:28

## 2025-02-12 RX ADMIN — Medication 1 MG: at 10:15

## 2025-02-12 RX ADMIN — CARVEDILOL 6.25 MG: 12.5 TABLET, FILM COATED ORAL at 17:27

## 2025-02-12 RX ADMIN — NIFEDIPINE 30 MG: 30 TABLET, FILM COATED, EXTENDED RELEASE ORAL at 10:15

## 2025-02-12 RX ADMIN — HEPARIN SODIUM 5000 UNITS: 5000 INJECTION INTRAVENOUS; SUBCUTANEOUS at 21:32

## 2025-02-12 RX ADMIN — SEVELAMER CARBONATE 0.8 G: 800 FOR SUSPENSION ORAL at 13:24

## 2025-02-12 RX ADMIN — SODIUM CHLORIDE, PRESERVATIVE FREE 10 ML: 5 INJECTION INTRAVENOUS at 10:12

## 2025-02-12 RX ADMIN — Medication: at 21:44

## 2025-02-12 RX ADMIN — SEVELAMER CARBONATE 0.8 G: 800 FOR SUSPENSION ORAL at 10:10

## 2025-02-12 RX ADMIN — ISOSORBIDE DINITRATE 20 MG: 20 TABLET ORAL at 21:32

## 2025-02-12 RX ADMIN — GUAIFENESIN 600 MG: 600 TABLET, EXTENDED RELEASE ORAL at 10:15

## 2025-02-12 RX ADMIN — GUAIFENESIN 600 MG: 600 TABLET, EXTENDED RELEASE ORAL at 21:32

## 2025-02-12 RX ADMIN — Medication 1 CAPSULE: at 10:15

## 2025-02-12 RX ADMIN — FERROUS SULFATE TAB 325 MG (65 MG ELEMENTAL FE) 325 MG: 325 (65 FE) TAB at 06:11

## 2025-02-12 RX ADMIN — OSELTAMIVIR PHOSPHATE 30 MG: 30 CAPSULE ORAL at 17:27

## 2025-02-12 RX ADMIN — HEPARIN SODIUM 5000 UNITS: 5000 INJECTION INTRAVENOUS; SUBCUTANEOUS at 06:12

## 2025-02-12 RX ADMIN — AZITHROMYCIN DIHYDRATE 500 MG: 250 TABLET, FILM COATED ORAL at 17:26

## 2025-02-12 RX ADMIN — Medication: at 21:40

## 2025-02-12 RX ADMIN — Medication 100 MG: at 10:15

## 2025-02-12 RX ADMIN — Medication: at 13:23

## 2025-02-12 RX ADMIN — SEVELAMER CARBONATE 0.8 G: 800 FOR SUSPENSION ORAL at 17:25

## 2025-02-12 RX ADMIN — Medication: at 13:24

## 2025-02-12 ASSESSMENT — PAIN SCALES - GENERAL
PAINLEVEL_OUTOF10: 3
PAINLEVEL_OUTOF10: 6

## 2025-02-12 ASSESSMENT — PAIN DESCRIPTION - DESCRIPTORS: DESCRIPTORS: ACHING;DISCOMFORT

## 2025-02-12 ASSESSMENT — PAIN DESCRIPTION - ORIENTATION: ORIENTATION: LOWER

## 2025-02-12 ASSESSMENT — PAIN DESCRIPTION - LOCATION: LOCATION: BACK

## 2025-02-12 NOTE — CONSULTS
Infectious Disease Consult Note    Assessment / Plan:       61 y/o male with recent T11-T12 osteomyelitis s/p Cefepime Rx and admitted with hypoxia with + Influenza A with potential secondary bacterial PNA.    Given overall gradual improvement to his back and refusal at this time of potential operative interventions, no further intervention needed.  Continue PT/OT outpatient as you are.    Complete a 5 day course with Ceftriaxone and Azithromycin.    Local wound care to left 1st toe.    Will sign off, please call with questions.       Reason for Consult: T11-T12 discitis/osteomyelitis  Date of Consultation: February 12, 2025  Date of Admission: 2/8/2025  Referring Physician: Dr. Díaz    HPI:    61 y/o male with DM, ESRD on HD via [facility] permacath, HTN, T11-T12 discitis and vertebral osteomyelitis for which he was treated with prolonged Cefepime course at end of HD treatments 8-10/2024 via [facility] with Serratia marcesens bacteremia in 7/2024 and 8/2024.  Had awful back pain at that time which has gradually improved with further PT/OT and has been largely off antibiotics since late 2024.  He declines neurosurgical options at this time.  Admitted from [place] due to AMS and SOB, hypoxia.  Found to have + Influenza A and multifocal PNA specifically RLL>LLL which has improved with tamiflu, CTX/Azithro course per primary.    Also with nail left 1st toe that was removed outpatient and treated with conservative local wound care.      Past Medical History:  Past Medical History:   Diagnosis Date    Diabetes (HCC)     Gout     History of renal dialysis     Hypertension     Osteoarthritis          Surgical History:  Past Surgical History:   Procedure Laterality Date    CT BIOPSY RENAL  9/1/2022    CT BIOPSY RENAL 9/1/2022 [facility] RAD CT    IR NONTUNNELED VASCULAR CATHETER > 5 YEARS  8/31/2022    IR NONTUNNELED VASCULAR CATHETER 8/31/2022 [facility] RAD ANGIO IR    IR NONTUNNELED VASCULAR CATHETER > 5 YEARS  8/31/2022    IR TUNNELED  Sodium 137 136 - 145 mmol/L    Potassium 4.5 3.5 - 5.1 mmol/L    Chloride 99 97 - 108 mmol/L    CO2 27 21 - 32 mmol/L    Anion Gap 11 2 - 12 mmol/L    Glucose 104 (H) 65 - 100 mg/dL    BUN 33 (H) 6 - 20 MG/DL    Creatinine 8.30 (H) 0.70 - 1.30 MG/DL    BUN/Creatinine Ratio 4 (L) 12 - 20      Est, Glom Filt Rate 7 (L) >60 ml/min/1.73m2    Calcium 9.2 8.5 - 10.1 MG/DL    Total Bilirubin 0.6 0.2 - 1.0 MG/DL    ALT <6 (L) 12 - 78 U/L    AST 24 15 - 37 U/L    Alk Phosphatase 71 45 - 117 U/L    Total Protein 8.7 (H) 6.4 - 8.2 g/dL    Albumin 2.5 (L) 3.5 - 5.0 g/dL    Globulin 6.2 (H) 2.0 - 4.0 g/dL    Albumin/Globulin Ratio 0.4 (L) 1.1 - 2.2     Magnesium    Collection Time: 02/12/25  2:16 AM   Result Value Ref Range    Magnesium 2.2 1.6 - 2.4 mg/dL   POCT Glucose    Collection Time: 02/12/25  8:16 AM   Result Value Ref Range    POC Glucose 111 65 - 117 mg/dL    Performed by: GRETTA Wilson    POCT Glucose    Collection Time: 02/12/25 11:20 AM   Result Value Ref Range    POC Glucose 110 65 - 117 mg/dL    Performed by: GRETTA Wilson        Microbiology Data:      Influenza A positive    Imaging: T11-12 known recent discitis/osteomyelitis, CAP RLL and slightly to LLL    Thank you Dr. Díaz for the opportunity to participate in the care of this patient. Please contact with questions or concerns.     A total time of 80 minutes was spent on today's encounter.  Greater than 50% of the time was spent on the following:  Preparing for visit and chart review.  Obtaining and/or reviewing separately obtained history  Performing a medically appropriate exam and/or evaluation  Counseling and educating a patient/family/caregiver as noted above  Placing relevant orders  Referring and communicating with other professionals (not separately reported)  Independently interpreting results (not separately reported) and communicating results to the patient/family/caregiver  Care coordination (not separately reported) as noted above  Documenting

## 2025-02-12 NOTE — PROGRESS NOTES
Physician Progress Note      PATIENT:               SRAVANTHI THAKKAR  CSN #:                  [id number]  :                       1965  ADMIT DATE:       2025 8:20 PM  DISCH DATE:  RESPONDING  PROVIDER #:        Ana Díaz MD          QUERY TEXT:    Noted documentation of acute respiratory failure in the H&P.  Noted pox 87% on   O2 4lnc on  and pox 88% on O2 4lnc on 2/10.  However, did not note   documentation of increased WOB, use of accessory muscles.    In order to support the diagnosis of acute respiratory failure, please include   additional clinical indicators in your documentation.  Or please document if   the diagnosis of acute respiratory failure has been ruled out after further   study.      The medical record reflects the following:  Risk Factors: has flu, hx CHF    Clinical Indicators: -pox 87% on O2 4lnc, 2/10pox 88% on O2 4lnc  ED MD  Hypoxic on arrival to the ED in the 80's and now on 2L NC  H&P  PHYSICAL EXAM:  General appearance: Patient appears ill, in moderate distress.  Chest: Bilateral basal crackles and few expiratory wheezing    Treatment: Oxygen, Monitor vital signs, labwork, imaging, pulse oximetry  Options provided:  -- Acute Respiratory Failure as evidenced by, Please document evidence.  -- Acute Respiratory Failure ruled out after study  -- Other - I will add my own diagnosis  -- Disagree - Not applicable / Not valid  -- Disagree - Clinically unable to determine / Unknown  -- Refer to Clinical Documentation Reviewer    PROVIDER RESPONSE TEXT:    This patient is in acute respiratory failure as evidenced by Pulse oximetry of   87% on 4 L O2 via nasal cannula    Query created by: Sheryl Quijano on 2025 12:17 PM      Electronically signed by:  Ana Díaz MD 2025 5:23 PM

## 2025-02-12 NOTE — CARE COORDINATION
Transition of Care Plan:    Needs LTC at dc - [facility] requested 2/12  - referrals pending w/ additional facilities 2/12  - [facility] TThS     Transport: Medicaid stretcher     RUR:  19%  Prior Level of Functioning: independent at baseline, is now needing assist w/ most ADLs  Does not drive or work   Disposition: LTC   JOANNE: TBD   If SNF or IPR: Date FOC offered:   Date FOC received:   Accepting facility:   Date authorization started with reference number:   Date authorization received and expires:   Follow up appointments: PCP  DME needed:  defer to LTC   Transportation at discharge: Medicaid stretcher   IM/IMM Medicare/ letter given: n/a  Caregiver Contact:   sister Phuc Perez, [phone]  Friend Ozzy, [phone]  Discharge Caregiver contacted prior to discharge? yes  Care Conference needed? no  Barriers to discharge: medical     2pm: [facility] admissions team informed this CM that Pt could not return to facility as he was for dc from [facility] the day he admitted to hospital (2/8). Reported he has safe dc plan and is still paying rent at his current address - Pt declines and states he can no longer return and he is no longer paying rent, requesting dc to a facility. Received permission for additional referrals be sent, [facility] requested 2/12.     MAKAYLA Rosas

## 2025-02-12 NOTE — PROGRESS NOTES
[facility]  Andrés Ag MD  [phone]            Renal Progress Note    NAME:  Leandra Perez   :   1965   MRN:   [id number]     Date/Time:  2025 10:31 AM            DISCUSSION / PLAN :        ESRD on hemodialysis at [facility] 25th history-TTH S  Metabolic encephalopathy, resolved  Hypoxic respiratory failure  Multilobar pneumonia  Influenza A  CHF  Question pulmonary mass versus infection  Anemia of ESRD  Diabetes mellitus type 2    Plan:  HD TTHS  Hemodialysis tomorrow-changed to 2K  No MARTELL  antibiotics  Continue with Renvela and calcitriol  Renal diet             ___________________________________________________  Subjective:       -patient has had nausea and small vomiting.  Denies chest pain.  Will have dialysis today.  On Nc  -feeling better today.  Still has cough.    No appetite      Medications reviewed:  Current Facility-Administered Medications   Medication Dose Route Frequency    lactobacillus (CULTURELLE) capsule 1 capsule  1 capsule Oral Daily with breakfast    guaiFENesin (MUCINEX) extended release tablet 600 mg  600 mg Oral BID    glucose chewable tablet 16 g  4 tablet Oral PRN    dextrose bolus 10% 125 mL  125 mL IntraVENous PRN    Or    dextrose bolus 10% 250 mL  250 mL IntraVENous PRN    glucagon injection 1 mg  1 mg SubCUTAneous PRN    dextrose 10 % infusion   IntraVENous Continuous PRN    carvedilol (COREG) tablet 6.25 mg  6.25 mg Oral Q12H    ammonium lactate (LAC-HYDRIN) 12 % lotion   Topical Q12H    balsum peru-castor oil (VENELEX) ointment   Topical Q12H    Virt-Caps 1 mg  1 capsule Oral Daily    buprenorphine-naloxone (SUBOXONE) 8-2 MG SL tablet 1 tablet  1 tablet SubLINGual TID    calcitRIOL (ROCALTROL) capsule 0.25 mcg  0.25 mcg Oral Daily    ferrous sulfate (IRON 325) tablet 325 mg  325 mg Oral QAM AC    folic acid (FOLVITE) tablet 1 mg  1 mg Oral Daily    isosorbide dinitrate (ISORDIL) tablet 20 mg  20 mg Oral BID    lidocaine 4 % external patch 1

## 2025-02-12 NOTE — PROGRESS NOTES
[facility]  Hospitalist Group                                                                                          Hospitalist Progress Note  Ana Díaz MD  Answering service: [phone] OR [phone] from in house phone        Date of Service:  2025  NAME:  Leandra Perez  :  1965  MRN:  [id number]       Admission Summary:   Leandra Perez is a 60 y.o. male with past medical history significant for hypertension, type 2 diabetes, ESRD on hemodialysis Tuesday, Thursday and Saturday, chronic combined systolic and diastolic congestive heart failure, substance use disorder on maintenance dose of Suboxone, left renal mass presented at the emergency room with change in mental status.  Patient symptoms started in the morning and got progressively worse throughout the day.  Patient was able to complete his routine hemodialysis without any event.  Symptoms persisted after dialysis, became associated with shortness of breath for which patient required supplemental oxygen and because of the he was sent to the emergency room for further evaluation.  Patient is unable to provide good history because of the acuity of his condition.  He was recently admitted to [facility] from 10/24/2024 to 2024, patient was admitted and treated for metabolic encephalopathy, unprovoked seizure as well as T11-T12 discitis/osteomyelitis and was discharged to SNF.  Patient continued to require oxygen in the emergency room.  COVID-negative and flu positive, he was referred to the hospitalist service for admission.       Interval history / Subjective:   Patient seen and examined.  He complains of significant back pain.  Patient has a history of thoracic discitis/osteomyelitis, treated with IV antibiotics at [facility] back in November.  He reports managing his symptoms after the antibiotics, but pain returned a couple weeks ago and is getting worse.  He is on Suboxone.   He denies any shortness of breath or dyspnea  from [facility]    Patient History     Smoking Tobacco Use: Every Day     Smokeless Tobacco Use: Never     Passive Exposure: Not on file   Alcohol Use: Not At Risk (8/28/2024)    Received from [facility]    AUDIT-C     Frequency of Alcohol Consumption: Never     Average Number of Drinks: Patient does not drink     Frequency of Binge Drinking: Never   Financial Resource Strain: Medium Risk (7/10/2024)    Overall Financial Resource Strain (CARDIA)     Difficulty of Paying Living Expenses: Somewhat hard   Food Insecurity: Patient Unable To Answer (10/25/2024)    Received from [facility]    Hunger Vital Sign     Worried About Running Out of Food in the Last Year: Patient unable to answer     Ran Out of Food in the Last Year: Patient unable to answer   Transportation Needs: Patient Unable To Answer (10/25/2024)    Received from [facility]    PRAPARE - Transportation     Lack of Transportation (Medical): Patient unable to answer     Lack of Transportation (Non-Medical): Patient unable to answer   Physical Activity: Not on file   Stress: Not on file   Social Connections: Not on file   Intimate Partner Violence: Not on file   Depression: Not at risk (7/10/2024)    PHQ-2     PHQ-2 Score: 2   Housing Stability: Patient Declined (7/19/2024)    Received from [facility] "[employer] [facility]    Housing Stability Vital Sign     In the last 12 months, was there a time when you were not able to pay the mortgage or rent on time?: Patient declined     Number of Times Moved in the Last Year: Not on file     At any time in the past 12 months, were you homeless or living in a shelter (including now)?: Patient declined   Recent Concern: Housing Stability - High Risk (7/10/2024)    Housing Stability Vital Sign     Unable to Pay for Housing in the Last Year: Not on file     Number of Places Lived in the Last Year: Not on file     Unstable Housing in the Last Year: Yes   Interpersonal Safety: Not on file   Utilities: Patient Unable To Answer

## 2025-02-12 NOTE — CARE COORDINATION
Care Management Initial Assessment       RUR:  19%  Readmission? No  1st IM letter given? No  1st  letter given: No    IMAN: Pt admitted from [facility] (under 30 day Medicaid benefit), will return at [place]  - [facility]    Transport: Medicaid stretcher to be scheduled     CM met with Pt at bedside to introduce self and role. Pt previously was living at apartment on file w/ a roommate but reported cannot return. Admitted from [facility] and will return at [place].     ADLs: independent at baseline, is now needing assist w/ most ADLs  Does not drive or work   DME: ANJEL rubio?  PCP follow up: Pt unsure   Previous Home Health: none  Previous Skilled Nursing Facility: [facility]  Previous Inpatient Rehab: [facility]  Insurance verified: yes; [facility] Medicaid   Pharmacy: facility fills   Emergency Contact:   sister Phuc Perez, [phone]  Friend Ozzy, [phone]    [facility]  [address] [place] [id number]   Phone: [phone]  Fax: [phone]     CM confirmed demographic information w/ Pt at bedside. Hx of [facility] and [facility]. Pt reports the apartment listed on face sheet as being sold and he is unable to return - endorses wanting to return to [facility] while coordinating a post-rehab dispo. CM confirmed w/ [facility] liaison (Izabella ) that he could return for rehab under a 30 day Medicaid benefit. CM to follow.    CM will follow patient progress and assist as needed with IMAN plan.       02/12/25 1005   Service Assessment   Patient Orientation Alert and Oriented;Place;Person;Situation;Self   Cognition Alert   History Provided By Patient   Primary Caregiver Self   Support Systems Family Members;Friends/Neighbors   Patient's Healthcare Decision Maker is: Legal Next of Kin   PCP Verified by CM Yes   Last Visit to PCP Within last 6 months   Prior Functional Level Independent in ADLs/IADLs   Current Functional Level

## 2025-02-12 NOTE — CONSULTS
Initial Addiction Medicine Consultation            IDENTIFICATION:    Patient Name  Leandra Perez   Date of Birth 1965   [facility] [id number]   Medical Record Number  [id number]      Age  60 y.o.   PCP Marog Bella MD   Admit date:  2/8/2025    Room Number  663/01  @ [facility]   Date of Service  2/12/2025            ASSESSMENT & PLAN        Leandra Perez, is a 60 y.o.  male with a history of opioid use disorder whose substance use disorder appears stable on bup/naloxone 8 mg three times daily.      Continue current dose of buprenorphine/naloxone.      Discussed options for treatment on discharge if he is not discharged to a SNF.  His nephew Miguelina was in the room and help facilitated an outpatient appointment (his plan was [facility] which is convenient for where miguelina and his family live).      If he needs a prescription for buprenorphine/naloxone on discharge I am happy to send that but notably anyone with a [employer] license can send that prescription.  A 7-14 day supply would be sufficient for him to establish care at an outpatient program.       eGovanni Mckinnon MD [facility]  [facility]  [phone]  Fax [phone]    HISTORY         REASON FOR HOSPITALIZATION:  CC: \"opioid use disorder\".      HISTORY OF PRESENT ILLNESS:    The patient, Leandra Perez, is a 60 y.o.  male with a opioid use disorder who was admitted with altered mental status.  He has had physical disability related to a hospitalization for osteomyelitis in the fall.  Since that time he has been living at a skilled nursing facility.  It is not clear at this point if he can return to that school nursing facility.  He reports that when he was living there he was pretty independent as far as his activities of daily living.    On review of his State prescription monitoring program report there are not many prescriptions for buprenorphine/naloxone and this may be related to the way it is obtained and

## 2025-02-12 NOTE — PROGRESS NOTES
Clinical Pharmacy Note: Re: IV to PO Automatic Conversion - Antibiotic    Please note: Leandra Perez’s medication (azithromycin) has been changed from IV to PO based on the following criteria:    The patient:  Received IV therapy for at least 24 hours   Is tolerating diet more advanced than clear liquids  Is tolerating oral medications  Is not on vasopressor blood pressure support (i.e. no signs of shock)      This IV to PO conversion is based on the [employer] approved automatic conversion policy for eligible patients.  Please call with questions.

## 2025-02-13 LAB
ALBUMIN SERPL-MCNC: 2.4 G/DL (ref 3.5–5)
ALBUMIN SERPL-MCNC: 2.5 G/DL (ref 3.5–5)
ALBUMIN/GLOB SERPL: 0.5 (ref 1.1–2.2)
ALP SERPL-CCNC: 71 U/L (ref 45–117)
ALT SERPL-CCNC: <6 U/L (ref 12–78)
ANION GAP SERPL CALC-SCNC: 10 MMOL/L (ref 2–12)
ANION GAP SERPL CALC-SCNC: 11 MMOL/L (ref 2–12)
AST SERPL-CCNC: 22 U/L (ref 15–37)
BACTERIA SPEC CULT: NORMAL
BACTERIA SPEC CULT: NORMAL
BILIRUB SERPL-MCNC: 0.8 MG/DL (ref 0.2–1)
BUN SERPL-MCNC: 51 MG/DL (ref 6–20)
BUN SERPL-MCNC: 51 MG/DL (ref 6–20)
BUN/CREAT SERPL: 5 (ref 12–20)
BUN/CREAT SERPL: 5 (ref 12–20)
CALCIUM SERPL-MCNC: 8.9 MG/DL (ref 8.5–10.1)
CALCIUM SERPL-MCNC: 9 MG/DL (ref 8.5–10.1)
CHLORIDE SERPL-SCNC: 96 MMOL/L (ref 97–108)
CHLORIDE SERPL-SCNC: 96 MMOL/L (ref 97–108)
CO2 SERPL-SCNC: 26 MMOL/L (ref 21–32)
CO2 SERPL-SCNC: 26 MMOL/L (ref 21–32)
CREAT SERPL-MCNC: 10 MG/DL (ref 0.7–1.3)
CREAT SERPL-MCNC: 10.2 MG/DL (ref 0.7–1.3)
ERYTHROCYTE [DISTWIDTH] IN BLOOD BY AUTOMATED COUNT: 17.8 % (ref 11.5–14.5)
GLOBULIN SER CALC-MCNC: 5.1 G/DL (ref 2–4)
GLUCOSE BLD STRIP.AUTO-MCNC: 117 MG/DL (ref 65–117)
GLUCOSE BLD STRIP.AUTO-MCNC: 149 MG/DL (ref 65–117)
GLUCOSE BLD STRIP.AUTO-MCNC: 163 MG/DL (ref 65–117)
GLUCOSE BLD STRIP.AUTO-MCNC: 80 MG/DL (ref 65–117)
GLUCOSE SERPL-MCNC: 100 MG/DL (ref 65–100)
GLUCOSE SERPL-MCNC: 97 MG/DL (ref 65–100)
HBV SURFACE AB SER QL: REACTIVE
HBV SURFACE AB SER-ACNC: 17.56 MIU/ML
HBV SURFACE AG SER QL: <0.1 INDEX
HBV SURFACE AG SER QL: NEGATIVE
HCT VFR BLD AUTO: 35.5 % (ref 36.6–50.3)
HGB BLD-MCNC: 11.1 G/DL (ref 12.1–17)
MAGNESIUM SERPL-MCNC: 2.2 MG/DL (ref 1.6–2.4)
MCH RBC QN AUTO: 31.6 PG (ref 26–34)
MCHC RBC AUTO-ENTMCNC: 31.3 G/DL (ref 30–36.5)
MCV RBC AUTO: 101.1 FL (ref 80–99)
NRBC # BLD: 0 K/UL (ref 0–0.01)
NRBC BLD-RTO: 0 PER 100 WBC
PHOSPHATE SERPL-MCNC: 6 MG/DL (ref 2.6–4.7)
PHOSPHATE SERPL-MCNC: 6.2 MG/DL (ref 2.6–4.7)
PLATELET # BLD AUTO: 232 K/UL (ref 150–400)
PMV BLD AUTO: 9.5 FL (ref 8.9–12.9)
POTASSIUM SERPL-SCNC: 4.7 MMOL/L (ref 3.5–5.1)
POTASSIUM SERPL-SCNC: 4.9 MMOL/L (ref 3.5–5.1)
PROT SERPL-MCNC: 7.6 G/DL (ref 6.4–8.2)
RBC # BLD AUTO: 3.51 M/UL (ref 4.1–5.7)
SERVICE CMNT-IMP: ABNORMAL
SERVICE CMNT-IMP: ABNORMAL
SERVICE CMNT-IMP: NORMAL
SODIUM SERPL-SCNC: 132 MMOL/L (ref 136–145)
SODIUM SERPL-SCNC: 133 MMOL/L (ref 136–145)
WBC # BLD AUTO: 5.4 K/UL (ref 4.1–11.1)

## 2025-02-13 PROCEDURE — 84100 ASSAY OF PHOSPHORUS: CPT

## 2025-02-13 PROCEDURE — 87340 HEPATITIS B SURFACE AG IA: CPT

## 2025-02-13 PROCEDURE — 2500000003 HC RX 250 WO HCPCS: Performed by: INTERNAL MEDICINE

## 2025-02-13 PROCEDURE — 36415 COLL VENOUS BLD VENIPUNCTURE: CPT

## 2025-02-13 PROCEDURE — 1200000000 HC SEMI PRIVATE

## 2025-02-13 PROCEDURE — 83735 ASSAY OF MAGNESIUM: CPT

## 2025-02-13 PROCEDURE — 80053 COMPREHEN METABOLIC PANEL: CPT

## 2025-02-13 PROCEDURE — 85027 COMPLETE CBC AUTOMATED: CPT

## 2025-02-13 PROCEDURE — 86706 HEP B SURFACE ANTIBODY: CPT

## 2025-02-13 PROCEDURE — 80069 RENAL FUNCTION PANEL: CPT

## 2025-02-13 PROCEDURE — 6360000002 HC RX W HCPCS: Performed by: INTERNAL MEDICINE

## 2025-02-13 PROCEDURE — 94760 N-INVAS EAR/PLS OXIMETRY 1: CPT

## 2025-02-13 PROCEDURE — 2700000000 HC OXYGEN THERAPY PER DAY

## 2025-02-13 PROCEDURE — 6370000000 HC RX 637 (ALT 250 FOR IP): Performed by: INTERNAL MEDICINE

## 2025-02-13 PROCEDURE — 97530 THERAPEUTIC ACTIVITIES: CPT

## 2025-02-13 PROCEDURE — 90935 HEMODIALYSIS ONE EVALUATION: CPT

## 2025-02-13 PROCEDURE — 82962 GLUCOSE BLOOD TEST: CPT

## 2025-02-13 RX ADMIN — BUPRENORPHINE HYDROCHLORIDE AND NALOXONE HYDROCHLORIDE DIHYDRATE 1 TABLET: 8; 2 TABLET SUBLINGUAL at 13:54

## 2025-02-13 RX ADMIN — SEVELAMER CARBONATE 0.8 G: 800 FOR SUSPENSION ORAL at 12:58

## 2025-02-13 RX ADMIN — BUMETANIDE 1 MG: 0.25 INJECTION INTRAMUSCULAR; INTRAVENOUS at 09:34

## 2025-02-13 RX ADMIN — CALCITRIOL CAPSULES 0.25 MCG 0.25 MCG: 0.25 CAPSULE ORAL at 09:33

## 2025-02-13 RX ADMIN — GUAIFENESIN 600 MG: 600 TABLET, EXTENDED RELEASE ORAL at 09:32

## 2025-02-13 RX ADMIN — SODIUM CHLORIDE, PRESERVATIVE FREE 10 ML: 5 INJECTION INTRAVENOUS at 09:31

## 2025-02-13 RX ADMIN — Medication 1 MG: at 09:32

## 2025-02-13 RX ADMIN — TRAZODONE HYDROCHLORIDE 50 MG: 50 TABLET ORAL at 22:34

## 2025-02-13 RX ADMIN — Medication 100 MG: at 09:33

## 2025-02-13 RX ADMIN — HEPARIN SODIUM 5000 UNITS: 5000 INJECTION INTRAVENOUS; SUBCUTANEOUS at 22:34

## 2025-02-13 RX ADMIN — BUPRENORPHINE HYDROCHLORIDE AND NALOXONE HYDROCHLORIDE DIHYDRATE 1 TABLET: 8; 2 TABLET SUBLINGUAL at 09:34

## 2025-02-13 RX ADMIN — Medication 1 CAPSULE: at 09:33

## 2025-02-13 RX ADMIN — Medication: at 12:57

## 2025-02-13 RX ADMIN — SEVELAMER CARBONATE 0.8 G: 800 FOR SUSPENSION ORAL at 18:08

## 2025-02-13 RX ADMIN — BUPRENORPHINE HYDROCHLORIDE AND NALOXONE HYDROCHLORIDE DIHYDRATE 1 TABLET: 8; 2 TABLET SUBLINGUAL at 22:34

## 2025-02-13 RX ADMIN — AZITHROMYCIN DIHYDRATE 500 MG: 250 TABLET, FILM COATED ORAL at 18:08

## 2025-02-13 RX ADMIN — WATER 1000 MG: 1 INJECTION INTRAMUSCULAR; INTRAVENOUS; SUBCUTANEOUS at 19:49

## 2025-02-13 RX ADMIN — GUAIFENESIN 600 MG: 600 TABLET, EXTENDED RELEASE ORAL at 22:34

## 2025-02-13 RX ADMIN — SEVELAMER CARBONATE 0.8 G: 800 FOR SUSPENSION ORAL at 09:31

## 2025-02-13 RX ADMIN — Medication: at 12:58

## 2025-02-13 RX ADMIN — ISOSORBIDE DINITRATE 20 MG: 20 TABLET ORAL at 22:34

## 2025-02-13 RX ADMIN — Medication: at 22:43

## 2025-02-13 RX ADMIN — SODIUM CHLORIDE, PRESERVATIVE FREE 10 ML: 5 INJECTION INTRAVENOUS at 22:38

## 2025-02-13 RX ADMIN — Medication 1 MG: at 09:34

## 2025-02-13 RX ADMIN — BUMETANIDE 1 MG: 0.25 INJECTION INTRAMUSCULAR; INTRAVENOUS at 22:34

## 2025-02-13 RX ADMIN — Medication: at 22:39

## 2025-02-13 RX ADMIN — HEPARIN SODIUM 5000 UNITS: 5000 INJECTION INTRAVENOUS; SUBCUTANEOUS at 13:54

## 2025-02-13 RX ADMIN — HYDRALAZINE HYDROCHLORIDE 25 MG: 25 TABLET ORAL at 22:34

## 2025-02-13 NOTE — PROGRESS NOTES
Comprehensive Nutrition Assessment    Type and Reason for Visit: Initial, Wound    Nutrition Recommendations/Plan:   Continue Regular diet, added Low Phos restriction   Nepro TID  Continue Phos binder with meals    Continue Folic Acid and Thiamine, consider Vit C and Zn to promote wound healing  Please record %PO intakes in I/Os  Monitor weight     Malnutrition Assessment:  Malnutrition Status:  Moderate malnutrition (02/13/25 1442)    Context:  Acute Illness     Findings of the 6 clinical characteristics of malnutrition:  Energy Intake:  75% or less of estimated energy requirements for 7 or more days  Weight Loss:  No weight loss     Body Fat Loss:  Moderate body fat loss Orbital, Buccal region   Muscle Mass Loss:  Mild muscle mass loss Temples (temporalis), Clavicles (pectoralis & deltoids)  Fluid Accumulation:  No fluid accumulation     Strength:  Not Performed     Nutrition Assessment:    61 yo male admitted for acute delirium, presented with change in mental status. PMH of HTN, T2DM, ESRD on HD TTS, chronic combined systolic and diastolic CHF, substance use disorder on Suboxone, L renal mass. +Flu  Noted shellfish allergy    2/13: RD eval for wound. WOCN following for stage 2 PI on sacrum. Nephrology following, on HD.   Spoke with pt at bedside, he reports low appetite x 3-4 days that he attributes to the flu. Observed breakfast tray, ~25% consumed and 100% of Nepro ONS consumed. Will provide 1260 kcal, 57 gm pro for all 3 Nepro drinks. Pt is hopeful his appetite will improve as he starts to feel better. No changes to intake/appetite PTA, mentions that he is not a big eater in general. Pt seen by RDs at [facility] on Nov and Sept, both notes mention that he has had a poor appetite and low intakes. UBW reported is 160#, #, weight on admission was 164#. Consider weight fluctuations d/t fluid shifts with HD. Pt with mild muscle wasting and severe fat loss.   Labs reviewed, Phos high, recommend continuing Phos

## 2025-02-13 NOTE — PLAN OF CARE
Problem: Safety - Adult  Goal: Free from fall injury  2/13/2025 1345 by Marissa Sagastume, RN  Outcome: Progressing  Flowsheets (Taken 2/13/2025 0845)  Free From Fall Injury:   Instruct family/caregiver on patient safety   Based on caregiver fall risk screen, instruct family/caregiver to ask for assistance with transferring infant if caregiver noted to have fall risk factors  2/13/2025 0214 by Chidi Moreira, RN  Outcome: Progressing     Problem: Chronic Conditions and Co-morbidities  Goal: Patient's chronic conditions and co-morbidity symptoms are monitored and maintained or improved  Outcome: Progressing  Flowsheets (Taken 2/13/2025 0845)  Care Plan - Patient's Chronic Conditions and Co-Morbidity Symptoms are Monitored and Maintained or Improved:   Monitor and assess patient's chronic conditions and comorbid symptoms for stability, deterioration, or improvement   Collaborate with multidisciplinary team to address chronic and comorbid conditions and prevent exacerbation or deterioration   Update acute care plan with appropriate goals if chronic or comorbid symptoms are exacerbated and prevent overall improvement and discharge     Problem: Discharge Planning  Goal: Discharge to home or other facility with appropriate resources  Outcome: Progressing  Flowsheets (Taken 2/13/2025 0845)  Discharge to home or other facility with appropriate resources:   Identify barriers to discharge with patient and caregiver   Arrange for needed discharge resources and transportation as appropriate   Identify discharge learning needs (meds, wound care, etc)   Arrange for interpreters to assist at discharge as needed   Refer to discharge planning if patient needs post-hospital services based on physician order or complex needs related to functional status, cognitive ability or social support system     Problem: Skin/Tissue Integrity  Goal: Skin integrity remains intact  Description: 1.  Monitor for areas of redness and/or skin

## 2025-02-13 NOTE — FLOWSHEET NOTE
02/13/25 0510   Treatment   Treatment Goal 1000   Observations & Evaluations   Level of Consciousness 0   Oriented X 4   Heart Rhythm Regular   Respiratory Quality/Effort Unlabored   O2 Device Nasal cannula   Bilateral Breath Sounds Clear   Skin Condition/Temp Dry   Abdomen Inspection Soft   Edema Generalized   Edema Generalized Trace   RLE Edema Trace   LLE Edema Trace   Vital Signs   /69   Temp 97.9 °F (36.6 °C)   Pulse 82   Respirations 20   SpO2 97 %   Weight - Scale 74.5 kg (164 lb 3.9 oz)   Weight Method Bed scale   Percent Weight Change 0   Pain Assessment   Pain Assessment None - Denies Pain   Technical Checks   Dialysis Machine No. [id number]   RO Machine Number [id number]   Dialyzer Lot No. [id number]   Tubing Lot Number [id number]   All Connections Secure Yes   NS Bag Yes   Saline Line Double Clamped Yes   Dialyzer Nipro ELISIO   Prime Volume (mL) 200 mL   ICEBOAT I;C;E;B;O;A;T   RO Machine Log Sheet Completed Yes   Machine Alarm Self Test Completed;Passed   Air Foam Detector Tested;Proper Function;pH Reading   Extracorporeal Circuit Tested for Integrity Yes   Machine Conductivity 13.9   Manual Ph 7.3   Bleach Test (Neg) Yes   Bath Temperature 97.7 °F (36.5 °C)   Hemodialysis Central Access - Permanent/Tunneled Right Subclavian   Placement Date/Time: (c) 12/01/24 2000   Present on Admission/Arrival: Yes  Orientation: Right  Access Location: Subclavian   Continued need for line? Yes   Site Assessment Clean, dry & intact   Blue Lumen Status Brisk blood return   Red Lumen Status Brisk blood return   Line Care Connections checked and tightened   Dressing Type Sterile dressing, transparent   Date of Last Dressing Change 02/11/25   Treatment Initiation   Dialyze Hours 3.5   Treatment  Initiation Universal Precautions maintained;Lines secured to patient;Connections secured;Prime given;Venous Parameters set;Arterial Parameters set;Air foam detector engaged   Dialysis Bath   K+ (Potassium) 2   Ca+ (Calcium) 2.5    Na+ (Sodium) 138   HCO3 (Bicarb) 37     Primary RN SBAR: Amari Clemente RN  Incapacitated Nurse [facility]. provided: yes  Patient Education provided: yes, r/t dialysis process  Preferred Education method and Primary language: verbal/English  Hospital General Consent Verified: yes  Hospital associated wait time; reason: no wait time  Hepatitis B Surface Ag   Date/Time Value Ref Range Status   02/13/2025 04:54 AM <0.10 Index Final     Hep B S Ag Interp   Date/Time Value Ref Range Status   02/13/2025 04:54 AM Negative NEG   Final     HEP B SURF AB   Date/Time Value Ref Range Status   09/05/2022 02:15 AM 25.46 mIU/mL Final     Hep B S Ab   Date/Time Value Ref Range Status   02/13/2025 04:54 AM 17.56 mIU/mL Final     Hep B S Ab Interp   Date/Time Value Ref Range Status   02/13/2025 04:54 AM REACTIVE (A) NR   Final        02/13/25 0515   Treatment   Time On 0515   Vital Signs   /65   Pulse 84   During Hemodialysis Assessment   Blood Flow Rate (ml/min) 375 ml/min   Arterial Pressure (mmHg) -230 mmHg   Venous Pressure (mmHg) 190   TMP 70      Access Visible Yes   Ultrafiltration Rate (ml/hr) 580 ml/hr      02/13/25 0845   Treatment   Time Off 0845   Observations & Evaluations   Level of Consciousness 0   Oriented X 4   Heart Rhythm Regular   Respiratory Quality/Effort Unlabored   O2 Device Nasal cannula   Bilateral Breath Sounds Clear   Skin Condition/Temp Dry   Abdomen Inspection Flat   Edema None   Edema Generalized None   RLE Edema None   LLE Edema None   Vital Signs   /74   Temp 97.5 °F (36.4 °C)   Pulse 94   Respirations 20   SpO2 97 %   Weight - Scale 72 kg (158 lb 11.7 oz)   Weight Method Bed scale   Percent Weight Change -3.36   Pain Assessment   Pain Assessment None - Denies Pain   Hemodialysis Central Access - Permanent/Tunneled Right Subclavian   Placement Date/Time: (c) 12/01/24 2000   Present on Admission/Arrival: Yes  Orientation: Right  Access Location: Subclavian   Continued need for line?

## 2025-02-13 NOTE — PLAN OF CARE
Problem: Safety - Adult  Goal: Free from fall injury  2/12/2025 2253 by Chyna Fitzpatrick RN  Outcome: Progressing  2/12/2025 1354 by Ana Maria Fishman RN  Outcome: Progressing  Flowsheets (Taken 2/12/2025 0800)  Free From Fall Injury: Instruct family/caregiver on patient safety     Problem: Chronic Conditions and Co-morbidities  Goal: Patient's chronic conditions and co-morbidity symptoms are monitored and maintained or improved  2/12/2025 2253 by Chyna Fitzpatrick RN  Outcome: Progressing  Flowsheets (Taken 2/12/2025 2045)  Care Plan - Patient's Chronic Conditions and Co-Morbidity Symptoms are Monitored and Maintained or Improved: Monitor and assess patient's chronic conditions and comorbid symptoms for stability, deterioration, or improvement  2/12/2025 1354 by Ana Maria Fishman RN  Outcome: Progressing  Flowsheets (Taken 2/12/2025 0800)  Care Plan - Patient's Chronic Conditions and Co-Morbidity Symptoms are Monitored and Maintained or Improved: Monitor and assess patient's chronic conditions and comorbid symptoms for stability, deterioration, or improvement     Problem: Discharge Planning  Goal: Discharge to home or other facility with appropriate resources  2/12/2025 2253 by Chyna Fitzpatrick RN  Outcome: Progressing  Flowsheets (Taken 2/12/2025 2045)  Discharge to home or other facility with appropriate resources:   Identify barriers to discharge with patient and caregiver   Arrange for needed discharge resources and transportation as appropriate  2/12/2025 1354 by Ana Maria Fishman RN  Outcome: Progressing  Flowsheets (Taken 2/12/2025 0800)  Discharge to home or other facility with appropriate resources: Identify barriers to discharge with patient and caregiver     Problem: Skin/Tissue Integrity  Goal: Skin integrity remains intact  Description: 1.  Monitor for areas of redness and/or skin breakdown  2.  Assess vascular access sites hourly  3.  Every 4-6 hours minimum:  Change oxygen saturation probe site  4.  Every

## 2025-02-13 NOTE — CARE COORDINATION
I complted - CM emailed [facility] to obtain a copy. [facility] should have a copy as well.     Screening Date:  11/01/2024  Payment Status:  Paid  Screener1:  [email or web address]  Screener2:  N/A  Request Date:  11/01/2024  Physician Date:  11/04/2024    MAKAYLA KING

## 2025-02-13 NOTE — PROGRESS NOTES
[facility] Adult  Hospitalist Group                                                                                          Hospitalist Progress Note  Brooklynn Garcia, NICK - CNP  Answering service: [phone] OR [phone] from in house phone        Date of Service:  2025  NAME:  Leandra Perez  :  1965  MRN:  [id number]       Admission Summary:   Leandra Perez is a 60 y.o. male with past medical history significant for hypertension, type 2 diabetes, ESRD on hemodialysis Tuesday, Thursday and Saturday, chronic combined systolic and diastolic congestive heart failure, substance use disorder on maintenance dose of Suboxone, left renal mass presented at the emergency room with change in mental status.  Patient symptoms started in the morning and got progressively worse throughout the day.  Patient was able to complete his routine hemodialysis without any event.  Symptoms persisted after dialysis, became associated with shortness of breath for which patient required supplemental oxygen and because of the he was sent to the emergency room for further evaluation.  Patient is unable to provide good history because of the acuity of his condition.  He was recently admitted to [facility] from 10/24/2024 to 2024, patient was admitted and treated for metabolic encephalopathy, unprovoked seizure as well as T11-T12 discitis/osteomyelitis and was discharged to SNF.  Patient continued to require oxygen in the emergency room.  COVID-negative and flu positive, he was referred to the hospitalist service for admission.       Interval history / Subjective:   Patient seen and examined sitting up in bed.  States he is feeling \"a little better\".  Still requiring 4 L of oxygen nasal cannula.  Mild nausea the day after his dialysis but no vomiting.      Assessment & Plan:     Acute delirium: Resolved   -Likely secondary to hypoxia, flu, CHF exacerbation;  -CT head: No acute intracranial abnormality;    Acute

## 2025-02-13 NOTE — PLAN OF CARE
Problem: Occupational Therapy - Adult  Goal: By Discharge: Performs self-care activities at highest level of function for planned discharge setting.  See evaluation for individualized goals.  Description: FUNCTIONAL STATUS PRIOR TO ADMISSION:  Patient was ambulatory using rollator    , Prior Level of Assist for ADLs: Independent,  ,  ,  ,  ,  , Prior Level of Assist for Homemaking: Independent,  , Prior Level of Assist for Transfers: Independent, Active : No     HOME SUPPORT: Patient lived with roommate but didn't require assistance.    Occupational Therapy Goals:  Initiated 2/11/2025  1.  Patient will perform grooming with Minimal Assist EOB within 7 day(s).  2.  Patient will perform bathing with Moderate Assist within 7 day(s).  3.  Patient will perform lower body dressing with Moderate Assist within 7 day(s).  4.  Patient will perform toilet transfers with Moderate Assist  within 7 day(s).  5.  Patient will perform all aspects of toileting with Moderate Assist within 7 day(s).  6.  Patient will participate in upper extremity therapeutic exercise/activities with Moderate Assist for 15 minutes within 7 day(s).    7.  Patient will utilize energy conservation techniques during functional activities with verbal cues within 7 day(s).   Outcome: Progressing   OCCUPATIONAL THERAPY TREATMENT  Patient: Leandra Perez (60 y.o. male)  Date: 2/13/2025  Primary Diagnosis: Hypoxemia [R09.02]  Acute delirium [R41.0]  Leg swelling [M79.89]  Influenza A [J10.1]       Precautions: Fall Risk, Isolation                Chart, occupational therapy assessment, plan of care, and goals were reviewed.    ASSESSMENT  Patient continues to benefit from skilled OT services and is slowly progressing towards goals. Patient was quite fatigued during today's session due to dialysis earlier. He had complaints of pain - instructed in bed mobility techniques and assisted with placement of cushion. Improved pain reported. Patient educated

## 2025-02-13 NOTE — PROGRESS NOTES
[facility] [[email or web address]] Kidney  Adnrés Ag MD  [phone]            Renal Progress Note    NAME:  Leandra Perez   :   1965   MRN:   [id number]     Date/Time:  2025 10:50 AM            DISCUSSION / PLAN :        ESRD on hemodialysis at [facility]th history-TTH S  Metabolic encephalopathy, resolved  Hypoxic respiratory failure  Multilobar pneumonia  Influenza A  CHF  Question pulmonary mass versus infection  Anemia of ESRD  Diabetes mellitus type 2    Plan:  Completed hemodialysis this morning with 2.5 L UF   HD TTHS  No MARTELL  antibiotics  Continue with Renvela and calcitriol  Renal diet             ___________________________________________________  Subjective:       -patient has had nausea and small vomiting.  Denies chest pain.  Will have dialysis today.  On Nc  -feeling better today.  Still has cough.    No appetite  -feels okay, denies any complaints.  Had dialysis this morning with 2.5 L UF      Medications reviewed:  Current Facility-Administered Medications   Medication Dose Route Frequency    cefTRIAXone (ROCEPHIN) 1,000 mg in sterile water 10 mL IV syringe  1,000 mg IntraVENous Q24H    azithromycin (ZITHROMAX) tablet 500 mg  500 mg Oral QPM    hydrALAZINE (APRESOLINE) tablet 25 mg  25 mg Oral 3 times per day    lactobacillus (CULTURELLE) capsule 1 capsule  1 capsule Oral Daily with breakfast    guaiFENesin (MUCINEX) extended release tablet 600 mg  600 mg Oral BID    glucose chewable tablet 16 g  4 tablet Oral PRN    dextrose bolus 10% 125 mL  125 mL IntraVENous PRN    Or    dextrose bolus 10% 250 mL  250 mL IntraVENous PRN    glucagon injection 1 mg  1 mg SubCUTAneous PRN    dextrose 10 % infusion   IntraVENous Continuous PRN    carvedilol (COREG) tablet 6.25 mg  6.25 mg Oral Q12H    ammonium lactate (LAC-HYDRIN) 12 % lotion   Topical Q12H    balsum peru-castor oil (VENELEX) ointment   Topical Q12H    Virt-Caps 1 mg  1 capsule Oral Daily    buprenorphine-naloxone (SUBOXONE) 8-2 MG SL

## 2025-02-13 NOTE — PLAN OF CARE
Problem: Safety - Adult  Goal: Free from fall injury  2/13/2025 0214 by Chidi Moreira RN  Outcome: Progressing  2/12/2025 2253 by Chyna Fitzpatrick RN  Outcome: Progressing  2/12/2025 1354 by Ana Maria Fishman RN  Outcome: Progressing  Flowsheets (Taken 2/12/2025 0800)  Free From Fall Injury: Instruct family/caregiver on patient safety     Problem: Skin/Tissue Integrity  Goal: Skin integrity remains intact  Description: 1.  Monitor for areas of redness and/or skin breakdown  2.  Assess vascular access sites hourly  3.  Every 4-6 hours minimum:  Change oxygen saturation probe site  4.  Every 4-6 hours:  If on nasal continuous positive airway pressure, respiratory therapy assess nares and determine need for appliance change or resting period  2/13/2025 0214 by Chidi Moreira RN  Outcome: Progressing  2/12/2025 2253 by Chyna Fitzpatrick RN  Outcome: Progressing  Flowsheets (Taken 2/12/2025 2253)  Skin Integrity Remains Intact: Monitor for areas of redness and/or skin breakdown  2/12/2025 1354 by Ana Maria Fishman, RN  Outcome: Progressing  Flowsheets (Taken 2/12/2025 0800)  Skin Integrity Remains Intact: Monitor for areas of redness and/or skin breakdown

## 2025-02-14 LAB
ALBUMIN SERPL-MCNC: 2.6 G/DL (ref 3.5–5)
ANION GAP SERPL CALC-SCNC: 9 MMOL/L (ref 2–12)
BUN SERPL-MCNC: 40 MG/DL (ref 6–20)
BUN/CREAT SERPL: 6 (ref 12–20)
CALCIUM SERPL-MCNC: 9.4 MG/DL (ref 8.5–10.1)
CHLORIDE SERPL-SCNC: 96 MMOL/L (ref 97–108)
CO2 SERPL-SCNC: 29 MMOL/L (ref 21–32)
CREAT SERPL-MCNC: 7.16 MG/DL (ref 0.7–1.3)
ERYTHROCYTE [DISTWIDTH] IN BLOOD BY AUTOMATED COUNT: 17.8 % (ref 11.5–14.5)
GLUCOSE BLD STRIP.AUTO-MCNC: 106 MG/DL (ref 65–117)
GLUCOSE BLD STRIP.AUTO-MCNC: 110 MG/DL (ref 65–117)
GLUCOSE BLD STRIP.AUTO-MCNC: 118 MG/DL (ref 65–117)
GLUCOSE BLD STRIP.AUTO-MCNC: 170 MG/DL (ref 65–117)
GLUCOSE BLD STRIP.AUTO-MCNC: 178 MG/DL (ref 65–117)
GLUCOSE SERPL-MCNC: 106 MG/DL (ref 65–100)
HCT VFR BLD AUTO: 36.4 % (ref 36.6–50.3)
HGB BLD-MCNC: 11.3 G/DL (ref 12.1–17)
MCH RBC QN AUTO: 31.1 PG (ref 26–34)
MCHC RBC AUTO-ENTMCNC: 31 G/DL (ref 30–36.5)
MCV RBC AUTO: 100.3 FL (ref 80–99)
NRBC # BLD: 0 K/UL (ref 0–0.01)
NRBC BLD-RTO: 0 PER 100 WBC
PHOSPHATE SERPL-MCNC: 6 MG/DL (ref 2.6–4.7)
PLATELET # BLD AUTO: 286 K/UL (ref 150–400)
PMV BLD AUTO: 9.4 FL (ref 8.9–12.9)
POTASSIUM SERPL-SCNC: 4.9 MMOL/L (ref 3.5–5.1)
RBC # BLD AUTO: 3.63 M/UL (ref 4.1–5.7)
SERVICE CMNT-IMP: ABNORMAL
SERVICE CMNT-IMP: NORMAL
SERVICE CMNT-IMP: NORMAL
SODIUM SERPL-SCNC: 134 MMOL/L (ref 136–145)
WBC # BLD AUTO: 5.9 K/UL (ref 4.1–11.1)

## 2025-02-14 PROCEDURE — 6370000000 HC RX 637 (ALT 250 FOR IP): Performed by: INTERNAL MEDICINE

## 2025-02-14 PROCEDURE — 1200000000 HC SEMI PRIVATE

## 2025-02-14 PROCEDURE — 97116 GAIT TRAINING THERAPY: CPT

## 2025-02-14 PROCEDURE — 97530 THERAPEUTIC ACTIVITIES: CPT

## 2025-02-14 PROCEDURE — 85027 COMPLETE CBC AUTOMATED: CPT

## 2025-02-14 PROCEDURE — 2500000003 HC RX 250 WO HCPCS: Performed by: INTERNAL MEDICINE

## 2025-02-14 PROCEDURE — 6360000002 HC RX W HCPCS: Performed by: INTERNAL MEDICINE

## 2025-02-14 PROCEDURE — 97535 SELF CARE MNGMENT TRAINING: CPT

## 2025-02-14 PROCEDURE — 80069 RENAL FUNCTION PANEL: CPT

## 2025-02-14 PROCEDURE — 82962 GLUCOSE BLOOD TEST: CPT

## 2025-02-14 RX ADMIN — SODIUM CHLORIDE, PRESERVATIVE FREE 10 ML: 5 INJECTION INTRAVENOUS at 10:52

## 2025-02-14 RX ADMIN — SEVELAMER CARBONATE 0.8 G: 800 FOR SUSPENSION ORAL at 10:52

## 2025-02-14 RX ADMIN — Medication: at 21:06

## 2025-02-14 RX ADMIN — SEVELAMER CARBONATE 0.8 G: 800 FOR SUSPENSION ORAL at 17:42

## 2025-02-14 RX ADMIN — Medication: at 10:39

## 2025-02-14 RX ADMIN — HYDRALAZINE HYDROCHLORIDE 25 MG: 25 TABLET ORAL at 21:05

## 2025-02-14 RX ADMIN — ISOSORBIDE DINITRATE 20 MG: 20 TABLET ORAL at 21:05

## 2025-02-14 RX ADMIN — BUPRENORPHINE HYDROCHLORIDE AND NALOXONE HYDROCHLORIDE DIHYDRATE 1 TABLET: 8; 2 TABLET SUBLINGUAL at 15:24

## 2025-02-14 RX ADMIN — HEPARIN SODIUM 5000 UNITS: 5000 INJECTION INTRAVENOUS; SUBCUTANEOUS at 15:24

## 2025-02-14 RX ADMIN — CALCITRIOL CAPSULES 0.25 MCG 0.25 MCG: 0.25 CAPSULE ORAL at 10:39

## 2025-02-14 RX ADMIN — HEPARIN SODIUM 5000 UNITS: 5000 INJECTION INTRAVENOUS; SUBCUTANEOUS at 21:03

## 2025-02-14 RX ADMIN — Medication 100 MG: at 10:39

## 2025-02-14 RX ADMIN — Medication 1 CAPSULE: at 10:52

## 2025-02-14 RX ADMIN — Medication 1 MG: at 10:39

## 2025-02-14 RX ADMIN — FERROUS SULFATE TAB 325 MG (65 MG ELEMENTAL FE) 325 MG: 325 (65 FE) TAB at 06:17

## 2025-02-14 RX ADMIN — SEVELAMER CARBONATE 0.8 G: 800 FOR SUSPENSION ORAL at 13:59

## 2025-02-14 RX ADMIN — BUMETANIDE 1 MG: 0.25 INJECTION INTRAMUSCULAR; INTRAVENOUS at 10:40

## 2025-02-14 RX ADMIN — PANTOPRAZOLE SODIUM 40 MG: 40 TABLET, DELAYED RELEASE ORAL at 06:17

## 2025-02-14 RX ADMIN — BUMETANIDE 1 MG: 0.25 INJECTION INTRAMUSCULAR; INTRAVENOUS at 21:02

## 2025-02-14 RX ADMIN — ISOSORBIDE DINITRATE 20 MG: 20 TABLET ORAL at 10:38

## 2025-02-14 RX ADMIN — TRAZODONE HYDROCHLORIDE 50 MG: 50 TABLET ORAL at 21:05

## 2025-02-14 RX ADMIN — GUAIFENESIN 600 MG: 600 TABLET, EXTENDED RELEASE ORAL at 10:39

## 2025-02-14 RX ADMIN — HEPARIN SODIUM 5000 UNITS: 5000 INJECTION INTRAVENOUS; SUBCUTANEOUS at 06:17

## 2025-02-14 RX ADMIN — BUPRENORPHINE HYDROCHLORIDE AND NALOXONE HYDROCHLORIDE DIHYDRATE 1 TABLET: 8; 2 TABLET SUBLINGUAL at 21:05

## 2025-02-14 RX ADMIN — SODIUM CHLORIDE, PRESERVATIVE FREE 10 ML: 5 INJECTION INTRAVENOUS at 21:06

## 2025-02-14 RX ADMIN — Medication: at 10:40

## 2025-02-14 RX ADMIN — BUPRENORPHINE HYDROCHLORIDE AND NALOXONE HYDROCHLORIDE DIHYDRATE 1 TABLET: 8; 2 TABLET SUBLINGUAL at 10:39

## 2025-02-14 RX ADMIN — Medication: at 21:05

## 2025-02-14 RX ADMIN — GUAIFENESIN 600 MG: 600 TABLET, EXTENDED RELEASE ORAL at 21:05

## 2025-02-14 NOTE — PLAN OF CARE
Problem: Safety - Adult  Goal: Free from fall injury  2/14/2025 1110 by Marissa Sagastume, RN  Outcome: Progressing  Flowsheets (Taken 2/14/2025 0820)  Free From Fall Injury:   Instruct family/caregiver on patient safety   Based on caregiver fall risk screen, instruct family/caregiver to ask for assistance with transferring infant if caregiver noted to have fall risk factors  2/14/2025 0417 by Chidi Moreira, RN  Outcome: Progressing     Problem: Chronic Conditions and Co-morbidities  Goal: Patient's chronic conditions and co-morbidity symptoms are monitored and maintained or improved  Outcome: Progressing  Flowsheets (Taken 2/14/2025 0820)  Care Plan - Patient's Chronic Conditions and Co-Morbidity Symptoms are Monitored and Maintained or Improved:   Update acute care plan with appropriate goals if chronic or comorbid symptoms are exacerbated and prevent overall improvement and discharge   Collaborate with multidisciplinary team to address chronic and comorbid conditions and prevent exacerbation or deterioration   Monitor and assess patient's chronic conditions and comorbid symptoms for stability, deterioration, or improvement     Problem: Discharge Planning  Goal: Discharge to home or other facility with appropriate resources  Outcome: Progressing  Flowsheets (Taken 2/14/2025 0820)  Discharge to home or other facility with appropriate resources:   Identify barriers to discharge with patient and caregiver   Arrange for needed discharge resources and transportation as appropriate   Identify discharge learning needs (meds, wound care, etc)   Arrange for interpreters to assist at discharge as needed   Refer to discharge planning if patient needs post-hospital services based on physician order or complex needs related to functional status, cognitive ability or social support system     Problem: Skin/Tissue Integrity  Goal: Skin integrity remains intact  Description: 1.  Monitor for areas of redness and/or skin

## 2025-02-14 NOTE — PLAN OF CARE
Problem: Occupational Therapy - Adult  Goal: By Discharge: Performs self-care activities at highest level of function for planned discharge setting.  See evaluation for individualized goals.  Description: FUNCTIONAL STATUS PRIOR TO ADMISSION:  Patient was ambulatory using rollator    , Prior Level of Assist for ADLs: Independent,  ,  ,  ,  ,  , Prior Level of Assist for Homemaking: Independent,  , Prior Level of Assist for Transfers: Independent, Active : No     HOME SUPPORT: Patient lived with roommate but didn't require assistance.    Occupational Therapy Goals:  Initiated 2/11/2025  1.  Patient will perform grooming with Minimal Assist EOB within 7 day(s).  2.  Patient will perform bathing with Moderate Assist within 7 day(s).  3.  Patient will perform lower body dressing with Moderate Assist within 7 day(s).  4.  Patient will perform toilet transfers with Moderate Assist  within 7 day(s).  5.  Patient will perform all aspects of toileting with Moderate Assist within 7 day(s).  6.  Patient will participate in upper extremity therapeutic exercise/activities with Moderate Assist for 15 minutes within 7 day(s).    7.  Patient will utilize energy conservation techniques during functional activities with verbal cues within 7 day(s).   Outcome: Progressing   OCCUPATIONAL THERAPY TREATMENT  Patient: Leandra Perez (60 y.o. male)  Date: 2/14/2025  Primary Diagnosis: Hypoxemia [R09.02]  Acute delirium [R41.0]  Leg swelling [M79.89]  Influenza A [J10.1]       Precautions: Fall Risk, Isolation                Chart, occupational therapy assessment, plan of care, and goals were reviewed.    ASSESSMENT  Patient continues to benefit from skilled OT services and is progressing towards goals. Patient demonstrates good improvement today from yesterday's session. He was able to tolerate out of bed activity and ambulate to/from bathroom area. Standing tolerance for ADL participation is improving. Pain was well controlled.  chair and Call bell within reach    COMMUNICATION/EDUCATION:   The patient's plan of care was discussed with: physical therapy assistant and registered nurse    Patient Education  Education Given To: Patient  Education Provided: Energy Conservation  Education Method: Demonstration;Verbal;Teach Back  Education Outcome: Verbalized understanding    Thank you for this referral.  Aarti Huitron OT  Minutes: 21

## 2025-02-14 NOTE — CARE COORDINATION
Transition of Care Plan:    RUR: 19%   Prior Level of Functioning: Independent   Disposition: LTC w/OP HD @ [address]   [address]   Address: [address] [place] [id number]   P:[phone]/[phone]  Chair: TTS Time: 10:45   Transport Stretcher w/O2:  P:[phone]  18th Trip#56305  20th Trip#61998  22nd Trip#01606  25th Trip#42907  27th Trip#67721  1st Trip# 57866  4th Trip#44006  6th Trip#21679  8th Trip#14998  11th Trip#31588  13th Trip#87130  JOANNE: 24hrs.   If SNF or IPR: Date FOC offered: 2/14   Date FOC received: 2/14   Accepting facility: [facility]   Report#[phone] [address]/ RM#133B  Copy of UAI placed on the Chart and uploaded to [employer].   Date authorization started with reference number: N/A   Date authorization received and expires: N/A   Follow up appointments: PCP   DME needed: None   Transportation at discharge: Medicaid Stretcher P:[phone] O2 needed 4L  IM/IMM Medicare/ letter given: N/A  Caregiver Contact:   Fredrick Fink (Other) [phone]  Discharge Caregiver contacted prior to discharge? N/A   Care Conference needed? N/A   Barriers to discharge: HD on Saturday        Per conversation with the pt; This cm provided an update regarding the IMAN plan. This cm reviewed over the referrals that had been sent to by the previous cm. The pt reported that he did not have a preference in a facility at this time as he is unable to return to [facility].This cm informed him that this cm was able to locate a facility that is able to accommodate the pt for LTC \"[place]\". This cm inquired if he was ok with transitioning to this facility. The pt reported that he was in agreement. This cm informed the pt that discharge would be in 24hrs following HD on Saturday.

## 2025-02-14 NOTE — PROGRESS NOTES
[facility]  Rebel Falcon MD  [phone]            Renal Progress Note    NAME:  Leandra Perez   :   1965   MRN:   [id number]     Date/Time:  2025 11:57 AM            DISCUSSION / PLAN :        ESRD on hemodialysis at [facility] 25th history-TTH S  Metabolic encephalopathy, resolved  Hypoxic respiratory failure  Multilobar pneumonia  Influenza A  CHF  Question pulmonary mass versus infection  Anemia of ESRD  Diabetes mellitus type 2    Plan:    Due for HD again in AM  HD TTHS  No MARTELL  antibiotics  Continue with Renvela and calcitriol  Renal diet             ___________________________________________________  Subjective:       -patient has had nausea and small vomiting.  Denies chest pain.  Will have dialysis today.  On Nc  -feeling better today.  Still has cough.    No appetite  -feels okay, denies any complaints.  Had dialysis this morning with 2.5 L UF  25 Resting in bed, NAD      Medications reviewed:  Current Facility-Administered Medications   Medication Dose Route Frequency    hydrALAZINE (APRESOLINE) tablet 25 mg  25 mg Oral 3 times per day    lactobacillus (CULTURELLE) capsule 1 capsule  1 capsule Oral Daily with breakfast    guaiFENesin (MUCINEX) extended release tablet 600 mg  600 mg Oral BID    glucose chewable tablet 16 g  4 tablet Oral PRN    dextrose bolus 10% 125 mL  125 mL IntraVENous PRN    Or    dextrose bolus 10% 250 mL  250 mL IntraVENous PRN    glucagon injection 1 mg  1 mg SubCUTAneous PRN    dextrose 10 % infusion   IntraVENous Continuous PRN    carvedilol (COREG) tablet 6.25 mg  6.25 mg Oral Q12H    ammonium lactate (LAC-HYDRIN) 12 % lotion   Topical Q12H    balsum peru-castor oil (VENELEX) ointment   Topical Q12H    Virt-Caps 1 mg  1 capsule Oral Daily    buprenorphine-naloxone (SUBOXONE) 8-2 MG SL tablet 1 tablet  1 tablet SubLINGual TID    calcitRIOL (ROCALTROL) capsule 0.25 mcg  0.25 mcg Oral Daily    ferrous sulfate (IRON 325) tablet 325 mg   325 mg Oral QAM AC    folic acid (FOLVITE) tablet 1 mg  1 mg Oral Daily    isosorbide dinitrate (ISORDIL) tablet 20 mg  20 mg Oral BID    lidocaine 4 % external patch 1 patch  1 patch Topical Daily    methocarbamol (ROBAXIN) tablet 1,000 mg  1,000 mg Oral TID PRN    pantoprazole (PROTONIX) tablet 40 mg  40 mg Oral QAM AC    sevelamer (RENVELA) packet 0.8 g  0.8 g Oral TID WC    thiamine tablet 100 mg  100 mg Oral Daily    traZODone (DESYREL) tablet 50 mg  50 mg Oral Nightly    sodium chloride flush 0.9 % injection 5-40 mL  5-40 mL IntraVENous 2 times per day    sodium chloride flush 0.9 % injection 5-40 mL  5-40 mL IntraVENous PRN    0.9 % sodium chloride infusion   IntraVENous PRN    ondansetron (ZOFRAN-ODT) disintegrating tablet 4 mg  4 mg Oral Q8H PRN    Or    ondansetron (ZOFRAN) injection 4 mg  4 mg IntraVENous Q6H PRN    polyethylene glycol (GLYCOLAX) packet 17 g  17 g Oral Daily PRN    acetaminophen (TYLENOL) tablet 650 mg  650 mg Oral Q6H PRN    Or    acetaminophen (TYLENOL) suppository 650 mg  650 mg Rectal Q6H PRN    bumetanide (BUMEX) injection 1 mg  1 mg IntraVENous BID    insulin lispro (HUMALOG,ADMELOG) injection vial 0-8 Units  0-8 Units SubCUTAneous 4x Daily AC & HS    nicotine (NICODERM CQ) 21 MG/24HR 1 patch  1 patch TransDERmal Daily    oxyCODONE (ROXICODONE) immediate release tablet 5 mg  5 mg Oral Q4H PRN    HYDROmorphone HCl PF (DILAUDID) injection 1 mg  1 mg IntraVENous Q4H PRN    ipratropium 0.5 mg-albuterol 2.5 mg (DUONEB) nebulizer solution 1 Dose  1 Dose Inhalation Q4H PRN    heparin (porcine) injection 5,000 Units  5,000 Units SubCUTAneous 3 times per day        Objective:   Vitals:  /84   Pulse 90   Temp 98.1 °F (36.7 °C) (Oral)   Resp 16   Ht 1.778 m (5' 10\")   Wt 72.1 kg (158 lb 15.2 oz)   SpO2 92%   BMI 22.81 kg/m²   Temp (24hrs), Av.1 °F (36.7 °C), Min:97.7 °F (36.5 °C), Max:98.2 °F (36.8 °C)           Last 24hr Input/Output:  No intake or output data in the 24 hours

## 2025-02-14 NOTE — PROGRESS NOTES
[facility] Adult  Hospitalist Group                                                                                          Hospitalist Progress Note  NICK Cai NP  Answering service: [phone] OR [phone] from in house phone        Date of Service:  2025  NAME:  Leandra Perez  :  1965  MRN:  [id number]       Admission Summary:   Leandra Perez is a 60 y.o. male with past medical history significant for hypertension, type 2 diabetes, ESRD on hemodialysis Tuesday, Thursday and Saturday, chronic combined systolic and diastolic congestive heart failure, substance use disorder on maintenance dose of Suboxone, left renal mass presented at the emergency room with change in mental status.  Patient symptoms started in the morning and got progressively worse throughout the day.  Patient was able to complete his routine hemodialysis without any event.  Symptoms persisted after dialysis, became associated with shortness of breath for which patient required supplemental oxygen and because of the he was sent to the emergency room for further evaluation.  Patient is unable to provide good history because of the acuity of his condition.  He was recently admitted to [facility] from 10/24/2024 to 2024, patient was admitted and treated for metabolic encephalopathy, unprovoked seizure as well as T11-T12 discitis/osteomyelitis and was discharged to SNF.  Patient continued to require oxygen in the emergency room.  COVID-negative and flu positive, he was referred to the hospitalist service for admission.    Interval history / Subjective:   Needs SNF  VSS  HD tomorrow morning  Can d/c to [place] after HD tomorrow  Abx completed for pna     Assessment & Plan:     Acute delirium - Resolved   - Likely secondary to hypoxia, flu, CHF exacerbation  - CT head: No acute intracranial abnormality    Acute respiratory failure with hypoxia  - Multifactorial: Secondary to influenza A infection, multilobar

## 2025-02-14 NOTE — PLAN OF CARE
Problem: Physical Therapy - Adult  Goal: By Discharge: Performs mobility at highest level of function for planned discharge setting.  See evaluation for individualized goals.  Description: FUNCTIONAL STATUS PRIOR TO ADMISSION: Patient was modified independent using a rollator for functional mobility. Limited historian; reports that he was admitted from SNF but no longer working with therapy; unclear if he was mobilizing ad monty    HOME SUPPORT PRIOR TO ADMISSION: The patient lived with a roommate prior to hospitalization at [facility] in October resulting in current SNF admission.    Physical Therapy Goals  Initiated 2/11/2025  1.  Patient will move from supine to sit and sit to supine and scoot up and down in bed with minimal assistance within 7 day(s).    2.  Patient will perform sit to stand with minimal assistance within 7 day(s).  3.  Patient will transfer from bed to chair and chair to bed with minimal assistance using the least restrictive device within 7 day(s).  4.  Patient will ambulate with minimal assistance for 50 feet with the least restrictive device within 7 day(s).     Outcome: Progressing       PHYSICAL THERAPY TREATMENT    Patient: Leandra Perez (60 y.o. male)  Date: 2/14/2025  Diagnosis: Hypoxemia [R09.02]  Acute delirium [R41.0]  Leg swelling [M79.89]  Influenza A [J10.1] Acute delirium      Precautions: Fall Risk, Isolation                      ASSESSMENT:  Patient continues to benefit from skilled PT services and is progressing towards goals. Pt demonstrated improved mobility and decrease assist level. Pt was able to mobilize a short distance with rolling walker  prior to fatigue. Pt is tolerating OOB in the chair. Pt required 4 L O2 SpO2 92 %         PLAN:  Patient continues to benefit from skilled intervention to address the above impairments.  Continue treatment per established plan of care.        Recommendation for discharge: (in order for the patient to meet his/her long term goals):

## 2025-02-14 NOTE — PROGRESS NOTES
Attended interdisciplanary rounds on 5W Ortho Spine where patient care was discussed.    Emeterio Yanez  Chaplain Resident  [phone]

## 2025-02-15 LAB
GLUCOSE BLD STRIP.AUTO-MCNC: 100 MG/DL (ref 65–117)
GLUCOSE BLD STRIP.AUTO-MCNC: 111 MG/DL (ref 65–117)
GLUCOSE BLD STRIP.AUTO-MCNC: 126 MG/DL (ref 65–117)
GLUCOSE BLD STRIP.AUTO-MCNC: 92 MG/DL (ref 65–117)
SERVICE CMNT-IMP: ABNORMAL
SERVICE CMNT-IMP: NORMAL

## 2025-02-15 PROCEDURE — 1200000000 HC SEMI PRIVATE

## 2025-02-15 PROCEDURE — 6360000002 HC RX W HCPCS: Performed by: INTERNAL MEDICINE

## 2025-02-15 PROCEDURE — 90935 HEMODIALYSIS ONE EVALUATION: CPT

## 2025-02-15 PROCEDURE — 6370000000 HC RX 637 (ALT 250 FOR IP): Performed by: INTERNAL MEDICINE

## 2025-02-15 PROCEDURE — 82962 GLUCOSE BLOOD TEST: CPT

## 2025-02-15 PROCEDURE — 2500000003 HC RX 250 WO HCPCS: Performed by: INTERNAL MEDICINE

## 2025-02-15 RX ORDER — AMMONIUM LACTATE 12 G/100G
LOTION TOPICAL
Qty: 1 EACH | Refills: 0 | Status: SHIPPED
Start: 2025-02-16

## 2025-02-15 RX ORDER — CARVEDILOL 6.25 MG/1
6.25 TABLET ORAL EVERY 12 HOURS
Qty: 60 TABLET | Refills: 3 | Status: SHIPPED
Start: 2025-02-15

## 2025-02-15 RX ORDER — BUPRENORPHINE HYDROCHLORIDE AND NALOXONE HYDROCHLORIDE DIHYDRATE 8; 2 MG/1; MG/1
1 TABLET SUBLINGUAL 3 TIMES DAILY
Qty: 20 TABLET | Refills: 0 | Status: SHIPPED | OUTPATIENT
Start: 2025-02-15 | End: 2025-03-17

## 2025-02-15 RX ORDER — GUAIFENESIN 600 MG/1
600 TABLET, EXTENDED RELEASE ORAL 2 TIMES DAILY
Qty: 10 TABLET | Refills: 0 | Status: SHIPPED
Start: 2025-02-15

## 2025-02-15 RX ORDER — CASTOR OIL AND BALSAM, PERU 788; 87 MG/G; MG/G
OINTMENT TOPICAL EVERY 12 HOURS
Qty: 1 EACH | Refills: 0 | Status: SHIPPED
Start: 2025-02-16

## 2025-02-15 RX ORDER — HYDRALAZINE HYDROCHLORIDE 25 MG/1
25 TABLET, FILM COATED ORAL EVERY 8 HOURS SCHEDULED
Qty: 90 TABLET | Refills: 3 | Status: SHIPPED
Start: 2025-02-15

## 2025-02-15 RX ADMIN — HYDRALAZINE HYDROCHLORIDE 25 MG: 25 TABLET ORAL at 14:08

## 2025-02-15 RX ADMIN — HEPARIN SODIUM 5000 UNITS: 5000 INJECTION INTRAVENOUS; SUBCUTANEOUS at 14:09

## 2025-02-15 RX ADMIN — HEPARIN SODIUM 5000 UNITS: 5000 INJECTION INTRAVENOUS; SUBCUTANEOUS at 06:17

## 2025-02-15 RX ADMIN — METHOCARBAMOL 1000 MG: 500 TABLET ORAL at 10:42

## 2025-02-15 RX ADMIN — SODIUM CHLORIDE, PRESERVATIVE FREE 10 ML: 5 INJECTION INTRAVENOUS at 09:02

## 2025-02-15 RX ADMIN — CALCITRIOL CAPSULES 0.25 MCG 0.25 MCG: 0.25 CAPSULE ORAL at 09:00

## 2025-02-15 RX ADMIN — Medication: at 12:56

## 2025-02-15 RX ADMIN — PANTOPRAZOLE SODIUM 40 MG: 40 TABLET, DELAYED RELEASE ORAL at 06:18

## 2025-02-15 RX ADMIN — SEVELAMER CARBONATE 0.8 G: 800 FOR SUSPENSION ORAL at 12:56

## 2025-02-15 RX ADMIN — SODIUM CHLORIDE, PRESERVATIVE FREE 10 ML: 5 INJECTION INTRAVENOUS at 21:10

## 2025-02-15 RX ADMIN — BUMETANIDE 1 MG: 0.25 INJECTION INTRAMUSCULAR; INTRAVENOUS at 21:09

## 2025-02-15 RX ADMIN — SEVELAMER CARBONATE 0.8 G: 800 FOR SUSPENSION ORAL at 18:03

## 2025-02-15 RX ADMIN — GUAIFENESIN 600 MG: 600 TABLET, EXTENDED RELEASE ORAL at 21:08

## 2025-02-15 RX ADMIN — GUAIFENESIN 600 MG: 600 TABLET, EXTENDED RELEASE ORAL at 09:00

## 2025-02-15 RX ADMIN — FERROUS SULFATE TAB 325 MG (65 MG ELEMENTAL FE) 325 MG: 325 (65 FE) TAB at 06:18

## 2025-02-15 RX ADMIN — BUPRENORPHINE HYDROCHLORIDE AND NALOXONE HYDROCHLORIDE DIHYDRATE 1 TABLET: 8; 2 TABLET SUBLINGUAL at 21:08

## 2025-02-15 RX ADMIN — Medication 100 MG: at 09:00

## 2025-02-15 RX ADMIN — TRAZODONE HYDROCHLORIDE 50 MG: 50 TABLET ORAL at 21:08

## 2025-02-15 RX ADMIN — Medication: at 12:57

## 2025-02-15 RX ADMIN — BUPRENORPHINE HYDROCHLORIDE AND NALOXONE HYDROCHLORIDE DIHYDRATE 1 TABLET: 8; 2 TABLET SUBLINGUAL at 09:00

## 2025-02-15 RX ADMIN — CARVEDILOL 6.25 MG: 12.5 TABLET, FILM COATED ORAL at 18:02

## 2025-02-15 RX ADMIN — ISOSORBIDE DINITRATE 20 MG: 20 TABLET ORAL at 21:08

## 2025-02-15 RX ADMIN — HEPARIN SODIUM 5000 UNITS: 5000 INJECTION INTRAVENOUS; SUBCUTANEOUS at 21:08

## 2025-02-15 RX ADMIN — BUPRENORPHINE HYDROCHLORIDE AND NALOXONE HYDROCHLORIDE DIHYDRATE 1 TABLET: 8; 2 TABLET SUBLINGUAL at 14:08

## 2025-02-15 ASSESSMENT — PAIN SCALES - GENERAL
PAINLEVEL_OUTOF10: 0
PAINLEVEL_OUTOF10: 0

## 2025-02-15 NOTE — PROGRESS NOTES
[facility]  Rebel Falcon MD  [phone]            Renal Progress Note    NAME:  Leandra Perez   :   1965   MRN:   [id number]     Date/Time:  2/15/2025 10:25 AM            DISCUSSION / PLAN :        ESRD on hemodialysis at [facility] 25th history-TTH S. Seen on HD in his room.  Metabolic encephalopathy, resolved  Hypoxic respiratory failure  Multilobar pneumonia  Influenza A  CHF  Question pulmonary mass versus infection  Anemia of ESRD  Diabetes mellitus type 2    Plan:    Complete HD today. Will be discharged afterwards.  HD TTHS  No MARTELL  antibiotics  Continue with Renvela and calcitriol  Renal diet             ___________________________________________________  Subjective:       -patient has had nausea and small vomiting.  Denies chest pain.  Will have dialysis today.  On Nc  -feeling better today.  Still has cough.    No appetite  -feels okay, denies any complaints.  Had dialysis this morning with 2.5 L UF  25 Resting in bed, NAD  2/15/25 On HD. No acute distress. The lines have a clot. Patient is at the end of his treatment. Will take him off when/ if HD stops working      Medications reviewed:  Current Facility-Administered Medications   Medication Dose Route Frequency    hydrALAZINE (APRESOLINE) tablet 25 mg  25 mg Oral 3 times per day    lactobacillus (CULTURELLE) capsule 1 capsule  1 capsule Oral Daily with breakfast    guaiFENesin (MUCINEX) extended release tablet 600 mg  600 mg Oral BID    glucose chewable tablet 16 g  4 tablet Oral PRN    dextrose bolus 10% 125 mL  125 mL IntraVENous PRN    Or    dextrose bolus 10% 250 mL  250 mL IntraVENous PRN    glucagon injection 1 mg  1 mg SubCUTAneous PRN    dextrose 10 % infusion   IntraVENous Continuous PRN    carvedilol (COREG) tablet 6.25 mg  6.25 mg Oral Q12H    ammonium lactate (LAC-HYDRIN) 12 % lotion   Topical Q12H    balsum peru-castor oil (VENELEX) ointment   Topical Q12H    Virt-Caps 1 mg  1 capsule Oral Daily

## 2025-02-15 NOTE — DISCHARGE SUMMARY
Discharge Summary       PATIENT ID: Leandra Perez  MRN: [id number]   YOB: 1965    DATE OF ADMISSION: 2/8/2025  8:20 PM    DATE OF DISCHARGE: 2/15/2025  PRIMARY CARE PROVIDER: Margo Bella MD     ATTENDING PHYSICIAN: Dr. Pierson  DISCHARGING PROVIDER: NICK Cai NP    To contact this individual call [phone] and ask the  to page.  If unavailable ask to be transferred the Adult Hospitalist Department.    CONSULTATIONS: IP CONSULT TO NEPHROLOGY  IP WOUND CARE NURSE CONSULT TO EVAL  IP CONSULT TO INFECTIOUS DISEASES  IP CONSULT TO ADDICTION MEDICINE  IP CONSULT TO CASE MANAGEMENT    PROCEDURES/SURGERIES: * No surgery found *    ADMITTING DIAGNOSES & HOSPITAL COURSE:   Leandra Perez is a 60 y.o. male with past medical history significant for hypertension, type 2 diabetes, ESRD on hemodialysis Tuesday, Thursday and Saturday, chronic combined systolic and diastolic congestive heart failure, substance use disorder on maintenance dose of Suboxone, left renal mass presented at the emergency room with change in mental status.  Patient symptoms started in the morning and got progressively worse throughout the day.  Patient was able to complete his routine hemodialysis without any event.  Symptoms persisted after dialysis, became associated with shortness of breath for which patient required supplemental oxygen and because of the he was sent to the emergency room for further evaluation.  Patient is unable to provide good history because of the acuity of his condition.  He was recently admitted to [facility] from 10/24/2024 to 11/4/2024, patient was admitted and treated for metabolic encephalopathy, unprovoked seizure as well as T11-T12 discitis/osteomyelitis and was discharged to SNF.  Patient continued to require oxygen in the emergency room.  COVID-negative and flu positive, he was referred to the hospitalist service for admission.    2/15/2025  VSS  Completed HD today  Medically stable

## 2025-02-16 VITALS
RESPIRATION RATE: 18 BRPM | TEMPERATURE: 97.9 F | OXYGEN SATURATION: 90 % | SYSTOLIC BLOOD PRESSURE: 116 MMHG | HEART RATE: 92 BPM | DIASTOLIC BLOOD PRESSURE: 89 MMHG | WEIGHT: 159.61 LBS | HEIGHT: 70 IN | BODY MASS INDEX: 22.85 KG/M2

## 2025-02-16 LAB
GLUCOSE BLD STRIP.AUTO-MCNC: 112 MG/DL (ref 65–117)
GLUCOSE BLD STRIP.AUTO-MCNC: 132 MG/DL (ref 65–117)
SERVICE CMNT-IMP: ABNORMAL
SERVICE CMNT-IMP: NORMAL

## 2025-02-16 PROCEDURE — 6360000002 HC RX W HCPCS: Performed by: INTERNAL MEDICINE

## 2025-02-16 PROCEDURE — 6370000000 HC RX 637 (ALT 250 FOR IP): Performed by: INTERNAL MEDICINE

## 2025-02-16 PROCEDURE — 2500000003 HC RX 250 WO HCPCS: Performed by: INTERNAL MEDICINE

## 2025-02-16 PROCEDURE — 82962 GLUCOSE BLOOD TEST: CPT

## 2025-02-16 RX ADMIN — FERROUS SULFATE TAB 325 MG (65 MG ELEMENTAL FE) 325 MG: 325 (65 FE) TAB at 06:03

## 2025-02-16 RX ADMIN — Medication 1 MG: at 09:57

## 2025-02-16 RX ADMIN — BUMETANIDE 1 MG: 0.25 INJECTION INTRAMUSCULAR; INTRAVENOUS at 09:58

## 2025-02-16 RX ADMIN — Medication 1 MG: at 09:58

## 2025-02-16 RX ADMIN — SEVELAMER CARBONATE 0.8 G: 800 FOR SUSPENSION ORAL at 10:59

## 2025-02-16 RX ADMIN — Medication 100 MG: at 09:57

## 2025-02-16 RX ADMIN — HEPARIN SODIUM 5000 UNITS: 5000 INJECTION INTRAVENOUS; SUBCUTANEOUS at 06:11

## 2025-02-16 RX ADMIN — SODIUM CHLORIDE, PRESERVATIVE FREE 10 ML: 5 INJECTION INTRAVENOUS at 09:59

## 2025-02-16 RX ADMIN — CALCITRIOL CAPSULES 0.25 MCG 0.25 MCG: 0.25 CAPSULE ORAL at 09:57

## 2025-02-16 RX ADMIN — GUAIFENESIN 600 MG: 600 TABLET, EXTENDED RELEASE ORAL at 09:57

## 2025-02-16 RX ADMIN — Medication: at 10:59

## 2025-02-16 RX ADMIN — METHOCARBAMOL 1000 MG: 500 TABLET ORAL at 10:00

## 2025-02-16 RX ADMIN — PANTOPRAZOLE SODIUM 40 MG: 40 TABLET, DELAYED RELEASE ORAL at 06:03

## 2025-02-16 RX ADMIN — ISOSORBIDE DINITRATE 20 MG: 20 TABLET ORAL at 09:57

## 2025-02-16 RX ADMIN — POLYETHYLENE GLYCOL 3350 17 G: 17 POWDER, FOR SOLUTION ORAL at 11:22

## 2025-02-16 RX ADMIN — SEVELAMER CARBONATE 0.8 G: 800 FOR SUSPENSION ORAL at 06:11

## 2025-02-16 RX ADMIN — BUPRENORPHINE HYDROCHLORIDE AND NALOXONE HYDROCHLORIDE DIHYDRATE 1 TABLET: 8; 2 TABLET SUBLINGUAL at 09:57

## 2025-02-16 ASSESSMENT — PAIN DESCRIPTION - DESCRIPTORS: DESCRIPTORS: ACHING;DISCOMFORT;SPASM

## 2025-02-16 ASSESSMENT — PAIN DESCRIPTION - LOCATION: LOCATION: BACK

## 2025-02-16 ASSESSMENT — PAIN SCALES - GENERAL: PAINLEVEL_OUTOF10: 6

## 2025-02-16 NOTE — PROGRESS NOTES
Discharge education was provided to patient. Patient verbalized understanding. Patient has no further questions. AVS was printed and placed in [place] folder going to transport team.

## 2025-02-16 NOTE — PLAN OF CARE
Problem: Safety - Adult  Goal: Free from fall injury  2/16/2025 1129 by Dio Blackburn RN  Outcome: Adequate for Discharge  2/15/2025 2255 by Joslyn Knox RN  Outcome: Progressing     Problem: Chronic Conditions and Co-morbidities  Goal: Patient's chronic conditions and co-morbidity symptoms are monitored and maintained or improved  2/16/2025 1129 by Dio Blackburn RN  Outcome: Adequate for Discharge  2/15/2025 2255 by Joslyn Knox RN  Outcome: Progressing     Problem: Discharge Planning  Goal: Discharge to home or other facility with appropriate resources  2/16/2025 1129 by Dio Blackburn RN  Outcome: Adequate for Discharge  2/15/2025 2255 by Joslyn Knox RN  Outcome: Progressing     Problem: Skin/Tissue Integrity  Goal: Skin integrity remains intact  Description: 1.  Monitor for areas of redness and/or skin breakdown  2.  Assess vascular access sites hourly  3.  Every 4-6 hours minimum:  Change oxygen saturation probe site  4.  Every 4-6 hours:  If on nasal continuous positive airway pressure, respiratory therapy assess nares and determine need for appliance change or resting period  2/16/2025 1129 by Dio Blackburn RN  Outcome: Adequate for Discharge  2/15/2025 2255 by Joslyn Knox RN  Outcome: Progressing     Problem: Pain  Goal: Verbalizes/displays adequate comfort level or baseline comfort level  2/16/2025 1129 by Dio Blackburn RN  Outcome: Adequate for Discharge  2/15/2025 2255 by Joslyn Knox RN  Outcome: Progressing     Problem: Neurosensory - Adult  Goal: Achieves maximal functionality and self care  2/16/2025 1129 by Dio Blackburn RN  Outcome: Adequate for Discharge  2/15/2025 2255 by Joslyn Knox RN  Outcome: Progressing     Problem: Cardiovascular - Adult  Goal: Absence of cardiac dysrhythmias or at baseline  2/16/2025 1129 by Dio Blackburn RN  Outcome: Adequate for Discharge  2/15/2025 2255 by Joslyn Knox RN  Outcome: Progressing     Problem: Skin/Tissue Integrity - Adult  Goal:

## 2025-02-16 NOTE — PLAN OF CARE
Problem: Safety - Adult  Goal: Free from fall injury  2/15/2025 2255 by Joslyn Knox RN  Outcome: Progressing  2/15/2025 0922 by Georgette Jamil RN  Outcome: Progressing     Problem: Chronic Conditions and Co-morbidities  Goal: Patient's chronic conditions and co-morbidity symptoms are monitored and maintained or improved  2/15/2025 2255 by Joslyn Knox RN  Outcome: Progressing  2/15/2025 0922 by Georgette Jamil RN  Outcome: Progressing  Flowsheets (Taken 2/15/2025 0850)  Care Plan - Patient's Chronic Conditions and Co-Morbidity Symptoms are Monitored and Maintained or Improved: Monitor and assess patient's chronic conditions and comorbid symptoms for stability, deterioration, or improvement     Problem: Discharge Planning  Goal: Discharge to home or other facility with appropriate resources  2/15/2025 2255 by Joslyn Knox RN  Outcome: Progressing  2/15/2025 0922 by Georgette Jamil RN  Outcome: Progressing  Flowsheets (Taken 2/15/2025 0850)  Discharge to home or other facility with appropriate resources: Identify barriers to discharge with patient and caregiver     Problem: Skin/Tissue Integrity  Goal: Skin integrity remains intact  Description: 1.  Monitor for areas of redness and/or skin breakdown  2.  Assess vascular access sites hourly  3.  Every 4-6 hours minimum:  Change oxygen saturation probe site  4.  Every 4-6 hours:  If on nasal continuous positive airway pressure, respiratory therapy assess nares and determine need for appliance change or resting period  2/15/2025 2255 by Joslyn Knox RN  Outcome: Progressing  2/15/2025 0922 by Georgette Jamil RN  Outcome: Progressing     Problem: Pain  Goal: Verbalizes/displays adequate comfort level or baseline comfort level  2/15/2025 2255 by Joslyn Knox RN  Outcome: Progressing  2/15/2025 0922 by Georgette Jamil RN  Outcome: Progressing  Flowsheets (Taken 2/15/2025 0850)  Verbalizes/displays adequate comfort level or baseline comfort level:

## 2025-02-16 NOTE — CARE COORDINATION
IMAN- D/c to [facility]  Transportation- H2 BLS@ 12 noon  Nursing- Please call report to [phone] and ask for Unit 1.    This pt is being discharged to [facility] today.CM spoke with Alicia [phone]) with [facility] and she confirmed that this pt can come today and he will go to room 133B. Ambulance set up through [employer] [employer] and the reference number is [id number]. Cm requested Hospital to Home. CM called [facility] and they secured this transportation request. They will be here at 12 noon. An envelope containing pt's MAR and AVS will be sent with this pt to [facility]. Also, pt's nurse will call report. MAKAYLA Ortiz,ACM-SW

## 2025-02-16 NOTE — PROGRESS NOTES
Pt still medically stable for discharge  Discharged on 2/15  Leaving today to [facility]    GABI Cai

## 2025-02-16 NOTE — PROGRESS NOTES
Gave report to Dennise, all questions were answered. Dennise had no further questions. Made aware pickup time was 12pm and she has call back number if questions arise.    dry, intact, no bleeding and no hematoma. RIJ 7Fr, removed, pressure held to hemostasis, primapore.

## 2025-03-07 ENCOUNTER — APPOINTMENT (OUTPATIENT)
Facility: HOSPITAL | Age: 60
DRG: 347 | End: 2025-03-07
Payer: MEDICAID

## 2025-03-07 ENCOUNTER — HOSPITAL ENCOUNTER (INPATIENT)
Facility: HOSPITAL | Age: 60
LOS: 13 days | Discharge: NURSING, REHAB OR LONG-TERM CARE | DRG: 347 | End: 2025-03-20
Attending: STUDENT IN AN ORGANIZED HEALTH CARE EDUCATION/TRAINING PROGRAM | Admitting: STUDENT IN AN ORGANIZED HEALTH CARE EDUCATION/TRAINING PROGRAM
Payer: MEDICAID

## 2025-03-07 DIAGNOSIS — N18.6 ESRD ON HEMODIALYSIS (HCC): ICD-10-CM

## 2025-03-07 DIAGNOSIS — Z99.2 ESRD ON HEMODIALYSIS (HCC): ICD-10-CM

## 2025-03-07 DIAGNOSIS — M46.44 DISCITIS OF THORACIC REGION: ICD-10-CM

## 2025-03-07 DIAGNOSIS — M46.45 DISCITIS OF THORACOLUMBAR REGION: ICD-10-CM

## 2025-03-07 DIAGNOSIS — Z99.2 END STAGE RENAL DISEASE ON DIALYSIS (HCC): ICD-10-CM

## 2025-03-07 DIAGNOSIS — I16.1 HYPERTENSIVE EMERGENCY: Primary | ICD-10-CM

## 2025-03-07 DIAGNOSIS — R78.81 BACTEREMIA: ICD-10-CM

## 2025-03-07 DIAGNOSIS — E87.5 HYPERKALEMIA: ICD-10-CM

## 2025-03-07 DIAGNOSIS — E43 SEVERE PROTEIN-CALORIE MALNUTRITION: ICD-10-CM

## 2025-03-07 DIAGNOSIS — M86.9 OSTEOMYELITIS OF OTHER SITE, UNSPECIFIED TYPE (HCC): ICD-10-CM

## 2025-03-07 DIAGNOSIS — N18.6 END STAGE RENAL DISEASE ON DIALYSIS (HCC): ICD-10-CM

## 2025-03-07 DIAGNOSIS — F19.90 SUBSTANCE USE DISORDER: ICD-10-CM

## 2025-03-07 PROBLEM — M46.40 DISCITIS: Status: ACTIVE | Noted: 2025-03-07

## 2025-03-07 LAB
ALBUMIN SERPL-MCNC: 2.7 G/DL (ref 3.5–5)
ALBUMIN SERPL-MCNC: 2.8 G/DL (ref 3.5–5)
ALBUMIN/GLOB SERPL: 0.4 (ref 1.1–2.2)
ALBUMIN/GLOB SERPL: 0.4 (ref 1.1–2.2)
ALP SERPL-CCNC: 109 U/L (ref 45–117)
ALP SERPL-CCNC: 99 U/L (ref 45–117)
ALT SERPL-CCNC: 15 U/L (ref 12–78)
ALT SERPL-CCNC: 15 U/L (ref 12–78)
ANION GAP SERPL CALC-SCNC: 11 MMOL/L (ref 2–12)
ANION GAP SERPL CALC-SCNC: 13 MMOL/L (ref 2–12)
AST SERPL-CCNC: 15 U/L (ref 15–37)
AST SERPL-CCNC: 18 U/L (ref 15–37)
BASOPHILS # BLD: 0.02 K/UL (ref 0–0.1)
BASOPHILS NFR BLD: 0.3 % (ref 0–1)
BILIRUB SERPL-MCNC: 1.6 MG/DL (ref 0.2–1)
BILIRUB SERPL-MCNC: 1.7 MG/DL (ref 0.2–1)
BUN SERPL-MCNC: 64 MG/DL (ref 6–20)
BUN SERPL-MCNC: 68 MG/DL (ref 6–20)
BUN/CREAT SERPL: 6 (ref 12–20)
BUN/CREAT SERPL: 6 (ref 12–20)
CALCIUM SERPL-MCNC: 9.4 MG/DL (ref 8.5–10.1)
CALCIUM SERPL-MCNC: 9.5 MG/DL (ref 8.5–10.1)
CHLORIDE SERPL-SCNC: 98 MMOL/L (ref 97–108)
CHLORIDE SERPL-SCNC: 98 MMOL/L (ref 97–108)
CO2 SERPL-SCNC: 22 MMOL/L (ref 21–32)
CO2 SERPL-SCNC: 24 MMOL/L (ref 21–32)
COMMENT:: NORMAL
CREAT SERPL-MCNC: 11.3 MG/DL (ref 0.7–1.3)
CREAT SERPL-MCNC: 11.5 MG/DL (ref 0.7–1.3)
DIFFERENTIAL METHOD BLD: ABNORMAL
EOSINOPHIL # BLD: 0 K/UL (ref 0–0.4)
EOSINOPHIL NFR BLD: 0 % (ref 0–7)
ERYTHROCYTE [DISTWIDTH] IN BLOOD BY AUTOMATED COUNT: 17.4 % (ref 11.5–14.5)
GLOBULIN SER CALC-MCNC: 6.5 G/DL (ref 2–4)
GLOBULIN SER CALC-MCNC: 6.7 G/DL (ref 2–4)
GLUCOSE BLD STRIP.AUTO-MCNC: 155 MG/DL (ref 65–117)
GLUCOSE BLD STRIP.AUTO-MCNC: 209 MG/DL (ref 65–117)
GLUCOSE SERPL-MCNC: 151 MG/DL (ref 65–100)
GLUCOSE SERPL-MCNC: 186 MG/DL (ref 65–100)
HCT VFR BLD AUTO: 37.7 % (ref 36.6–50.3)
HGB BLD-MCNC: 12.5 G/DL (ref 12.1–17)
IMM GRANULOCYTES # BLD AUTO: 0.03 K/UL (ref 0–0.04)
IMM GRANULOCYTES NFR BLD AUTO: 0.4 % (ref 0–0.5)
LIPASE SERPL-CCNC: 14 U/L (ref 13–75)
LYMPHOCYTES # BLD: 0.8 K/UL (ref 0.8–3.5)
LYMPHOCYTES NFR BLD: 10.2 % (ref 12–49)
MCH RBC QN AUTO: 32.4 PG (ref 26–34)
MCHC RBC AUTO-ENTMCNC: 33.2 G/DL (ref 30–36.5)
MCV RBC AUTO: 97.7 FL (ref 80–99)
MONOCYTES # BLD: 0.6 K/UL (ref 0–1)
MONOCYTES NFR BLD: 7.6 % (ref 5–13)
NEUTS SEG # BLD: 6.4 K/UL (ref 1.8–8)
NEUTS SEG NFR BLD: 81.5 % (ref 32–75)
NRBC # BLD: 0 K/UL (ref 0–0.01)
NRBC BLD-RTO: 0 PER 100 WBC
PLATELET # BLD AUTO: 165 K/UL (ref 150–400)
PMV BLD AUTO: 10 FL (ref 8.9–12.9)
POTASSIUM SERPL-SCNC: 4.8 MMOL/L (ref 3.5–5.1)
POTASSIUM SERPL-SCNC: 5.4 MMOL/L (ref 3.5–5.1)
PROT SERPL-MCNC: 9.2 G/DL (ref 6.4–8.2)
PROT SERPL-MCNC: 9.5 G/DL (ref 6.4–8.2)
RBC # BLD AUTO: 3.86 M/UL (ref 4.1–5.7)
SERVICE CMNT-IMP: ABNORMAL
SERVICE CMNT-IMP: ABNORMAL
SODIUM SERPL-SCNC: 133 MMOL/L (ref 136–145)
SODIUM SERPL-SCNC: 133 MMOL/L (ref 136–145)
SPECIMEN HOLD: NORMAL
WBC # BLD AUTO: 7.9 K/UL (ref 4.1–11.1)

## 2025-03-07 PROCEDURE — 2500000003 HC RX 250 WO HCPCS: Performed by: STUDENT IN AN ORGANIZED HEALTH CARE EDUCATION/TRAINING PROGRAM

## 2025-03-07 PROCEDURE — 6370000000 HC RX 637 (ALT 250 FOR IP): Performed by: STUDENT IN AN ORGANIZED HEALTH CARE EDUCATION/TRAINING PROGRAM

## 2025-03-07 PROCEDURE — 90935 HEMODIALYSIS ONE EVALUATION: CPT

## 2025-03-07 PROCEDURE — 96365 THER/PROPH/DIAG IV INF INIT: CPT

## 2025-03-07 PROCEDURE — 5A1D70Z PERFORMANCE OF URINARY FILTRATION, INTERMITTENT, LESS THAN 6 HOURS PER DAY: ICD-10-PCS | Performed by: STUDENT IN AN ORGANIZED HEALTH CARE EDUCATION/TRAINING PROGRAM

## 2025-03-07 PROCEDURE — 87186 SC STD MICRODIL/AGAR DIL: CPT

## 2025-03-07 PROCEDURE — 72131 CT LUMBAR SPINE W/O DYE: CPT

## 2025-03-07 PROCEDURE — 87077 CULTURE AEROBIC IDENTIFY: CPT

## 2025-03-07 PROCEDURE — 6360000002 HC RX W HCPCS: Performed by: STUDENT IN AN ORGANIZED HEALTH CARE EDUCATION/TRAINING PROGRAM

## 2025-03-07 PROCEDURE — 85025 COMPLETE CBC W/AUTO DIFF WBC: CPT

## 2025-03-07 PROCEDURE — 87154 CUL TYP ID BLD PTHGN 6+ TRGT: CPT

## 2025-03-07 PROCEDURE — 99285 EMERGENCY DEPT VISIT HI MDM: CPT

## 2025-03-07 PROCEDURE — APPNB30 APP NON BILLABLE TIME 0-30 MINS

## 2025-03-07 PROCEDURE — 96366 THER/PROPH/DIAG IV INF ADDON: CPT

## 2025-03-07 PROCEDURE — 83690 ASSAY OF LIPASE: CPT

## 2025-03-07 PROCEDURE — 99223 1ST HOSP IP/OBS HIGH 75: CPT | Performed by: INTERNAL MEDICINE

## 2025-03-07 PROCEDURE — 2580000003 HC RX 258: Performed by: STUDENT IN AN ORGANIZED HEALTH CARE EDUCATION/TRAINING PROGRAM

## 2025-03-07 PROCEDURE — 6360000002 HC RX W HCPCS: Performed by: NURSE PRACTITIONER

## 2025-03-07 PROCEDURE — 96375 TX/PRO/DX INJ NEW DRUG ADDON: CPT

## 2025-03-07 PROCEDURE — 96367 TX/PROPH/DG ADDL SEQ IV INF: CPT

## 2025-03-07 PROCEDURE — 71045 X-RAY EXAM CHEST 1 VIEW: CPT

## 2025-03-07 PROCEDURE — 87040 BLOOD CULTURE FOR BACTERIA: CPT

## 2025-03-07 PROCEDURE — 2060000000 HC ICU INTERMEDIATE R&B

## 2025-03-07 PROCEDURE — 6360000002 HC RX W HCPCS: Performed by: INTERNAL MEDICINE

## 2025-03-07 PROCEDURE — 96376 TX/PRO/DX INJ SAME DRUG ADON: CPT

## 2025-03-07 PROCEDURE — 82962 GLUCOSE BLOOD TEST: CPT

## 2025-03-07 PROCEDURE — 80053 COMPREHEN METABOLIC PANEL: CPT

## 2025-03-07 RX ORDER — ISOSORBIDE DINITRATE 20 MG/1
20 TABLET ORAL 2 TIMES DAILY
Status: DISCONTINUED | OUTPATIENT
Start: 2025-03-07 | End: 2025-03-20

## 2025-03-07 RX ORDER — LANOLIN ALCOHOL/MO/W.PET/CERES
100 CREAM (GRAM) TOPICAL DAILY
Status: DISCONTINUED | OUTPATIENT
Start: 2025-03-07 | End: 2025-03-20 | Stop reason: HOSPADM

## 2025-03-07 RX ORDER — ACETAMINOPHEN 325 MG/1
650 TABLET ORAL EVERY 6 HOURS PRN
Status: DISCONTINUED | OUTPATIENT
Start: 2025-03-07 | End: 2025-03-20 | Stop reason: HOSPADM

## 2025-03-07 RX ORDER — PANTOPRAZOLE SODIUM 40 MG/1
40 TABLET, DELAYED RELEASE ORAL
Status: DISCONTINUED | OUTPATIENT
Start: 2025-03-08 | End: 2025-03-10

## 2025-03-07 RX ORDER — FOLIC ACID 1 MG/1
1 TABLET ORAL DAILY
Status: DISCONTINUED | OUTPATIENT
Start: 2025-03-07 | End: 2025-03-20 | Stop reason: HOSPADM

## 2025-03-07 RX ORDER — CALCITRIOL 0.25 UG/1
0.25 CAPSULE, LIQUID FILLED ORAL DAILY
Status: DISCONTINUED | OUTPATIENT
Start: 2025-03-07 | End: 2025-03-20 | Stop reason: HOSPADM

## 2025-03-07 RX ORDER — HYDRALAZINE HYDROCHLORIDE 25 MG/1
25 TABLET, FILM COATED ORAL EVERY 8 HOURS SCHEDULED
Status: DISCONTINUED | OUTPATIENT
Start: 2025-03-07 | End: 2025-03-20

## 2025-03-07 RX ORDER — ACETAMINOPHEN 650 MG/1
650 SUPPOSITORY RECTAL EVERY 6 HOURS PRN
Status: DISCONTINUED | OUTPATIENT
Start: 2025-03-07 | End: 2025-03-20 | Stop reason: HOSPADM

## 2025-03-07 RX ORDER — SODIUM CHLORIDE 0.9 % (FLUSH) 0.9 %
5-40 SYRINGE (ML) INJECTION EVERY 12 HOURS SCHEDULED
Status: DISCONTINUED | OUTPATIENT
Start: 2025-03-07 | End: 2025-03-20 | Stop reason: HOSPADM

## 2025-03-07 RX ORDER — POLYETHYLENE GLYCOL 3350 17 G/17G
17 POWDER, FOR SOLUTION ORAL DAILY PRN
Status: DISCONTINUED | OUTPATIENT
Start: 2025-03-07 | End: 2025-03-20 | Stop reason: HOSPADM

## 2025-03-07 RX ORDER — TRAZODONE HYDROCHLORIDE 50 MG/1
50 TABLET ORAL NIGHTLY
Status: DISCONTINUED | OUTPATIENT
Start: 2025-03-07 | End: 2025-03-20 | Stop reason: HOSPADM

## 2025-03-07 RX ORDER — SEVELAMER CARBONATE 0.8 G/1
0.8 POWDER, FOR SUSPENSION ORAL
Status: DISCONTINUED | OUTPATIENT
Start: 2025-03-07 | End: 2025-03-07

## 2025-03-07 RX ORDER — HYDROMORPHONE HYDROCHLORIDE 1 MG/ML
1 INJECTION, SOLUTION INTRAMUSCULAR; INTRAVENOUS; SUBCUTANEOUS
Status: COMPLETED | OUTPATIENT
Start: 2025-03-07 | End: 2025-03-07

## 2025-03-07 RX ORDER — ONDANSETRON 2 MG/ML
4 INJECTION INTRAMUSCULAR; INTRAVENOUS ONCE
Status: COMPLETED | OUTPATIENT
Start: 2025-03-07 | End: 2025-03-07

## 2025-03-07 RX ORDER — FERROUS SULFATE 325(65) MG
325 TABLET ORAL
Status: DISCONTINUED | OUTPATIENT
Start: 2025-03-08 | End: 2025-03-20 | Stop reason: HOSPADM

## 2025-03-07 RX ORDER — LABETALOL HYDROCHLORIDE 5 MG/ML
20 INJECTION, SOLUTION INTRAVENOUS
Status: COMPLETED | OUTPATIENT
Start: 2025-03-07 | End: 2025-03-07

## 2025-03-07 RX ORDER — CARVEDILOL 6.25 MG/1
6.25 TABLET ORAL 2 TIMES DAILY
Status: DISCONTINUED | OUTPATIENT
Start: 2025-03-07 | End: 2025-03-20

## 2025-03-07 RX ORDER — OXYCODONE HYDROCHLORIDE 5 MG/1
5 TABLET ORAL EVERY 4 HOURS PRN
Status: DISCONTINUED | OUTPATIENT
Start: 2025-03-07 | End: 2025-03-10 | Stop reason: SDUPTHER

## 2025-03-07 RX ORDER — SEVELAMER CARBONATE 0.8 G/1
1.6 POWDER, FOR SUSPENSION ORAL
Status: DISCONTINUED | OUTPATIENT
Start: 2025-03-07 | End: 2025-03-11

## 2025-03-07 RX ORDER — SODIUM CHLORIDE 0.9 % (FLUSH) 0.9 %
5-40 SYRINGE (ML) INJECTION PRN
Status: DISCONTINUED | OUTPATIENT
Start: 2025-03-07 | End: 2025-03-20 | Stop reason: HOSPADM

## 2025-03-07 RX ORDER — HEPARIN SODIUM 5000 [USP'U]/ML
5000 INJECTION, SOLUTION INTRAVENOUS; SUBCUTANEOUS EVERY 8 HOURS SCHEDULED
Status: DISCONTINUED | OUTPATIENT
Start: 2025-03-07 | End: 2025-03-20 | Stop reason: HOSPADM

## 2025-03-07 RX ORDER — ONDANSETRON 2 MG/ML
4 INJECTION INTRAMUSCULAR; INTRAVENOUS EVERY 6 HOURS PRN
Status: DISCONTINUED | OUTPATIENT
Start: 2025-03-07 | End: 2025-03-20 | Stop reason: HOSPADM

## 2025-03-07 RX ORDER — CALCIUM GLUCONATE 20 MG/ML
1000 INJECTION, SOLUTION INTRAVENOUS
Status: COMPLETED | OUTPATIENT
Start: 2025-03-07 | End: 2025-03-07

## 2025-03-07 RX ORDER — ONDANSETRON 4 MG/1
4 TABLET, ORALLY DISINTEGRATING ORAL EVERY 8 HOURS PRN
Status: DISCONTINUED | OUTPATIENT
Start: 2025-03-07 | End: 2025-03-20 | Stop reason: HOSPADM

## 2025-03-07 RX ORDER — VANCOMYCIN 1.25 G/250ML
25 INJECTION, SOLUTION INTRAVENOUS ONCE
Status: DISCONTINUED | OUTPATIENT
Start: 2025-03-07 | End: 2025-03-07

## 2025-03-07 RX ORDER — SODIUM CHLORIDE 9 MG/ML
INJECTION, SOLUTION INTRAVENOUS PRN
Status: DISCONTINUED | OUTPATIENT
Start: 2025-03-07 | End: 2025-03-20 | Stop reason: HOSPADM

## 2025-03-07 RX ADMIN — CARVEDILOL 6.25 MG: 6.25 TABLET, FILM COATED ORAL at 20:34

## 2025-03-07 RX ADMIN — HYDRALAZINE HYDROCHLORIDE 25 MG: 25 TABLET ORAL at 20:34

## 2025-03-07 RX ADMIN — Medication 20 MG: at 09:25

## 2025-03-07 RX ADMIN — ISOSORBIDE DINITRATE 20 MG: 20 TABLET ORAL at 20:34

## 2025-03-07 RX ADMIN — SODIUM CHLORIDE, PRESERVATIVE FREE 10 ML: 5 INJECTION INTRAVENOUS at 20:34

## 2025-03-07 RX ADMIN — INSULIN HUMAN 10 UNITS: 100 INJECTION, SOLUTION PARENTERAL at 10:58

## 2025-03-07 RX ADMIN — SODIUM CHLORIDE 3000 MG: 9 INJECTION, SOLUTION INTRAVENOUS at 11:57

## 2025-03-07 RX ADMIN — NICARDIPINE HYDROCHLORIDE 5 MG/HR: 0.1 INJECTION, SOLUTION INTRAVENOUS at 11:02

## 2025-03-07 RX ADMIN — HEPARIN SODIUM 1800 UNITS: 1000 INJECTION INTRAVENOUS; SUBCUTANEOUS at 17:29

## 2025-03-07 RX ADMIN — HYDROMORPHONE HYDROCHLORIDE 1 MG: 1 INJECTION, SOLUTION INTRAMUSCULAR; INTRAVENOUS; SUBCUTANEOUS at 09:25

## 2025-03-07 RX ADMIN — ACETAMINOPHEN 650 MG: 325 TABLET ORAL at 20:33

## 2025-03-07 RX ADMIN — ONDANSETRON 4 MG: 2 INJECTION, SOLUTION INTRAMUSCULAR; INTRAVENOUS at 08:47

## 2025-03-07 RX ADMIN — CALCIUM GLUCONATE 1000 MG: 20 INJECTION, SOLUTION INTRAVENOUS at 10:55

## 2025-03-07 RX ADMIN — CALCITRIOL CAPSULES 0.25 MCG 0.25 MCG: 0.25 CAPSULE ORAL at 19:27

## 2025-03-07 RX ADMIN — DEXTROSE 250 ML: 10 SOLUTION INTRAVENOUS at 11:00

## 2025-03-07 RX ADMIN — TRAZODONE HYDROCHLORIDE 50 MG: 50 TABLET ORAL at 20:34

## 2025-03-07 RX ADMIN — HYDROMORPHONE HYDROCHLORIDE 1 MG: 1 INJECTION, SOLUTION INTRAMUSCULAR; INTRAVENOUS; SUBCUTANEOUS at 08:46

## 2025-03-07 RX ADMIN — HEPARIN SODIUM 1900 UNITS: 1000 INJECTION INTRAVENOUS; SUBCUTANEOUS at 17:29

## 2025-03-07 RX ADMIN — OXYCODONE 5 MG: 5 TABLET ORAL at 13:39

## 2025-03-07 RX ADMIN — HYDROMORPHONE HYDROCHLORIDE 0.5 MG: 1 INJECTION, SOLUTION INTRAMUSCULAR; INTRAVENOUS; SUBCUTANEOUS at 22:39

## 2025-03-07 RX ADMIN — OXYCODONE 5 MG: 5 TABLET ORAL at 20:34

## 2025-03-07 RX ADMIN — HEPARIN SODIUM 5000 UNITS: 5000 INJECTION INTRAVENOUS; SUBCUTANEOUS at 20:34

## 2025-03-07 ASSESSMENT — PAIN SCALES - GENERAL
PAINLEVEL_OUTOF10: 10

## 2025-03-07 ASSESSMENT — PAIN DESCRIPTION - LOCATION
LOCATION: BACK
LOCATION: BACK
LOCATION: BACK;ABDOMEN
LOCATION: BACK

## 2025-03-07 ASSESSMENT — PAIN DESCRIPTION - ORIENTATION
ORIENTATION: LOWER

## 2025-03-07 ASSESSMENT — PAIN DESCRIPTION - DESCRIPTORS
DESCRIPTORS: ACHING

## 2025-03-07 ASSESSMENT — LIFESTYLE VARIABLES
HOW OFTEN DO YOU HAVE A DRINK CONTAINING ALCOHOL: NEVER
HOW MANY STANDARD DRINKS CONTAINING ALCOHOL DO YOU HAVE ON A TYPICAL DAY: PATIENT DOES NOT DRINK

## 2025-03-07 ASSESSMENT — PAIN - FUNCTIONAL ASSESSMENT: PAIN_FUNCTIONAL_ASSESSMENT: 0-10

## 2025-03-07 NOTE — ED PROVIDER NOTES
nephrology was consulted for emergent dialysis. Empirical antibiotic therapy was initiated to address the potential infection, with blood cultures ordered to guide further treatment. An MRI was also ordered to provide a more detailed assessment of the spinal involvement. The complexity of the patient's condition, including his chronic illnesses and acute presentation, required a multidisciplinary approach to manage the immediate risks and plan for ongoing care.    DIAGNOSIS:  Primary Diagnosis:  - Discitis    Secondary Diagnoses:  - Osteomyelitis    Perfect Serve Consult for Admission  11:47 AM    ED Room Number: ER06/06  Patient Name and age:  Leandra Perez 60 y.o.  male  Working Diagnosis:   1. Hypertensive emergency    2. Discitis of thoracic region    3. Osteomyelitis of other site, unspecified type (HCC)    4. Hyperkalemia    5. ESRD on hemodialysis (HCC)        COVID-19 Suspicion: No  Sepsis present:  No  Reassessment needed: Yes  Code Status:  Full Code  Readmission: No  Isolation Requirements: no  Recommended Level of Care: telemetry  Department: [facility] Adult ED - [phone]  Consulting Provider: Nephrology    Other:      Total critical care time spent exclusive of procedures:  58 minutes.        Yeison Perkins MD  03/08/25 3692

## 2025-03-07 NOTE — ED TRIAGE NOTES
Patient arrives via EMS from [facility]. Staff stated that patient woke up combative and swinging at staff, concern for AMS. Patient states he swung at them because his back hurts. Patient is A&O x4 on arrival to ER complaining of severe back pain, states he fell 1 month ago which caused his back pain. Patient does dialysis MWF. Vomit residual noted on patient's mouth, patient states he also vomited this morning.

## 2025-03-07 NOTE — ED NOTES
Bedside shift change report given to Azul (oncoming nurse) by Reba (offgoing nurse). Report included the following information Nurse Handoff Report, ED Encounter Summary, ED SBAR, Adult Overview, MAR, and Recent Results.     
Blood cultures pulled from LAC and RAC ultrasound IVs due to pt being very difficult stick    1150 pt desatted to mid 80s. Titrated O2 from 2L to 4L. MD Gregorio aware.     1250 titrated O2 to 3L. Pt satting mid 90s    1255 titrated O2 to 2L. Pt satting in mid 90s    1315 holding PO meds for now per dialysis RN    1343 ID NP and dialysis RN at bedside    1726 notified Prateek WOMACK pt's HR in 120s-130s per dialysis RN.   
Pt refused to have blankets removed from under him due to pain. Azul NAM and Albina NAM were able to get wet pants off. Pt informed his blankets were wet and said \"that;s okay, dont move me.\"  
Pt taken to CT at this time  
Report received from CYRIL Britton  
Encounter .4 days     5% Pain Scale 10     5% O2 Delivery Method NASAL CANNULA     5% Respirations 21     4% Age 60 years old      Recent Labs     03/07/25  0841   WBC 7.9     Blood Cultures Drawn: Yes     Antibiotic Given: Yes    Recommendation    Plan for next 24 hours: IV abx, pain control  Additional Comments: pt is oriented x4 and follows commands appropriately. Can be slow to respond and doesn't always engage in conversation by choice.   Isolation:  Pending orders: IV abx  Consults ordered: IP CONSULT TO NEPHROLOGY  IP CONSULT TO INFECTIOUS DISEASES  IP CONSULT TO NEPHROLOGY  IP CONSULT TO INTERVENTIONAL RADIOLOGY    Consulted provider:      If any further questions, please call Sending RN at [phone]  Electronically signed by: Electronically signed by Albina Baca RN on 3/7/2025 at 5:44 PM

## 2025-03-07 NOTE — H&P
History & Physical    Primary Care Provider: Margo Bella MD  Source of Information: Patient and chart review    History of Presenting Illness:   Leandra Perez is a 60 y.o. male with past medical history of ESRD on HD, hypertension, combined systolic and diastolic heart failure, history of opioid abuse on Suboxone, type 2 diabetes, tobacco use, known history of thoracic discitis who presents to ED with complaints of low back pain. Reports chronic hx of upper back pain which acutely worsened over the last few days. Chart review shows chronic hx of discitis previously treated with iv abs.  Denies any associated neurological complaints.    The patient denies any fever, chills, chest or abdominal pain, nausea, vomiting, cough, congestion, recent illness, palpitations, or dysuria.  Remarkable vitals on ER Presentation: BP to 200/128  Labs Remarkable for: k-5.4, cr 11.3  ER Images: CT lumbar spine: Discitis and osteomyelitis at T11-T12 with associated high-grade spinal canal neuroforaminal narrowing.  ER Rx: Unasyn, calcium gluconate, Dilaudid, Zofran, labetalol, and     Review of Systems:  Pertinent items are noted in the History of Present Illness.     Past Medical History:   Diagnosis Date    Diabetes (HCC)     Gout     History of renal dialysis     Hypertension     Opioid use disorder     Osteoarthritis       Past Surgical History:   Procedure Laterality Date    CT BIOPSY RENAL  9/1/2022    CT BIOPSY RENAL 9/1/2022 H RAD CT    IR NONTUNNELED VASCULAR CATHETER > 5 YEARS  8/31/2022    IR NONTUNNELED VASCULAR CATHETER 8/31/2022 [facility]    IR NONTUNNELED VASCULAR CATHETER > 5 YEARS  8/31/2022    IR TUNNELED CVC PLACE WO SQ PORT/PUMP > 5 YEARS  9/6/2022    IR TUNNELED CATHETER PLACEMENT GREATER THAN 5 YEARS 9/6/2022 [facility] IR    IR TUNNELED CVC PLACE WO SQ PORT/PUMP > 5 YEARS  9/6/2022    ORTHOPEDIC SURGERY  rt thigh neva    ORTHOPEDIC SURGERY  rt hip replacement     Prior to Admission medications

## 2025-03-08 ENCOUNTER — APPOINTMENT (OUTPATIENT)
Facility: HOSPITAL | Age: 60
DRG: 347 | End: 2025-03-08
Payer: MEDICAID

## 2025-03-08 LAB
ACB COMPLEX DNA BLD POS QL NAA+NON-PROBE: NOT DETECTED
ACB COMPLEX DNA BLD POS QL NAA+NON-PROBE: NOT DETECTED
ACCESSION NUMBER, LLC1M: ABNORMAL
ACCESSION NUMBER, LLC1M: ABNORMAL
ANION GAP SERPL CALC-SCNC: 12 MMOL/L (ref 2–12)
ARTERIAL PATENCY WRIST A: POSITIVE
B FRAGILIS DNA BLD POS QL NAA+NON-PROBE: NOT DETECTED
B FRAGILIS DNA BLD POS QL NAA+NON-PROBE: NOT DETECTED
BASE EXCESS BLD CALC-SCNC: 0.1 MMOL/L
BASOPHILS # BLD: 0.02 K/UL (ref 0–0.1)
BASOPHILS NFR BLD: 0.3 % (ref 0–1)
BDY SITE: ABNORMAL
BIOFIRE TEST COMMENT: ABNORMAL
BIOFIRE TEST COMMENT: ABNORMAL
BLACTX-M ISLT/SPM QL: NOT DETECTED
BLAIMP ISLT/SPM QL: NOT DETECTED
BLAKPC ISLT/SPM QL: NOT DETECTED
BLAOXA-48-LIKE ISLT/SPM QL: NOT DETECTED
BLAVIM ISLT/SPM QL: NOT DETECTED
BUN SERPL-MCNC: 53 MG/DL (ref 6–20)
BUN/CREAT SERPL: 6 (ref 12–20)
C ALBICANS DNA BLD POS QL NAA+NON-PROBE: NOT DETECTED
C ALBICANS DNA BLD POS QL NAA+NON-PROBE: NOT DETECTED
C AURIS DNA BLD POS QL NAA+NON-PROBE: NOT DETECTED
C AURIS DNA BLD POS QL NAA+NON-PROBE: NOT DETECTED
C GATTII+NEOFOR DNA BLD POS QL NAA+N-PRB: NOT DETECTED
C GATTII+NEOFOR DNA BLD POS QL NAA+N-PRB: NOT DETECTED
C GLABRATA DNA BLD POS QL NAA+NON-PROBE: NOT DETECTED
C GLABRATA DNA BLD POS QL NAA+NON-PROBE: NOT DETECTED
C KRUSEI DNA BLD POS QL NAA+NON-PROBE: NOT DETECTED
C KRUSEI DNA BLD POS QL NAA+NON-PROBE: NOT DETECTED
C PARAP DNA BLD POS QL NAA+NON-PROBE: NOT DETECTED
C PARAP DNA BLD POS QL NAA+NON-PROBE: NOT DETECTED
C TROPICLS DNA BLD POS QL NAA+NON-PROBE: NOT DETECTED
C TROPICLS DNA BLD POS QL NAA+NON-PROBE: NOT DETECTED
CALCIUM SERPL-MCNC: 9.1 MG/DL (ref 8.5–10.1)
CHLORIDE SERPL-SCNC: 99 MMOL/L (ref 97–108)
CO2 SERPL-SCNC: 23 MMOL/L (ref 21–32)
CREAT SERPL-MCNC: 8.7 MG/DL (ref 0.7–1.3)
DIFFERENTIAL METHOD BLD: ABNORMAL
E CLOAC COMP DNA BLD POS NAA+NON-PROBE: NOT DETECTED
E CLOAC COMP DNA BLD POS NAA+NON-PROBE: NOT DETECTED
E COLI DNA BLD POS QL NAA+NON-PROBE: NOT DETECTED
E COLI DNA BLD POS QL NAA+NON-PROBE: NOT DETECTED
E FAECALIS DNA BLD POS QL NAA+NON-PROBE: NOT DETECTED
E FAECALIS DNA BLD POS QL NAA+NON-PROBE: NOT DETECTED
E FAECIUM DNA BLD POS QL NAA+NON-PROBE: NOT DETECTED
E FAECIUM DNA BLD POS QL NAA+NON-PROBE: NOT DETECTED
ENTEROBACTERALES DNA BLD POS NAA+N-PRB: DETECTED
ENTEROBACTERALES DNA BLD POS NAA+N-PRB: NOT DETECTED
EOSINOPHIL # BLD: 0 K/UL (ref 0–0.4)
EOSINOPHIL NFR BLD: 0 % (ref 0–7)
ERYTHROCYTE [DISTWIDTH] IN BLOOD BY AUTOMATED COUNT: 17.2 % (ref 11.5–14.5)
GAS FLOW.O2 O2 DELIVERY SYS: ABNORMAL
GLUCOSE SERPL-MCNC: 119 MG/DL (ref 65–100)
GP B STREP DNA BLD POS QL NAA+NON-PROBE: NOT DETECTED
GP B STREP DNA BLD POS QL NAA+NON-PROBE: NOT DETECTED
HAEM INFLU DNA BLD POS QL NAA+NON-PROBE: NOT DETECTED
HAEM INFLU DNA BLD POS QL NAA+NON-PROBE: NOT DETECTED
HCO3 BLD-SCNC: 24.8 MMOL/L (ref 21–28)
HCT VFR BLD AUTO: 39.6 % (ref 36.6–50.3)
HGB BLD-MCNC: 13 G/DL (ref 12.1–17)
IMM GRANULOCYTES # BLD AUTO: 0.02 K/UL (ref 0–0.04)
IMM GRANULOCYTES NFR BLD AUTO: 0.3 % (ref 0–0.5)
K OXYTOCA DNA BLD POS QL NAA+NON-PROBE: NOT DETECTED
K OXYTOCA DNA BLD POS QL NAA+NON-PROBE: NOT DETECTED
KLEBSIELLA SP DNA BLD POS QL NAA+NON-PRB: NOT DETECTED
L MONOCYTOG DNA BLD POS QL NAA+NON-PROBE: NOT DETECTED
L MONOCYTOG DNA BLD POS QL NAA+NON-PROBE: NOT DETECTED
LYMPHOCYTES # BLD: 1.21 K/UL (ref 0.8–3.5)
LYMPHOCYTES NFR BLD: 18 % (ref 12–49)
MCH RBC QN AUTO: 32.4 PG (ref 26–34)
MCHC RBC AUTO-ENTMCNC: 32.8 G/DL (ref 30–36.5)
MCV RBC AUTO: 98.8 FL (ref 80–99)
MECA+MECC ISLT/SPM QL: DETECTED
MONOCYTES # BLD: 1.35 K/UL (ref 0–1)
MONOCYTES NFR BLD: 20.2 % (ref 5–13)
N MEN DNA BLD POS QL NAA+NON-PROBE: NOT DETECTED
N MEN DNA BLD POS QL NAA+NON-PROBE: NOT DETECTED
NEUTS SEG # BLD: 4.1 K/UL (ref 1.8–8)
NEUTS SEG NFR BLD: 61.2 % (ref 32–75)
NRBC # BLD: 0 K/UL (ref 0–0.01)
NRBC BLD-RTO: 0 PER 100 WBC
O2/TOTAL GAS SETTING VFR VENT: 100 %
P AERUGINOSA DNA BLD POS NAA+NON-PROBE: NOT DETECTED
P AERUGINOSA DNA BLD POS NAA+NON-PROBE: NOT DETECTED
PCO2 BLD: 39.4 MMHG (ref 35–48)
PH BLD: 7.41 (ref 7.35–7.45)
PLATELET # BLD AUTO: 148 K/UL (ref 150–400)
PMV BLD AUTO: 10.4 FL (ref 8.9–12.9)
PO2 BLD: 81 MMHG (ref 83–108)
POTASSIUM SERPL-SCNC: 4.8 MMOL/L (ref 3.5–5.1)
PROTEUS SP DNA BLD POS QL NAA+NON-PROBE: NOT DETECTED
PROTEUS SP DNA BLD POS QL NAA+NON-PROBE: NOT DETECTED
RBC # BLD AUTO: 4.01 M/UL (ref 4.1–5.7)
RBC MORPH BLD: ABNORMAL
RBC MORPH BLD: ABNORMAL
RESISTANT GENE NDM BY PCR: NOT DETECTED
RESISTANT GENE TARGETS: ABNORMAL
RESISTANT GENE TARGETS: ABNORMAL
S AUREUS DNA BLD POS QL NAA+NON-PROBE: NOT DETECTED
S AUREUS DNA BLD POS QL NAA+NON-PROBE: NOT DETECTED
S AUREUS+CONS DNA BLD POS NAA+NON-PROBE: DETECTED
S AUREUS+CONS DNA BLD POS NAA+NON-PROBE: NOT DETECTED
S EPIDERMIDIS DNA BLD POS QL NAA+NON-PRB: DETECTED
S EPIDERMIDIS DNA BLD POS QL NAA+NON-PRB: NOT DETECTED
S LUGDUNENSIS DNA BLD POS QL NAA+NON-PRB: NOT DETECTED
S LUGDUNENSIS DNA BLD POS QL NAA+NON-PRB: NOT DETECTED
S MALTOPHILIA DNA BLD POS QL NAA+NON-PRB: NOT DETECTED
S MALTOPHILIA DNA BLD POS QL NAA+NON-PRB: NOT DETECTED
S MARCESCENS DNA BLD POS NAA+NON-PROBE: DETECTED
S MARCESCENS DNA BLD POS NAA+NON-PROBE: NOT DETECTED
S PNEUM DNA BLD POS QL NAA+NON-PROBE: NOT DETECTED
S PNEUM DNA BLD POS QL NAA+NON-PROBE: NOT DETECTED
S PYO DNA BLD POS QL NAA+NON-PROBE: NOT DETECTED
S PYO DNA BLD POS QL NAA+NON-PROBE: NOT DETECTED
SALMONELLA DNA BLD POS QL NAA+NON-PROBE: NOT DETECTED
SALMONELLA DNA BLD POS QL NAA+NON-PROBE: NOT DETECTED
SAO2 % BLD: 96 % (ref 92–97)
SODIUM SERPL-SCNC: 134 MMOL/L (ref 136–145)
SPECIMEN TYPE: ABNORMAL
STREPTOCOCCUS DNA BLD POS NAA+NON-PROBE: NOT DETECTED
STREPTOCOCCUS DNA BLD POS NAA+NON-PROBE: NOT DETECTED
TROPONIN I SERPL HS-MCNC: 24 NG/L (ref 0–76)
WBC # BLD AUTO: 6.7 K/UL (ref 4.1–11.1)

## 2025-03-08 PROCEDURE — 6360000002 HC RX W HCPCS: Performed by: INTERNAL MEDICINE

## 2025-03-08 PROCEDURE — 6370000000 HC RX 637 (ALT 250 FOR IP): Performed by: INTERNAL MEDICINE

## 2025-03-08 PROCEDURE — 6360000002 HC RX W HCPCS: Performed by: NURSE PRACTITIONER

## 2025-03-08 PROCEDURE — 71045 X-RAY EXAM CHEST 1 VIEW: CPT

## 2025-03-08 PROCEDURE — 71275 CT ANGIOGRAPHY CHEST: CPT

## 2025-03-08 PROCEDURE — 2500000003 HC RX 250 WO HCPCS: Performed by: STUDENT IN AN ORGANIZED HEALTH CARE EDUCATION/TRAINING PROGRAM

## 2025-03-08 PROCEDURE — 6360000002 HC RX W HCPCS: Performed by: STUDENT IN AN ORGANIZED HEALTH CARE EDUCATION/TRAINING PROGRAM

## 2025-03-08 PROCEDURE — 90935 HEMODIALYSIS ONE EVALUATION: CPT

## 2025-03-08 PROCEDURE — 85025 COMPLETE CBC W/AUTO DIFF WBC: CPT

## 2025-03-08 PROCEDURE — 36589 REMOVAL TUNNELED CV CATH: CPT

## 2025-03-08 PROCEDURE — 2060000000 HC ICU INTERMEDIATE R&B

## 2025-03-08 PROCEDURE — 82803 BLOOD GASES ANY COMBINATION: CPT

## 2025-03-08 PROCEDURE — 6360000004 HC RX CONTRAST MEDICATION: Performed by: HOSPITALIST

## 2025-03-08 PROCEDURE — 2580000003 HC RX 258: Performed by: INTERNAL MEDICINE

## 2025-03-08 PROCEDURE — 80048 BASIC METABOLIC PNL TOTAL CA: CPT

## 2025-03-08 PROCEDURE — 36600 WITHDRAWAL OF ARTERIAL BLOOD: CPT

## 2025-03-08 PROCEDURE — 2500000003 HC RX 250 WO HCPCS: Performed by: INTERNAL MEDICINE

## 2025-03-08 PROCEDURE — 6370000000 HC RX 637 (ALT 250 FOR IP): Performed by: STUDENT IN AN ORGANIZED HEALTH CARE EDUCATION/TRAINING PROGRAM

## 2025-03-08 PROCEDURE — 84484 ASSAY OF TROPONIN QUANT: CPT

## 2025-03-08 RX ORDER — VANCOMYCIN 1.25 G/250ML
25 INJECTION, SOLUTION INTRAVENOUS ONCE
Status: COMPLETED | OUTPATIENT
Start: 2025-03-08 | End: 2025-03-08

## 2025-03-08 RX ORDER — HYDROMORPHONE HYDROCHLORIDE 1 MG/ML
1 INJECTION, SOLUTION INTRAMUSCULAR; INTRAVENOUS; SUBCUTANEOUS EVERY 4 HOURS PRN
Status: DISCONTINUED | OUTPATIENT
Start: 2025-03-08 | End: 2025-03-12

## 2025-03-08 RX ORDER — IOPAMIDOL 755 MG/ML
100 INJECTION, SOLUTION INTRAVASCULAR
Status: COMPLETED | OUTPATIENT
Start: 2025-03-08 | End: 2025-03-08

## 2025-03-08 RX ORDER — BUPRENORPHINE HYDROCHLORIDE AND NALOXONE HYDROCHLORIDE DIHYDRATE 8; 2 MG/1; MG/1
1 TABLET SUBLINGUAL 3 TIMES DAILY
Status: DISCONTINUED | OUTPATIENT
Start: 2025-03-08 | End: 2025-03-11

## 2025-03-08 RX ADMIN — SODIUM CHLORIDE, PRESERVATIVE FREE 10 ML: 5 INJECTION INTRAVENOUS at 22:03

## 2025-03-08 RX ADMIN — Medication 1 MG: at 08:35

## 2025-03-08 RX ADMIN — CARVEDILOL 6.25 MG: 6.25 TABLET, FILM COATED ORAL at 08:35

## 2025-03-08 RX ADMIN — HYDROMORPHONE HYDROCHLORIDE 0.5 MG: 1 INJECTION, SOLUTION INTRAMUSCULAR; INTRAVENOUS; SUBCUTANEOUS at 07:27

## 2025-03-08 RX ADMIN — ISOSORBIDE DINITRATE 20 MG: 20 TABLET ORAL at 08:35

## 2025-03-08 RX ADMIN — Medication 100 MG: at 08:34

## 2025-03-08 RX ADMIN — PANTOPRAZOLE SODIUM 40 MG: 40 TABLET, DELAYED RELEASE ORAL at 07:23

## 2025-03-08 RX ADMIN — BUPRENORPHINE HYDROCHLORIDE AND NALOXONE HYDROCHLORIDE DIHYDRATE 1 TABLET: 8; 2 TABLET SUBLINGUAL at 10:34

## 2025-03-08 RX ADMIN — WATER 2000 MG: 1 INJECTION INTRAMUSCULAR; INTRAVENOUS; SUBCUTANEOUS at 10:34

## 2025-03-08 RX ADMIN — CEFEPIME 1000 MG: 1 INJECTION, POWDER, FOR SOLUTION INTRAMUSCULAR; INTRAVENOUS at 22:16

## 2025-03-08 RX ADMIN — HEPARIN SODIUM 5000 UNITS: 5000 INJECTION INTRAVENOUS; SUBCUTANEOUS at 07:23

## 2025-03-08 RX ADMIN — HYDROMORPHONE HYDROCHLORIDE 0.5 MG: 1 INJECTION, SOLUTION INTRAMUSCULAR; INTRAVENOUS; SUBCUTANEOUS at 16:45

## 2025-03-08 RX ADMIN — FERROUS SULFATE TAB 325 MG (65 MG ELEMENTAL FE) 325 MG: 325 (65 FE) TAB at 07:23

## 2025-03-08 RX ADMIN — HYDRALAZINE HYDROCHLORIDE 25 MG: 25 TABLET ORAL at 07:23

## 2025-03-08 RX ADMIN — HYDROMORPHONE HYDROCHLORIDE 1 MG: 1 INJECTION, SOLUTION INTRAMUSCULAR; INTRAVENOUS; SUBCUTANEOUS at 20:33

## 2025-03-08 RX ADMIN — IOPAMIDOL 75 ML: 755 INJECTION, SOLUTION INTRAVENOUS at 17:33

## 2025-03-08 RX ADMIN — VANCOMYCIN 1750 MG: 1.25 INJECTION, SOLUTION INTRAVENOUS at 17:56

## 2025-03-08 RX ADMIN — CALCITRIOL CAPSULES 0.25 MCG 0.25 MCG: 0.25 CAPSULE ORAL at 08:35

## 2025-03-08 RX ADMIN — OXYCODONE 5 MG: 5 TABLET ORAL at 08:34

## 2025-03-08 RX ADMIN — HEPARIN SODIUM 5000 UNITS: 5000 INJECTION INTRAVENOUS; SUBCUTANEOUS at 22:02

## 2025-03-08 ASSESSMENT — PAIN SCALES - GENERAL
PAINLEVEL_OUTOF10: 10

## 2025-03-08 ASSESSMENT — PAIN DESCRIPTION - LOCATION
LOCATION: BACK;ABDOMEN
LOCATION: BACK
LOCATION: BACK;FLANK
LOCATION: BACK

## 2025-03-08 ASSESSMENT — PAIN DESCRIPTION - ORIENTATION
ORIENTATION: LOWER;RIGHT
ORIENTATION: LOWER
ORIENTATION: LOWER
ORIENTATION: MID;LOWER

## 2025-03-08 ASSESSMENT — PAIN DESCRIPTION - DESCRIPTORS: DESCRIPTORS: ACHING

## 2025-03-08 NOTE — CARE COORDINATION
Care Management Initial Assessment       RUR: 23%  Readmission? No  1st IM letter given? No  1st  letter given: No    CM to verify demographics and insurance info. Pt resides currently at [facility] for LTC. Pt plan is to return. Referral sent via Link. Pt requesting stretcher transport via Medicaid.    Pt DME: oxygen, unsure of baseline (from chart review, his baseline is 4L), WC, RW. Uses DME at facility.    Medicaid transportation: unsure of which provides transportation. Pt does not drive. Pt is currently needing assistance with ADLs and ambulation. CM will follow up with [facility] for details. Will need to know so CM can set up dialysis transport appointments prior to pt returning to [facility].    Medicaid Transportation: [phone]  **Will need stretcher with O2**    HH: no hx  SNF: [facility]  IPR: [facility]    Pt ESRD HD pt: [facility]  [address] [facility] [address]  P: [phone]  F: [phone]    POC: sister, Phuc Perez, [phone]    CM will continue to follow and provide support.    Letitia Fuentes RN BSN CM    Via [facility]       03/08/25 0735   Service Assessment   Patient Orientation Alert and Oriented;Person;Place;Situation;Self;Other (see comment)  (slightly drowsy)   History Provided By Patient   Primary Caregiver Other (Comment)  (LTC)   Support Systems /   PCP Verified by CM Yes   Last Visit to PCP Within last 3 months   Prior Functional Level Assistance with the following:;Bathing;Dressing;Toileting;Feeding;Cooking;Housework;Shopping;Mobility   Current Functional Level Assistance with the following:;Bathing;Dressing;Toileting;Feeding;Cooking;Housework;Shopping;Mobility   Can patient return to prior living arrangement Other (see comment)  (Return to [facility] for LTC)   Ability to make needs known: Fair   Family able to assist with home care needs: No   Financial Resources Medicaid Community Resources

## 2025-03-08 NOTE — SIGNIFICANT EVENT
Rapid Response Note       03/08/25 at 1657    A rapid response was called on this patient for Chest Pain.    Response    Rapid Response Team present for evaluation.    Dr. Swan responding at the bedside.    Keshia NAM is the primary nurse during this episode.    Situation    1657- The patient was just finishing a dialysis treatment when he reported chest pain and shortness of breath.  A non-rebreather mask was applied prior to the Rapid Response Team arrival.  ABG performed.  Orders for a Stat Chest CT now.    1720- Down for CT scan with nurse and monitor.    1740- Patient transported to [facility]. Lisette NAM in the room to receive patient.    The patient was left in the care of his primary nursing team.    Rapid Response end time approximately 1745      Rapid Response Team Sepsis Screening  Is the patient's history suggestive of a new infection? No    Are two or more SIRS criteria present? No    Is there evidence of Organ Dysfunction? [facility] Sepsis OD: None    Communication with provider: No    Was a Code Sepsis called at this encounter? No. Why? Not indicated

## 2025-03-08 NOTE — CARE COORDINATION
03/08/25 1504   Readmission Assessment   Number of Days since last admission? 8-30 days   Previous Disposition Long Term Care   Who is being Interviewed Patient   What was the patient's/caregiver's perception as to why they think they needed to return back to the hospital? Could not/did not make appointment with PCP;Other (Comment)  (LTC MD)   Did you visit your Primary Care Physician after you left the hospital, before you returned this time? No   Why weren't you able to visit your PCP? Did not have an appointment   Did you see a specialist, such as Cardiac, Pulmonary, Orthopedic Physician, etc. after you left the hospital? Yes   Who advised the patient to return to the hospital? Skilled Unit;Self-referral   Does the patient report anything that got in the way of taking their medications? No   In our efforts to provide the best possible care to you and others like you, can you think of anything that we could have done to help you after you left the hospital the first time, so that you might not have needed to return so soon? Arrange for more help when leaving the hospital;Discharge instructions that are concise, clear, and non contradictory;Teaching during hospitalization regarding your illness

## 2025-03-09 ENCOUNTER — APPOINTMENT (OUTPATIENT)
Facility: HOSPITAL | Age: 60
DRG: 347 | End: 2025-03-09
Payer: MEDICAID

## 2025-03-09 PROBLEM — I16.1 HYPERTENSIVE EMERGENCY: Status: ACTIVE | Noted: 2025-03-09

## 2025-03-09 PROBLEM — M46.24 OSTEOMYELITIS OF THORACIC SPINE (HCC): Status: ACTIVE | Noted: 2025-03-09

## 2025-03-09 PROBLEM — N18.6 ESRD ON HEMODIALYSIS (HCC): Status: ACTIVE | Noted: 2024-07-10

## 2025-03-09 PROBLEM — J10.1 INFLUENZA A: Status: ACTIVE | Noted: 2025-03-09

## 2025-03-09 PROBLEM — I50.40 COMBINED SYSTOLIC AND DIASTOLIC CONGESTIVE HEART FAILURE (HCC): Status: ACTIVE | Noted: 2025-03-09

## 2025-03-09 PROBLEM — A48.8 SERRATIA MARCESCENS INFECTION: Status: ACTIVE | Noted: 2025-03-09

## 2025-03-09 LAB
ALBUMIN SERPL-MCNC: 2.9 G/DL (ref 3.5–5)
ALBUMIN/GLOB SERPL: 0.4 (ref 1.1–2.2)
ALP SERPL-CCNC: 91 U/L (ref 45–117)
ALT SERPL-CCNC: 19 U/L (ref 12–78)
ANION GAP SERPL CALC-SCNC: 12 MMOL/L (ref 2–12)
ARTERIAL PATENCY WRIST A: POSITIVE
AST SERPL-CCNC: 27 U/L (ref 15–37)
BASE DEFICIT BLD-SCNC: 1.6 MMOL/L
BASOPHILS # BLD: 0.02 K/UL (ref 0–0.1)
BASOPHILS NFR BLD: 0.3 % (ref 0–1)
BDY SITE: ABNORMAL
BILIRUB SERPL-MCNC: 0.9 MG/DL (ref 0.2–1)
BUN SERPL-MCNC: 39 MG/DL (ref 6–20)
BUN/CREAT SERPL: 6 (ref 12–20)
CALCIUM SERPL-MCNC: 9.5 MG/DL (ref 8.5–10.1)
CHLORIDE SERPL-SCNC: 97 MMOL/L (ref 97–108)
CO2 SERPL-SCNC: 24 MMOL/L (ref 21–32)
CREAT SERPL-MCNC: 6.34 MG/DL (ref 0.7–1.3)
DIFFERENTIAL METHOD BLD: ABNORMAL
EOSINOPHIL # BLD: 0.01 K/UL (ref 0–0.4)
EOSINOPHIL NFR BLD: 0.1 % (ref 0–7)
ERYTHROCYTE [DISTWIDTH] IN BLOOD BY AUTOMATED COUNT: 17.3 % (ref 11.5–14.5)
GAS FLOW.O2 O2 DELIVERY SYS: ABNORMAL
GLOBULIN SER CALC-MCNC: 6.8 G/DL (ref 2–4)
GLUCOSE BLD STRIP.AUTO-MCNC: 141 MG/DL (ref 65–117)
GLUCOSE BLD STRIP.AUTO-MCNC: 145 MG/DL (ref 65–117)
GLUCOSE SERPL-MCNC: 137 MG/DL (ref 65–100)
HCO3 BLD-SCNC: 24.4 MMOL/L (ref 21–28)
HCT VFR BLD AUTO: 40.2 % (ref 36.6–50.3)
HGB BLD-MCNC: 12.5 G/DL (ref 12.1–17)
IMM GRANULOCYTES # BLD AUTO: 0.04 K/UL (ref 0–0.04)
IMM GRANULOCYTES NFR BLD AUTO: 0.5 % (ref 0–0.5)
LACTATE SERPL-SCNC: 1.1 MMOL/L (ref 0.4–2)
LYMPHOCYTES # BLD: 1.64 K/UL (ref 0.8–3.5)
LYMPHOCYTES NFR BLD: 21.7 % (ref 12–49)
MAGNESIUM SERPL-MCNC: 2.3 MG/DL (ref 1.6–2.4)
MCH RBC QN AUTO: 30.9 PG (ref 26–34)
MCHC RBC AUTO-ENTMCNC: 31.1 G/DL (ref 30–36.5)
MCV RBC AUTO: 99.5 FL (ref 80–99)
MONOCYTES # BLD: 1.24 K/UL (ref 0–1)
MONOCYTES NFR BLD: 16.4 % (ref 5–13)
NEUTS SEG # BLD: 4.6 K/UL (ref 1.8–8)
NEUTS SEG NFR BLD: 61 % (ref 32–75)
NRBC # BLD: 0 K/UL (ref 0–0.01)
NRBC BLD-RTO: 0 PER 100 WBC
PCO2 BLD: 45.1 MMHG (ref 35–48)
PH BLD: 7.34 (ref 7.35–7.45)
PLATELET # BLD AUTO: 174 K/UL (ref 150–400)
PMV BLD AUTO: 10.1 FL (ref 8.9–12.9)
PO2 BLD: 257 MMHG (ref 83–108)
POTASSIUM SERPL-SCNC: 4.1 MMOL/L (ref 3.5–5.1)
PROCALCITONIN SERPL-MCNC: 4.21 NG/ML
PROT SERPL-MCNC: 9.7 G/DL (ref 6.4–8.2)
RBC # BLD AUTO: 4.04 M/UL (ref 4.1–5.7)
SAO2 % BLD: 99.8 % (ref 92–97)
SERVICE CMNT-IMP: ABNORMAL
SERVICE CMNT-IMP: ABNORMAL
SODIUM SERPL-SCNC: 133 MMOL/L (ref 136–145)
SPECIMEN TYPE: ABNORMAL
WBC # BLD AUTO: 7.6 K/UL (ref 4.1–11.1)

## 2025-03-09 PROCEDURE — P9047 ALBUMIN (HUMAN), 25%, 50ML: HCPCS | Performed by: ANESTHESIOLOGY

## 2025-03-09 PROCEDURE — 87186 SC STD MICRODIL/AGAR DIL: CPT

## 2025-03-09 PROCEDURE — 6370000000 HC RX 637 (ALT 250 FOR IP): Performed by: INTERNAL MEDICINE

## 2025-03-09 PROCEDURE — 2500000003 HC RX 250 WO HCPCS: Performed by: STUDENT IN AN ORGANIZED HEALTH CARE EDUCATION/TRAINING PROGRAM

## 2025-03-09 PROCEDURE — 0BH17EZ INSERTION OF ENDOTRACHEAL AIRWAY INTO TRACHEA, VIA NATURAL OR ARTIFICIAL OPENING: ICD-10-PCS | Performed by: STUDENT IN AN ORGANIZED HEALTH CARE EDUCATION/TRAINING PROGRAM

## 2025-03-09 PROCEDURE — 80053 COMPREHEN METABOLIC PANEL: CPT

## 2025-03-09 PROCEDURE — 84145 PROCALCITONIN (PCT): CPT

## 2025-03-09 PROCEDURE — 6360000002 HC RX W HCPCS: Performed by: STUDENT IN AN ORGANIZED HEALTH CARE EDUCATION/TRAINING PROGRAM

## 2025-03-09 PROCEDURE — 87205 SMEAR GRAM STAIN: CPT

## 2025-03-09 PROCEDURE — 6360000002 HC RX W HCPCS: Performed by: INTERNAL MEDICINE

## 2025-03-09 PROCEDURE — 2580000003 HC RX 258: Performed by: STUDENT IN AN ORGANIZED HEALTH CARE EDUCATION/TRAINING PROGRAM

## 2025-03-09 PROCEDURE — 0CJS8ZZ INSPECTION OF LARYNX, VIA NATURAL OR ARTIFICIAL OPENING ENDOSCOPIC: ICD-10-PCS | Performed by: ANESTHESIOLOGY

## 2025-03-09 PROCEDURE — 76937 US GUIDE VASCULAR ACCESS: CPT

## 2025-03-09 PROCEDURE — 85025 COMPLETE CBC W/AUTO DIFF WBC: CPT

## 2025-03-09 PROCEDURE — 6370000000 HC RX 637 (ALT 250 FOR IP): Performed by: STUDENT IN AN ORGANIZED HEALTH CARE EDUCATION/TRAINING PROGRAM

## 2025-03-09 PROCEDURE — 74018 RADEX ABDOMEN 1 VIEW: CPT

## 2025-03-09 PROCEDURE — 87077 CULTURE AEROBIC IDENTIFY: CPT

## 2025-03-09 PROCEDURE — 87070 CULTURE OTHR SPECIMN AEROBIC: CPT

## 2025-03-09 PROCEDURE — 83605 ASSAY OF LACTIC ACID: CPT

## 2025-03-09 PROCEDURE — 94002 VENT MGMT INPAT INIT DAY: CPT

## 2025-03-09 PROCEDURE — 82962 GLUCOSE BLOOD TEST: CPT

## 2025-03-09 PROCEDURE — 2580000003 HC RX 258: Performed by: INTERNAL MEDICINE

## 2025-03-09 PROCEDURE — 31500 INSERT EMERGENCY AIRWAY: CPT

## 2025-03-09 PROCEDURE — 2000000000 HC ICU R&B

## 2025-03-09 PROCEDURE — 2500000003 HC RX 250 WO HCPCS: Performed by: ANESTHESIOLOGY

## 2025-03-09 PROCEDURE — 0B9J8ZX DRAINAGE OF LEFT LOWER LUNG LOBE, VIA NATURAL OR ARTIFICIAL OPENING ENDOSCOPIC, DIAGNOSTIC: ICD-10-PCS | Performed by: ANESTHESIOLOGY

## 2025-03-09 PROCEDURE — 71045 X-RAY EXAM CHEST 1 VIEW: CPT

## 2025-03-09 PROCEDURE — 36600 WITHDRAWAL OF ARTERIAL BLOOD: CPT

## 2025-03-09 PROCEDURE — 76700 US EXAM ABDOM COMPLETE: CPT

## 2025-03-09 PROCEDURE — 6360000002 HC RX W HCPCS: Performed by: ANESTHESIOLOGY

## 2025-03-09 PROCEDURE — 83735 ASSAY OF MAGNESIUM: CPT

## 2025-03-09 PROCEDURE — 87040 BLOOD CULTURE FOR BACTERIA: CPT

## 2025-03-09 PROCEDURE — 2500000003 HC RX 250 WO HCPCS

## 2025-03-09 PROCEDURE — 82803 BLOOD GASES ANY COMBINATION: CPT

## 2025-03-09 PROCEDURE — 5A1945Z RESPIRATORY VENTILATION, 24-96 CONSECUTIVE HOURS: ICD-10-PCS | Performed by: STUDENT IN AN ORGANIZED HEALTH CARE EDUCATION/TRAINING PROGRAM

## 2025-03-09 PROCEDURE — 94640 AIRWAY INHALATION TREATMENT: CPT

## 2025-03-09 RX ORDER — DEXMEDETOMIDINE HYDROCHLORIDE 4 UG/ML
.1-1.5 INJECTION, SOLUTION INTRAVENOUS CONTINUOUS
Status: DISCONTINUED | OUTPATIENT
Start: 2025-03-09 | End: 2025-03-10

## 2025-03-09 RX ORDER — ROCURONIUM BROMIDE 10 MG/ML
INJECTION, SOLUTION INTRAVENOUS
Status: COMPLETED
Start: 2025-03-09 | End: 2025-03-09

## 2025-03-09 RX ORDER — ETOMIDATE 2 MG/ML
INJECTION INTRAVENOUS
Status: COMPLETED
Start: 2025-03-09 | End: 2025-03-09

## 2025-03-09 RX ORDER — MIDAZOLAM HYDROCHLORIDE 2 MG/2ML
4 INJECTION, SOLUTION INTRAMUSCULAR; INTRAVENOUS ONCE
Status: COMPLETED | OUTPATIENT
Start: 2025-03-09 | End: 2025-03-09

## 2025-03-09 RX ORDER — PROPOFOL 10 MG/ML
5-50 INJECTION, EMULSION INTRAVENOUS CONTINUOUS
Status: DISCONTINUED | OUTPATIENT
Start: 2025-03-09 | End: 2025-03-10

## 2025-03-09 RX ORDER — ETOMIDATE 2 MG/ML
20 INJECTION INTRAVENOUS ONCE
Status: COMPLETED | OUTPATIENT
Start: 2025-03-09 | End: 2025-03-09

## 2025-03-09 RX ORDER — ACETYLCYSTEINE 200 MG/ML
600 SOLUTION ORAL; RESPIRATORY (INHALATION)
Status: DISCONTINUED | OUTPATIENT
Start: 2025-03-09 | End: 2025-03-10

## 2025-03-09 RX ORDER — SODIUM CHLORIDE, SODIUM LACTATE, POTASSIUM CHLORIDE, AND CALCIUM CHLORIDE .6; .31; .03; .02 G/100ML; G/100ML; G/100ML; G/100ML
500 INJECTION, SOLUTION INTRAVENOUS ONCE
Status: COMPLETED | OUTPATIENT
Start: 2025-03-09 | End: 2025-03-09

## 2025-03-09 RX ORDER — ROCURONIUM BROMIDE 10 MG/ML
60 INJECTION, SOLUTION INTRAVENOUS ONCE
Status: COMPLETED | OUTPATIENT
Start: 2025-03-09 | End: 2025-03-09

## 2025-03-09 RX ORDER — METHOCARBAMOL 500 MG/1
1000 TABLET, FILM COATED ORAL 3 TIMES DAILY
Status: DISCONTINUED | OUTPATIENT
Start: 2025-03-09 | End: 2025-03-10

## 2025-03-09 RX ORDER — ALBUMIN (HUMAN) 12.5 G/50ML
12.5 SOLUTION INTRAVENOUS ONCE
Status: COMPLETED | OUTPATIENT
Start: 2025-03-09 | End: 2025-03-09

## 2025-03-09 RX ORDER — NOREPINEPHRINE BITARTRATE 0.06 MG/ML
1-100 INJECTION, SOLUTION INTRAVENOUS CONTINUOUS
Status: DISCONTINUED | OUTPATIENT
Start: 2025-03-09 | End: 2025-03-10

## 2025-03-09 RX ORDER — ALBUTEROL SULFATE 0.83 MG/ML
2.5 SOLUTION RESPIRATORY (INHALATION)
Status: DISCONTINUED | OUTPATIENT
Start: 2025-03-09 | End: 2025-03-20 | Stop reason: HOSPADM

## 2025-03-09 RX ADMIN — PROPOFOL 50 MCG/KG/MIN: 10 INJECTION, EMULSION INTRAVENOUS at 06:28

## 2025-03-09 RX ADMIN — HYDROMORPHONE HYDROCHLORIDE 1 MG: 1 INJECTION, SOLUTION INTRAMUSCULAR; INTRAVENOUS; SUBCUTANEOUS at 04:54

## 2025-03-09 RX ADMIN — HEPARIN SODIUM 5000 UNITS: 5000 INJECTION INTRAVENOUS; SUBCUTANEOUS at 05:54

## 2025-03-09 RX ADMIN — PROPOFOL 30 MCG/KG/MIN: 10 INJECTION, EMULSION INTRAVENOUS at 20:27

## 2025-03-09 RX ADMIN — PROPOFOL 50 MCG/KG/MIN: 10 INJECTION, EMULSION INTRAVENOUS at 11:31

## 2025-03-09 RX ADMIN — BUPRENORPHINE HYDROCHLORIDE AND NALOXONE HYDROCHLORIDE DIHYDRATE 1 TABLET: 8; 2 TABLET SUBLINGUAL at 20:28

## 2025-03-09 RX ADMIN — ALBUMIN (HUMAN) 12.5 G: 0.25 INJECTION, SOLUTION INTRAVENOUS at 04:21

## 2025-03-09 RX ADMIN — HYDROMORPHONE HYDROCHLORIDE 0.5 MG/HR: 10 INJECTION, SOLUTION INTRAMUSCULAR; INTRAVENOUS; SUBCUTANEOUS at 11:46

## 2025-03-09 RX ADMIN — CALCITRIOL CAPSULES 0.25 MCG 0.25 MCG: 0.25 CAPSULE ORAL at 09:37

## 2025-03-09 RX ADMIN — ROCURONIUM BROMIDE 60 MG: 10 INJECTION, SOLUTION INTRAVENOUS at 01:18

## 2025-03-09 RX ADMIN — SODIUM CHLORIDE: 0.9 INJECTION, SOLUTION INTRAVENOUS at 08:48

## 2025-03-09 RX ADMIN — BUPRENORPHINE HYDROCHLORIDE AND NALOXONE HYDROCHLORIDE DIHYDRATE 1 TABLET: 8; 2 TABLET SUBLINGUAL at 09:37

## 2025-03-09 RX ADMIN — HEPARIN SODIUM 5000 UNITS: 5000 INJECTION INTRAVENOUS; SUBCUTANEOUS at 22:22

## 2025-03-09 RX ADMIN — ETOMIDATE 20 MG: 2 INJECTION, SOLUTION INTRAVENOUS at 01:19

## 2025-03-09 RX ADMIN — HEPARIN SODIUM 5000 UNITS: 5000 INJECTION INTRAVENOUS; SUBCUTANEOUS at 13:44

## 2025-03-09 RX ADMIN — SEVELAMER CARBONATE 1.6 G: 800 FOR SUSPENSION ORAL at 12:07

## 2025-03-09 RX ADMIN — CEFEPIME 1000 MG: 1 INJECTION, POWDER, FOR SOLUTION INTRAMUSCULAR; INTRAVENOUS at 22:25

## 2025-03-09 RX ADMIN — BUPRENORPHINE HYDROCHLORIDE AND NALOXONE HYDROCHLORIDE DIHYDRATE 1 TABLET: 8; 2 TABLET SUBLINGUAL at 14:19

## 2025-03-09 RX ADMIN — MIDAZOLAM HYDROCHLORIDE 4 MG: 2 INJECTION, SOLUTION INTRAMUSCULAR; INTRAVENOUS at 03:45

## 2025-03-09 RX ADMIN — Medication 1 MG: at 09:36

## 2025-03-09 RX ADMIN — SODIUM CHLORIDE, PRESERVATIVE FREE 10 ML: 5 INJECTION INTRAVENOUS at 09:37

## 2025-03-09 RX ADMIN — PROPOFOL 30 MCG/KG/MIN: 10 INJECTION, EMULSION INTRAVENOUS at 01:20

## 2025-03-09 RX ADMIN — SODIUM CHLORIDE, POTASSIUM CHLORIDE, SODIUM LACTATE AND CALCIUM CHLORIDE 500 ML: 600; 310; 30; 20 INJECTION, SOLUTION INTRAVENOUS at 15:02

## 2025-03-09 RX ADMIN — PROPOFOL 40 MCG/KG/MIN: 10 INJECTION, EMULSION INTRAVENOUS at 23:51

## 2025-03-09 RX ADMIN — SODIUM CHLORIDE, PRESERVATIVE FREE 10 ML: 5 INJECTION INTRAVENOUS at 22:22

## 2025-03-09 RX ADMIN — METHOCARBAMOL 1000 MG: 500 TABLET ORAL at 23:50

## 2025-03-09 RX ADMIN — HYDROMORPHONE HYDROCHLORIDE 1 MG: 1 INJECTION, SOLUTION INTRAMUSCULAR; INTRAVENOUS; SUBCUTANEOUS at 08:50

## 2025-03-09 RX ADMIN — DEXMEDETOMIDINE HYDROCHLORIDE 0.2 MCG/KG/HR: 400 INJECTION INTRAVENOUS at 11:32

## 2025-03-09 RX ADMIN — OXYCODONE 5 MG: 5 TABLET ORAL at 09:36

## 2025-03-09 RX ADMIN — SEVELAMER CARBONATE 1.6 G: 800 FOR SUSPENSION ORAL at 09:37

## 2025-03-09 RX ADMIN — Medication 100 MG: at 09:36

## 2025-03-09 RX ADMIN — ACETYLCYSTEINE 600 MG: 200 INHALANT RESPIRATORY (INHALATION) at 20:12

## 2025-03-09 RX ADMIN — ETOMIDATE 20 MG: 2 INJECTION INTRAVENOUS at 01:19

## 2025-03-09 RX ADMIN — NOREPINEPHRINE BITARTRATE 5 MCG/MIN: 64 SOLUTION INTRAVENOUS at 04:02

## 2025-03-09 RX ADMIN — SEVELAMER CARBONATE 1.6 G: 800 FOR SUSPENSION ORAL at 17:00

## 2025-03-09 ASSESSMENT — PULMONARY FUNCTION TESTS
PIF_VALUE: 16
PIF_VALUE: 21
PIF_VALUE: 18
PIF_VALUE: 20
PIF_VALUE: 19

## 2025-03-09 ASSESSMENT — PAIN SCALES - GENERAL
PAINLEVEL_OUTOF10: 0
PAINLEVEL_OUTOF10: 0
PAINLEVEL_OUTOF10: 6
PAINLEVEL_OUTOF10: 0
PAINLEVEL_OUTOF10: 8
PAINLEVEL_OUTOF10: 8

## 2025-03-09 NOTE — SIGNIFICANT EVENT
Rapid Response  Rapid response room 442 called overhead at 0100. RRT responding.    Rapid response called for desaturation and tachypnea    Prior to rapid, Primary nurse requested an assessment of patient. With this the patient was desaturating into the 80's, RR in 30's and due to pain unable to breathe properly. NP called to bedside with concern for decompensation. ICU was then consulted and rapid called due to continued hypoxia. ICU requested transfer to ICU with a controlled intubation. Patient was then moved to 4225, Rocuronium and Etomidate given for intubation. ET properly placed, breathe sounds heard, and X-ray taken. Patient care transferred to CCU nurse at this time.    Meds given:   60 mg Rocuronium  20 mg Etomidate    ET tube placement:   7.5 tube, 26 @ lip    Checked in with primary RN prior to leaving. Opportunity for questions and concerns provided.      Rapid Response Team Sepsis Screening  Is the patient's history suggestive of a new infection? No    Are two or more SIRS criteria present? No    Is there evidence of Organ Dysfunction? [facility] Sepsis OD: None    Communication with provider: No    Was a Code Sepsis called at this encounter? No. Why? NA

## 2025-03-10 ENCOUNTER — ANESTHESIA EVENT (OUTPATIENT)
Facility: HOSPITAL | Age: 60
End: 2025-03-10
Payer: MEDICAID

## 2025-03-10 ENCOUNTER — APPOINTMENT (OUTPATIENT)
Facility: HOSPITAL | Age: 60
DRG: 347 | End: 2025-03-10
Payer: MEDICAID

## 2025-03-10 ENCOUNTER — ANESTHESIA (OUTPATIENT)
Facility: HOSPITAL | Age: 60
End: 2025-03-10
Payer: MEDICAID

## 2025-03-10 ENCOUNTER — HOSPITAL ENCOUNTER (INPATIENT)
Facility: HOSPITAL | Age: 60
Discharge: HOME | DRG: 347 | End: 2025-03-13
Attending: INTERNAL MEDICINE
Payer: MEDICAID

## 2025-03-10 VITALS
HEART RATE: 92 BPM | OXYGEN SATURATION: 100 % | DIASTOLIC BLOOD PRESSURE: 85 MMHG | TEMPERATURE: 98.6 F | SYSTOLIC BLOOD PRESSURE: 121 MMHG | RESPIRATION RATE: 15 BRPM

## 2025-03-10 PROBLEM — R78.81 BACTEREMIA: Status: ACTIVE | Noted: 2025-03-10

## 2025-03-10 PROBLEM — M86.9 OSTEOMYELITIS: Status: ACTIVE | Noted: 2025-03-09

## 2025-03-10 LAB
ALBUMIN SERPL-MCNC: 2.7 G/DL (ref 3.5–5)
ALBUMIN/GLOB SERPL: 0.5 (ref 1.1–2.2)
ALP SERPL-CCNC: 79 U/L (ref 45–117)
ALT SERPL-CCNC: 25 U/L (ref 12–78)
ANION GAP SERPL CALC-SCNC: 17 MMOL/L (ref 2–12)
AST SERPL-CCNC: 36 U/L (ref 15–37)
BASOPHILS # BLD: 0.02 K/UL (ref 0–0.1)
BASOPHILS NFR BLD: 0.3 % (ref 0–1)
BILIRUB SERPL-MCNC: 1.3 MG/DL (ref 0.2–1)
BUN SERPL-MCNC: 64 MG/DL (ref 6–20)
BUN/CREAT SERPL: 7 (ref 12–20)
CALCIUM SERPL-MCNC: 9.4 MG/DL (ref 8.5–10.1)
CHLORIDE SERPL-SCNC: 94 MMOL/L (ref 97–108)
CO2 SERPL-SCNC: 19 MMOL/L (ref 21–32)
CREAT SERPL-MCNC: 8.61 MG/DL (ref 0.7–1.3)
DIFFERENTIAL METHOD BLD: ABNORMAL
EOSINOPHIL # BLD: 0.03 K/UL (ref 0–0.4)
EOSINOPHIL NFR BLD: 0.4 % (ref 0–7)
ERYTHROCYTE [DISTWIDTH] IN BLOOD BY AUTOMATED COUNT: 17.3 % (ref 11.5–14.5)
GLOBULIN SER CALC-MCNC: 6 G/DL (ref 2–4)
GLUCOSE BLD STRIP.AUTO-MCNC: 69 MG/DL (ref 65–117)
GLUCOSE BLD STRIP.AUTO-MCNC: 98 MG/DL (ref 65–117)
GLUCOSE SERPL-MCNC: 127 MG/DL (ref 65–100)
HCT VFR BLD AUTO: 33.7 % (ref 36.6–50.3)
HGB BLD-MCNC: 10.8 G/DL (ref 12.1–17)
IMM GRANULOCYTES # BLD AUTO: 0.03 K/UL (ref 0–0.04)
IMM GRANULOCYTES NFR BLD AUTO: 0.4 % (ref 0–0.5)
LYMPHOCYTES # BLD: 1.1 K/UL (ref 0.8–3.5)
LYMPHOCYTES NFR BLD: 15.3 % (ref 12–49)
MAGNESIUM SERPL-MCNC: 2.6 MG/DL (ref 1.6–2.4)
MCH RBC QN AUTO: 31.5 PG (ref 26–34)
MCHC RBC AUTO-ENTMCNC: 32 G/DL (ref 30–36.5)
MCV RBC AUTO: 98.3 FL (ref 80–99)
MONOCYTES # BLD: 0.82 K/UL (ref 0–1)
MONOCYTES NFR BLD: 11.4 % (ref 5–13)
NEUTS SEG # BLD: 5.21 K/UL (ref 1.8–8)
NEUTS SEG NFR BLD: 72.2 % (ref 32–75)
NRBC # BLD: 0 K/UL (ref 0–0.01)
NRBC BLD-RTO: 0 PER 100 WBC
PHOSPHATE SERPL-MCNC: 6.8 MG/DL (ref 2.6–4.7)
PLATELET # BLD AUTO: 178 K/UL (ref 150–400)
PMV BLD AUTO: 10.2 FL (ref 8.9–12.9)
POTASSIUM SERPL-SCNC: 4.1 MMOL/L (ref 3.5–5.1)
PROT SERPL-MCNC: 8.7 G/DL (ref 6.4–8.2)
RBC # BLD AUTO: 3.43 M/UL (ref 4.1–5.7)
SERVICE CMNT-IMP: NORMAL
SERVICE CMNT-IMP: NORMAL
SODIUM SERPL-SCNC: 130 MMOL/L (ref 136–145)
WBC # BLD AUTO: 7.2 K/UL (ref 4.1–11.1)

## 2025-03-10 PROCEDURE — 6360000002 HC RX W HCPCS: Performed by: INTERNAL MEDICINE

## 2025-03-10 PROCEDURE — 2500000003 HC RX 250 WO HCPCS: Performed by: STUDENT IN AN ORGANIZED HEALTH CARE EDUCATION/TRAINING PROGRAM

## 2025-03-10 PROCEDURE — 87186 SC STD MICRODIL/AGAR DIL: CPT

## 2025-03-10 PROCEDURE — 2580000003 HC RX 258: Performed by: NURSE ANESTHETIST, CERTIFIED REGISTERED

## 2025-03-10 PROCEDURE — 3700000001 HC ADD 15 MINUTES (ANESTHESIA)

## 2025-03-10 PROCEDURE — 77003 FLUOROGUIDE FOR SPINE INJECT: CPT

## 2025-03-10 PROCEDURE — 2500000003 HC RX 250 WO HCPCS: Performed by: PHYSICIAN ASSISTANT

## 2025-03-10 PROCEDURE — 87102 FUNGUS ISOLATION CULTURE: CPT

## 2025-03-10 PROCEDURE — 2580000003 HC RX 258: Performed by: PHYSICIAN ASSISTANT

## 2025-03-10 PROCEDURE — 6360000002 HC RX W HCPCS: Performed by: ANESTHESIOLOGY

## 2025-03-10 PROCEDURE — 84100 ASSAY OF PHOSPHORUS: CPT

## 2025-03-10 PROCEDURE — 6360000004 HC RX CONTRAST MEDICATION: Performed by: FAMILY MEDICINE

## 2025-03-10 PROCEDURE — 99222 1ST HOSP IP/OBS MODERATE 55: CPT | Performed by: PHYSICIAN ASSISTANT

## 2025-03-10 PROCEDURE — 6370000000 HC RX 637 (ALT 250 FOR IP): Performed by: INTERNAL MEDICINE

## 2025-03-10 PROCEDURE — 2500000003 HC RX 250 WO HCPCS: Performed by: NURSE ANESTHETIST, CERTIFIED REGISTERED

## 2025-03-10 PROCEDURE — 80053 COMPREHEN METABOLIC PANEL: CPT

## 2025-03-10 PROCEDURE — 94640 AIRWAY INHALATION TREATMENT: CPT

## 2025-03-10 PROCEDURE — 87077 CULTURE AEROBIC IDENTIFY: CPT

## 2025-03-10 PROCEDURE — 72157 MRI CHEST SPINE W/O & W/DYE: CPT

## 2025-03-10 PROCEDURE — 6370000000 HC RX 637 (ALT 250 FOR IP): Performed by: STUDENT IN AN ORGANIZED HEALTH CARE EDUCATION/TRAINING PROGRAM

## 2025-03-10 PROCEDURE — 6360000002 HC RX W HCPCS: Performed by: STUDENT IN AN ORGANIZED HEALTH CARE EDUCATION/TRAINING PROGRAM

## 2025-03-10 PROCEDURE — 94003 VENT MGMT INPAT SUBQ DAY: CPT

## 2025-03-10 PROCEDURE — 6370000000 HC RX 637 (ALT 250 FOR IP): Performed by: PHYSICIAN ASSISTANT

## 2025-03-10 PROCEDURE — 82962 GLUCOSE BLOOD TEST: CPT

## 2025-03-10 PROCEDURE — 2580000003 HC RX 258: Performed by: INTERNAL MEDICINE

## 2025-03-10 PROCEDURE — 00BX3ZX EXCISION OF THORACIC SPINAL CORD, PERCUTANEOUS APPROACH, DIAGNOSTIC: ICD-10-PCS | Performed by: STUDENT IN AN ORGANIZED HEALTH CARE EDUCATION/TRAINING PROGRAM

## 2025-03-10 PROCEDURE — A9579 GAD-BASE MR CONTRAST NOS,1ML: HCPCS | Performed by: FAMILY MEDICINE

## 2025-03-10 PROCEDURE — 83735 ASSAY OF MAGNESIUM: CPT

## 2025-03-10 PROCEDURE — 2000000000 HC ICU R&B

## 2025-03-10 PROCEDURE — 87070 CULTURE OTHR SPECIMN AEROBIC: CPT

## 2025-03-10 PROCEDURE — 6360000002 HC RX W HCPCS: Performed by: NURSE PRACTITIONER

## 2025-03-10 PROCEDURE — 87205 SMEAR GRAM STAIN: CPT

## 2025-03-10 PROCEDURE — 2500000003 HC RX 250 WO HCPCS: Performed by: ANESTHESIOLOGY

## 2025-03-10 PROCEDURE — 99232 SBSQ HOSP IP/OBS MODERATE 35: CPT | Performed by: NURSE PRACTITIONER

## 2025-03-10 PROCEDURE — 2580000003 HC RX 258: Performed by: NURSE PRACTITIONER

## 2025-03-10 PROCEDURE — 85025 COMPLETE CBC W/AUTO DIFF WBC: CPT

## 2025-03-10 PROCEDURE — 3700000000 HC ANESTHESIA ATTENDED CARE

## 2025-03-10 PROCEDURE — 2709999900 IR PERC ASPIRATION INTERVERT DISC

## 2025-03-10 RX ORDER — SODIUM CHLORIDE 9 MG/ML
INJECTION, SOLUTION INTRAVENOUS
Status: DISCONTINUED | OUTPATIENT
Start: 2025-03-10 | End: 2025-03-10 | Stop reason: SDUPTHER

## 2025-03-10 RX ORDER — OXYCODONE HYDROCHLORIDE 5 MG/1
5 TABLET ORAL EVERY 4 HOURS PRN
Refills: 0 | Status: DISCONTINUED | OUTPATIENT
Start: 2025-03-10 | End: 2025-03-12

## 2025-03-10 RX ORDER — LIDOCAINE HYDROCHLORIDE 10 MG/ML
INJECTION, SOLUTION EPIDURAL; INFILTRATION; INTRACAUDAL; PERINEURAL PRN
Status: COMPLETED | OUTPATIENT
Start: 2025-03-10 | End: 2025-03-10

## 2025-03-10 RX ORDER — METHOCARBAMOL 500 MG/1
1000 TABLET, FILM COATED ORAL 4 TIMES DAILY
Status: DISCONTINUED | OUTPATIENT
Start: 2025-03-10 | End: 2025-03-11

## 2025-03-10 RX ORDER — MINERAL OIL/HYDROPHIL PETROLAT
OINTMENT (GRAM) TOPICAL PRN
Status: DISCONTINUED | OUTPATIENT
Start: 2025-03-10 | End: 2025-03-20 | Stop reason: HOSPADM

## 2025-03-10 RX ORDER — GABAPENTIN 600 MG/1
300 TABLET ORAL 3 TIMES DAILY
Status: DISCONTINUED | OUTPATIENT
Start: 2025-03-10 | End: 2025-03-15 | Stop reason: DRUGHIGH

## 2025-03-10 RX ORDER — SODIUM CHLORIDE FOR INHALATION 3 %
4 VIAL, NEBULIZER (ML) INHALATION PRN
Status: DISCONTINUED | OUTPATIENT
Start: 2025-03-10 | End: 2025-03-11

## 2025-03-10 RX ORDER — OXYCODONE HYDROCHLORIDE 5 MG/1
10 TABLET ORAL EVERY 4 HOURS PRN
Refills: 0 | Status: DISCONTINUED | OUTPATIENT
Start: 2025-03-10 | End: 2025-03-12

## 2025-03-10 RX ORDER — ROCURONIUM BROMIDE 10 MG/ML
INJECTION, SOLUTION INTRAVENOUS
Status: DISCONTINUED | OUTPATIENT
Start: 2025-03-10 | End: 2025-03-10 | Stop reason: SDUPTHER

## 2025-03-10 RX ORDER — DEXTROSE MONOHYDRATE 100 MG/ML
INJECTION, SOLUTION INTRAVENOUS CONTINUOUS PRN
Status: DISCONTINUED | OUTPATIENT
Start: 2025-03-10 | End: 2025-03-17 | Stop reason: SDUPTHER

## 2025-03-10 RX ADMIN — HEPARIN SODIUM 5000 UNITS: 5000 INJECTION INTRAVENOUS; SUBCUTANEOUS at 06:08

## 2025-03-10 RX ADMIN — SODIUM CHLORIDE, PRESERVATIVE FREE 10 ML: 5 INJECTION INTRAVENOUS at 08:22

## 2025-03-10 RX ADMIN — PROPOFOL 40 MCG/KG/MIN: 10 INJECTION, EMULSION INTRAVENOUS at 09:14

## 2025-03-10 RX ADMIN — TRAZODONE HYDROCHLORIDE 50 MG: 50 TABLET ORAL at 22:33

## 2025-03-10 RX ADMIN — SEVELAMER CARBONATE 1.6 G: 800 FOR SUSPENSION ORAL at 08:22

## 2025-03-10 RX ADMIN — NOREPINEPHRINE BITARTRATE 4 MCG/MIN: 64 SOLUTION INTRAVENOUS at 04:44

## 2025-03-10 RX ADMIN — CEFEPIME 1000 MG: 1 INJECTION, POWDER, FOR SOLUTION INTRAMUSCULAR; INTRAVENOUS at 22:39

## 2025-03-10 RX ADMIN — PANTOPRAZOLE SODIUM 40 MG: 40 INJECTION, POWDER, FOR SOLUTION INTRAVENOUS at 08:22

## 2025-03-10 RX ADMIN — ACETYLCYSTEINE 600 MG: 200 INHALANT RESPIRATORY (INHALATION) at 07:45

## 2025-03-10 RX ADMIN — METHOCARBAMOL 1000 MG: 500 TABLET ORAL at 08:22

## 2025-03-10 RX ADMIN — METHOCARBAMOL 1000 MG: 500 TABLET ORAL at 13:24

## 2025-03-10 RX ADMIN — METHOCARBAMOL 1000 MG: 500 TABLET ORAL at 17:57

## 2025-03-10 RX ADMIN — GADOTERIDOL 12 ML: 279.3 INJECTION, SOLUTION INTRAVENOUS at 01:05

## 2025-03-10 RX ADMIN — SEVELAMER CARBONATE 1.6 G: 800 FOR SUSPENSION ORAL at 11:39

## 2025-03-10 RX ADMIN — GABAPENTIN 300 MG: 600 TABLET, FILM COATED ORAL at 14:08

## 2025-03-10 RX ADMIN — ALBUTEROL SULFATE 2.5 MG: 2.5 SOLUTION RESPIRATORY (INHALATION) at 07:45

## 2025-03-10 RX ADMIN — BUPRENORPHINE HYDROCHLORIDE AND NALOXONE HYDROCHLORIDE DIHYDRATE 1 TABLET: 8; 2 TABLET SUBLINGUAL at 13:24

## 2025-03-10 RX ADMIN — SODIUM CHLORIDE: 9 INJECTION, SOLUTION INTRAVENOUS at 09:50

## 2025-03-10 RX ADMIN — SODIUM CHLORIDE, PRESERVATIVE FREE 10 ML: 5 INJECTION INTRAVENOUS at 22:33

## 2025-03-10 RX ADMIN — SEVELAMER CARBONATE 1.6 G: 800 FOR SUSPENSION ORAL at 17:57

## 2025-03-10 RX ADMIN — BUPRENORPHINE HYDROCHLORIDE AND NALOXONE HYDROCHLORIDE DIHYDRATE 1 TABLET: 8; 2 TABLET SUBLINGUAL at 22:32

## 2025-03-10 RX ADMIN — PROPOFOL 50 MCG/KG/MIN: 10 INJECTION, EMULSION INTRAVENOUS at 04:44

## 2025-03-10 RX ADMIN — Medication 100 MG: at 08:21

## 2025-03-10 RX ADMIN — ROCURONIUM BROMIDE 20 MG: 10 INJECTION, SOLUTION INTRAVENOUS at 09:50

## 2025-03-10 RX ADMIN — SUGAMMADEX 132 MG: 100 INJECTION, SOLUTION INTRAVENOUS at 10:27

## 2025-03-10 RX ADMIN — LIDOCAINE HYDROCHLORIDE 2 ML: 10 INJECTION, SOLUTION EPIDURAL; INFILTRATION; INTRACAUDAL; PERINEURAL at 10:25

## 2025-03-10 RX ADMIN — HYDROMORPHONE HYDROCHLORIDE 1 MG: 1 INJECTION, SOLUTION INTRAMUSCULAR; INTRAVENOUS; SUBCUTANEOUS at 11:40

## 2025-03-10 RX ADMIN — Medication 1 MG: at 08:22

## 2025-03-10 RX ADMIN — GABAPENTIN 300 MG: 600 TABLET, FILM COATED ORAL at 22:32

## 2025-03-10 RX ADMIN — BUPRENORPHINE HYDROCHLORIDE AND NALOXONE HYDROCHLORIDE DIHYDRATE 1 TABLET: 8; 2 TABLET SUBLINGUAL at 08:22

## 2025-03-10 RX ADMIN — METHOCARBAMOL 1000 MG: 500 TABLET ORAL at 22:33

## 2025-03-10 RX ADMIN — KETAMINE HYDROCHLORIDE 0.03 MG/KG/HR: 100 INJECTION, SOLUTION, CONCENTRATE INTRAMUSCULAR; INTRAVENOUS at 15:39

## 2025-03-10 ASSESSMENT — PAIN DESCRIPTION - ONSET
ONSET: GRADUAL
ONSET: GRADUAL

## 2025-03-10 ASSESSMENT — LIFESTYLE VARIABLES: SMOKING_STATUS: 1

## 2025-03-10 ASSESSMENT — PAIN SCALES - GENERAL
PAINLEVEL_OUTOF10: 3
PAINLEVEL_OUTOF10: 0
PAINLEVEL_OUTOF10: 6
PAINLEVEL_OUTOF10: 8

## 2025-03-10 ASSESSMENT — PAIN DESCRIPTION - ORIENTATION
ORIENTATION: LOWER

## 2025-03-10 ASSESSMENT — PAIN DESCRIPTION - LOCATION
LOCATION: BACK

## 2025-03-10 ASSESSMENT — PULMONARY FUNCTION TESTS
PIF_VALUE: 11
PIF_VALUE: 19
PIF_VALUE: 17
PIF_VALUE: 16

## 2025-03-10 ASSESSMENT — PAIN DESCRIPTION - PAIN TYPE
TYPE: CHRONIC PAIN

## 2025-03-10 ASSESSMENT — PAIN DESCRIPTION - FREQUENCY
FREQUENCY: CONTINUOUS
FREQUENCY: CONTINUOUS

## 2025-03-10 ASSESSMENT — PAIN DESCRIPTION - DESCRIPTORS
DESCRIPTORS: ACHING

## 2025-03-10 NOTE — CONSULTS
325) tablet 325 mg  325 mg Oral QAM AC    folic acid (FOLVITE) tablet 1 mg  1 mg Oral Daily    [Held by provider] hydrALAZINE (APRESOLINE) tablet 25 mg  25 mg Oral 3 times per day    [Held by provider] isosorbide dinitrate (ISORDIL) tablet 20 mg  20 mg Oral BID    thiamine tablet 100 mg  100 mg Oral Daily    traZODone (DESYREL) tablet 50 mg  50 mg Oral Nightly    sodium chloride flush 0.9 % injection 5-40 mL  5-40 mL IntraVENous 2 times per day    sodium chloride flush 0.9 % injection 5-40 mL  5-40 mL IntraVENous PRN    0.9 % sodium chloride infusion   IntraVENous PRN    [Held by provider] heparin (porcine) injection 5,000 Units  5,000 Units SubCUTAneous 3 times per day    ondansetron (ZOFRAN-ODT) disintegrating tablet 4 mg  4 mg Oral Q8H PRN    Or    ondansetron (ZOFRAN) injection 4 mg  4 mg IntraVENous Q6H PRN    polyethylene glycol (GLYCOLAX) packet 17 g  17 g Oral Daily PRN    acetaminophen (TYLENOL) tablet 650 mg  650 mg Oral Q6H PRN    Or    acetaminophen (TYLENOL) suppository 650 mg  650 mg Rectal Q6H PRN    oxyCODONE (ROXICODONE) immediate release tablet 5 mg  5 mg Oral Q4H PRN    heparin (porcine) 1000 UNIT/ML injection 1,900 Units  1,900 Units IntraCATHeter PRN    And    heparin (porcine) 1000 UNIT/ML injection 1,800 Units  1,800 Units IntraCATHeter PRN    sevelamer (RENVELA) packet 1.6 g  1.6 g Oral TID WC        Physical Exam:  There were no vitals taken for this visit.  GENERAL: Intubated, sedated   LUNG: Nonlabored respiration on room air  HEART: Well perfused   EXT: No edema BLEs  ABD: Nondistended      Laboratory:      Recent Labs     03/10/25  0446   HGB 10.8*   HCT 33.7*   WBC 7.2      BUN 64*   K 4.1         Plan of Care/Planned Procedure:  Risks, benefits, and alternatives reviewed with patient's sister and she agrees to proceed with the procedure.     Rachele Capps MD  [facility]  9:57 AM, 3/10/2025

## 2025-03-10 NOTE — PROGRESS NOTES
TRANSFER - OUT REPORT:    Verbal report given to Bailey NAM on Leandra Perez  being transferred to ICU 5 for routine progression of patient care       Report consisted of patient's Situation, Background, Assessment and   Recommendations(SBAR).     Information from the following report(s) Nurse Handoff Report, Adult Overview, Surgery Report, and Event Log was reviewed with the receiving nurse.           Lines:   Peripheral IV 03/07/25 Distal;Left;Anterior Cephalic (Active)   Site Assessment Clean, dry & intact 03/10/25 0800   Line Status Infusing 03/10/25 0800   Line Care Connections checked and tightened 03/10/25 0800   Phlebitis Assessment No symptoms 03/10/25 0800   Infiltration Assessment 0 03/10/25 0800   Alcohol Cap Used Yes 03/10/25 0800   Dressing Status Clean, dry & intact 03/10/25 0800   Dressing Type Transparent 03/10/25 0800       Peripheral IV 03/09/25 Right;Anterior Forearm (Active)   Site Assessment Clean, dry & intact 03/10/25 0800   Line Status Flushed;Capped 03/10/25 0800   Line Care Connections checked and tightened 03/10/25 0800   Phlebitis Assessment No symptoms 03/10/25 0800   Infiltration Assessment 0 03/10/25 0800   Alcohol Cap Used Yes 03/10/25 0800   Dressing Status Clean, dry & intact 03/10/25 0800   Dressing Type Transparent 03/10/25 0800   Dressing Intervention New 03/09/25 1025        Opportunity for questions and clarification was provided.      Patient transported with:  Monitor, O2 @ 15lpm, and Registered Nurse, CRNA

## 2025-03-10 NOTE — ANESTHESIA POSTPROCEDURE EVALUATION
Post-Anesthesia Evaluation and Assessment    Patient: Leandra Perez MRN: [id number]  SSN: [id number]    YOB: 1965  Age: 60 y.o.  Sex: male      I have evaluated the patient and they are stable in the ICU.     Cardiovascular Function/Vital Signs  Visit Vitals  /85   Pulse 92   Temp 98.6 °F (37 °C) (Skin)   Resp 15   SpO2 100%       Patient is status post * No anesthesia type entered * anesthesia for * No procedures listed *.    Nausea/Vomiting: None    Postoperative hydration reviewed and adequate.    Pain:  Managed    Neurological Status:   Intubated and sedated     Pulmonary Status:   Intubated with adequate ventilation and oxygenation     Complications related to anesthesia: None    Post-anesthesia assessment completed. No concerns    Signed By: Kody Pierson MD     March 10, 2025

## 2025-03-10 NOTE — PROCEDURES
Brief Postoperative Note    Leandra Perez  YOB: 1965  [id number]    Pre-operative Diagnosis: T11-12 discitis     Post-operative Diagnosis: Same    Procedure: T11-12 disc aspiration     Anesthesia: Local    Surgeons/Assistants: Rachele Capps MD    Estimated Blood Loss: Minimal     Complications: None    Specimens: Was Obtained: 5 mL bloody fluid     Findings: Fluoroscopic guided T11-12 disc aspiration. 5 mL bloody fluid obtained and sent for culture.     Electronically signed by Rachele Capps MD on 3/10/2025 at 10:52 AM

## 2025-03-10 NOTE — ANESTHESIA PRE PROCEDURE
Department of Anesthesiology  Preprocedure Note       Name:  Leandra Perez   Age:  60 y.o.  :  1965                                          MRN:  [id number]         Date:  3/10/2025      Surgeon: * No surgeons listed *    Procedure: * No procedures listed *    Medications prior to admission:   Prior to Admission medications    Medication Sig Start Date End Date Taking? Authorizing Provider   buprenorphine-naloxone (SUBOXONE) 8-2 MG SUBL SL tablet Place 1 tablet under the tongue 3 times daily for 30 days. Max Daily Amount: 3 tablets 2/15/25 3/17/25  Yeny Banerjee APRN - NP   carvedilol (COREG) 6.25 MG tablet Take 1 tablet by mouth in the morning and 1 tablet in the evening. 2/15/25   Yeny Banerjee APRN - NP   hydrALAZINE (APRESOLINE) 25 MG tablet Take 1 tablet by mouth every 8 hours 2/15/25   Yeny Banerjee APRN - NP   guaiFENesin (MUCINEX) 600 MG extended release tablet Take 1 tablet by mouth 2 times daily 2/15/25   Yeny Banerjee APRN - NP   ammonium lactate (LAC-HYDRIN) 12 % lotion Apply topically as needed.Apply to bilateral lower legs.  See wound care orders. 25   Yeny Banerjee APRN - NP   balsum peru-castor oil (VENELEX) OINT ointment Apply topically every 12 hours Apply to sacrum and bilateral heels. 25   Yeny Banerjee APRN - NP   Methoxy PEG-Epoetin Beta 50 MCG/0.3ML SOSY Inject 75 mcg into the skin every 14 days    Lindsay Hernandez MD   omeprazole (PRILOSEC) 40 MG delayed release capsule Take 1 capsule by mouth every morning (before breakfast) 2/3/25 3/5/25  Lindsay Hernandez MD   naloxone 4 MG/0.1ML LIQD nasal spray Administer 1 spray into one nostril as needed for opioid reversal or respiratory depression. Call 911. If patient does not respond, or responds and then relapses, give additional doses every 2 to 3 minutes, alternating nostrils, until medical help arrives. Use as directed. 10/30/23   Lindsay Hernandez MD   diclofenac sodium (VOLTAREN) 1 % GEL Apply topically 2

## 2025-03-11 ENCOUNTER — APPOINTMENT (OUTPATIENT)
Facility: HOSPITAL | Age: 60
DRG: 347 | End: 2025-03-11
Payer: MEDICAID

## 2025-03-11 LAB
ALBUMIN SERPL-MCNC: 2.5 G/DL (ref 3.5–5)
ALBUMIN/GLOB SERPL: 0.4 (ref 1.1–2.2)
ALP SERPL-CCNC: 76 U/L (ref 45–117)
ALT SERPL-CCNC: 24 U/L (ref 12–78)
ANION GAP SERPL CALC-SCNC: 16 MMOL/L (ref 2–12)
AST SERPL-CCNC: 28 U/L (ref 15–37)
BACTERIA SPEC CULT: NORMAL
BASOPHILS # BLD: 0.02 K/UL (ref 0–0.1)
BASOPHILS NFR BLD: 0.4 % (ref 0–1)
BILIRUB SERPL-MCNC: 1 MG/DL (ref 0.2–1)
BUN SERPL-MCNC: 79 MG/DL (ref 6–20)
BUN/CREAT SERPL: 8 (ref 12–20)
CALCIUM SERPL-MCNC: 9.2 MG/DL (ref 8.5–10.1)
CHLORIDE SERPL-SCNC: 95 MMOL/L (ref 97–108)
CO2 SERPL-SCNC: 20 MMOL/L (ref 21–32)
CREAT SERPL-MCNC: 9.85 MG/DL (ref 0.7–1.3)
DIFFERENTIAL METHOD BLD: ABNORMAL
EOSINOPHIL # BLD: 0.22 K/UL (ref 0–0.4)
EOSINOPHIL NFR BLD: 4.3 % (ref 0–7)
ERYTHROCYTE [DISTWIDTH] IN BLOOD BY AUTOMATED COUNT: 17 % (ref 11.5–14.5)
GLOBULIN SER CALC-MCNC: 5.6 G/DL (ref 2–4)
GLUCOSE BLD STRIP.AUTO-MCNC: 118 MG/DL (ref 65–117)
GLUCOSE SERPL-MCNC: 103 MG/DL (ref 65–100)
GRAM STN SPEC: NORMAL
HCT VFR BLD AUTO: 31.3 % (ref 36.6–50.3)
HGB BLD-MCNC: 10 G/DL (ref 12.1–17)
IMM GRANULOCYTES # BLD AUTO: 0.03 K/UL (ref 0–0.04)
IMM GRANULOCYTES NFR BLD AUTO: 0.6 % (ref 0–0.5)
LYMPHOCYTES # BLD: 1.01 K/UL (ref 0.8–3.5)
LYMPHOCYTES NFR BLD: 19.7 % (ref 12–49)
MAGNESIUM SERPL-MCNC: 2.8 MG/DL (ref 1.6–2.4)
MCH RBC QN AUTO: 31.4 PG (ref 26–34)
MCHC RBC AUTO-ENTMCNC: 31.9 G/DL (ref 30–36.5)
MCV RBC AUTO: 98.4 FL (ref 80–99)
MONOCYTES # BLD: 0.56 K/UL (ref 0–1)
MONOCYTES NFR BLD: 10.9 % (ref 5–13)
NEUTS SEG # BLD: 3.28 K/UL (ref 1.8–8)
NEUTS SEG NFR BLD: 64.1 % (ref 32–75)
NRBC # BLD: 0 K/UL (ref 0–0.01)
NRBC BLD-RTO: 0 PER 100 WBC
PLATELET # BLD AUTO: 165 K/UL (ref 150–400)
PMV BLD AUTO: 10.2 FL (ref 8.9–12.9)
POTASSIUM SERPL-SCNC: 4.6 MMOL/L (ref 3.5–5.1)
PROT SERPL-MCNC: 8.1 G/DL (ref 6.4–8.2)
RBC # BLD AUTO: 3.18 M/UL (ref 4.1–5.7)
SERVICE CMNT-IMP: ABNORMAL
SERVICE CMNT-IMP: NORMAL
SODIUM SERPL-SCNC: 131 MMOL/L (ref 136–145)
WBC # BLD AUTO: 5.1 K/UL (ref 4.1–11.1)

## 2025-03-11 PROCEDURE — 2580000003 HC RX 258: Performed by: NURSE PRACTITIONER

## 2025-03-11 PROCEDURE — 6360000002 HC RX W HCPCS: Performed by: NURSE PRACTITIONER

## 2025-03-11 PROCEDURE — 6360000002 HC RX W HCPCS: Performed by: PHYSICIAN ASSISTANT

## 2025-03-11 PROCEDURE — 6370000000 HC RX 637 (ALT 250 FOR IP): Performed by: PHYSICIAN ASSISTANT

## 2025-03-11 PROCEDURE — 6370000000 HC RX 637 (ALT 250 FOR IP): Performed by: INTERNAL MEDICINE

## 2025-03-11 PROCEDURE — 80053 COMPREHEN METABOLIC PANEL: CPT

## 2025-03-11 PROCEDURE — 02HV33Z INSERTION OF INFUSION DEVICE INTO SUPERIOR VENA CAVA, PERCUTANEOUS APPROACH: ICD-10-PCS | Performed by: PHYSICIAN ASSISTANT

## 2025-03-11 PROCEDURE — 90935 HEMODIALYSIS ONE EVALUATION: CPT

## 2025-03-11 PROCEDURE — 36415 COLL VENOUS BLD VENIPUNCTURE: CPT

## 2025-03-11 PROCEDURE — 2500000003 HC RX 250 WO HCPCS: Performed by: STUDENT IN AN ORGANIZED HEALTH CARE EDUCATION/TRAINING PROGRAM

## 2025-03-11 PROCEDURE — 99232 SBSQ HOSP IP/OBS MODERATE 35: CPT | Performed by: NURSE PRACTITIONER

## 2025-03-11 PROCEDURE — 82962 GLUCOSE BLOOD TEST: CPT

## 2025-03-11 PROCEDURE — 6370000000 HC RX 637 (ALT 250 FOR IP): Performed by: STUDENT IN AN ORGANIZED HEALTH CARE EDUCATION/TRAINING PROGRAM

## 2025-03-11 PROCEDURE — 6360000002 HC RX W HCPCS: Performed by: INTERNAL MEDICINE

## 2025-03-11 PROCEDURE — B548ZZA ULTRASONOGRAPHY OF SUPERIOR VENA CAVA, GUIDANCE: ICD-10-PCS | Performed by: PHYSICIAN ASSISTANT

## 2025-03-11 PROCEDURE — 85025 COMPLETE CBC W/AUTO DIFF WBC: CPT

## 2025-03-11 PROCEDURE — 2000000000 HC ICU R&B

## 2025-03-11 PROCEDURE — 71045 X-RAY EXAM CHEST 1 VIEW: CPT

## 2025-03-11 PROCEDURE — 83735 ASSAY OF MAGNESIUM: CPT

## 2025-03-11 PROCEDURE — 36556 INSERT NON-TUNNEL CV CATH: CPT

## 2025-03-11 PROCEDURE — 2580000003 HC RX 258: Performed by: INTERNAL MEDICINE

## 2025-03-11 RX ORDER — LIDOCAINE 4 G/G
1 PATCH TOPICAL DAILY
Status: DISCONTINUED | OUTPATIENT
Start: 2025-03-11 | End: 2025-03-12

## 2025-03-11 RX ORDER — VANCOMYCIN 500 MG/100ML
500 INJECTION, SOLUTION INTRAVENOUS ONCE
Status: COMPLETED | OUTPATIENT
Start: 2025-03-11 | End: 2025-03-11

## 2025-03-11 RX ORDER — MIDAZOLAM HYDROCHLORIDE 2 MG/2ML
2 INJECTION, SOLUTION INTRAMUSCULAR; INTRAVENOUS ONCE
Status: COMPLETED | OUTPATIENT
Start: 2025-03-11 | End: 2025-03-11

## 2025-03-11 RX ORDER — SEVELAMER CARBONATE 0.8 G/1
1.6 POWDER, FOR SUSPENSION ORAL
Status: DISCONTINUED | OUTPATIENT
Start: 2025-03-11 | End: 2025-03-20 | Stop reason: HOSPADM

## 2025-03-11 RX ORDER — DRONABINOL 2.5 MG/1
2.5 CAPSULE ORAL 2 TIMES DAILY
Status: DISCONTINUED | OUTPATIENT
Start: 2025-03-11 | End: 2025-03-11

## 2025-03-11 RX ORDER — SODIUM CHLORIDE FOR INHALATION 3 %
4 VIAL, NEBULIZER (ML) INHALATION 2 TIMES DAILY PRN
Status: DISCONTINUED | OUTPATIENT
Start: 2025-03-11 | End: 2025-03-12

## 2025-03-11 RX ORDER — DRONABINOL 2.5 MG/1
2.5 CAPSULE ORAL 2 TIMES DAILY
Status: DISCONTINUED | OUTPATIENT
Start: 2025-03-11 | End: 2025-03-20 | Stop reason: HOSPADM

## 2025-03-11 RX ORDER — BUPRENORPHINE HYDROCHLORIDE AND NALOXONE HYDROCHLORIDE DIHYDRATE 8; 2 MG/1; MG/1
1 TABLET SUBLINGUAL 2 TIMES DAILY
Status: DISCONTINUED | OUTPATIENT
Start: 2025-03-11 | End: 2025-03-18

## 2025-03-11 RX ORDER — METHOCARBAMOL 500 MG/1
1000 TABLET, FILM COATED ORAL 4 TIMES DAILY
Status: DISPENSED | OUTPATIENT
Start: 2025-03-11 | End: 2025-03-15

## 2025-03-11 RX ORDER — LIDOCAINE 4 G/G
1 PATCH TOPICAL DAILY
Status: DISCONTINUED | OUTPATIENT
Start: 2025-03-11 | End: 2025-03-11

## 2025-03-11 RX ADMIN — METHOCARBAMOL 1000 MG: 500 TABLET ORAL at 07:53

## 2025-03-11 RX ADMIN — Medication 100 MG: at 07:54

## 2025-03-11 RX ADMIN — BUPRENORPHINE HYDROCHLORIDE AND NALOXONE HYDROCHLORIDE DIHYDRATE 1 TABLET: 8; 2 TABLET SUBLINGUAL at 20:09

## 2025-03-11 RX ADMIN — METHOCARBAMOL TABLETS 1000 MG: 500 TABLET, COATED ORAL at 20:09

## 2025-03-11 RX ADMIN — SODIUM CHLORIDE, PRESERVATIVE FREE 10 ML: 5 INJECTION INTRAVENOUS at 07:54

## 2025-03-11 RX ADMIN — PANTOPRAZOLE SODIUM 40 MG: 40 INJECTION, POWDER, FOR SOLUTION INTRAVENOUS at 07:54

## 2025-03-11 RX ADMIN — DRONABINOL 2.5 MG: 2.5 CAPSULE ORAL at 14:45

## 2025-03-11 RX ADMIN — GABAPENTIN 300 MG: 600 TABLET, FILM COATED ORAL at 14:45

## 2025-03-11 RX ADMIN — BUPRENORPHINE HYDROCHLORIDE AND NALOXONE HYDROCHLORIDE DIHYDRATE 1 TABLET: 8; 2 TABLET SUBLINGUAL at 07:54

## 2025-03-11 RX ADMIN — SEVELAMER CARBONATE 1.6 G: 800 FOR SUSPENSION ORAL at 14:44

## 2025-03-11 RX ADMIN — MIDAZOLAM HYDROCHLORIDE 2 MG: 1 INJECTION, SOLUTION INTRAMUSCULAR; INTRAVENOUS at 08:44

## 2025-03-11 RX ADMIN — FERROUS SULFATE TAB 325 MG (65 MG ELEMENTAL FE) 325 MG: 325 (65 FE) TAB at 06:31

## 2025-03-11 RX ADMIN — HEPARIN SODIUM 5000 UNITS: 5000 INJECTION INTRAVENOUS; SUBCUTANEOUS at 14:45

## 2025-03-11 RX ADMIN — VANCOMYCIN 500 MG: 500 INJECTION, SOLUTION INTRAVENOUS at 15:21

## 2025-03-11 RX ADMIN — SEVELAMER CARBONATE 1.6 G: 800 FOR SUSPENSION ORAL at 07:54

## 2025-03-11 RX ADMIN — OXYCODONE 5 MG: 5 TABLET ORAL at 06:31

## 2025-03-11 RX ADMIN — HEPARIN SODIUM 5000 UNITS: 5000 INJECTION INTRAVENOUS; SUBCUTANEOUS at 21:43

## 2025-03-11 RX ADMIN — CALCITRIOL CAPSULES 0.25 MCG 0.25 MCG: 0.25 CAPSULE ORAL at 07:54

## 2025-03-11 RX ADMIN — TRAZODONE HYDROCHLORIDE 50 MG: 50 TABLET ORAL at 20:12

## 2025-03-11 RX ADMIN — ACETAMINOPHEN 650 MG: 325 TABLET ORAL at 20:09

## 2025-03-11 RX ADMIN — CEFEPIME 1000 MG: 1 INJECTION, POWDER, FOR SOLUTION INTRAMUSCULAR; INTRAVENOUS at 20:43

## 2025-03-11 RX ADMIN — METHOCARBAMOL TABLETS 1000 MG: 500 TABLET, COATED ORAL at 14:45

## 2025-03-11 RX ADMIN — OXYCODONE 10 MG: 5 TABLET ORAL at 14:44

## 2025-03-11 RX ADMIN — GABAPENTIN 300 MG: 600 TABLET, FILM COATED ORAL at 20:30

## 2025-03-11 RX ADMIN — SODIUM CHLORIDE, PRESERVATIVE FREE 10 ML: 5 INJECTION INTRAVENOUS at 20:17

## 2025-03-11 RX ADMIN — Medication 1 MG: at 07:54

## 2025-03-11 RX ADMIN — GABAPENTIN 300 MG: 600 TABLET, FILM COATED ORAL at 07:54

## 2025-03-11 RX ADMIN — DRONABINOL 2.5 MG: 2.5 CAPSULE ORAL at 20:12

## 2025-03-11 RX ADMIN — HYDROMORPHONE HYDROCHLORIDE 1 MG: 1 INJECTION, SOLUTION INTRAMUSCULAR; INTRAVENOUS; SUBCUTANEOUS at 17:57

## 2025-03-11 RX ADMIN — HYDROMORPHONE HYDROCHLORIDE 1 MG: 1 INJECTION, SOLUTION INTRAMUSCULAR; INTRAVENOUS; SUBCUTANEOUS at 08:45

## 2025-03-11 ASSESSMENT — PAIN SCALES - GENERAL
PAINLEVEL_OUTOF10: 10
PAINLEVEL_OUTOF10: 4
PAINLEVEL_OUTOF10: 3
PAINLEVEL_OUTOF10: 0
PAINLEVEL_OUTOF10: 3
PAINLEVEL_OUTOF10: 5
PAINLEVEL_OUTOF10: 3
PAINLEVEL_OUTOF10: 4
PAINLEVEL_OUTOF10: 10

## 2025-03-11 ASSESSMENT — PAIN DESCRIPTION - DESCRIPTORS
DESCRIPTORS: ACHING

## 2025-03-11 ASSESSMENT — PAIN DESCRIPTION - LOCATION
LOCATION: BACK

## 2025-03-11 ASSESSMENT — PAIN DESCRIPTION - ORIENTATION
ORIENTATION: POSTERIOR
ORIENTATION: LOWER

## 2025-03-11 ASSESSMENT — PAIN DESCRIPTION - PAIN TYPE
TYPE: CHRONIC PAIN
TYPE: CHRONIC PAIN

## 2025-03-11 ASSESSMENT — PAIN DESCRIPTION - ONSET: ONSET: GRADUAL

## 2025-03-11 ASSESSMENT — PAIN DESCRIPTION - FREQUENCY: FREQUENCY: CONTINUOUS

## 2025-03-11 NOTE — CARE COORDINATION
Transition of Care Plan:    RUR: 23 %   Prior Level of Functioning: Needed assistance , in LTC at [facility]   Disposition: Return to [facility]   JOANNE:   Date authorization started with reference number: Will need auth for [place] Medicaid   DME needed: TBD  Transportation at discharge: BLS  IM/IMM Medicare/ letter given: NA  Is patient a  and connected with VA? No  Caregiver Contact: Sister Phuc Perez [phone]  Discharge Caregiver contacted prior to discharge? No  Care Conference needed? No  Barriers to discharge:      HD pt: [facility]  [address] [facility] [address]  P: [phone]  F: [phone]    Patient with a history of thoracic spine discitis / osteomyelitis T11-T12.  ID following for IV abx.    History of substance abuse   Patient transferred to ICU 3/9 s/p RRT for acute respiratory failure.   Patient extubated 3/10. Remains on a Ketamine gtt.   HD today.   Care Management continuing to follow.  Libby Hamilton RN,Care Management

## 2025-03-11 NOTE — CONSULTS
[facility]    ICU Team Consult Note/History and Physical    Name: Leandra Perez   : 1965   MRN: [id number]   Date: 3/9/2025      Reason for ICU Admission: Acute respiratory failure    HPI     60-year-old male past medical history of ESRD on hemodialysis, hypertension, combined systolic and diastolic heart failure, history of opioid abuse on Suboxone, type 2 diabetes mellitus, history of chronic upper back pain with known thoracic discitis, presents to the ED with chief complaint of low back pain.    Workup on admission including CT lumbar spine with discitis and osteomyelitis at T11-T12 with associated high-grade spinal canal and neuroforaminal narrowing.  Patient reported to have declined prior surgical interventions.    Hospital course  Blood cultures noted to be positive with Serratia  3/9-at about 1 AM patient was noted to be in respiratory distress with respiratory rate in the 30s to 40s, oxygen saturation in the 90s on 15 L mid flow.  Reported to have coarse breath sounds throughout.  Patient had rapid response earlier for chest pain.  He subsequently noted to have further decline including worsening mentation and oxygen saturation in the 80s.  Patient was bagged by respiratory therapist and transferred to the CCU for intubation.  Postintubation patient underwent bronchoscopy with findings of secretion in the left lower main bronchus and most of other bronchi.    Patient subsequently noted to have difficulty with sedation, noted to be hypotensive with propofol Precedex, started on Dilaudid drip.    Active Problem List:     [unfilled]    Past Medical History:      has a past medical history of Diabetes (HCC), Gout, History of renal dialysis, Hypertension, Opioid use disorder, and Osteoarthritis.    Past Surgical History:      has a past surgical history that includes IR TUNNELED CVC PLACE WO SQ PORT/PUMP > 5 YEARS (2022); IR NONTUNNELED VASCULAR CATHETER > 5 YEARS (2022); CT 
ID  Patient seen and examined  Full consult to follow  IR to aspirate disc-please send cultures  Hold antibiotics until aspiration is done.  Restart vancomycin, cefepime thereafter.  If patient develops fever or chills & not had disc aspiration, then start antibiotics.  
Infectious Disease Consult          I agree with the infectious  Consult note in its entirety as authored by and discussed in detail with the nurse practitioner.   I have reviewed pertinent laboratory studies, microbiology cultures, radiologic reports with review of the consultations and progress notes as appropriate.   I agree with today's subjective findings, physical examination, assessment and plan of care as described above and discussed extensively with the nurse practitioner.   60-year-old male with history of recently T 11/12 OM, presumed to be secondary to Serratia given recent bacteremia s/p cefepime  & vancomycin with HD 3 times weekly x 6 weeks end date 10/21/2024 , diabetes type 2, CHF, opioid use on Suboxone thanks , tobacco who presented to ED with worsening back pain.  Patient seen today in ED . Complains of back pain.    Impression  Discitis/osteomyelitis T11/12  ? Acute vs chronic  S/p treatment for T11/12 OM/discitis at [facility]  Blood cultures were positive for Serratia marcescens at same time  Patient was treated with cefepime and vancomycin with HD x 6 weeks end date 10/21/2024  Patient is currently hurting all over, difficult to assess if pain is due to new infection  Recommend IR to aspirate fluid, sent for cultures  Hold antibiotics at this time for better yield  If fevers occur resume vancomycin and cefepime.    ESRD on HD  Line appears intact      Diabetes type 2  A1c 5.2    Hypertensive urgency  Blood pressure improved  Management per primary team    CHF  Continue home meds    S/p admission 2/2025  Treated for CAP, influenza A  Completed 5-day course of antibiotic therapy ceftriaxone and azithromycin    Plan  IR to aspirate disc-please send cultures  Hold antibiotics until aspiration is done.  Restart vancomycin, cefepime thereafter.  If patient develops fever or chills & not had disc aspiration, then start antibiotics        A total time of 50 minutes was spent on today's encounter.  Greater 
Initial consult done on 3/7  See progress note from 3/9  
  diclofenac sodium (VOLTAREN) 1 % GEL Apply topically 2 times daily 7/10/24   Margo Bella MD   ferrous sulfate (IRON 325) 325 (65 Fe) MG tablet Take 1 tablet by mouth every morning (before breakfast) 7/10/24   Margo Bella MD   isosorbide dinitrate (ISORDIL) 20 MG tablet Take 1 tablet by mouth 2 times daily 7/10/24   Margo Bella MD   traZODone (DESYREL) 50 MG tablet Take 1 tablet by mouth nightly ceived the following from [facility]: Outside name: traZODone (DESYREL) 50 mg tablet 7/10/24   Margo Bella MD   acetaminophen (TYLENOL) 500 MG tablet Take 2 tablets by mouth 3 times daily as needed for Pain 5/16/24   Kelton Sams MD   calcitRIOL (ROCALTROL) 0.25 MCG capsule Take by mouth daily 9/7/22   Automatic Reconciliation, Ar   folic acid (FOLVITE) 1 MG tablet Take by mouth daily 1/14/22   Automatic Reconciliation, Ar   sevelamer (RENVELA) 0.8 g PACK packet Take by mouth 3 times daily (with meals) 9/7/22   Automatic Reconciliation, Ar   thiamine 100 MG tablet Take by mouth daily 1/14/22   Automatic Reconciliation, Ar       Allergies   Allergen Reactions    Shellfish Allergy Swelling       Social Hx:  reports that he has been smoking cigarettes. He has never used smokeless tobacco. He reports that he does not currently use alcohol. He reports that he does not use drugs.     Family History   Problem Relation Age of Onset    Diabetes Other     Hypertension Other        Review of Systems:  A twelve point review of system was performed today. Pertinent positives and negatives are mentioned in the HPI. The reminder of the ROS is negative and noncontributory.     Objective:    Vitals:    Vitals:    03/07/25 1515 03/07/25 1516 03/07/25 1530 03/07/25 1545   BP: (!) 113/91 (!) 130/94 (!) 139/108 (!) 155/112   Pulse: (!) 122 (!) 118 (!) 110 (!) 112   Resp: 25 18 24 19   Temp:       TempSrc:       SpO2: 93% 94% 97% 97%   Weight:       Height:         I&O's:  No intake/output data recorded.  BP 
Other        REVIEW OF SYSTEMS:   A 12 system review of systems was completed and was negative for the following 12 systems except as noted above CV, Pulmonary, HEENT, skin, lymphatics, GI, , hematologic, renal, endocrinology, rheumatologic, MSK         Physical Exam     /78   Pulse 98   Temp 98.6 °F (37 °C) (Oral)   Resp 11   Ht 1.778 m (5' 10\")   Wt 70.7 kg (155 lb 13.8 oz)   SpO2 97%   BMI 22.36 kg/m²     Alert and Oriented, left neck dialysis catheter currently dialysis machine running.   No distress  Non labored respirations  Regular pulse  Abdomen soft  No edema in legs  Neuro grossly intact  No track marks           DATA     LABORATORY DATA:  Labs Reviewed   CULTURE, BLOOD 1 - Abnormal; Notable for the following components:       Result Value    Culture   (*)     Value: Serratia marcescens GROWING IN BOTH BOTTLES DRAWN SITE= LAC    All other components within normal limits   CULTURE, BLOOD 2 - Abnormal; Notable for the following components:    Culture   (*)     Value: Serratia marcescens GROWING IN 1 OF 2 BOTTLES DRAWN SITE= RAC REFER [id number] FOR SENSITIVITY    Culture   (*)     Value: STAPHYLOCOCCUS SPECIES, COAGULASE NEGATIVE GROWING IN THE 2ND BOTTLE DRAWN SITE= RAC    All other components within normal limits   CULTURE, BLOOD, PCR ID PANEL RESULTS REPORT - Abnormal; Notable for the following components:    Enterobacteriaceae by PCR Detected (*)     Serratia marcescens by PCR Detected (*)     All other components within normal limits   CULTURE, BLOOD, PCR ID PANEL RESULTS REPORT - Abnormal; Notable for the following components:    STAPHYLOCOCCUS Detected (*)     Staphylococcus epidermidis by PCR Detected (*)     Methicillin Resistance mecA/C  by PCR Detected (*)     All other components within normal limits   CULTURE, BLOOD 2 - Abnormal; Notable for the following components:    Culture   (*)     Value: STAPHYLOCOCCUS SPECIES, COAGULASE NEGATIVE GROWING IN 1 OF 2 BOTTLES DRAWN SITE L FA

## 2025-03-11 NOTE — PROCEDURES
[]Hide copied text    Reason for intubation:  Hypoxia     Patient preoxygenated first with BVM     Suction confirmed.  IVF fluid in line.   IV access working.  VS being monitored.     Sedation given etomidate and rocuronium     Video laryngoscopy used CMAC 4 MAC     EtCo2 calorimeter changed color.   Pt had good bilateral BS and   Saturation post intubation 100%     CXR reviewed.     Pt tolerated procedure well.      PROCEDURE NOTE  Date: 3/9/2025   Name: Leandra Perez  YOB: 1965    Procedures          
  Vascular Access Team - peripheral IV catheter insertion note     A 20 gauge, 45 mm (1.75 inch) PIV was inserted into the right ventral forearm using ultrasound guidance. The IV flushed easily and had brisk blood return. The patient tolerated the procedure well.   Cristobal Ashford RN        
PROCEDURE NOTE  Date: 3/9/2025   Name: Leandra Perez  YOB: 1965    Procedures    Bronchoscopy    - Emergent bronchoscopy done post intubation due to decreased BS on the left.  - pt already sedated, paralyzed and intubated on vent  - Bronchoscope inserted through ET tube  - ET tube noted to be in good position above the jenny  - Right mainstem bronchus clear  - Left main stem bronchus had clear secretions  - most of left side bronchi had secretions.  - Patient was suctioned out until bronchi clear  - BAL sample taken.  - VS monitored throughout  - Pt tolerated procedure well  - CXR ordered            
patient tolerated the procedure well with no immediate complications  Comments: Attempt made for RIJ placement. Unable to feed wire. Clot evident in RIJ on US. Successful placement LIJ first pass under US guidance.

## 2025-03-12 LAB
ANION GAP SERPL CALC-SCNC: 11 MMOL/L (ref 2–12)
ANION GAP SERPL CALC-SCNC: 13 MMOL/L (ref 2–12)
BACTERIA SPEC CULT: ABNORMAL
BUN SERPL-MCNC: 41 MG/DL (ref 6–20)
BUN SERPL-MCNC: 52 MG/DL (ref 6–20)
BUN/CREAT SERPL: 6 (ref 12–20)
BUN/CREAT SERPL: 7 (ref 12–20)
CALCIUM SERPL-MCNC: 9.2 MG/DL (ref 8.5–10.1)
CALCIUM SERPL-MCNC: 9.9 MG/DL (ref 8.5–10.1)
CHLORIDE SERPL-SCNC: 92 MMOL/L (ref 97–108)
CHLORIDE SERPL-SCNC: 96 MMOL/L (ref 97–108)
CO2 SERPL-SCNC: 22 MMOL/L (ref 21–32)
CO2 SERPL-SCNC: 27 MMOL/L (ref 21–32)
CREAT SERPL-MCNC: 6.62 MG/DL (ref 0.7–1.3)
CREAT SERPL-MCNC: 7.35 MG/DL (ref 0.7–1.3)
ERYTHROCYTE [DISTWIDTH] IN BLOOD BY AUTOMATED COUNT: 17 % (ref 11.5–14.5)
GLUCOSE SERPL-MCNC: 103 MG/DL (ref 65–100)
GLUCOSE SERPL-MCNC: 130 MG/DL (ref 65–100)
GRAM STN SPEC: ABNORMAL
GRAM STN SPEC: ABNORMAL
HCT VFR BLD AUTO: 39.9 % (ref 36.6–50.3)
HGB BLD-MCNC: 12.6 G/DL (ref 12.1–17)
MAGNESIUM SERPL-MCNC: 2.3 MG/DL (ref 1.6–2.4)
MCH RBC QN AUTO: 31.3 PG (ref 26–34)
MCHC RBC AUTO-ENTMCNC: 31.6 G/DL (ref 30–36.5)
MCV RBC AUTO: 99.3 FL (ref 80–99)
NRBC # BLD: 0 K/UL (ref 0–0.01)
NRBC BLD-RTO: 0 PER 100 WBC
PHOSPHATE SERPL-MCNC: 5.1 MG/DL (ref 2.6–4.7)
PLATELET # BLD AUTO: 203 K/UL (ref 150–400)
PMV BLD AUTO: 10.4 FL (ref 8.9–12.9)
POTASSIUM SERPL-SCNC: 4.5 MMOL/L (ref 3.5–5.1)
POTASSIUM SERPL-SCNC: 5.2 MMOL/L (ref 3.5–5.1)
RBC # BLD AUTO: 4.02 M/UL (ref 4.1–5.7)
SERVICE CMNT-IMP: ABNORMAL
SODIUM SERPL-SCNC: 130 MMOL/L (ref 136–145)
SODIUM SERPL-SCNC: 131 MMOL/L (ref 136–145)
WBC # BLD AUTO: 6 K/UL (ref 4.1–11.1)

## 2025-03-12 PROCEDURE — 2580000003 HC RX 258: Performed by: NURSE PRACTITIONER

## 2025-03-12 PROCEDURE — 85027 COMPLETE CBC AUTOMATED: CPT

## 2025-03-12 PROCEDURE — 83735 ASSAY OF MAGNESIUM: CPT

## 2025-03-12 PROCEDURE — 2500000003 HC RX 250 WO HCPCS: Performed by: STUDENT IN AN ORGANIZED HEALTH CARE EDUCATION/TRAINING PROGRAM

## 2025-03-12 PROCEDURE — 80048 BASIC METABOLIC PNL TOTAL CA: CPT

## 2025-03-12 PROCEDURE — 6360000002 HC RX W HCPCS: Performed by: PHYSICIAN ASSISTANT

## 2025-03-12 PROCEDURE — 87040 BLOOD CULTURE FOR BACTERIA: CPT

## 2025-03-12 PROCEDURE — 6370000000 HC RX 637 (ALT 250 FOR IP): Performed by: INTERNAL MEDICINE

## 2025-03-12 PROCEDURE — 6370000000 HC RX 637 (ALT 250 FOR IP): Performed by: STUDENT IN AN ORGANIZED HEALTH CARE EDUCATION/TRAINING PROGRAM

## 2025-03-12 PROCEDURE — 6370000000 HC RX 637 (ALT 250 FOR IP): Performed by: PHYSICIAN ASSISTANT

## 2025-03-12 PROCEDURE — 6370000000 HC RX 637 (ALT 250 FOR IP)

## 2025-03-12 PROCEDURE — 2580000003 HC RX 258: Performed by: INTERNAL MEDICINE

## 2025-03-12 PROCEDURE — 97530 THERAPEUTIC ACTIVITIES: CPT

## 2025-03-12 PROCEDURE — 2060000000 HC ICU INTERMEDIATE R&B

## 2025-03-12 PROCEDURE — 99232 SBSQ HOSP IP/OBS MODERATE 35: CPT | Performed by: NURSE PRACTITIONER

## 2025-03-12 PROCEDURE — 97165 OT EVAL LOW COMPLEX 30 MIN: CPT

## 2025-03-12 PROCEDURE — 6360000002 HC RX W HCPCS: Performed by: NURSE PRACTITIONER

## 2025-03-12 PROCEDURE — 97161 PT EVAL LOW COMPLEX 20 MIN: CPT

## 2025-03-12 PROCEDURE — 84100 ASSAY OF PHOSPHORUS: CPT

## 2025-03-12 PROCEDURE — 6360000002 HC RX W HCPCS: Performed by: INTERNAL MEDICINE

## 2025-03-12 RX ORDER — OXYCODONE HYDROCHLORIDE 5 MG/1
5 TABLET ORAL EVERY 4 HOURS PRN
Refills: 0 | Status: DISCONTINUED | OUTPATIENT
Start: 2025-03-12 | End: 2025-03-20 | Stop reason: HOSPADM

## 2025-03-12 RX ORDER — PANTOPRAZOLE SODIUM 40 MG/1
40 TABLET, DELAYED RELEASE ORAL
Status: DISCONTINUED | OUTPATIENT
Start: 2025-03-13 | End: 2025-03-20 | Stop reason: HOSPADM

## 2025-03-12 RX ORDER — OXYCODONE HYDROCHLORIDE 5 MG/1
10 TABLET ORAL EVERY 4 HOURS PRN
Refills: 0 | Status: DISCONTINUED | OUTPATIENT
Start: 2025-03-12 | End: 2025-03-20 | Stop reason: HOSPADM

## 2025-03-12 RX ADMIN — Medication 1 MG: at 08:32

## 2025-03-12 RX ADMIN — SEVELAMER CARBONATE 1.6 G: 800 FOR SUSPENSION ORAL at 08:32

## 2025-03-12 RX ADMIN — TRAZODONE HYDROCHLORIDE 50 MG: 50 TABLET ORAL at 20:13

## 2025-03-12 RX ADMIN — OXYCODONE 10 MG: 5 TABLET ORAL at 20:12

## 2025-03-12 RX ADMIN — OXYCODONE 10 MG: 5 TABLET ORAL at 11:29

## 2025-03-12 RX ADMIN — PANTOPRAZOLE SODIUM 40 MG: 40 INJECTION, POWDER, FOR SOLUTION INTRAVENOUS at 08:32

## 2025-03-12 RX ADMIN — SODIUM CHLORIDE, PRESERVATIVE FREE 10 ML: 5 INJECTION INTRAVENOUS at 20:18

## 2025-03-12 RX ADMIN — METHOCARBAMOL TABLETS 1000 MG: 500 TABLET, COATED ORAL at 17:20

## 2025-03-12 RX ADMIN — SODIUM CHLORIDE, PRESERVATIVE FREE 10 ML: 5 INJECTION INTRAVENOUS at 08:33

## 2025-03-12 RX ADMIN — HEPARIN SODIUM 5000 UNITS: 5000 INJECTION INTRAVENOUS; SUBCUTANEOUS at 06:28

## 2025-03-12 RX ADMIN — HEPARIN SODIUM 5000 UNITS: 5000 INJECTION INTRAVENOUS; SUBCUTANEOUS at 21:32

## 2025-03-12 RX ADMIN — SEVELAMER CARBONATE 1.6 G: 800 FOR SUSPENSION ORAL at 17:20

## 2025-03-12 RX ADMIN — DRONABINOL 2.5 MG: 2.5 CAPSULE ORAL at 08:32

## 2025-03-12 RX ADMIN — HEPARIN SODIUM 5000 UNITS: 5000 INJECTION INTRAVENOUS; SUBCUTANEOUS at 13:54

## 2025-03-12 RX ADMIN — GABAPENTIN 300 MG: 600 TABLET, FILM COATED ORAL at 08:32

## 2025-03-12 RX ADMIN — Medication 100 MG: at 08:32

## 2025-03-12 RX ADMIN — SEVELAMER CARBONATE 1.6 G: 800 FOR SUSPENSION ORAL at 11:29

## 2025-03-12 RX ADMIN — METHOCARBAMOL TABLETS 1000 MG: 500 TABLET, COATED ORAL at 20:13

## 2025-03-12 RX ADMIN — CALCITRIOL CAPSULES 0.25 MCG 0.25 MCG: 0.25 CAPSULE ORAL at 08:32

## 2025-03-12 RX ADMIN — BUPRENORPHINE HYDROCHLORIDE AND NALOXONE HYDROCHLORIDE DIHYDRATE 1 TABLET: 8; 2 TABLET SUBLINGUAL at 20:13

## 2025-03-12 RX ADMIN — BUPRENORPHINE HYDROCHLORIDE AND NALOXONE HYDROCHLORIDE DIHYDRATE 1 TABLET: 8; 2 TABLET SUBLINGUAL at 08:32

## 2025-03-12 RX ADMIN — METHOCARBAMOL TABLETS 1000 MG: 500 TABLET, COATED ORAL at 13:54

## 2025-03-12 RX ADMIN — GABAPENTIN 300 MG: 600 TABLET, FILM COATED ORAL at 20:30

## 2025-03-12 RX ADMIN — DRONABINOL 2.5 MG: 2.5 CAPSULE ORAL at 20:13

## 2025-03-12 RX ADMIN — CEFEPIME 1000 MG: 1 INJECTION, POWDER, FOR SOLUTION INTRAMUSCULAR; INTRAVENOUS at 20:44

## 2025-03-12 RX ADMIN — METHOCARBAMOL TABLETS 1000 MG: 500 TABLET, COATED ORAL at 08:32

## 2025-03-12 RX ADMIN — GABAPENTIN 300 MG: 600 TABLET, FILM COATED ORAL at 13:54

## 2025-03-12 RX ADMIN — FERROUS SULFATE TAB 325 MG (65 MG ELEMENTAL FE) 325 MG: 325 (65 FE) TAB at 06:28

## 2025-03-12 ASSESSMENT — PAIN SCALES - GENERAL
PAINLEVEL_OUTOF10: 8
PAINLEVEL_OUTOF10: 0
PAINLEVEL_OUTOF10: 5
PAINLEVEL_OUTOF10: 0
PAINLEVEL_OUTOF10: 10

## 2025-03-12 ASSESSMENT — PAIN DESCRIPTION - LOCATION
LOCATION: BACK
LOCATION: BACK

## 2025-03-12 ASSESSMENT — PAIN DESCRIPTION - DESCRIPTORS: DESCRIPTORS: ACHING;NAGGING

## 2025-03-12 ASSESSMENT — PAIN DESCRIPTION - ORIENTATION: ORIENTATION: LOWER

## 2025-03-12 ASSESSMENT — PAIN DESCRIPTION - PAIN TYPE: TYPE: CHRONIC PAIN

## 2025-03-13 LAB
ANION GAP SERPL CALC-SCNC: 12 MMOL/L (ref 2–12)
BACTERIA SPEC CULT: ABNORMAL
BUN SERPL-MCNC: 73 MG/DL (ref 6–20)
BUN/CREAT SERPL: 9 (ref 12–20)
CALCIUM SERPL-MCNC: 9.4 MG/DL (ref 8.5–10.1)
CHLORIDE SERPL-SCNC: 90 MMOL/L (ref 97–108)
CO2 SERPL-SCNC: 28 MMOL/L (ref 21–32)
CREAT SERPL-MCNC: 8.01 MG/DL (ref 0.7–1.3)
ERYTHROCYTE [DISTWIDTH] IN BLOOD BY AUTOMATED COUNT: 16.6 % (ref 11.5–14.5)
GLUCOSE BLD STRIP.AUTO-MCNC: 104 MG/DL (ref 65–117)
GLUCOSE BLD STRIP.AUTO-MCNC: 104 MG/DL (ref 65–117)
GLUCOSE BLD STRIP.AUTO-MCNC: 126 MG/DL (ref 65–117)
GLUCOSE BLD STRIP.AUTO-MCNC: 130 MG/DL (ref 65–117)
GLUCOSE SERPL-MCNC: 95 MG/DL (ref 65–100)
HCT VFR BLD AUTO: 30.8 % (ref 36.6–50.3)
HGB BLD-MCNC: 9.9 G/DL (ref 12.1–17)
MAGNESIUM SERPL-MCNC: 2.7 MG/DL (ref 1.6–2.4)
MCH RBC QN AUTO: 31.7 PG (ref 26–34)
MCHC RBC AUTO-ENTMCNC: 32.1 G/DL (ref 30–36.5)
MCV RBC AUTO: 98.7 FL (ref 80–99)
NRBC # BLD: 0 K/UL (ref 0–0.01)
NRBC BLD-RTO: 0 PER 100 WBC
PHOSPHATE SERPL-MCNC: 4.6 MG/DL (ref 2.6–4.7)
PLATELET # BLD AUTO: 237 K/UL (ref 150–400)
PMV BLD AUTO: 9.5 FL (ref 8.9–12.9)
POTASSIUM SERPL-SCNC: 4.8 MMOL/L (ref 3.5–5.1)
RBC # BLD AUTO: 3.12 M/UL (ref 4.1–5.7)
SERVICE CMNT-IMP: ABNORMAL
SERVICE CMNT-IMP: NORMAL
SERVICE CMNT-IMP: NORMAL
SODIUM SERPL-SCNC: 130 MMOL/L (ref 136–145)
VANCOMYCIN SERPL-MCNC: 22.7 UG/ML
WBC # BLD AUTO: 5.8 K/UL (ref 4.1–11.1)

## 2025-03-13 PROCEDURE — 82962 GLUCOSE BLOOD TEST: CPT

## 2025-03-13 PROCEDURE — 99232 SBSQ HOSP IP/OBS MODERATE 35: CPT | Performed by: NURSE PRACTITIONER

## 2025-03-13 PROCEDURE — 6370000000 HC RX 637 (ALT 250 FOR IP): Performed by: PHYSICIAN ASSISTANT

## 2025-03-13 PROCEDURE — 2580000003 HC RX 258: Performed by: INTERNAL MEDICINE

## 2025-03-13 PROCEDURE — 85027 COMPLETE CBC AUTOMATED: CPT

## 2025-03-13 PROCEDURE — 83735 ASSAY OF MAGNESIUM: CPT

## 2025-03-13 PROCEDURE — 2060000000 HC ICU INTERMEDIATE R&B

## 2025-03-13 PROCEDURE — 6360000002 HC RX W HCPCS: Performed by: STUDENT IN AN ORGANIZED HEALTH CARE EDUCATION/TRAINING PROGRAM

## 2025-03-13 PROCEDURE — 90935 HEMODIALYSIS ONE EVALUATION: CPT

## 2025-03-13 PROCEDURE — 6360000002 HC RX W HCPCS: Performed by: PHYSICIAN ASSISTANT

## 2025-03-13 PROCEDURE — 6360000002 HC RX W HCPCS: Performed by: INTERNAL MEDICINE

## 2025-03-13 PROCEDURE — 84100 ASSAY OF PHOSPHORUS: CPT

## 2025-03-13 PROCEDURE — 2580000003 HC RX 258: Performed by: STUDENT IN AN ORGANIZED HEALTH CARE EDUCATION/TRAINING PROGRAM

## 2025-03-13 PROCEDURE — 6370000000 HC RX 637 (ALT 250 FOR IP)

## 2025-03-13 PROCEDURE — 80202 ASSAY OF VANCOMYCIN: CPT

## 2025-03-13 PROCEDURE — 6370000000 HC RX 637 (ALT 250 FOR IP): Performed by: INTERNAL MEDICINE

## 2025-03-13 PROCEDURE — 6370000000 HC RX 637 (ALT 250 FOR IP): Performed by: STUDENT IN AN ORGANIZED HEALTH CARE EDUCATION/TRAINING PROGRAM

## 2025-03-13 PROCEDURE — 2500000003 HC RX 250 WO HCPCS: Performed by: STUDENT IN AN ORGANIZED HEALTH CARE EDUCATION/TRAINING PROGRAM

## 2025-03-13 PROCEDURE — 80048 BASIC METABOLIC PNL TOTAL CA: CPT

## 2025-03-13 RX ORDER — VANCOMYCIN 500 MG/100ML
500 INJECTION, SOLUTION INTRAVENOUS ONCE
Status: COMPLETED | OUTPATIENT
Start: 2025-03-13 | End: 2025-03-13

## 2025-03-13 RX ADMIN — CEFEPIME 1000 MG: 1 INJECTION, POWDER, FOR SOLUTION INTRAMUSCULAR; INTRAVENOUS at 21:47

## 2025-03-13 RX ADMIN — HEPARIN SODIUM 5000 UNITS: 5000 INJECTION INTRAVENOUS; SUBCUTANEOUS at 06:08

## 2025-03-13 RX ADMIN — PANTOPRAZOLE SODIUM 40 MG: 40 TABLET, DELAYED RELEASE ORAL at 06:58

## 2025-03-13 RX ADMIN — CALCITRIOL CAPSULES 0.25 MCG 0.25 MCG: 0.25 CAPSULE ORAL at 10:20

## 2025-03-13 RX ADMIN — Medication 100 MG: at 10:20

## 2025-03-13 RX ADMIN — METHOCARBAMOL TABLETS 1000 MG: 500 TABLET, COATED ORAL at 21:37

## 2025-03-13 RX ADMIN — METHOCARBAMOL TABLETS 1000 MG: 500 TABLET, COATED ORAL at 18:07

## 2025-03-13 RX ADMIN — SODIUM CHLORIDE: 0.9 INJECTION, SOLUTION INTRAVENOUS at 14:13

## 2025-03-13 RX ADMIN — SEVELAMER CARBONATE 1.6 G: 800 FOR SUSPENSION ORAL at 10:21

## 2025-03-13 RX ADMIN — METHOCARBAMOL TABLETS 1000 MG: 500 TABLET, COATED ORAL at 13:56

## 2025-03-13 RX ADMIN — BUPRENORPHINE HYDROCHLORIDE AND NALOXONE HYDROCHLORIDE DIHYDRATE 1 TABLET: 8; 2 TABLET SUBLINGUAL at 21:37

## 2025-03-13 RX ADMIN — GABAPENTIN 300 MG: 600 TABLET, FILM COATED ORAL at 21:08

## 2025-03-13 RX ADMIN — TRAZODONE HYDROCHLORIDE 50 MG: 50 TABLET ORAL at 21:38

## 2025-03-13 RX ADMIN — HEPARIN SODIUM 1900 UNITS: 1000 INJECTION INTRAVENOUS; SUBCUTANEOUS at 12:15

## 2025-03-13 RX ADMIN — VANCOMYCIN 500 MG: 500 INJECTION, SOLUTION INTRAVENOUS at 14:18

## 2025-03-13 RX ADMIN — METHOCARBAMOL TABLETS 1000 MG: 500 TABLET, COATED ORAL at 10:20

## 2025-03-13 RX ADMIN — GABAPENTIN 300 MG: 600 TABLET, FILM COATED ORAL at 13:57

## 2025-03-13 RX ADMIN — SEVELAMER CARBONATE 1.6 G: 800 FOR SUSPENSION ORAL at 13:57

## 2025-03-13 RX ADMIN — FERROUS SULFATE TAB 325 MG (65 MG ELEMENTAL FE) 325 MG: 325 (65 FE) TAB at 06:58

## 2025-03-13 RX ADMIN — BUPRENORPHINE HYDROCHLORIDE AND NALOXONE HYDROCHLORIDE DIHYDRATE 1 TABLET: 8; 2 TABLET SUBLINGUAL at 09:19

## 2025-03-13 RX ADMIN — SODIUM CHLORIDE, PRESERVATIVE FREE 10 ML: 5 INJECTION INTRAVENOUS at 21:38

## 2025-03-13 RX ADMIN — HEPARIN SODIUM 5000 UNITS: 5000 INJECTION INTRAVENOUS; SUBCUTANEOUS at 13:57

## 2025-03-13 RX ADMIN — DRONABINOL 2.5 MG: 2.5 CAPSULE ORAL at 10:20

## 2025-03-13 RX ADMIN — SODIUM CHLORIDE, PRESERVATIVE FREE 10 ML: 5 INJECTION INTRAVENOUS at 10:23

## 2025-03-13 RX ADMIN — SEVELAMER CARBONATE 1.6 G: 800 FOR SUSPENSION ORAL at 18:07

## 2025-03-13 RX ADMIN — DRONABINOL 2.5 MG: 2.5 CAPSULE ORAL at 21:38

## 2025-03-13 RX ADMIN — HEPARIN SODIUM 1800 UNITS: 1000 INJECTION INTRAVENOUS; SUBCUTANEOUS at 12:13

## 2025-03-13 RX ADMIN — HEPARIN SODIUM 5000 UNITS: 5000 INJECTION INTRAVENOUS; SUBCUTANEOUS at 21:37

## 2025-03-13 ASSESSMENT — PAIN DESCRIPTION - ORIENTATION
ORIENTATION: LOWER
ORIENTATION: RIGHT
ORIENTATION: LOWER

## 2025-03-13 ASSESSMENT — PAIN - FUNCTIONAL ASSESSMENT: PAIN_FUNCTIONAL_ASSESSMENT: ACTIVITIES ARE NOT PREVENTED

## 2025-03-13 ASSESSMENT — PAIN DESCRIPTION - LOCATION
LOCATION: BACK
LOCATION: BACK

## 2025-03-13 ASSESSMENT — PAIN SCALES - GENERAL
PAINLEVEL_OUTOF10: 8
PAINLEVEL_OUTOF10: 0
PAINLEVEL_OUTOF10: 6
PAINLEVEL_OUTOF10: 0
PAINLEVEL_OUTOF10: 0
PAINLEVEL_OUTOF10: 8

## 2025-03-13 ASSESSMENT — PAIN DESCRIPTION - FREQUENCY: FREQUENCY: CONTINUOUS

## 2025-03-13 ASSESSMENT — PAIN DESCRIPTION - DIRECTION: RADIATING_TOWARDS: LOWER BACK

## 2025-03-13 ASSESSMENT — PAIN DESCRIPTION - DESCRIPTORS: DESCRIPTORS: DULL;DISCOMFORT

## 2025-03-13 ASSESSMENT — PAIN DESCRIPTION - PAIN TYPE: TYPE: CHRONIC PAIN

## 2025-03-13 ASSESSMENT — PAIN DESCRIPTION - ONSET: ONSET: ON-GOING

## 2025-03-13 NOTE — CARE COORDINATION
Transition of Care Plan:    RUR: 23% - high  Prior Level of Functioning: [facility]; requires assistance  Disposition: IPR recommended - will follow for progress  JOANNE: 2+ days  IPR: Date FOC offered: 3/13/25  Date FOC received:   Accepting facility:   Date authorization started with reference number:   Date authorization received and expires:   Follow up appointments: PCP; ID; defer to IPR  DME needed: defer to rehab  Transportation at discharge: stretcher  Caregiver Contact: nephew - Ozzy Jeffrey - p: [phone]  Discharge Caregiver contacted prior to discharge? yes  Care Conference needed? no  Barriers to discharge: medical    CM updated by attending MD that patient is not medically stable for discharge. ID consulted for bacteremia and awaiting blood culture results. Patient also with chon for HD access. Patient will need permacath for HD and definitive IV abx order for discharge placement. Treatment team has recommended acute rehab for disposition plan. Will follow up with attending MD on recommendation. Patient with Medicaid and will need insurance authorization. Nephew and son at the bedside advised that he has previous poor experience at [facility]. Nephew would like to tour [facility] and [facility].    CM will continue to follow.    Sidney Steinberg, MPH  Care Manager l [facility]  Available via [employer]

## 2025-03-14 LAB
ANION GAP SERPL CALC-SCNC: 8 MMOL/L (ref 2–12)
BACTERIA SPEC CULT: NORMAL
BUN SERPL-MCNC: 51 MG/DL (ref 6–20)
BUN/CREAT SERPL: 8 (ref 12–20)
CALCIUM SERPL-MCNC: 9.2 MG/DL (ref 8.5–10.1)
CHLORIDE SERPL-SCNC: 97 MMOL/L (ref 97–108)
CO2 SERPL-SCNC: 24 MMOL/L (ref 21–32)
CREAT SERPL-MCNC: 6.06 MG/DL (ref 0.7–1.3)
ERYTHROCYTE [DISTWIDTH] IN BLOOD BY AUTOMATED COUNT: 16.5 % (ref 11.5–14.5)
GLUCOSE BLD STRIP.AUTO-MCNC: 115 MG/DL (ref 65–117)
GLUCOSE BLD STRIP.AUTO-MCNC: 124 MG/DL (ref 65–117)
GLUCOSE BLD STRIP.AUTO-MCNC: 125 MG/DL (ref 65–117)
GLUCOSE SERPL-MCNC: 85 MG/DL (ref 65–100)
HCT VFR BLD AUTO: 32.3 % (ref 36.6–50.3)
HGB BLD-MCNC: 10 G/DL (ref 12.1–17)
MAGNESIUM SERPL-MCNC: 2.4 MG/DL (ref 1.6–2.4)
MCH RBC QN AUTO: 31 PG (ref 26–34)
MCHC RBC AUTO-ENTMCNC: 31 G/DL (ref 30–36.5)
MCV RBC AUTO: 100 FL (ref 80–99)
NRBC # BLD: 0 K/UL (ref 0–0.01)
NRBC BLD-RTO: 0 PER 100 WBC
PHOSPHATE SERPL-MCNC: 4.3 MG/DL (ref 2.6–4.7)
PLATELET # BLD AUTO: 277 K/UL (ref 150–400)
PMV BLD AUTO: 10 FL (ref 8.9–12.9)
POTASSIUM SERPL-SCNC: 5 MMOL/L (ref 3.5–5.1)
RBC # BLD AUTO: 3.23 M/UL (ref 4.1–5.7)
SERVICE CMNT-IMP: ABNORMAL
SERVICE CMNT-IMP: ABNORMAL
SERVICE CMNT-IMP: NORMAL
SERVICE CMNT-IMP: NORMAL
SODIUM SERPL-SCNC: 129 MMOL/L (ref 136–145)
WBC # BLD AUTO: 5.3 K/UL (ref 4.1–11.1)

## 2025-03-14 PROCEDURE — 80048 BASIC METABOLIC PNL TOTAL CA: CPT

## 2025-03-14 PROCEDURE — 6370000000 HC RX 637 (ALT 250 FOR IP): Performed by: INTERNAL MEDICINE

## 2025-03-14 PROCEDURE — 2580000003 HC RX 258: Performed by: INTERNAL MEDICINE

## 2025-03-14 PROCEDURE — 6370000000 HC RX 637 (ALT 250 FOR IP): Performed by: STUDENT IN AN ORGANIZED HEALTH CARE EDUCATION/TRAINING PROGRAM

## 2025-03-14 PROCEDURE — 36415 COLL VENOUS BLD VENIPUNCTURE: CPT

## 2025-03-14 PROCEDURE — 82962 GLUCOSE BLOOD TEST: CPT

## 2025-03-14 PROCEDURE — 85027 COMPLETE CBC AUTOMATED: CPT

## 2025-03-14 PROCEDURE — 2500000003 HC RX 250 WO HCPCS: Performed by: STUDENT IN AN ORGANIZED HEALTH CARE EDUCATION/TRAINING PROGRAM

## 2025-03-14 PROCEDURE — 97530 THERAPEUTIC ACTIVITIES: CPT

## 2025-03-14 PROCEDURE — 6370000000 HC RX 637 (ALT 250 FOR IP)

## 2025-03-14 PROCEDURE — 83735 ASSAY OF MAGNESIUM: CPT

## 2025-03-14 PROCEDURE — 2060000000 HC ICU INTERMEDIATE R&B

## 2025-03-14 PROCEDURE — 6360000002 HC RX W HCPCS: Performed by: PHYSICIAN ASSISTANT

## 2025-03-14 PROCEDURE — 6360000002 HC RX W HCPCS: Performed by: INTERNAL MEDICINE

## 2025-03-14 PROCEDURE — 99232 SBSQ HOSP IP/OBS MODERATE 35: CPT | Performed by: NURSE PRACTITIONER

## 2025-03-14 PROCEDURE — 84100 ASSAY OF PHOSPHORUS: CPT

## 2025-03-14 PROCEDURE — 6370000000 HC RX 637 (ALT 250 FOR IP): Performed by: PHYSICIAN ASSISTANT

## 2025-03-14 PROCEDURE — 97535 SELF CARE MNGMENT TRAINING: CPT

## 2025-03-14 RX ADMIN — HEPARIN SODIUM 5000 UNITS: 5000 INJECTION INTRAVENOUS; SUBCUTANEOUS at 05:31

## 2025-03-14 RX ADMIN — GABAPENTIN 300 MG: 600 TABLET, FILM COATED ORAL at 08:47

## 2025-03-14 RX ADMIN — DRONABINOL 2.5 MG: 2.5 CAPSULE ORAL at 08:37

## 2025-03-14 RX ADMIN — SODIUM CHLORIDE, PRESERVATIVE FREE 10 ML: 5 INJECTION INTRAVENOUS at 08:38

## 2025-03-14 RX ADMIN — METHOCARBAMOL TABLETS 1000 MG: 500 TABLET, COATED ORAL at 08:37

## 2025-03-14 RX ADMIN — METHOCARBAMOL TABLETS 1000 MG: 500 TABLET, COATED ORAL at 21:41

## 2025-03-14 RX ADMIN — CALCITRIOL CAPSULES 0.25 MCG 0.25 MCG: 0.25 CAPSULE ORAL at 08:37

## 2025-03-14 RX ADMIN — Medication 100 MG: at 08:38

## 2025-03-14 RX ADMIN — HEPARIN SODIUM 5000 UNITS: 5000 INJECTION INTRAVENOUS; SUBCUTANEOUS at 21:41

## 2025-03-14 RX ADMIN — CEFEPIME 1000 MG: 1 INJECTION, POWDER, FOR SOLUTION INTRAMUSCULAR; INTRAVENOUS at 22:04

## 2025-03-14 RX ADMIN — TRAZODONE HYDROCHLORIDE 50 MG: 50 TABLET ORAL at 21:42

## 2025-03-14 RX ADMIN — BUPRENORPHINE HYDROCHLORIDE AND NALOXONE HYDROCHLORIDE DIHYDRATE 1 TABLET: 8; 2 TABLET SUBLINGUAL at 08:37

## 2025-03-14 RX ADMIN — FERROUS SULFATE TAB 325 MG (65 MG ELEMENTAL FE) 325 MG: 325 (65 FE) TAB at 05:31

## 2025-03-14 RX ADMIN — OXYCODONE 10 MG: 5 TABLET ORAL at 05:31

## 2025-03-14 RX ADMIN — METHOCARBAMOL TABLETS 1000 MG: 500 TABLET, COATED ORAL at 17:33

## 2025-03-14 RX ADMIN — SEVELAMER CARBONATE 1.6 G: 800 FOR SUSPENSION ORAL at 08:37

## 2025-03-14 RX ADMIN — GABAPENTIN 300 MG: 600 TABLET, FILM COATED ORAL at 21:42

## 2025-03-14 RX ADMIN — SEVELAMER CARBONATE 1.6 G: 800 FOR SUSPENSION ORAL at 12:43

## 2025-03-14 RX ADMIN — METHOCARBAMOL TABLETS 1000 MG: 500 TABLET, COATED ORAL at 12:43

## 2025-03-14 RX ADMIN — HEPARIN SODIUM 5000 UNITS: 5000 INJECTION INTRAVENOUS; SUBCUTANEOUS at 14:17

## 2025-03-14 RX ADMIN — DRONABINOL 2.5 MG: 2.5 CAPSULE ORAL at 21:43

## 2025-03-14 RX ADMIN — Medication 1 MG: at 08:38

## 2025-03-14 RX ADMIN — BUPRENORPHINE HYDROCHLORIDE AND NALOXONE HYDROCHLORIDE DIHYDRATE 1 TABLET: 8; 2 TABLET SUBLINGUAL at 21:43

## 2025-03-14 RX ADMIN — GABAPENTIN 300 MG: 600 TABLET, FILM COATED ORAL at 14:17

## 2025-03-14 RX ADMIN — PANTOPRAZOLE SODIUM 40 MG: 40 TABLET, DELAYED RELEASE ORAL at 05:31

## 2025-03-14 RX ADMIN — SEVELAMER CARBONATE 1.6 G: 800 FOR SUSPENSION ORAL at 17:33

## 2025-03-14 ASSESSMENT — PAIN DESCRIPTION - DESCRIPTORS: DESCRIPTORS: ACHING;DISCOMFORT

## 2025-03-14 ASSESSMENT — PAIN DESCRIPTION - ORIENTATION: ORIENTATION: LOWER

## 2025-03-14 ASSESSMENT — PAIN DESCRIPTION - LOCATION
LOCATION: BACK
LOCATION: BACK

## 2025-03-14 ASSESSMENT — PAIN SCALES - GENERAL: PAINLEVEL_OUTOF10: 8

## 2025-03-14 NOTE — CARE COORDINATION
Transition of Care Plan:    [facility] IPR when stable, > 48 hrs (likely 3/17) - needs permacath before dc  - needs auth through [facility] Medicaid     Transport: Medicaid stretcher to be arranged     ESRD HD - [place] [address] [place] [address]    RUR: 23% - high  Prior Level of Functioning: [facility]; requires assistance  Disposition: [facility]   JOANNE: >48 hrs  IPR: Date FOC offered: 3/13/25  Date FOC received: 3/14  Accepting facility:  [facility]  Date authorization started with reference number:   Date authorization received and expires:   Follow up appointments: PCP; ID; defer to IPR  DME needed: defer to rehab  Transportation at discharge: stretcher  Caregiver Contact: nazario Jeffrey - p: [phone]  Discharge Caregiver contacted prior to discharge? yes  Care Conference needed? no  Barriers to discharge: medical; needs permacath     Pt ESRD HD pt: [place] [address] [place] [id number]  P: [phone]  F: [phone]    330pm: [facility] unable to accept due to post-IPR plans and [facility] does not have bed availability until mid next week. As Pt will likely be medically stable Monday, CM sent additional referrals. [facility] can accept - Pt informed at bedside and is in agreement. PATRICIA faxed ID dc order to dialysis clinic. Per MD, Pt stable for dc once Permacath is placed.     MAKAYLA Rosas

## 2025-03-15 LAB
ANION GAP SERPL CALC-SCNC: 10 MMOL/L (ref 2–12)
BASOPHILS # BLD: 0.1 K/UL (ref 0–0.1)
BASOPHILS NFR BLD: 2 % (ref 0–1)
BUN SERPL-MCNC: 87 MG/DL (ref 6–20)
BUN/CREAT SERPL: 11 (ref 12–20)
CALCIUM SERPL-MCNC: 9.6 MG/DL (ref 8.5–10.1)
CHLORIDE SERPL-SCNC: 97 MMOL/L (ref 97–108)
CO2 SERPL-SCNC: 23 MMOL/L (ref 21–32)
CREAT SERPL-MCNC: 7.96 MG/DL (ref 0.7–1.3)
DIFFERENTIAL METHOD BLD: ABNORMAL
EOSINOPHIL # BLD: 0.46 K/UL (ref 0–0.4)
EOSINOPHIL NFR BLD: 9 % (ref 0–7)
ERYTHROCYTE [DISTWIDTH] IN BLOOD BY AUTOMATED COUNT: 16.2 % (ref 11.5–14.5)
GLUCOSE BLD STRIP.AUTO-MCNC: 106 MG/DL (ref 65–117)
GLUCOSE BLD STRIP.AUTO-MCNC: 125 MG/DL (ref 65–117)
GLUCOSE BLD STRIP.AUTO-MCNC: 159 MG/DL (ref 65–117)
GLUCOSE SERPL-MCNC: 92 MG/DL (ref 65–100)
HCT VFR BLD AUTO: 30.5 % (ref 36.6–50.3)
HGB BLD-MCNC: 9.6 G/DL (ref 12.1–17)
IMM GRANULOCYTES # BLD AUTO: 0 K/UL
IMM GRANULOCYTES NFR BLD AUTO: 0 %
LYMPHOCYTES # BLD: 1.99 K/UL (ref 0.8–3.5)
LYMPHOCYTES NFR BLD: 39 % (ref 12–49)
MCH RBC QN AUTO: 30.8 PG (ref 26–34)
MCHC RBC AUTO-ENTMCNC: 31.5 G/DL (ref 30–36.5)
MCV RBC AUTO: 97.8 FL (ref 80–99)
MONOCYTES # BLD: 0.36 K/UL (ref 0–1)
MONOCYTES NFR BLD: 7 % (ref 5–13)
MYELOCYTES NFR BLD MANUAL: 1 %
NEUTS SEG # BLD: 2.09 K/UL (ref 1.8–8)
NEUTS SEG NFR BLD: 41 % (ref 32–75)
NRBC # BLD: 0 K/UL (ref 0–0.01)
NRBC BLD-RTO: 0 PER 100 WBC
PLATELET # BLD AUTO: 280 K/UL (ref 150–400)
PMV BLD AUTO: 9.3 FL (ref 8.9–12.9)
POTASSIUM SERPL-SCNC: 5.6 MMOL/L (ref 3.5–5.1)
PROMYELOCYTES NFR BLD MANUAL: 1 %
RBC # BLD AUTO: 3.12 M/UL (ref 4.1–5.7)
RBC MORPH BLD: ABNORMAL
RBC MORPH BLD: ABNORMAL
SERVICE CMNT-IMP: ABNORMAL
SERVICE CMNT-IMP: ABNORMAL
SERVICE CMNT-IMP: NORMAL
SODIUM SERPL-SCNC: 130 MMOL/L (ref 136–145)
WBC # BLD AUTO: 5.1 K/UL (ref 4.1–11.1)

## 2025-03-15 PROCEDURE — 85025 COMPLETE CBC W/AUTO DIFF WBC: CPT

## 2025-03-15 PROCEDURE — 6360000002 HC RX W HCPCS: Performed by: INTERNAL MEDICINE

## 2025-03-15 PROCEDURE — 6370000000 HC RX 637 (ALT 250 FOR IP): Performed by: STUDENT IN AN ORGANIZED HEALTH CARE EDUCATION/TRAINING PROGRAM

## 2025-03-15 PROCEDURE — 6370000000 HC RX 637 (ALT 250 FOR IP): Performed by: INTERNAL MEDICINE

## 2025-03-15 PROCEDURE — 6370000000 HC RX 637 (ALT 250 FOR IP)

## 2025-03-15 PROCEDURE — 6360000002 HC RX W HCPCS: Performed by: PHYSICIAN ASSISTANT

## 2025-03-15 PROCEDURE — 6360000002 HC RX W HCPCS: Performed by: STUDENT IN AN ORGANIZED HEALTH CARE EDUCATION/TRAINING PROGRAM

## 2025-03-15 PROCEDURE — 2500000003 HC RX 250 WO HCPCS: Performed by: STUDENT IN AN ORGANIZED HEALTH CARE EDUCATION/TRAINING PROGRAM

## 2025-03-15 PROCEDURE — 82962 GLUCOSE BLOOD TEST: CPT

## 2025-03-15 PROCEDURE — 80048 BASIC METABOLIC PNL TOTAL CA: CPT

## 2025-03-15 PROCEDURE — 2580000003 HC RX 258: Performed by: INTERNAL MEDICINE

## 2025-03-15 PROCEDURE — 2060000000 HC ICU INTERMEDIATE R&B

## 2025-03-15 PROCEDURE — 90935 HEMODIALYSIS ONE EVALUATION: CPT

## 2025-03-15 RX ORDER — VANCOMYCIN 500 MG/100ML
500 INJECTION, SOLUTION INTRAVENOUS ONCE
Status: COMPLETED | OUTPATIENT
Start: 2025-03-15 | End: 2025-03-15

## 2025-03-15 RX ORDER — GABAPENTIN 100 MG/1
100 CAPSULE ORAL 3 TIMES DAILY
Status: DISCONTINUED | OUTPATIENT
Start: 2025-03-15 | End: 2025-03-20 | Stop reason: HOSPADM

## 2025-03-15 RX ADMIN — FERROUS SULFATE TAB 325 MG (65 MG ELEMENTAL FE) 325 MG: 325 (65 FE) TAB at 07:18

## 2025-03-15 RX ADMIN — GABAPENTIN 100 MG: 100 CAPSULE ORAL at 13:34

## 2025-03-15 RX ADMIN — BUPRENORPHINE HYDROCHLORIDE AND NALOXONE HYDROCHLORIDE DIHYDRATE 1 TABLET: 8; 2 TABLET SUBLINGUAL at 21:24

## 2025-03-15 RX ADMIN — BUPRENORPHINE HYDROCHLORIDE AND NALOXONE HYDROCHLORIDE DIHYDRATE 1 TABLET: 8; 2 TABLET SUBLINGUAL at 13:36

## 2025-03-15 RX ADMIN — SODIUM CHLORIDE, PRESERVATIVE FREE 10 ML: 5 INJECTION INTRAVENOUS at 13:36

## 2025-03-15 RX ADMIN — CEFEPIME 1000 MG: 1 INJECTION, POWDER, FOR SOLUTION INTRAMUSCULAR; INTRAVENOUS at 21:28

## 2025-03-15 RX ADMIN — SODIUM CHLORIDE, PRESERVATIVE FREE 10 ML: 5 INJECTION INTRAVENOUS at 21:37

## 2025-03-15 RX ADMIN — ACETAMINOPHEN 650 MG: 325 TABLET ORAL at 07:18

## 2025-03-15 RX ADMIN — VANCOMYCIN 500 MG: 500 INJECTION, SOLUTION INTRAVENOUS at 13:47

## 2025-03-15 RX ADMIN — HEPARIN SODIUM 5000 UNITS: 5000 INJECTION INTRAVENOUS; SUBCUTANEOUS at 13:49

## 2025-03-15 RX ADMIN — HEPARIN SODIUM 1500 UNITS: 1000 INJECTION INTRAVENOUS; SUBCUTANEOUS at 12:44

## 2025-03-15 RX ADMIN — CALCITRIOL CAPSULES 0.25 MCG 0.25 MCG: 0.25 CAPSULE ORAL at 13:32

## 2025-03-15 RX ADMIN — DRONABINOL 2.5 MG: 2.5 CAPSULE ORAL at 21:24

## 2025-03-15 RX ADMIN — HEPARIN SODIUM 5000 UNITS: 5000 INJECTION INTRAVENOUS; SUBCUTANEOUS at 21:33

## 2025-03-15 RX ADMIN — SEVELAMER CARBONATE 1.6 G: 800 FOR SUSPENSION ORAL at 13:32

## 2025-03-15 RX ADMIN — SEVELAMER CARBONATE 1.6 G: 800 FOR SUSPENSION ORAL at 17:40

## 2025-03-15 RX ADMIN — TRAZODONE HYDROCHLORIDE 50 MG: 50 TABLET ORAL at 21:24

## 2025-03-15 RX ADMIN — HEPARIN SODIUM 5000 UNITS: 5000 INJECTION INTRAVENOUS; SUBCUTANEOUS at 07:18

## 2025-03-15 RX ADMIN — Medication 1 MG: at 13:32

## 2025-03-15 RX ADMIN — DRONABINOL 2.5 MG: 2.5 CAPSULE ORAL at 13:32

## 2025-03-15 RX ADMIN — GABAPENTIN 100 MG: 100 CAPSULE ORAL at 21:24

## 2025-03-15 RX ADMIN — PANTOPRAZOLE SODIUM 40 MG: 40 TABLET, DELAYED RELEASE ORAL at 07:19

## 2025-03-15 RX ADMIN — Medication 100 MG: at 13:34

## 2025-03-15 RX ADMIN — HEPARIN SODIUM 1700 UNITS: 1000 INJECTION INTRAVENOUS; SUBCUTANEOUS at 12:45

## 2025-03-15 RX ADMIN — METHOCARBAMOL TABLETS 1000 MG: 500 TABLET, COATED ORAL at 13:32

## 2025-03-15 ASSESSMENT — PAIN DESCRIPTION - LOCATION
LOCATION: BACK
LOCATION: BACK

## 2025-03-15 ASSESSMENT — PAIN SCALES - GENERAL
PAINLEVEL_OUTOF10: 8
PAINLEVEL_OUTOF10: 8

## 2025-03-15 ASSESSMENT — PAIN DESCRIPTION - ORIENTATION
ORIENTATION: LOWER
ORIENTATION: LOWER

## 2025-03-15 ASSESSMENT — PAIN DESCRIPTION - DESCRIPTORS: DESCRIPTORS: STABBING

## 2025-03-15 ASSESSMENT — PAIN - FUNCTIONAL ASSESSMENT: PAIN_FUNCTIONAL_ASSESSMENT: PREVENTS OR INTERFERES SOME ACTIVE ACTIVITIES AND ADLS

## 2025-03-16 LAB
ANION GAP SERPL CALC-SCNC: 8 MMOL/L (ref 2–12)
BASOPHILS # BLD: 0.05 K/UL (ref 0–0.1)
BASOPHILS NFR BLD: 1 % (ref 0–1)
BUN SERPL-MCNC: 65 MG/DL (ref 6–20)
BUN/CREAT SERPL: 10 (ref 12–20)
CALCIUM SERPL-MCNC: 9.9 MG/DL (ref 8.5–10.1)
CHLORIDE SERPL-SCNC: 98 MMOL/L (ref 97–108)
CO2 SERPL-SCNC: 26 MMOL/L (ref 21–32)
CREAT SERPL-MCNC: 6.26 MG/DL (ref 0.7–1.3)
DIFFERENTIAL METHOD BLD: ABNORMAL
EOSINOPHIL # BLD: 0.16 K/UL (ref 0–0.4)
EOSINOPHIL NFR BLD: 3 % (ref 0–7)
ERYTHROCYTE [DISTWIDTH] IN BLOOD BY AUTOMATED COUNT: 16.2 % (ref 11.5–14.5)
GLUCOSE BLD STRIP.AUTO-MCNC: 125 MG/DL (ref 65–117)
GLUCOSE BLD STRIP.AUTO-MCNC: 94 MG/DL (ref 65–117)
GLUCOSE SERPL-MCNC: 88 MG/DL (ref 65–100)
HCT VFR BLD AUTO: 35.4 % (ref 36.6–50.3)
HGB BLD-MCNC: 11 G/DL (ref 12.1–17)
IMM GRANULOCYTES # BLD AUTO: 0 K/UL
IMM GRANULOCYTES NFR BLD AUTO: 0 %
LYMPHOCYTES # BLD: 2.33 K/UL (ref 0.8–3.5)
LYMPHOCYTES NFR BLD: 44 % (ref 12–49)
MCH RBC QN AUTO: 30.7 PG (ref 26–34)
MCHC RBC AUTO-ENTMCNC: 31.1 G/DL (ref 30–36.5)
MCV RBC AUTO: 98.9 FL (ref 80–99)
MONOCYTES # BLD: 0.53 K/UL (ref 0–1)
MONOCYTES NFR BLD: 10 % (ref 5–13)
MYELOCYTES NFR BLD MANUAL: 1 %
NEUTS SEG # BLD: 2.17 K/UL (ref 1.8–8)
NEUTS SEG NFR BLD: 41 % (ref 32–75)
NRBC # BLD: 0 K/UL (ref 0–0.01)
NRBC BLD-RTO: 0 PER 100 WBC
PLATELET # BLD AUTO: 328 K/UL (ref 150–400)
PMV BLD AUTO: 9.7 FL (ref 8.9–12.9)
POTASSIUM SERPL-SCNC: 5.8 MMOL/L (ref 3.5–5.1)
POTASSIUM SERPL-SCNC: 6.4 MMOL/L (ref 3.5–5.1)
RBC # BLD AUTO: 3.58 M/UL (ref 4.1–5.7)
RBC MORPH BLD: ABNORMAL
RBC MORPH BLD: ABNORMAL
SERVICE CMNT-IMP: ABNORMAL
SERVICE CMNT-IMP: NORMAL
SODIUM SERPL-SCNC: 132 MMOL/L (ref 136–145)
WBC # BLD AUTO: 5.3 K/UL (ref 4.1–11.1)

## 2025-03-16 PROCEDURE — 82962 GLUCOSE BLOOD TEST: CPT

## 2025-03-16 PROCEDURE — 84132 ASSAY OF SERUM POTASSIUM: CPT

## 2025-03-16 PROCEDURE — 2060000000 HC ICU INTERMEDIATE R&B

## 2025-03-16 PROCEDURE — 2500000003 HC RX 250 WO HCPCS: Performed by: STUDENT IN AN ORGANIZED HEALTH CARE EDUCATION/TRAINING PROGRAM

## 2025-03-16 PROCEDURE — 6370000000 HC RX 637 (ALT 250 FOR IP)

## 2025-03-16 PROCEDURE — 6360000002 HC RX W HCPCS: Performed by: PHYSICIAN ASSISTANT

## 2025-03-16 PROCEDURE — 6360000002 HC RX W HCPCS: Performed by: INTERNAL MEDICINE

## 2025-03-16 PROCEDURE — 6370000000 HC RX 637 (ALT 250 FOR IP): Performed by: STUDENT IN AN ORGANIZED HEALTH CARE EDUCATION/TRAINING PROGRAM

## 2025-03-16 PROCEDURE — 85025 COMPLETE CBC W/AUTO DIFF WBC: CPT

## 2025-03-16 PROCEDURE — 80048 BASIC METABOLIC PNL TOTAL CA: CPT

## 2025-03-16 PROCEDURE — 2580000003 HC RX 258: Performed by: INTERNAL MEDICINE

## 2025-03-16 PROCEDURE — 6370000000 HC RX 637 (ALT 250 FOR IP): Performed by: INTERNAL MEDICINE

## 2025-03-16 RX ADMIN — DRONABINOL 2.5 MG: 2.5 CAPSULE ORAL at 21:21

## 2025-03-16 RX ADMIN — SEVELAMER CARBONATE 1.6 G: 800 FOR SUSPENSION ORAL at 17:23

## 2025-03-16 RX ADMIN — GABAPENTIN 100 MG: 100 CAPSULE ORAL at 21:21

## 2025-03-16 RX ADMIN — POLYETHYLENE GLYCOL 3350 17 G: 17 POWDER, FOR SOLUTION ORAL at 05:45

## 2025-03-16 RX ADMIN — HEPARIN SODIUM 5000 UNITS: 5000 INJECTION INTRAVENOUS; SUBCUTANEOUS at 21:21

## 2025-03-16 RX ADMIN — TRAZODONE HYDROCHLORIDE 50 MG: 50 TABLET ORAL at 21:21

## 2025-03-16 RX ADMIN — FERROUS SULFATE TAB 325 MG (65 MG ELEMENTAL FE) 325 MG: 325 (65 FE) TAB at 05:45

## 2025-03-16 RX ADMIN — CEFEPIME 1000 MG: 1 INJECTION, POWDER, FOR SOLUTION INTRAMUSCULAR; INTRAVENOUS at 21:22

## 2025-03-16 RX ADMIN — Medication 100 MG: at 09:41

## 2025-03-16 RX ADMIN — SODIUM ZIRCONIUM CYCLOSILICATE 5 G: 5 POWDER, FOR SUSPENSION ORAL at 11:45

## 2025-03-16 RX ADMIN — Medication 1 MG: at 09:42

## 2025-03-16 RX ADMIN — HEPARIN SODIUM 5000 UNITS: 5000 INJECTION INTRAVENOUS; SUBCUTANEOUS at 05:45

## 2025-03-16 RX ADMIN — HEPARIN SODIUM 5000 UNITS: 5000 INJECTION INTRAVENOUS; SUBCUTANEOUS at 14:11

## 2025-03-16 RX ADMIN — SODIUM CHLORIDE, PRESERVATIVE FREE 10 ML: 5 INJECTION INTRAVENOUS at 21:21

## 2025-03-16 RX ADMIN — CALCITRIOL CAPSULES 0.25 MCG 0.25 MCG: 0.25 CAPSULE ORAL at 09:42

## 2025-03-16 RX ADMIN — GABAPENTIN 100 MG: 100 CAPSULE ORAL at 14:11

## 2025-03-16 RX ADMIN — SEVELAMER CARBONATE 1.6 G: 800 FOR SUSPENSION ORAL at 09:44

## 2025-03-16 RX ADMIN — BUPRENORPHINE HYDROCHLORIDE AND NALOXONE HYDROCHLORIDE DIHYDRATE 1 TABLET: 8; 2 TABLET SUBLINGUAL at 09:42

## 2025-03-16 RX ADMIN — PANTOPRAZOLE SODIUM 40 MG: 40 TABLET, DELAYED RELEASE ORAL at 05:45

## 2025-03-16 RX ADMIN — DRONABINOL 2.5 MG: 2.5 CAPSULE ORAL at 09:42

## 2025-03-16 RX ADMIN — GABAPENTIN 100 MG: 100 CAPSULE ORAL at 09:42

## 2025-03-16 RX ADMIN — BUPRENORPHINE HYDROCHLORIDE AND NALOXONE HYDROCHLORIDE DIHYDRATE 1 TABLET: 8; 2 TABLET SUBLINGUAL at 21:21

## 2025-03-16 RX ADMIN — SODIUM CHLORIDE, PRESERVATIVE FREE 10 ML: 5 INJECTION INTRAVENOUS at 09:43

## 2025-03-16 ASSESSMENT — PAIN DESCRIPTION - ORIENTATION
ORIENTATION: LOWER
ORIENTATION: LOWER

## 2025-03-16 ASSESSMENT — PAIN DESCRIPTION - PAIN TYPE: TYPE: CHRONIC PAIN

## 2025-03-16 ASSESSMENT — PAIN DESCRIPTION - DESCRIPTORS
DESCRIPTORS: ACHING
DESCRIPTORS: ACHING

## 2025-03-16 ASSESSMENT — PAIN SCALES - GENERAL
PAINLEVEL_OUTOF10: 6
PAINLEVEL_OUTOF10: 8
PAINLEVEL_OUTOF10: 6
PAINLEVEL_OUTOF10: 8
PAINLEVEL_OUTOF10: 8
PAINLEVEL_OUTOF10: 3

## 2025-03-16 ASSESSMENT — PAIN - FUNCTIONAL ASSESSMENT
PAIN_FUNCTIONAL_ASSESSMENT: ACTIVITIES ARE NOT PREVENTED
PAIN_FUNCTIONAL_ASSESSMENT: ACTIVITIES ARE NOT PREVENTED

## 2025-03-16 ASSESSMENT — PAIN DESCRIPTION - LOCATION
LOCATION: BACK
LOCATION: BACK

## 2025-03-16 ASSESSMENT — PAIN DESCRIPTION - ONSET: ONSET: GRADUAL

## 2025-03-17 ENCOUNTER — APPOINTMENT (OUTPATIENT)
Facility: HOSPITAL | Age: 60
DRG: 347 | End: 2025-03-17
Attending: INTERNAL MEDICINE
Payer: MEDICAID

## 2025-03-17 PROBLEM — E43 SEVERE PROTEIN-CALORIE MALNUTRITION: Status: ACTIVE | Noted: 2025-03-17

## 2025-03-17 LAB
ANION GAP SERPL CALC-SCNC: 11 MMOL/L (ref 2–12)
BACTERIA SPEC CULT: NORMAL
BASOPHILS # BLD: 0.04 K/UL (ref 0–0.1)
BASOPHILS NFR BLD: 0.7 % (ref 0–1)
BUN SERPL-MCNC: 83 MG/DL (ref 6–20)
BUN/CREAT SERPL: 11 (ref 12–20)
CALCIUM SERPL-MCNC: 9.6 MG/DL (ref 8.5–10.1)
CHLORIDE SERPL-SCNC: 98 MMOL/L (ref 97–108)
CO2 SERPL-SCNC: 23 MMOL/L (ref 21–32)
CREAT SERPL-MCNC: 7.76 MG/DL (ref 0.7–1.3)
DIFFERENTIAL METHOD BLD: ABNORMAL
EOSINOPHIL # BLD: 0.22 K/UL (ref 0–0.4)
EOSINOPHIL NFR BLD: 4 % (ref 0–7)
ERYTHROCYTE [DISTWIDTH] IN BLOOD BY AUTOMATED COUNT: 16.4 % (ref 11.5–14.5)
GLUCOSE BLD STRIP.AUTO-MCNC: 116 MG/DL (ref 65–117)
GLUCOSE BLD STRIP.AUTO-MCNC: 144 MG/DL (ref 65–117)
GLUCOSE BLD STRIP.AUTO-MCNC: 165 MG/DL (ref 65–117)
GLUCOSE BLD STRIP.AUTO-MCNC: 73 MG/DL (ref 65–117)
GLUCOSE SERPL-MCNC: 98 MG/DL (ref 65–100)
HCT VFR BLD AUTO: 28.8 % (ref 36.6–50.3)
HGB BLD-MCNC: 9.2 G/DL (ref 12.1–17)
IMM GRANULOCYTES # BLD AUTO: 0.09 K/UL (ref 0–0.04)
IMM GRANULOCYTES NFR BLD AUTO: 1.6 % (ref 0–0.5)
LYMPHOCYTES # BLD: 1.83 K/UL (ref 0.8–3.5)
LYMPHOCYTES NFR BLD: 33.3 % (ref 12–49)
MCH RBC QN AUTO: 31.3 PG (ref 26–34)
MCHC RBC AUTO-ENTMCNC: 31.9 G/DL (ref 30–36.5)
MCV RBC AUTO: 98 FL (ref 80–99)
MONOCYTES # BLD: 0.56 K/UL (ref 0–1)
MONOCYTES NFR BLD: 10.2 % (ref 5–13)
NEUTS SEG # BLD: 2.76 K/UL (ref 1.8–8)
NEUTS SEG NFR BLD: 50.2 % (ref 32–75)
NRBC # BLD: 0 K/UL (ref 0–0.01)
NRBC BLD-RTO: 0 PER 100 WBC
PLATELET # BLD AUTO: 304 K/UL (ref 150–400)
PMV BLD AUTO: 9.8 FL (ref 8.9–12.9)
POTASSIUM SERPL-SCNC: 5.8 MMOL/L (ref 3.5–5.1)
POTASSIUM SERPL-SCNC: 6.5 MMOL/L (ref 3.5–5.1)
RBC # BLD AUTO: 2.94 M/UL (ref 4.1–5.7)
SERVICE CMNT-IMP: ABNORMAL
SERVICE CMNT-IMP: ABNORMAL
SERVICE CMNT-IMP: NORMAL
SODIUM SERPL-SCNC: 132 MMOL/L (ref 136–145)
WBC # BLD AUTO: 5.5 K/UL (ref 4.1–11.1)

## 2025-03-17 PROCEDURE — 6370000000 HC RX 637 (ALT 250 FOR IP): Performed by: STUDENT IN AN ORGANIZED HEALTH CARE EDUCATION/TRAINING PROGRAM

## 2025-03-17 PROCEDURE — 02H633Z INSERTION OF INFUSION DEVICE INTO RIGHT ATRIUM, PERCUTANEOUS APPROACH: ICD-10-PCS | Performed by: STUDENT IN AN ORGANIZED HEALTH CARE EDUCATION/TRAINING PROGRAM

## 2025-03-17 PROCEDURE — 2500000003 HC RX 250 WO HCPCS

## 2025-03-17 PROCEDURE — 6360000002 HC RX W HCPCS

## 2025-03-17 PROCEDURE — P9047 ALBUMIN (HUMAN), 25%, 50ML: HCPCS | Performed by: INTERNAL MEDICINE

## 2025-03-17 PROCEDURE — 82962 GLUCOSE BLOOD TEST: CPT

## 2025-03-17 PROCEDURE — 2500000003 HC RX 250 WO HCPCS: Performed by: STUDENT IN AN ORGANIZED HEALTH CARE EDUCATION/TRAINING PROGRAM

## 2025-03-17 PROCEDURE — 80048 BASIC METABOLIC PNL TOTAL CA: CPT

## 2025-03-17 PROCEDURE — 2060000000 HC ICU INTERMEDIATE R&B

## 2025-03-17 PROCEDURE — 6360000002 HC RX W HCPCS: Performed by: INTERNAL MEDICINE

## 2025-03-17 PROCEDURE — P9047 ALBUMIN (HUMAN), 25%, 50ML: HCPCS | Performed by: STUDENT IN AN ORGANIZED HEALTH CARE EDUCATION/TRAINING PROGRAM

## 2025-03-17 PROCEDURE — 76937 US GUIDE VASCULAR ACCESS: CPT

## 2025-03-17 PROCEDURE — B548ZZA ULTRASONOGRAPHY OF SUPERIOR VENA CAVA, GUIDANCE: ICD-10-PCS | Performed by: STUDENT IN AN ORGANIZED HEALTH CARE EDUCATION/TRAINING PROGRAM

## 2025-03-17 PROCEDURE — 6370000000 HC RX 637 (ALT 250 FOR IP): Performed by: INTERNAL MEDICINE

## 2025-03-17 PROCEDURE — 90935 HEMODIALYSIS ONE EVALUATION: CPT

## 2025-03-17 PROCEDURE — 0JH63XZ INSERTION OF TUNNELED VASCULAR ACCESS DEVICE INTO CHEST SUBCUTANEOUS TISSUE AND FASCIA, PERCUTANEOUS APPROACH: ICD-10-PCS | Performed by: STUDENT IN AN ORGANIZED HEALTH CARE EDUCATION/TRAINING PROGRAM

## 2025-03-17 PROCEDURE — 6370000000 HC RX 637 (ALT 250 FOR IP)

## 2025-03-17 PROCEDURE — 6360000002 HC RX W HCPCS: Performed by: PHYSICIAN ASSISTANT

## 2025-03-17 PROCEDURE — 84132 ASSAY OF SERUM POTASSIUM: CPT

## 2025-03-17 PROCEDURE — 36556 INSERT NON-TUNNEL CV CATH: CPT

## 2025-03-17 PROCEDURE — 2580000003 HC RX 258: Performed by: STUDENT IN AN ORGANIZED HEALTH CARE EDUCATION/TRAINING PROGRAM

## 2025-03-17 PROCEDURE — 6360000002 HC RX W HCPCS: Performed by: STUDENT IN AN ORGANIZED HEALTH CARE EDUCATION/TRAINING PROGRAM

## 2025-03-17 PROCEDURE — 2580000003 HC RX 258: Performed by: INTERNAL MEDICINE

## 2025-03-17 PROCEDURE — 85025 COMPLETE CBC W/AUTO DIFF WBC: CPT

## 2025-03-17 RX ORDER — FENTANYL CITRATE 50 UG/ML
INJECTION, SOLUTION INTRAMUSCULAR; INTRAVENOUS PRN
Status: COMPLETED | OUTPATIENT
Start: 2025-03-17 | End: 2025-03-17

## 2025-03-17 RX ORDER — HEPARIN SODIUM 1000 [USP'U]/ML
INJECTION, SOLUTION INTRAVENOUS; SUBCUTANEOUS PRN
Status: COMPLETED | OUTPATIENT
Start: 2025-03-17 | End: 2025-03-17

## 2025-03-17 RX ORDER — ALBUMIN (HUMAN) 12.5 G/50ML
25 SOLUTION INTRAVENOUS PRN
Status: DISCONTINUED | OUTPATIENT
Start: 2025-03-17 | End: 2025-03-20 | Stop reason: HOSPADM

## 2025-03-17 RX ORDER — ALBUMIN (HUMAN) 12.5 G/50ML
25 SOLUTION INTRAVENOUS ONCE
Status: COMPLETED | OUTPATIENT
Start: 2025-03-17 | End: 2025-03-17

## 2025-03-17 RX ORDER — LIDOCAINE HYDROCHLORIDE 10 MG/ML
INJECTION, SOLUTION EPIDURAL; INFILTRATION; INTRACAUDAL; PERINEURAL PRN
Status: COMPLETED | OUTPATIENT
Start: 2025-03-17 | End: 2025-03-17

## 2025-03-17 RX ORDER — MIDAZOLAM HYDROCHLORIDE 2 MG/2ML
INJECTION, SOLUTION INTRAMUSCULAR; INTRAVENOUS PRN
Status: COMPLETED | OUTPATIENT
Start: 2025-03-17 | End: 2025-03-17

## 2025-03-17 RX ORDER — DEXTROSE MONOHYDRATE 100 MG/ML
INJECTION, SOLUTION INTRAVENOUS CONTINUOUS PRN
Status: DISCONTINUED | OUTPATIENT
Start: 2025-03-17 | End: 2025-03-20 | Stop reason: HOSPADM

## 2025-03-17 RX ORDER — VANCOMYCIN 500 MG/100ML
500 INJECTION, SOLUTION INTRAVENOUS ONCE
Status: COMPLETED | OUTPATIENT
Start: 2025-03-17 | End: 2025-03-17

## 2025-03-17 RX ORDER — CALCIUM GLUCONATE 20 MG/ML
1000 INJECTION, SOLUTION INTRAVENOUS ONCE
Status: COMPLETED | OUTPATIENT
Start: 2025-03-17 | End: 2025-03-17

## 2025-03-17 RX ADMIN — MIDAZOLAM HYDROCHLORIDE 0.5 MG: 1 INJECTION, SOLUTION INTRAMUSCULAR; INTRAVENOUS at 11:13

## 2025-03-17 RX ADMIN — MIDAZOLAM HYDROCHLORIDE 1 MG: 1 INJECTION, SOLUTION INTRAMUSCULAR; INTRAVENOUS at 10:56

## 2025-03-17 RX ADMIN — INSULIN HUMAN 10 UNITS: 100 INJECTION, SOLUTION PARENTERAL at 09:22

## 2025-03-17 RX ADMIN — PANTOPRAZOLE SODIUM 40 MG: 40 TABLET, DELAYED RELEASE ORAL at 07:19

## 2025-03-17 RX ADMIN — CEFEPIME 1000 MG: 1 INJECTION, POWDER, FOR SOLUTION INTRAMUSCULAR; INTRAVENOUS at 21:42

## 2025-03-17 RX ADMIN — GABAPENTIN 100 MG: 100 CAPSULE ORAL at 09:12

## 2025-03-17 RX ADMIN — SODIUM CHLORIDE, PRESERVATIVE FREE 10 ML: 5 INJECTION INTRAVENOUS at 21:34

## 2025-03-17 RX ADMIN — MIDAZOLAM HYDROCHLORIDE 0.5 MG: 1 INJECTION, SOLUTION INTRAMUSCULAR; INTRAVENOUS at 11:23

## 2025-03-17 RX ADMIN — DEXTROSE MONOHYDRATE 250 ML: 100 INJECTION, SOLUTION INTRAVENOUS at 14:24

## 2025-03-17 RX ADMIN — TRAZODONE HYDROCHLORIDE 50 MG: 50 TABLET ORAL at 21:33

## 2025-03-17 RX ADMIN — FENTANYL CITRATE 25 MCG: 50 INJECTION INTRAMUSCULAR; INTRAVENOUS at 12:07

## 2025-03-17 RX ADMIN — DRONABINOL 2.5 MG: 2.5 CAPSULE ORAL at 21:33

## 2025-03-17 RX ADMIN — HEPARIN SODIUM 1900 UNITS: 1000 INJECTION INTRAVENOUS; SUBCUTANEOUS at 18:32

## 2025-03-17 RX ADMIN — ALBUMIN (HUMAN) 25 G: 0.25 INJECTION, SOLUTION INTRAVENOUS at 16:30

## 2025-03-17 RX ADMIN — HEPARIN SODIUM 5000 UNITS: 5000 INJECTION INTRAVENOUS; SUBCUTANEOUS at 21:37

## 2025-03-17 RX ADMIN — SEVELAMER CARBONATE 1.6 G: 800 FOR SUSPENSION ORAL at 14:09

## 2025-03-17 RX ADMIN — WATER 2000 MG: 1 INJECTION INTRAMUSCULAR; INTRAVENOUS; SUBCUTANEOUS at 10:52

## 2025-03-17 RX ADMIN — CALCIUM GLUCONATE 1000 MG: 20 INJECTION, SOLUTION INTRAVENOUS at 14:06

## 2025-03-17 RX ADMIN — BUPRENORPHINE HYDROCHLORIDE AND NALOXONE HYDROCHLORIDE DIHYDRATE 1 TABLET: 8; 2 TABLET SUBLINGUAL at 21:33

## 2025-03-17 RX ADMIN — FENTANYL CITRATE 25 MCG: 50 INJECTION INTRAMUSCULAR; INTRAVENOUS at 11:11

## 2025-03-17 RX ADMIN — GABAPENTIN 100 MG: 100 CAPSULE ORAL at 21:00

## 2025-03-17 RX ADMIN — SEVELAMER CARBONATE 1.6 G: 800 FOR SUSPENSION ORAL at 19:38

## 2025-03-17 RX ADMIN — FENTANYL CITRATE 50 MCG: 50 INJECTION INTRAMUSCULAR; INTRAVENOUS at 10:56

## 2025-03-17 RX ADMIN — CALCITRIOL CAPSULES 0.25 MCG 0.25 MCG: 0.25 CAPSULE ORAL at 09:11

## 2025-03-17 RX ADMIN — FERROUS SULFATE TAB 325 MG (65 MG ELEMENTAL FE) 325 MG: 325 (65 FE) TAB at 07:19

## 2025-03-17 RX ADMIN — HEPARIN SODIUM 5000 UNITS: 5000 INJECTION INTRAVENOUS; SUBCUTANEOUS at 06:00

## 2025-03-17 RX ADMIN — HEPARIN SODIUM 4000 UNITS: 1000 INJECTION INTRAVENOUS; SUBCUTANEOUS at 12:19

## 2025-03-17 RX ADMIN — HEPARIN SODIUM 5000 UNITS: 5000 INJECTION INTRAVENOUS; SUBCUTANEOUS at 14:08

## 2025-03-17 RX ADMIN — SODIUM CHLORIDE, PRESERVATIVE FREE 10 ML: 5 INJECTION INTRAVENOUS at 09:12

## 2025-03-17 RX ADMIN — VANCOMYCIN 500 MG: 500 INJECTION, SOLUTION INTRAVENOUS at 21:45

## 2025-03-17 RX ADMIN — LIDOCAINE HYDROCHLORIDE 19 ML: 10 INJECTION, SOLUTION EPIDURAL; INFILTRATION; INTRACAUDAL; PERINEURAL at 11:30

## 2025-03-17 RX ADMIN — GABAPENTIN 100 MG: 100 CAPSULE ORAL at 14:08

## 2025-03-17 RX ADMIN — Medication 1 MG: at 09:12

## 2025-03-17 RX ADMIN — ALBUMIN (HUMAN) 25 G: 0.25 INJECTION, SOLUTION INTRAVENOUS at 14:33

## 2025-03-17 RX ADMIN — BUPRENORPHINE HYDROCHLORIDE AND NALOXONE HYDROCHLORIDE DIHYDRATE 1 TABLET: 8; 2 TABLET SUBLINGUAL at 09:12

## 2025-03-17 RX ADMIN — HEPARIN SODIUM 1800 UNITS: 1000 INJECTION INTRAVENOUS; SUBCUTANEOUS at 18:31

## 2025-03-17 RX ADMIN — Medication 100 MG: at 09:12

## 2025-03-17 RX ADMIN — DRONABINOL 2.5 MG: 2.5 CAPSULE ORAL at 09:12

## 2025-03-17 RX ADMIN — OXYCODONE 5 MG: 5 TABLET ORAL at 20:10

## 2025-03-17 RX ADMIN — SEVELAMER CARBONATE 1.6 G: 800 FOR SUSPENSION ORAL at 09:11

## 2025-03-17 RX ADMIN — FENTANYL CITRATE 25 MCG: 50 INJECTION INTRAMUSCULAR; INTRAVENOUS at 11:36

## 2025-03-17 RX ADMIN — SODIUM ZIRCONIUM CYCLOSILICATE 10 G: 10 POWDER, FOR SUSPENSION ORAL at 07:19

## 2025-03-17 ASSESSMENT — PULMONARY FUNCTION TESTS
PIF_VALUE: 0
PIF_VALUE: 1
PIF_VALUE: 0
PIF_VALUE: 1
PIF_VALUE: 0

## 2025-03-17 ASSESSMENT — PAIN DESCRIPTION - LOCATION
LOCATION: BACK
LOCATION: BACK
LOCATION: ABDOMEN
LOCATION: BACK

## 2025-03-17 ASSESSMENT — PAIN - FUNCTIONAL ASSESSMENT
PAIN_FUNCTIONAL_ASSESSMENT: ACTIVITIES ARE NOT PREVENTED

## 2025-03-17 ASSESSMENT — PAIN DESCRIPTION - DESCRIPTORS
DESCRIPTORS: SHARP;STABBING
DESCRIPTORS: ACHING;SHARP
DESCRIPTORS: ACHING
DESCRIPTORS: ACHING

## 2025-03-17 ASSESSMENT — PAIN DESCRIPTION - ORIENTATION
ORIENTATION: RIGHT
ORIENTATION: LOWER
ORIENTATION: LOWER
ORIENTATION: RIGHT

## 2025-03-17 ASSESSMENT — PAIN SCALES - GENERAL
PAINLEVEL_OUTOF10: 5
PAINLEVEL_OUTOF10: 10
PAINLEVEL_OUTOF10: 2
PAINLEVEL_OUTOF10: 1

## 2025-03-17 NOTE — CARE COORDINATION
Transition of Care Plan:    [facility] IPR when stable, 1-3 days  - auth to be initiated through [employer] Medicaid 3/17 (once new PT OT notes uploaded)    Transport: Medicaid stretcher to be arranged     ESRD HD - [place] [address] [place] [address]    RUR: 24% - high  Prior Level of Functioning: [facility]; requires assistance  Disposition: [facility]   JOANNE: 1-3 days  IPR: Date FOC offered: 3/13/25  Date FOC received: 3/14  Accepting facility:  [facility]  Date authorization started with reference number: 3/17  Date authorization received and expires:   Follow up appointments: PCP; ID; defer to IPR  DME needed: defer to rehab  Transportation at discharge: stretcher  Caregiver Contact: nazario Jeffrey - p: [phone]  Discharge Caregiver contacted prior to discharge? yes  Care Conference needed? no  Barriers to discharge: medical; needs permacath     Pt ESRD HD pt: [place] [address] [place] [address]  P: [phone]  F: [phone]    10am: Pt needs permacath before dc - plans to get 3/17 and then will be stable for dc. [facility] int[facility] once updated PT OT notes uploaded.     3/14 - Final ID order faxed to [facility] dialysis clinic.     MAKAYLA Rosas

## 2025-03-18 ENCOUNTER — APPOINTMENT (OUTPATIENT)
Facility: HOSPITAL | Age: 60
DRG: 347 | End: 2025-03-18
Payer: MEDICAID

## 2025-03-18 LAB
ANION GAP SERPL CALC-SCNC: 11 MMOL/L (ref 2–12)
BASOPHILS # BLD: 0.06 K/UL (ref 0–0.1)
BASOPHILS NFR BLD: 0.7 % (ref 0–1)
BUN SERPL-MCNC: 49 MG/DL (ref 6–20)
BUN/CREAT SERPL: 8 (ref 12–20)
CALCIUM SERPL-MCNC: 9.3 MG/DL (ref 8.5–10.1)
CHLORIDE SERPL-SCNC: 98 MMOL/L (ref 97–108)
CO2 SERPL-SCNC: 25 MMOL/L (ref 21–32)
CREAT SERPL-MCNC: 5.85 MG/DL (ref 0.7–1.3)
DIFFERENTIAL METHOD BLD: ABNORMAL
ECHO AO ROOT DIAM: 3.7 CM
ECHO AO ROOT INDEX: 2 CM/M2
ECHO AV PEAK GRADIENT: 10 MMHG
ECHO AV PEAK VELOCITY: 1.6 M/S
ECHO AV VELOCITY RATIO: 0.56
ECHO BSA: 1.84 M2
ECHO EST RA PRESSURE: 3 MMHG
ECHO LA DIAMETER INDEX: 1.84 CM/M2
ECHO LA DIAMETER: 3.4 CM
ECHO LA TO AORTIC ROOT RATIO: 0.92
ECHO LV EF PHYSICIAN: 70 %
ECHO LV FRACTIONAL SHORTENING: 35 % (ref 28–44)
ECHO LV INTERNAL DIMENSION DIASTOLE INDEX: 2.32 CM/M2
ECHO LV INTERNAL DIMENSION DIASTOLIC: 4.3 CM (ref 4.2–5.9)
ECHO LV INTERNAL DIMENSION SYSTOLIC INDEX: 1.51 CM/M2
ECHO LV INTERNAL DIMENSION SYSTOLIC: 2.8 CM
ECHO LV IVSD: 0.8 CM (ref 0.6–1)
ECHO LV MASS 2D: 105.3 G (ref 88–224)
ECHO LV MASS INDEX 2D: 56.9 G/M2 (ref 49–115)
ECHO LV POSTERIOR WALL DIASTOLIC: 0.8 CM (ref 0.6–1)
ECHO LV RELATIVE WALL THICKNESS RATIO: 0.37
ECHO LVOT PEAK GRADIENT: 3 MMHG
ECHO LVOT PEAK VELOCITY: 0.9 M/S
ECHO MV A VELOCITY: 0.75 M/S
ECHO MV AREA PHT: 5.6 CM2
ECHO MV E DECELERATION TIME (DT): 134.9 MS
ECHO MV E VELOCITY: 0.53 M/S
ECHO MV E/A RATIO: 0.71
ECHO MV PRESSURE HALF TIME (PHT): 39.1 MS
ECHO RIGHT VENTRICULAR SYSTOLIC PRESSURE (RVSP): 33 MMHG
ECHO RV FREE WALL PEAK S': 8.9 CM/S
ECHO RV INTERNAL DIMENSION: 3.5 CM
ECHO RV TAPSE: 0.8 CM (ref 1.7–?)
ECHO TV REGURGITANT MAX VELOCITY: 2.73 M/S
ECHO TV REGURGITANT PEAK GRADIENT: 30 MMHG
EOSINOPHIL # BLD: 0.14 K/UL (ref 0–0.4)
EOSINOPHIL NFR BLD: 1.6 % (ref 0–7)
ERYTHROCYTE [DISTWIDTH] IN BLOOD BY AUTOMATED COUNT: 16.8 % (ref 11.5–14.5)
GLUCOSE BLD STRIP.AUTO-MCNC: 104 MG/DL (ref 65–117)
GLUCOSE BLD STRIP.AUTO-MCNC: 123 MG/DL (ref 65–117)
GLUCOSE BLD STRIP.AUTO-MCNC: 80 MG/DL (ref 65–117)
GLUCOSE BLD STRIP.AUTO-MCNC: 84 MG/DL (ref 65–117)
GLUCOSE BLD STRIP.AUTO-MCNC: 99 MG/DL (ref 65–117)
GLUCOSE SERPL-MCNC: 98 MG/DL (ref 65–100)
HCT VFR BLD AUTO: 27.5 % (ref 36.6–50.3)
HGB BLD-MCNC: 8.6 G/DL (ref 12.1–17)
IMM GRANULOCYTES # BLD AUTO: 0.11 K/UL (ref 0–0.04)
IMM GRANULOCYTES NFR BLD AUTO: 1.3 % (ref 0–0.5)
LYMPHOCYTES # BLD: 1.44 K/UL (ref 0.8–3.5)
LYMPHOCYTES NFR BLD: 16.8 % (ref 12–49)
MCH RBC QN AUTO: 31.6 PG (ref 26–34)
MCHC RBC AUTO-ENTMCNC: 31.3 G/DL (ref 30–36.5)
MCV RBC AUTO: 101.1 FL (ref 80–99)
MONOCYTES # BLD: 0.64 K/UL (ref 0–1)
MONOCYTES NFR BLD: 7.5 % (ref 5–13)
NEUTS SEG # BLD: 6.16 K/UL (ref 1.8–8)
NEUTS SEG NFR BLD: 72.1 % (ref 32–75)
NRBC # BLD: 0 K/UL (ref 0–0.01)
NRBC BLD-RTO: 0 PER 100 WBC
PLATELET # BLD AUTO: 350 K/UL (ref 150–400)
POTASSIUM SERPL-SCNC: 6.1 MMOL/L (ref 3.5–5.1)
RBC # BLD AUTO: 2.72 M/UL (ref 4.1–5.7)
SERVICE CMNT-IMP: ABNORMAL
SERVICE CMNT-IMP: NORMAL
SODIUM SERPL-SCNC: 134 MMOL/L (ref 136–145)
WBC # BLD AUTO: 8.6 K/UL (ref 4.1–11.1)

## 2025-03-18 PROCEDURE — 85025 COMPLETE CBC W/AUTO DIFF WBC: CPT

## 2025-03-18 PROCEDURE — 6360000002 HC RX W HCPCS: Performed by: INTERNAL MEDICINE

## 2025-03-18 PROCEDURE — 82962 GLUCOSE BLOOD TEST: CPT

## 2025-03-18 PROCEDURE — 93306 TTE W/DOPPLER COMPLETE: CPT | Performed by: INTERNAL MEDICINE

## 2025-03-18 PROCEDURE — 94760 N-INVAS EAR/PLS OXIMETRY 1: CPT

## 2025-03-18 PROCEDURE — 97530 THERAPEUTIC ACTIVITIES: CPT

## 2025-03-18 PROCEDURE — 2500000003 HC RX 250 WO HCPCS: Performed by: STUDENT IN AN ORGANIZED HEALTH CARE EDUCATION/TRAINING PROGRAM

## 2025-03-18 PROCEDURE — 80048 BASIC METABOLIC PNL TOTAL CA: CPT

## 2025-03-18 PROCEDURE — 93306 TTE W/DOPPLER COMPLETE: CPT

## 2025-03-18 PROCEDURE — 90935 HEMODIALYSIS ONE EVALUATION: CPT

## 2025-03-18 PROCEDURE — 2700000000 HC OXYGEN THERAPY PER DAY

## 2025-03-18 PROCEDURE — P9047 ALBUMIN (HUMAN), 25%, 50ML: HCPCS | Performed by: STUDENT IN AN ORGANIZED HEALTH CARE EDUCATION/TRAINING PROGRAM

## 2025-03-18 PROCEDURE — 6360000002 HC RX W HCPCS

## 2025-03-18 PROCEDURE — P9047 ALBUMIN (HUMAN), 25%, 50ML: HCPCS | Performed by: INTERNAL MEDICINE

## 2025-03-18 PROCEDURE — 2580000003 HC RX 258: Performed by: INTERNAL MEDICINE

## 2025-03-18 PROCEDURE — 6360000002 HC RX W HCPCS: Performed by: PHYSICIAN ASSISTANT

## 2025-03-18 PROCEDURE — 2060000000 HC ICU INTERMEDIATE R&B

## 2025-03-18 PROCEDURE — 6370000000 HC RX 637 (ALT 250 FOR IP): Performed by: INTERNAL MEDICINE

## 2025-03-18 PROCEDURE — 6370000000 HC RX 637 (ALT 250 FOR IP): Performed by: STUDENT IN AN ORGANIZED HEALTH CARE EDUCATION/TRAINING PROGRAM

## 2025-03-18 PROCEDURE — 6370000000 HC RX 637 (ALT 250 FOR IP)

## 2025-03-18 PROCEDURE — 97535 SELF CARE MNGMENT TRAINING: CPT

## 2025-03-18 PROCEDURE — 6360000002 HC RX W HCPCS: Performed by: STUDENT IN AN ORGANIZED HEALTH CARE EDUCATION/TRAINING PROGRAM

## 2025-03-18 RX ORDER — VANCOMYCIN 500 MG/100ML
500 INJECTION, SOLUTION INTRAVENOUS ONCE
Status: COMPLETED | OUTPATIENT
Start: 2025-03-18 | End: 2025-03-18

## 2025-03-18 RX ORDER — ALBUMIN (HUMAN) 12.5 G/50ML
25 SOLUTION INTRAVENOUS ONCE
Status: COMPLETED | OUTPATIENT
Start: 2025-03-18 | End: 2025-03-18

## 2025-03-18 RX ORDER — BUPRENORPHINE HYDROCHLORIDE AND NALOXONE HYDROCHLORIDE DIHYDRATE 8; 2 MG/1; MG/1
1 TABLET SUBLINGUAL 3 TIMES DAILY
Status: DISCONTINUED | OUTPATIENT
Start: 2025-03-18 | End: 2025-03-20 | Stop reason: HOSPADM

## 2025-03-18 RX ADMIN — ALBUMIN (HUMAN) 25 G: 0.25 INJECTION, SOLUTION INTRAVENOUS at 10:00

## 2025-03-18 RX ADMIN — ALBUMIN (HUMAN) 25 G: 0.25 INJECTION, SOLUTION INTRAVENOUS at 13:14

## 2025-03-18 RX ADMIN — HEPARIN SODIUM 5000 UNITS: 5000 INJECTION INTRAVENOUS; SUBCUTANEOUS at 21:48

## 2025-03-18 RX ADMIN — DRONABINOL 2.5 MG: 2.5 CAPSULE ORAL at 08:37

## 2025-03-18 RX ADMIN — HEPARIN SODIUM 2000 UNITS: 1000 INJECTION INTRAVENOUS; SUBCUTANEOUS at 16:50

## 2025-03-18 RX ADMIN — HEPARIN SODIUM 5000 UNITS: 5000 INJECTION INTRAVENOUS; SUBCUTANEOUS at 06:19

## 2025-03-18 RX ADMIN — BUPRENORPHINE HYDROCHLORIDE AND NALOXONE HYDROCHLORIDE DIHYDRATE 1 TABLET: 8; 2 TABLET SUBLINGUAL at 20:39

## 2025-03-18 RX ADMIN — VANCOMYCIN 500 MG: 500 INJECTION, SOLUTION INTRAVENOUS at 21:50

## 2025-03-18 RX ADMIN — GABAPENTIN 100 MG: 100 CAPSULE ORAL at 20:39

## 2025-03-18 RX ADMIN — Medication 1 MG: at 08:37

## 2025-03-18 RX ADMIN — TRAZODONE HYDROCHLORIDE 50 MG: 50 TABLET ORAL at 20:39

## 2025-03-18 RX ADMIN — SEVELAMER CARBONATE 1.6 G: 800 FOR SUSPENSION ORAL at 11:54

## 2025-03-18 RX ADMIN — CALCITRIOL CAPSULES 0.25 MCG 0.25 MCG: 0.25 CAPSULE ORAL at 08:37

## 2025-03-18 RX ADMIN — SEVELAMER CARBONATE 1.6 G: 800 FOR SUSPENSION ORAL at 08:37

## 2025-03-18 RX ADMIN — PANTOPRAZOLE SODIUM 40 MG: 40 TABLET, DELAYED RELEASE ORAL at 06:20

## 2025-03-18 RX ADMIN — SODIUM CHLORIDE, PRESERVATIVE FREE 10 ML: 5 INJECTION INTRAVENOUS at 08:38

## 2025-03-18 RX ADMIN — DRONABINOL 2.5 MG: 2.5 CAPSULE ORAL at 20:39

## 2025-03-18 RX ADMIN — FERROUS SULFATE TAB 325 MG (65 MG ELEMENTAL FE) 325 MG: 325 (65 FE) TAB at 06:20

## 2025-03-18 RX ADMIN — HEPARIN SODIUM 2000 UNITS: 1000 INJECTION INTRAVENOUS; SUBCUTANEOUS at 16:51

## 2025-03-18 RX ADMIN — SODIUM ZIRCONIUM CYCLOSILICATE 10 G: 10 POWDER, FOR SUSPENSION ORAL at 08:39

## 2025-03-18 RX ADMIN — CEFEPIME 1000 MG: 1 INJECTION, POWDER, FOR SOLUTION INTRAMUSCULAR; INTRAVENOUS at 20:41

## 2025-03-18 RX ADMIN — HEPARIN SODIUM 5000 UNITS: 5000 INJECTION INTRAVENOUS; SUBCUTANEOUS at 15:01

## 2025-03-18 RX ADMIN — BUPRENORPHINE HYDROCHLORIDE AND NALOXONE HYDROCHLORIDE DIHYDRATE 1 TABLET: 8; 2 TABLET SUBLINGUAL at 15:01

## 2025-03-18 RX ADMIN — SEVELAMER CARBONATE 1.6 G: 800 FOR SUSPENSION ORAL at 17:47

## 2025-03-18 RX ADMIN — Medication 100 MG: at 08:37

## 2025-03-18 RX ADMIN — SODIUM CHLORIDE, PRESERVATIVE FREE 10 ML: 5 INJECTION INTRAVENOUS at 20:39

## 2025-03-18 ASSESSMENT — PAIN DESCRIPTION - LOCATION: LOCATION: BACK

## 2025-03-18 ASSESSMENT — PAIN SCALES - GENERAL
PAINLEVEL_OUTOF10: 3
PAINLEVEL_OUTOF10: 0
PAINLEVEL_OUTOF10: 2

## 2025-03-18 NOTE — CARE COORDINATION
Transition of Care Plan:    [facility] when stable, 2-3 days  - auth initiated through [employer] 3/18      Transport: Medicaid stretcher to be arranged     ESRD HD - [facility]  [address]     RUR: 25% - high  Prior Level of Functioning: [facility]; requires assistance  Disposition: [facility]   JOANNE: 2-3 days  IPR: Date FOC offered: 3/13/25  Date FOC received: 3/14  Accepting facility:  [facility]  Date authorization started with reference number: 3/18  Date authorization received and expires:   Follow up appointments: PCP; ID; defer to IPR  DME needed: defer to rehab  Transportation at discharge: stretcher  Caregiver Contact: nazario Jeffrey - p: [phone]  Discharge Caregiver contacted prior to discharge? yes  Care Conference needed? no  Barriers to discharge: medical; needs permacath     Pt ESRD HD pt: [facility]  [address] [place] [id number]  P: [phone]  F: [phone]    10am: Pt likely medically stable for dc in 1-3 days. [facility] initiated auth through [employer] 3/18. CM will follow.     3/14 - Final ID order faxed to [facility] dialysis clinic.     MAKAYLA Rosas

## 2025-03-19 LAB
ANION GAP SERPL CALC-SCNC: 7 MMOL/L (ref 2–12)
BASOPHILS # BLD: 0.06 K/UL (ref 0–0.1)
BASOPHILS NFR BLD: 0.8 % (ref 0–1)
BUN SERPL-MCNC: 38 MG/DL (ref 6–20)
BUN/CREAT SERPL: 7 (ref 12–20)
CALCIUM SERPL-MCNC: 9.4 MG/DL (ref 8.5–10.1)
CHLORIDE SERPL-SCNC: 98 MMOL/L (ref 97–108)
CO2 SERPL-SCNC: 28 MMOL/L (ref 21–32)
CREAT SERPL-MCNC: 5.17 MG/DL (ref 0.7–1.3)
DIFFERENTIAL METHOD BLD: ABNORMAL
EOSINOPHIL # BLD: 0.29 K/UL (ref 0–0.4)
EOSINOPHIL NFR BLD: 4 % (ref 0–7)
ERYTHROCYTE [DISTWIDTH] IN BLOOD BY AUTOMATED COUNT: 16.6 % (ref 11.5–14.5)
GLUCOSE BLD STRIP.AUTO-MCNC: 108 MG/DL (ref 65–117)
GLUCOSE BLD STRIP.AUTO-MCNC: 115 MG/DL (ref 65–117)
GLUCOSE BLD STRIP.AUTO-MCNC: 129 MG/DL (ref 65–117)
GLUCOSE BLD STRIP.AUTO-MCNC: 60 MG/DL (ref 65–117)
GLUCOSE BLD STRIP.AUTO-MCNC: 95 MG/DL (ref 65–117)
GLUCOSE SERPL-MCNC: 102 MG/DL (ref 65–100)
HCT VFR BLD AUTO: 25.4 % (ref 36.6–50.3)
HGB BLD-MCNC: 8.1 G/DL (ref 12.1–17)
IMM GRANULOCYTES # BLD AUTO: 0.05 K/UL (ref 0–0.04)
IMM GRANULOCYTES NFR BLD AUTO: 0.7 % (ref 0–0.5)
LYMPHOCYTES # BLD: 1.66 K/UL (ref 0.8–3.5)
LYMPHOCYTES NFR BLD: 23 % (ref 12–49)
MCH RBC QN AUTO: 31.9 PG (ref 26–34)
MCHC RBC AUTO-ENTMCNC: 31.9 G/DL (ref 30–36.5)
MCV RBC AUTO: 100 FL (ref 80–99)
MONOCYTES # BLD: 0.68 K/UL (ref 0–1)
MONOCYTES NFR BLD: 9.4 % (ref 5–13)
NEUTS SEG # BLD: 4.47 K/UL (ref 1.8–8)
NEUTS SEG NFR BLD: 62.1 % (ref 32–75)
NRBC # BLD: 0 K/UL (ref 0–0.01)
NRBC BLD-RTO: 0 PER 100 WBC
PLATELET # BLD AUTO: 300 K/UL (ref 150–400)
PMV BLD AUTO: 9.7 FL (ref 8.9–12.9)
POTASSIUM SERPL-SCNC: 4.6 MMOL/L (ref 3.5–5.1)
RBC # BLD AUTO: 2.54 M/UL (ref 4.1–5.7)
SERVICE CMNT-IMP: ABNORMAL
SERVICE CMNT-IMP: ABNORMAL
SERVICE CMNT-IMP: NORMAL
SODIUM SERPL-SCNC: 133 MMOL/L (ref 136–145)
WBC # BLD AUTO: 7.2 K/UL (ref 4.1–11.1)

## 2025-03-19 PROCEDURE — 2060000000 HC ICU INTERMEDIATE R&B

## 2025-03-19 PROCEDURE — 6370000000 HC RX 637 (ALT 250 FOR IP)

## 2025-03-19 PROCEDURE — 6370000000 HC RX 637 (ALT 250 FOR IP): Performed by: INTERNAL MEDICINE

## 2025-03-19 PROCEDURE — 6370000000 HC RX 637 (ALT 250 FOR IP): Performed by: STUDENT IN AN ORGANIZED HEALTH CARE EDUCATION/TRAINING PROGRAM

## 2025-03-19 PROCEDURE — 2500000003 HC RX 250 WO HCPCS: Performed by: STUDENT IN AN ORGANIZED HEALTH CARE EDUCATION/TRAINING PROGRAM

## 2025-03-19 PROCEDURE — 6360000002 HC RX W HCPCS: Performed by: PHYSICIAN ASSISTANT

## 2025-03-19 PROCEDURE — 6360000002 HC RX W HCPCS: Performed by: INTERNAL MEDICINE

## 2025-03-19 PROCEDURE — 97530 THERAPEUTIC ACTIVITIES: CPT

## 2025-03-19 PROCEDURE — 2580000003 HC RX 258: Performed by: INTERNAL MEDICINE

## 2025-03-19 PROCEDURE — 85025 COMPLETE CBC W/AUTO DIFF WBC: CPT

## 2025-03-19 PROCEDURE — 82962 GLUCOSE BLOOD TEST: CPT

## 2025-03-19 PROCEDURE — 80048 BASIC METABOLIC PNL TOTAL CA: CPT

## 2025-03-19 PROCEDURE — 87350 HEPATITIS BE AG IA: CPT

## 2025-03-19 RX ADMIN — SODIUM CHLORIDE, PRESERVATIVE FREE 10 ML: 5 INJECTION INTRAVENOUS at 21:57

## 2025-03-19 RX ADMIN — BUPRENORPHINE HYDROCHLORIDE AND NALOXONE HYDROCHLORIDE DIHYDRATE 1 TABLET: 8; 2 TABLET SUBLINGUAL at 09:07

## 2025-03-19 RX ADMIN — HEPARIN SODIUM 5000 UNITS: 5000 INJECTION INTRAVENOUS; SUBCUTANEOUS at 22:15

## 2025-03-19 RX ADMIN — BUPRENORPHINE HYDROCHLORIDE AND NALOXONE HYDROCHLORIDE DIHYDRATE 1 TABLET: 8; 2 TABLET SUBLINGUAL at 15:46

## 2025-03-19 RX ADMIN — CEFEPIME 1000 MG: 1 INJECTION, POWDER, FOR SOLUTION INTRAMUSCULAR; INTRAVENOUS at 22:12

## 2025-03-19 RX ADMIN — TRAZODONE HYDROCHLORIDE 50 MG: 50 TABLET ORAL at 22:13

## 2025-03-19 RX ADMIN — CALCITRIOL CAPSULES 0.25 MCG 0.25 MCG: 0.25 CAPSULE ORAL at 09:07

## 2025-03-19 RX ADMIN — Medication 1 MG: at 09:07

## 2025-03-19 RX ADMIN — FERROUS SULFATE TAB 325 MG (65 MG ELEMENTAL FE) 325 MG: 325 (65 FE) TAB at 05:50

## 2025-03-19 RX ADMIN — Medication 100 MG: at 09:07

## 2025-03-19 RX ADMIN — GABAPENTIN 100 MG: 100 CAPSULE ORAL at 09:07

## 2025-03-19 RX ADMIN — GABAPENTIN 100 MG: 100 CAPSULE ORAL at 13:25

## 2025-03-19 RX ADMIN — SEVELAMER CARBONATE 1.6 G: 800 FOR SUSPENSION ORAL at 09:07

## 2025-03-19 RX ADMIN — SEVELAMER CARBONATE 1.6 G: 800 FOR SUSPENSION ORAL at 17:22

## 2025-03-19 RX ADMIN — SODIUM CHLORIDE, PRESERVATIVE FREE 10 ML: 5 INJECTION INTRAVENOUS at 09:08

## 2025-03-19 RX ADMIN — DRONABINOL 2.5 MG: 2.5 CAPSULE ORAL at 09:07

## 2025-03-19 RX ADMIN — PANTOPRAZOLE SODIUM 40 MG: 40 TABLET, DELAYED RELEASE ORAL at 05:50

## 2025-03-19 RX ADMIN — BUPRENORPHINE HYDROCHLORIDE AND NALOXONE HYDROCHLORIDE DIHYDRATE 1 TABLET: 8; 2 TABLET SUBLINGUAL at 22:13

## 2025-03-19 RX ADMIN — DRONABINOL 2.5 MG: 2.5 CAPSULE ORAL at 22:13

## 2025-03-19 RX ADMIN — SEVELAMER CARBONATE 1.6 G: 800 FOR SUSPENSION ORAL at 13:25

## 2025-03-19 RX ADMIN — HEPARIN SODIUM 5000 UNITS: 5000 INJECTION INTRAVENOUS; SUBCUTANEOUS at 13:25

## 2025-03-19 RX ADMIN — HEPARIN SODIUM 5000 UNITS: 5000 INJECTION INTRAVENOUS; SUBCUTANEOUS at 05:49

## 2025-03-19 RX ADMIN — GABAPENTIN 100 MG: 100 CAPSULE ORAL at 22:13

## 2025-03-19 ASSESSMENT — PAIN SCALES - GENERAL
PAINLEVEL_OUTOF10: 4
PAINLEVEL_OUTOF10: 3
PAINLEVEL_OUTOF10: 2
PAINLEVEL_OUTOF10: 0
PAINLEVEL_OUTOF10: 3

## 2025-03-19 ASSESSMENT — PAIN DESCRIPTION - DESCRIPTORS: DESCRIPTORS: ACHING

## 2025-03-19 ASSESSMENT — PAIN DESCRIPTION - LOCATION
LOCATION: BACK
LOCATION: BACK

## 2025-03-19 ASSESSMENT — PAIN DESCRIPTION - ORIENTATION: ORIENTATION: LOWER

## 2025-03-19 ASSESSMENT — PAIN DESCRIPTION - PAIN TYPE: TYPE: CHRONIC PAIN

## 2025-03-19 NOTE — CARE COORDINATION
Transition of Care Plan:    [facility] when stable, likely 3/20  - auth approved through [employer] 3/19    Transport: Medicaid stretcher to be arranged     ESRD HD - [facility]  [address] [place] [address]    RUR: 25% - high  Prior Level of Functioning: [facility]; requires assistance  Disposition: [facility]   JOANNE: 3/20  IPR: Date FOC offered: 3/13/25  Date FOC received: 3/14  Accepting facility:  [facility]  Date authorization started with reference number: 3/18  Date authorization received and expires:   Follow up appointments: PCP; ID; defer to IPR  DME needed: defer to rehab  Transportation at discharge: stretcher  Caregiver Contact: nazario Jeffrey - p: [phone]  Discharge Caregiver contacted prior to discharge? yes  Care Conference needed? no  Barriers to discharge: IPR bed    Pt ESRD HD pt: [facility]  [address] [place] [id number]  P: [phone]  F: [phone]    1130am: [facility] received auth through [employer] 3/19 - likely will dc 3/20 per MD.    3/14 - Final ID order faxed to [facility] dialysis clinic.     MAKAYLA Rosas

## 2025-03-20 VITALS
SYSTOLIC BLOOD PRESSURE: 99 MMHG | RESPIRATION RATE: 18 BRPM | OXYGEN SATURATION: 95 % | WEIGHT: 164.3 LBS | HEIGHT: 70 IN | HEART RATE: 108 BPM | BODY MASS INDEX: 23.52 KG/M2 | DIASTOLIC BLOOD PRESSURE: 61 MMHG | TEMPERATURE: 97.7 F

## 2025-03-20 LAB
ALBUMIN SERPL-MCNC: 3 G/DL (ref 3.5–5)
ANION GAP SERPL CALC-SCNC: 10 MMOL/L (ref 2–12)
BASOPHILS # BLD: 0.03 K/UL (ref 0–0.1)
BASOPHILS NFR BLD: 0.6 % (ref 0–1)
BUN SERPL-MCNC: 54 MG/DL (ref 6–20)
BUN/CREAT SERPL: 8 (ref 12–20)
CALCIUM SERPL-MCNC: 9.7 MG/DL (ref 8.5–10.1)
CHLORIDE SERPL-SCNC: 97 MMOL/L (ref 97–108)
CO2 SERPL-SCNC: 26 MMOL/L (ref 21–32)
CREAT SERPL-MCNC: 6.71 MG/DL (ref 0.7–1.3)
DIFFERENTIAL METHOD BLD: ABNORMAL
EOSINOPHIL # BLD: 0.28 K/UL (ref 0–0.4)
EOSINOPHIL NFR BLD: 5.5 % (ref 0–7)
ERYTHROCYTE [DISTWIDTH] IN BLOOD BY AUTOMATED COUNT: 16.3 % (ref 11.5–14.5)
GLUCOSE BLD STRIP.AUTO-MCNC: 117 MG/DL (ref 65–117)
GLUCOSE SERPL-MCNC: 140 MG/DL (ref 65–100)
HCT VFR BLD AUTO: 23.8 % (ref 36.6–50.3)
HGB BLD-MCNC: 7.5 G/DL (ref 12.1–17)
IMM GRANULOCYTES # BLD AUTO: 0.04 K/UL (ref 0–0.04)
IMM GRANULOCYTES NFR BLD AUTO: 0.8 % (ref 0–0.5)
IRON SATN MFR SERPL: 29 % (ref 20–50)
IRON SERPL-MCNC: 53 UG/DL (ref 35–150)
LYMPHOCYTES # BLD: 1.54 K/UL (ref 0.8–3.5)
LYMPHOCYTES NFR BLD: 30.4 % (ref 12–49)
MCH RBC QN AUTO: 31.9 PG (ref 26–34)
MCHC RBC AUTO-ENTMCNC: 31.5 G/DL (ref 30–36.5)
MCV RBC AUTO: 101.3 FL (ref 80–99)
MONOCYTES # BLD: 0.47 K/UL (ref 0–1)
MONOCYTES NFR BLD: 9.3 % (ref 5–13)
NEUTS SEG # BLD: 2.71 K/UL (ref 1.8–8)
NEUTS SEG NFR BLD: 53.4 % (ref 32–75)
NRBC # BLD: 0 K/UL (ref 0–0.01)
NRBC BLD-RTO: 0 PER 100 WBC
PHOSPHATE SERPL-MCNC: 4.8 MG/DL (ref 2.6–4.7)
PLATELET # BLD AUTO: 276 K/UL (ref 150–400)
PMV BLD AUTO: 9.9 FL (ref 8.9–12.9)
POTASSIUM SERPL-SCNC: 4.7 MMOL/L (ref 3.5–5.1)
RBC # BLD AUTO: 2.35 M/UL (ref 4.1–5.7)
SERVICE CMNT-IMP: NORMAL
SODIUM SERPL-SCNC: 133 MMOL/L (ref 136–145)
TIBC SERPL-MCNC: 185 UG/DL (ref 250–450)
VANCOMYCIN SERPL-MCNC: 20.4 UG/ML
WBC # BLD AUTO: 5.1 K/UL (ref 4.1–11.1)

## 2025-03-20 PROCEDURE — 83550 IRON BINDING TEST: CPT

## 2025-03-20 PROCEDURE — 6360000002 HC RX W HCPCS: Performed by: PHYSICIAN ASSISTANT

## 2025-03-20 PROCEDURE — 94760 N-INVAS EAR/PLS OXIMETRY 1: CPT

## 2025-03-20 PROCEDURE — 6360000002 HC RX W HCPCS: Performed by: INTERNAL MEDICINE

## 2025-03-20 PROCEDURE — 85025 COMPLETE CBC W/AUTO DIFF WBC: CPT

## 2025-03-20 PROCEDURE — 83540 ASSAY OF IRON: CPT

## 2025-03-20 PROCEDURE — 90935 HEMODIALYSIS ONE EVALUATION: CPT

## 2025-03-20 PROCEDURE — 80069 RENAL FUNCTION PANEL: CPT

## 2025-03-20 PROCEDURE — 6370000000 HC RX 637 (ALT 250 FOR IP): Performed by: INTERNAL MEDICINE

## 2025-03-20 PROCEDURE — 82962 GLUCOSE BLOOD TEST: CPT

## 2025-03-20 PROCEDURE — 6370000000 HC RX 637 (ALT 250 FOR IP)

## 2025-03-20 PROCEDURE — 2700000000 HC OXYGEN THERAPY PER DAY

## 2025-03-20 PROCEDURE — 6370000000 HC RX 637 (ALT 250 FOR IP): Performed by: STUDENT IN AN ORGANIZED HEALTH CARE EDUCATION/TRAINING PROGRAM

## 2025-03-20 PROCEDURE — 80202 ASSAY OF VANCOMYCIN: CPT

## 2025-03-20 PROCEDURE — P9047 ALBUMIN (HUMAN), 25%, 50ML: HCPCS | Performed by: INTERNAL MEDICINE

## 2025-03-20 RX ORDER — MIDODRINE HYDROCHLORIDE 5 MG/1
5 TABLET ORAL
Qty: 90 TABLET | Refills: 0 | Status: SHIPPED | OUTPATIENT
Start: 2025-03-22

## 2025-03-20 RX ORDER — BUPRENORPHINE HYDROCHLORIDE AND NALOXONE HYDROCHLORIDE DIHYDRATE 8; 2 MG/1; MG/1
1 TABLET SUBLINGUAL 3 TIMES DAILY
Qty: 20 TABLET | Refills: 0 | Status: SHIPPED | OUTPATIENT
Start: 2025-03-20 | End: 2025-04-19

## 2025-03-20 RX ORDER — DRONABINOL 2.5 MG/1
2.5 CAPSULE ORAL 2 TIMES DAILY
Qty: 60 CAPSULE | Refills: 0 | Status: SHIPPED | OUTPATIENT
Start: 2025-03-20 | End: 2025-04-19

## 2025-03-20 RX ORDER — MIDODRINE HYDROCHLORIDE 5 MG/1
5 TABLET ORAL
Status: DISCONTINUED | OUTPATIENT
Start: 2025-03-20 | End: 2025-03-20 | Stop reason: HOSPADM

## 2025-03-20 RX ORDER — VANCOMYCIN 500 MG/100ML
500 INJECTION, SOLUTION INTRAVENOUS ONCE
Status: DISCONTINUED | OUTPATIENT
Start: 2025-03-20 | End: 2025-03-20 | Stop reason: HOSPADM

## 2025-03-20 RX ORDER — GABAPENTIN 100 MG/1
100 CAPSULE ORAL 3 TIMES DAILY
Qty: 90 CAPSULE | Refills: 0 | Status: SHIPPED | OUTPATIENT
Start: 2025-03-20 | End: 2025-04-19

## 2025-03-20 RX ADMIN — HEPARIN SODIUM 2000 UNITS: 1000 INJECTION INTRAVENOUS; SUBCUTANEOUS at 11:21

## 2025-03-20 RX ADMIN — HEPARIN SODIUM 5000 UNITS: 5000 INJECTION INTRAVENOUS; SUBCUTANEOUS at 15:04

## 2025-03-20 RX ADMIN — FERROUS SULFATE TAB 325 MG (65 MG ELEMENTAL FE) 325 MG: 325 (65 FE) TAB at 06:10

## 2025-03-20 RX ADMIN — BUPRENORPHINE HYDROCHLORIDE AND NALOXONE HYDROCHLORIDE DIHYDRATE 1 TABLET: 8; 2 TABLET SUBLINGUAL at 15:04

## 2025-03-20 RX ADMIN — HEPARIN SODIUM 5000 UNITS: 5000 INJECTION INTRAVENOUS; SUBCUTANEOUS at 06:12

## 2025-03-20 RX ADMIN — GABAPENTIN 100 MG: 100 CAPSULE ORAL at 15:04

## 2025-03-20 RX ADMIN — MIDODRINE HYDROCHLORIDE 5 MG: 5 TABLET ORAL at 11:55

## 2025-03-20 RX ADMIN — BUPRENORPHINE HYDROCHLORIDE AND NALOXONE HYDROCHLORIDE DIHYDRATE 1 TABLET: 8; 2 TABLET SUBLINGUAL at 11:55

## 2025-03-20 RX ADMIN — SEVELAMER CARBONATE 1.6 G: 800 FOR SUSPENSION ORAL at 11:55

## 2025-03-20 RX ADMIN — PANTOPRAZOLE SODIUM 40 MG: 40 TABLET, DELAYED RELEASE ORAL at 06:10

## 2025-03-20 RX ADMIN — ALBUMIN (HUMAN) 25 G: 0.25 INJECTION, SOLUTION INTRAVENOUS at 07:50

## 2025-03-20 RX ADMIN — DRONABINOL 2.5 MG: 2.5 CAPSULE ORAL at 11:59

## 2025-03-20 RX ADMIN — HEPARIN SODIUM 2000 UNITS: 1000 INJECTION INTRAVENOUS; SUBCUTANEOUS at 11:20

## 2025-03-20 RX ADMIN — ALBUMIN (HUMAN) 25 G: 0.25 INJECTION, SOLUTION INTRAVENOUS at 10:05

## 2025-03-20 NOTE — PLAN OF CARE
Problem: Occupational Therapy - Adult  Goal: By Discharge: Performs self-care activities at highest level of function for planned discharge setting.  See evaluation for individualized goals.  Description: FUNCTIONAL STATUS PRIOR TO ADMISSION:  Patient was ambulatory using RW  Receives Help From: Other (Comment) (LTC), Prior Level of Assist for ADLs: Needs assistance,  ,  ,  ,  , Toileting: Independent, Prior Level of Assist for Homemaking: Needs assistance, Ambulation Assistance: Needs assistance, Prior Level of Assist for Transfers: Independent, Active : No     HOME SUPPORT: prior to admission pt was had [facility] for rehab reports prior to rehab admission he was living with roommates and reports independence with ADLS/IADLS    Occupational Therapy Goals:  Initiated 3/12/2025  1.  Patient will perform grooming with Minimal Assist within 7 day(s).  2.  Patient will perform bathing with Minimal Assist within 7 day(s).  3.  Patient will perform lower body dressing with Moderate Assist within 7 day(s).  4.  Patient will perform toilet transfers with Moderate Assist  within 7 day(s).  5.  Patient will perform all aspects of toileting with Minimal Assist within 7 day(s).  6.  Patient will participate in upper extremity therapeutic exercise/activities with Minimal Assist for 15 minutes within 7 day(s).    7.  Patient will utilize energy conservation techniques during functional activities with verbal cues within 7 day(s).   Outcome: Progressing       OCCUPATIONAL THERAPY TREATMENT  Patient: Leandra Perez (60 y.o. male)  Date: 3/14/2025  Primary Diagnosis: Discitis of thoracic region [M46.44]  Hyperkalemia [E87.5]  Discitis [M46.40]  ESRD on hemodialysis (HCC) [N18.6, Z99.2]  Hypertensive emergency [I16.1]  Osteomyelitis of other site, unspecified type (HCC) [M86.9]       Precautions:    Chart, occupational therapy assessment, plan of care, and goals were reviewed.    ASSESSMENT  Patient continues to benefit 
  Problem: Occupational Therapy - Adult  Goal: By Discharge: Performs self-care activities at highest level of function for planned discharge setting.  See evaluation for individualized goals.  Description: FUNCTIONAL STATUS PRIOR TO ADMISSION:  Patient was ambulatory using RW  Receives Help From: Other (Comment) (LTC), Prior Level of Assist for ADLs: Needs assistance,  ,  ,  ,  , Toileting: Independent, Prior Level of Assist for Homemaking: Needs assistance, Ambulation Assistance: Needs assistance, Prior Level of Assist for Transfers: Independent, Active : No     HOME SUPPORT: prior to admission pt was had [facility] for rehab reports prior to rehab admission he was living with roommates and reports independence with ADLS/IADLS    Occupational Therapy Goals:  Initiated 3/12/2025  1.  Patient will perform grooming with Minimal Assist within 7 day(s).  2.  Patient will perform bathing with Minimal Assist within 7 day(s).  3.  Patient will perform lower body dressing with Moderate Assist within 7 day(s).  4.  Patient will perform toilet transfers with Moderate Assist  within 7 day(s).  5.  Patient will perform all aspects of toileting with Minimal Assist within 7 day(s).  6.  Patient will participate in upper extremity therapeutic exercise/activities with Minimal Assist for 15 minutes within 7 day(s).    7.  Patient will utilize energy conservation techniques during functional activities with verbal cues within 7 day(s).   Outcome: Progressing   OCCUPATIONAL THERAPY EVALUATION    Patient: Leandra Perez (60 y.o. male)  Date: 3/12/2025  Primary Diagnosis: Discitis of thoracic region [M46.44]  Hyperkalemia [E87.5]  Discitis [M46.40]  ESRD on hemodialysis (HCC) [N18.6, Z99.2]  Hypertensive emergency [I16.1]  Osteomyelitis of other site, unspecified type ([facility]) [M86.9]         Precautions:                    ASSESSMENT :  The patient is limited by decreased functional mobility, independence in ADLs, high-level 
  Problem: Physical Therapy - Adult  Goal: By Discharge: Performs mobility at highest level of function for planned discharge setting.  See evaluation for individualized goals.  Description: FUNCTIONAL STATUS PRIOR TO ADMISSION: Pt reports he was IND prior to recent hospitalizations; has been in and out of hospital / SNF since January.    HOME SUPPORT PRIOR TO ADMISSION: Admitted from [facility]. Required assistance for ADLs with increased back pain.     Physical Therapy Goals  Initiated 3/12/2025  1.  Patient will move from supine to sit and sit to supine and roll side to side in bed with supervision/set-up within 7 day(s).    2.  Patient will perform sit to stand with supervision/set-up within 7 day(s).  3.  Patient will transfer from bed to chair and chair to bed with supervision/set-up using the least restrictive device within 7 day(s).  4.  Patient will ambulate with supervision/set-up for 150 feet with the least restrictive device within 7 day(s).     Outcome: Progressing   PHYSICAL THERAPY TREATMENT    Patient: Leandra Perez (60 y.o. male)  Date: 3/18/2025  Diagnosis: Discitis of thoracic region [M46.44]  Hyperkalemia [E87.5]  Discitis [M46.40]  ESRD on hemodialysis (HCC) [N18.6, Z99.2]  Hypertensive emergency [I16.1]  Osteomyelitis of other site, unspecified type (HCC) [M86.9] Discitis      Precautions:              ASSESSMENT:  Patient continues to benefit from skilled PT services and is progressing towards goals. Pt tolerated session fairly well, but was limited by increased pain levels (L flank area), hypotension, and elevated HR, decreased functional mobility and decreased tolerance to activity. Pt required mod A and additional time and effort to assume sitting position. Pt with fair-good sitting balance. HR sustained 120's and increased up to 139 bpm while seated EOB. Noted increased UE myoclonic jerking movements compared to previous session last Friday. Pt is due for HD today. Pt will continue 
  Problem: Physical Therapy - Adult  Goal: By Discharge: Performs mobility at highest level of function for planned discharge setting.  See evaluation for individualized goals.  Description: FUNCTIONAL STATUS PRIOR TO ADMISSION: Pt reports he was IND prior to recent hospitalizations; has been in and out of hospital / SNF since January.    HOME SUPPORT PRIOR TO ADMISSION: Admitted from [facility]. Required assistance for ADLs with increased back pain.     Physical Therapy Goals  Initiated 3/12/2025  1.  Patient will move from supine to sit and sit to supine and roll side to side in bed with supervision/set-up within 7 day(s).    2.  Patient will perform sit to stand with supervision/set-up within 7 day(s).  3.  Patient will transfer from bed to chair and chair to bed with supervision/set-up using the least restrictive device within 7 day(s).  4.  Patient will ambulate with supervision/set-up for 150 feet with the least restrictive device within 7 day(s).     Outcome: Progressing   PHYSICAL THERAPY TREATMENT    Patient: Leandra Perez (60 y.o. male)  Date: 3/14/2025  Diagnosis: Discitis of thoracic region [M46.44]  Hyperkalemia [E87.5]  Discitis [M46.40]  ESRD on hemodialysis (HCC) [N18.6, Z99.2]  Hypertensive emergency [I16.1]  Osteomyelitis of other site, unspecified type (HCC) [M86.9] Discitis      Precautions:              ASSESSMENT:  Patient continues to benefit from skilled PT services and is progressing towards goals. Pt tolerated session well, but continues to be limited by back pain, decreased functional mobility, impaired balance and gait, increased risk for falls. Pt required mod A x 1-2 for supine to sit EOB with reports of back pain. Pt performed sit<>stands with min A x 2. Pt places his hands on his thighs and walk them up his legs until standing upright. Pt able to take steps for bed-->BSC-->chair today. Pt will continue to benefit from PT to progress mobility as tolerated and reach highest level 
  Problem: Physical Therapy - Adult  Goal: By Discharge: Performs mobility at highest level of function for planned discharge setting.  See evaluation for individualized goals.  Description: FUNCTIONAL STATUS PRIOR TO ADMISSION: Pt reports he was IND prior to recent hospitalizations; has been in and out of hospital / SNF since January.    HOME SUPPORT PRIOR TO ADMISSION: Admitted from [facility]. Required assistance for ADLs with increased back pain.     Physical Therapy Goals  Initiated 3/12/2025  1.  Patient will move from supine to sit and sit to supine and roll side to side in bed with supervision/set-up within 7 day(s).    2.  Patient will perform sit to stand with supervision/set-up within 7 day(s).  3.  Patient will transfer from bed to chair and chair to bed with supervision/set-up using the least restrictive device within 7 day(s).  4.  Patient will ambulate with supervision/set-up for 150 feet with the least restrictive device within 7 day(s).     Outcome: Progressing     PHYSICAL THERAPY EVALUATION    Patient: Leandra Perez (60 y.o. male)  Date: 3/12/2025  Primary Diagnosis: Discitis of thoracic region [M46.44]  Hyperkalemia [E87.5]  Discitis [M46.40]  ESRD on hemodialysis (HCC) [N18.6, Z99.2]  Hypertensive emergency [I16.1]  Osteomyelitis of other site, unspecified type (HCC) [M86.9]       Precautions:              ASSESSMENT :   DEFICITS/IMPAIRMENTS:   The patient is limited by decreased functional mobility, strength, body mechanics, activity tolerance, endurance, balance, increased pain levels.  Pt seen for PT evaluation after admission to hospital with back pain; on 3/9 Patient developed respiratory distress and was transferred to the ICU for intubation, extubated 3/10, and weaned off pressors 3/11. Pt overall tolerating session well on RA, reporting back pain, but able to transfer OOB > chair with Mary via HHA x 2. Pt requires increased Vc'ing for log roll to minimize back pain and reports 
  Problem: Safety - Adult  Goal: Free from fall injury  Outcome: Progressing     Problem: Chronic Conditions and Co-morbidities  Goal: Patient's chronic conditions and co-morbidity symptoms are monitored and maintained or improved  Outcome: Progressing     
  Problem: Safety - Adult  Goal: Free from fall injury  Outcome: Progressing     Problem: Discharge Planning  Goal: Discharge to home or other facility with appropriate resources  Outcome: Progressing     Problem: Pain  Goal: Verbalizes/displays adequate comfort level or baseline comfort level  Outcome: Progressing  Flowsheets (Taken 3/19/2025 0800)  Verbalizes/displays adequate comfort level or baseline comfort level:   Encourage patient to monitor pain and request assistance   Assess pain using appropriate pain scale   Administer analgesics based on type and severity of pain and evaluate response   Implement non-pharmacological measures as appropriate and evaluate response   Consider cultural and social influences on pain and pain management   Notify Licensed Independent Practitioner if interventions unsuccessful or patient reports new pain     Problem: Safety - Medical Restraint  Goal: Remains free of injury from restraints (Restraint for Interference with Medical Device)  Description: INTERVENTIONS:  1. Determine that other, less restrictive measures have been tried or would not be effective before applying the restraint  2. Evaluate the patient's condition at the time of restraint application  3. Inform patient/family regarding the reason for restraint  4. Q2H: Monitor safety, psychosocial status, comfort, nutrition and hydration  Outcome: Progressing     
  Problem: Safety - Adult  Problem: Chronic Conditions and Co-morbidities  Goal: Patient's chronic conditions and co-morbidity symptoms are monitored and maintained or improved  Outcome: Progressing  Flowsheets (Taken 3/19/2025 2010)  Care Plan - Patient's Chronic Conditions and Co-Morbidity Symptoms are Monitored and Maintained or Improved: Monitor and assess patient's chronic conditions and comorbid symptoms for stability, deterioration, or improvement     Problem: Skin/Tissue Integrity  Goal: Skin integrity remains intact  Description: 1.  Monitor for areas of redness and/or skin breakdown  2.  Assess vascular access sites hourly  3.  Every 4-6 hours minimum:  Change oxygen saturation probe site  4.  Every 4-6 hours:  If on nasal continuous positive airway pressure, respiratory therapy assess nares and determine need for appliance change or resting period  Outcome: Progressing  Flowsheets  Taken 3/19/2025 2353  Skin Integrity Remains Intact: Monitor for areas of redness and/or skin breakdown  Taken 3/19/2025 2010  Skin Integrity Remains Intact: Monitor for areas of redness and/or skin breakdown     Problem: Discharge Planning  Goal: Discharge to home or other facility with appropriate resources  3/20/2025 0128 by Chyna Fitzpatrick, RN  Outcome: Progressing  3/19/2025 1128 by vIis Cox RN  Outcome: Progressing  Flowsheets (Taken 3/19/2025 0800)  Discharge to home or other facility with appropriate resources:   Identify barriers to discharge with patient and caregiver   Arrange for needed discharge resources and transportation as appropriate   Identify discharge learning needs (meds, wound care, etc)   Arrange for interpreters to assist at discharge as needed   Refer to discharge planning if patient needs post-hospital services based on physician order or complex needs related to functional status, cognitive ability or social support system     Goal: Free from fall injury  3/20/2025 0128 by Chyna Fitzpatrick, 
  Problem: Skin/Tissue Integrity  Goal: Skin integrity remains intact  Description: 1.  Monitor for areas of redness and/or skin breakdown  2.  Assess vascular access sites hourly  3.  Every 4-6 hours minimum:  Change oxygen saturation probe site  4.  Every 4-6 hours:  If on nasal continuous positive airway pressure, respiratory therapy assess nares and determine need for appliance change or resting period  3/13/2025 2256 by Fiordaliza Case RN  Outcome: Progressing  3/13/2025 1858 by Mery Bee RN  Outcome: Progressing  Flowsheets (Taken 3/13/2025 0800)  Skin Integrity Remains Intact: Monitor for areas of redness and/or skin breakdown     Problem: Safety - Adult  Goal: Free from fall injury  3/13/2025 2256 by Fiordaliza Case RN  Outcome: Progressing  3/13/2025 1858 by Mery Bee RN  Outcome: Progressing  Flowsheets (Taken 3/13/2025 0800)  Free From Fall Injury: Instruct family/caregiver on patient safety     Problem: Chronic Conditions and Co-morbidities  Goal: Patient's chronic conditions and co-morbidity symptoms are monitored and maintained or improved  3/13/2025 2256 by Fiordaliza Case RN  Outcome: Progressing  3/13/2025 1858 by Mery Bee RN  Outcome: Progressing  Flowsheets (Taken 3/13/2025 0800)  Care Plan - Patient's Chronic Conditions and Co-Morbidity Symptoms are Monitored and Maintained or Improved:   Monitor and assess patient's chronic conditions and comorbid symptoms for stability, deterioration, or improvement   Collaborate with multidisciplinary team to address chronic and comorbid conditions and prevent exacerbation or deterioration   Update acute care plan with appropriate goals if chronic or comorbid symptoms are exacerbated and prevent overall improvement and discharge     Problem: Discharge Planning  Goal: Discharge to home or other facility with appropriate resources  3/13/2025 2256 by Fiordaliza Case RN  Outcome: Progressing  3/13/2025 1858 by 
  Problem: Skin/Tissue Integrity  Goal: Skin integrity remains intact  Description: 1.  Monitor for areas of redness and/or skin breakdown  2.  Assess vascular access sites hourly  3.  Every 4-6 hours minimum:  Change oxygen saturation probe site  4.  Every 4-6 hours:  If on nasal continuous positive airway pressure, respiratory therapy assess nares and determine need for appliance change or resting period  3/15/2025 0604 by Luck, Rylen, RN  Outcome: Progressing  3/15/2025 0438 by Luck, Rylen, RN  Outcome: Progressing  Flowsheets (Taken 3/14/2025 2000)  Skin Integrity Remains Intact:   Monitor for areas of redness and/or skin breakdown   Assess vascular access sites hourly   Every 4-6 hours minimum: Change oxygen saturation probe site     Problem: Safety - Adult  Goal: Free from fall injury  3/15/2025 1112 by Ivis Cox RN  Outcome: Progressing  3/15/2025 0604 by Luck, Rylen, RN  Outcome: Progressing  3/15/2025 0438 by Luck, Rylen, RN  Outcome: Progressing  Flowsheets (Taken 3/14/2025 2000)  Free From Fall Injury:   Instruct family/caregiver on patient safety   Based on caregiver fall risk screen, instruct family/caregiver to ask for assistance with transferring infant if caregiver noted to have fall risk factors     Problem: Chronic Conditions and Co-morbidities  Goal: Patient's chronic conditions and co-morbidity symptoms are monitored and maintained or improved  3/15/2025 0604 by Luck, Rylen, RN  Outcome: Progressing  3/15/2025 0438 by Luck, Rylen, RN  Outcome: Progressing  Flowsheets (Taken 3/14/2025 2000)  Care Plan - Patient's Chronic Conditions and Co-Morbidity Symptoms are Monitored and Maintained or Improved:   Monitor and assess patient's chronic conditions and comorbid symptoms for stability, deterioration, or improvement   Collaborate with multidisciplinary team to address chronic and comorbid conditions and prevent exacerbation or deterioration     Problem: Discharge Planning  Goal: Discharge to home 
  Problem: Skin/Tissue Integrity  Goal: Skin integrity remains intact  Description: 1.  Monitor for areas of redness and/or skin breakdown  2.  Assess vascular access sites hourly  3.  Every 4-6 hours minimum:  Change oxygen saturation probe site  4.  Every 4-6 hours:  If on nasal continuous positive airway pressure, respiratory therapy assess nares and determine need for appliance change or resting period  3/16/2025 0504 by Luck, Rylen, RN  Outcome: Progressing  3/16/2025 0431 by Luck, Rylen, RN  Outcome: Progressing  Flowsheets (Taken 3/15/2025 2000)  Skin Integrity Remains Intact:   Monitor for areas of redness and/or skin breakdown   Assess vascular access sites hourly   Every 4-6 hours: If on nasal continuous positive airway pressure, respiratory therapy assesses nares and determine need for appliance change or resting period   Every 4-6 hours minimum: Change oxygen saturation probe site     Problem: Safety - Adult  Goal: Free from fall injury  3/16/2025 0504 by Luck, Rylen, RN  Outcome: Progressing  3/16/2025 0431 by Luck, Rylen, RN  Outcome: Progressing  Flowsheets (Taken 3/15/2025 2000)  Free From Fall Injury: Instruct family/caregiver on patient safety     Problem: Chronic Conditions and Co-morbidities  Goal: Patient's chronic conditions and co-morbidity symptoms are monitored and maintained or improved  3/16/2025 0504 by Luck, Rylen, RN  Outcome: Progressing  3/16/2025 0431 by Luck, Rylen, RN  Outcome: Progressing  Flowsheets (Taken 3/15/2025 2000)  Care Plan - Patient's Chronic Conditions and Co-Morbidity Symptoms are Monitored and Maintained or Improved:   Monitor and assess patient's chronic conditions and comorbid symptoms for stability, deterioration, or improvement   Collaborate with multidisciplinary team to address chronic and comorbid conditions and prevent exacerbation or deterioration   Update acute care plan with appropriate goals if chronic or comorbid symptoms are exacerbated and prevent 
  Problem: Skin/Tissue Integrity  Goal: Skin integrity remains intact  Description: 1.  Monitor for areas of redness and/or skin breakdown  2.  Assess vascular access sites hourly  3.  Every 4-6 hours minimum:  Change oxygen saturation probe site  4.  Every 4-6 hours:  If on nasal continuous positive airway pressure, respiratory therapy assess nares and determine need for appliance change or resting period  3/16/2025 2125 by Fiordaliza Case RN  Outcome: Progressing  3/16/2025 1522 by Mery Bee RN  Outcome: Progressing  3/16/2025 1521 by Mery Bee RN  Outcome: Progressing  Flowsheets (Taken 3/16/2025 0800)  Skin Integrity Remains Intact:   Monitor for areas of redness and/or skin breakdown   Assess vascular access sites hourly     Problem: Safety - Adult  Goal: Free from fall injury  3/16/2025 2125 by Fiordaliza Case RN  Outcome: Progressing  3/16/2025 1522 by Mery Bee RN  Outcome: Progressing  3/16/2025 1521 by Mery Bee RN  Outcome: Progressing     Problem: Chronic Conditions and Co-morbidities  Goal: Patient's chronic conditions and co-morbidity symptoms are monitored and maintained or improved  3/16/2025 2125 by Fiordaliza Case RN  Outcome: Progressing  3/16/2025 1522 by Mery Bee RN  Outcome: Progressing  3/16/2025 1521 by Mery Bee RN  Outcome: Progressing  Flowsheets (Taken 3/16/2025 0800)  Care Plan - Patient's Chronic Conditions and Co-Morbidity Symptoms are Monitored and Maintained or Improved:   Monitor and assess patient's chronic conditions and comorbid symptoms for stability, deterioration, or improvement   Collaborate with multidisciplinary team to address chronic and comorbid conditions and prevent exacerbation or deterioration   Update acute care plan with appropriate goals if chronic or comorbid symptoms are exacerbated and prevent overall improvement and discharge     Problem: Discharge Planning  Goal: 
  Problem: Skin/Tissue Integrity  Goal: Skin integrity remains intact  Description: 1.  Monitor for areas of redness and/or skin breakdown  2.  Assess vascular access sites hourly  3.  Every 4-6 hours minimum:  Change oxygen saturation probe site  4.  Every 4-6 hours:  If on nasal continuous positive airway pressure, respiratory therapy assess nares and determine need for appliance change or resting period  3/17/2025 1213 by Rolanda Walls RN  Outcome: Progressing  Flowsheets (Taken 3/17/2025 0800)  Skin Integrity Remains Intact:   Monitor for areas of redness and/or skin breakdown   Assess vascular access sites hourly   Every 4-6 hours minimum: Change oxygen saturation probe site   Every 4-6 hours: If on nasal continuous positive airway pressure, respiratory therapy assesses nares and determine need for appliance change or resting period     Problem: Safety - Adult  Goal: Free from fall injury  3/17/2025 1456 by Ivis Cox RN  Outcome: Progressing  3/17/2025 1213 by Rolanda Walls RN  Outcome: Progressing     Problem: Chronic Conditions and Co-morbidities  Goal: Patient's chronic conditions and co-morbidity symptoms are monitored and maintained or improved  3/17/2025 1213 by Rolanda Walls RN  Outcome: Progressing     Problem: Discharge Planning  Goal: Discharge to home or other facility with appropriate resources  3/17/2025 1456 by Ivis Cox RN  Outcome: Progressing  3/17/2025 1213 by Rolanda Walls RN  Outcome: Progressing     Problem: Pain  Goal: Verbalizes/displays adequate comfort level or baseline comfort level  3/17/2025 1456 by Ivis Cox RN  Outcome: Progressing  3/17/2025 1213 by Rolanda Walls RN  Outcome: Progressing     Problem: Safety - Medical Restraint  Goal: Remains free of injury from restraints (Restraint for Interference with Medical Device)  Description: INTERVENTIONS:  1. Determine that other, less restrictive measures have been tried or would not be effective before 
  Problem: Skin/Tissue Integrity  Goal: Skin integrity remains intact  Description: 1.  Monitor for areas of redness and/or skin breakdown  2.  Assess vascular access sites hourly  3.  Every 4-6 hours minimum:  Change oxygen saturation probe site  4.  Every 4-6 hours:  If on nasal continuous positive airway pressure, respiratory therapy assess nares and determine need for appliance change or resting period  3/17/2025 2150 by Fiordaliza Case RN  Outcome: Progressing  3/17/2025 1213 by Rolanda Walls RN  Outcome: Progressing  Flowsheets (Taken 3/17/2025 0800)  Skin Integrity Remains Intact:   Monitor for areas of redness and/or skin breakdown   Assess vascular access sites hourly   Every 4-6 hours minimum: Change oxygen saturation probe site   Every 4-6 hours: If on nasal continuous positive airway pressure, respiratory therapy assesses nares and determine need for appliance change or resting period     Problem: Safety - Adult  Goal: Free from fall injury  3/17/2025 2150 by Fiordaliza Case RN  Outcome: Progressing  3/17/2025 1456 by Ivis Cox RN  Outcome: Progressing  3/17/2025 1213 by Rolanda Walls RN  Outcome: Progressing     Problem: Chronic Conditions and Co-morbidities  Goal: Patient's chronic conditions and co-morbidity symptoms are monitored and maintained or improved  3/17/2025 2150 by Fiordaliza Case RN  Outcome: Progressing  3/17/2025 1213 by Rolanda Walls RN  Outcome: Progressing     Problem: Discharge Planning  Goal: Discharge to home or other facility with appropriate resources  3/17/2025 2150 by Fiordaliza Case RN  Outcome: Progressing  3/17/2025 1456 by Ivis Cox RN  Outcome: Progressing  3/17/2025 1213 by Rolanda Walls RN  Outcome: Progressing     Problem: Pain  Goal: Verbalizes/displays adequate comfort level or baseline comfort level  3/17/2025 2150 by Fiordaliza Case RN  Outcome: Progressing  3/17/2025 1456 by Ivis Cox RN  Outcome: Progressing  3/17/2025 
  Problem: Skin/Tissue Integrity  Goal: Skin integrity remains intact  Description: 1.  Monitor for areas of redness and/or skin breakdown  2.  Assess vascular access sites hourly  3.  Every 4-6 hours minimum:  Change oxygen saturation probe site  4.  Every 4-6 hours:  If on nasal continuous positive airway pressure, respiratory therapy assess nares and determine need for appliance change or resting period  3/18/2025 2044 by Fiordaliza Case RN  Outcome: Progressing  3/18/2025 1258 by Rolanda Walls RN  Outcome: Progressing  Flowsheets (Taken 3/18/2025 0800)  Skin Integrity Remains Intact:   Monitor for areas of redness and/or skin breakdown   Assess vascular access sites hourly   Every 4-6 hours minimum: Change oxygen saturation probe site   Every 4-6 hours: If on nasal continuous positive airway pressure, respiratory therapy assesses nares and determine need for appliance change or resting period     Problem: Safety - Adult  Goal: Free from fall injury  3/18/2025 2044 by Fiordaliza Case RN  Outcome: Progressing  3/18/2025 1258 by Rolanda Walls RN  Outcome: Progressing     Problem: Chronic Conditions and Co-morbidities  Goal: Patient's chronic conditions and co-morbidity symptoms are monitored and maintained or improved  3/18/2025 2044 by Fiordaliza Case RN  Outcome: Progressing  3/18/2025 1258 by Rolanda Walls RN  Outcome: Progressing  Flowsheets (Taken 3/18/2025 0800 by Ivis Cox RN)  Care Plan - Patient's Chronic Conditions and Co-Morbidity Symptoms are Monitored and Maintained or Improved:   Monitor and assess patient's chronic conditions and comorbid symptoms for stability, deterioration, or improvement   Collaborate with multidisciplinary team to address chronic and comorbid conditions and prevent exacerbation or deterioration   Update acute care plan with appropriate goals if chronic or comorbid symptoms are exacerbated and prevent overall improvement and discharge     Problem: 
  Problem: Skin/Tissue Integrity  Goal: Skin integrity remains intact  Description: 1.  Monitor for areas of redness and/or skin breakdown  2.  Assess vascular access sites hourly  3.  Every 4-6 hours minimum:  Change oxygen saturation probe site  4.  Every 4-6 hours:  If on nasal continuous positive airway pressure, respiratory therapy assess nares and determine need for appliance change or resting period  3/8/2025 2032 by Lisette Greenwood RN  Outcome: Progressing  3/8/2025 1004 by Keshia Anguiano RN  Outcome: Progressing  Flowsheets (Taken 3/7/2025 2029 by Stephanie Centeno RN)  Skin Integrity Remains Intact: Monitor for areas of redness and/or skin breakdown     Problem: Safety - Adult  Goal: Free from fall injury  3/8/2025 2032 by Lisette Greenwood RN  Outcome: Progressing  3/8/2025 1004 by Keshia Anguiano RN  Outcome: Progressing     Problem: Chronic Conditions and Co-morbidities  Goal: Patient's chronic conditions and co-morbidity symptoms are monitored and maintained or improved  3/8/2025 2032 by Lisette Greenwood RN  Outcome: Progressing  3/8/2025 1004 by Keshia Anguiano RN  Outcome: Progressing  Flowsheets (Taken 3/7/2025 2029 by Stephanie Centeno, RN)  Care Plan - Patient's Chronic Conditions and Co-Morbidity Symptoms are Monitored and Maintained or Improved: Monitor and assess patient's chronic conditions and comorbid symptoms for stability, deterioration, or improvement     Problem: Discharge Planning  Goal: Discharge to home or other facility with appropriate resources  3/8/2025 2032 by Lisette Greenwood RN  Outcome: Progressing  3/8/2025 1004 by Keshia Anguiano RN  Outcome: Progressing  Flowsheets (Taken 3/7/2025 2029 by Stephanie Centeno RN)  Discharge to home or other facility with appropriate resources:   Identify barriers to discharge with patient and caregiver   Identify discharge learning needs (meds, wound care, etc)     Problem: Pain  Goal: Verbalizes/displays adequate comfort level or baseline comfort level  3/8/2025 
  Problem: Skin/Tissue Integrity  Goal: Skin integrity remains intact  Description: 1.  Monitor for areas of redness and/or skin breakdown  2.  Assess vascular access sites hourly  3.  Every 4-6 hours minimum:  Change oxygen saturation probe site  4.  Every 4-6 hours:  If on nasal continuous positive airway pressure, respiratory therapy assess nares and determine need for appliance change or resting period  Outcome: Progressing  Flowsheets (Taken 3/12/2025 2000)  Skin Integrity Remains Intact: Monitor for areas of redness and/or skin breakdown     Problem: Safety - Adult  Goal: Free from fall injury  Outcome: Progressing     Problem: Pain  Goal: Verbalizes/displays adequate comfort level or baseline comfort level  Outcome: Progressing     Problem: Physical Therapy - Adult  Goal: By Discharge: Performs mobility at highest level of function for planned discharge setting.  See evaluation for individualized goals.  Description: FUNCTIONAL STATUS PRIOR TO ADMISSION: Pt reports he was IND prior to recent hospitalizations; has been in and out of hospital / SNF since January.    HOME SUPPORT PRIOR TO ADMISSION: Admitted from [facility]. Required assistance for ADLs with increased back pain.     Physical Therapy Goals  Initiated 3/12/2025  1.  Patient will move from supine to sit and sit to supine and roll side to side in bed with supervision/set-up within 7 day(s).    2.  Patient will perform sit to stand with supervision/set-up within 7 day(s).  3.  Patient will transfer from bed to chair and chair to bed with supervision/set-up using the least restrictive device within 7 day(s).  4.  Patient will ambulate with supervision/set-up for 150 feet with the least restrictive device within 7 day(s).     3/12/2025 1542 by Loyda Myers, PT  Outcome: Progressing     Problem: Occupational Therapy - Adult  Goal: By Discharge: Performs self-care activities at highest level of function for planned discharge setting.  See evaluation 
  Problem: Skin/Tissue Integrity  Goal: Skin integrity remains intact  Description: 1.  Monitor for areas of redness and/or skin breakdown  2.  Assess vascular access sites hourly  3.  Every 4-6 hours minimum:  Change oxygen saturation probe site  4.  Every 4-6 hours:  If on nasal continuous positive airway pressure, respiratory therapy assess nares and determine need for appliance change or resting period  Outcome: Progressing  Flowsheets (Taken 3/17/2025 0800)  Skin Integrity Remains Intact:   Monitor for areas of redness and/or skin breakdown   Assess vascular access sites hourly   Every 4-6 hours minimum: Change oxygen saturation probe site   Every 4-6 hours: If on nasal continuous positive airway pressure, respiratory therapy assesses nares and determine need for appliance change or resting period     Problem: Safety - Adult  Goal: Free from fall injury  Outcome: Progressing     Problem: Chronic Conditions and Co-morbidities  Goal: Patient's chronic conditions and co-morbidity symptoms are monitored and maintained or improved  Outcome: Progressing     Problem: Discharge Planning  Goal: Discharge to home or other facility with appropriate resources  Outcome: Progressing     Problem: Pain  Goal: Verbalizes/displays adequate comfort level or baseline comfort level  Outcome: Progressing     Problem: Safety - Medical Restraint  Goal: Remains free of injury from restraints (Restraint for Interference with Medical Device)  Description: INTERVENTIONS:  1. Determine that other, less restrictive measures have been tried or would not be effective before applying the restraint  2. Evaluate the patient's condition at the time of restraint application  3. Inform patient/family regarding the reason for restraint  4. Q2H: Monitor safety, psychosocial status, comfort, nutrition and hydration  Outcome: Progressing     
  Problem: Skin/Tissue Integrity  Goal: Skin integrity remains intact  Description: 1.  Monitor for areas of redness and/or skin breakdown  2.  Assess vascular access sites hourly  3.  Every 4-6 hours minimum:  Change oxygen saturation probe site  4.  Every 4-6 hours:  If on nasal continuous positive airway pressure, respiratory therapy assess nares and determine need for appliance change or resting period  Outcome: Progressing  Flowsheets (Taken 3/18/2025 0800)  Skin Integrity Remains Intact:   Monitor for areas of redness and/or skin breakdown   Assess vascular access sites hourly   Every 4-6 hours minimum: Change oxygen saturation probe site   Every 4-6 hours: If on nasal continuous positive airway pressure, respiratory therapy assesses nares and determine need for appliance change or resting period     Problem: Safety - Adult  Goal: Free from fall injury  Outcome: Progressing     Problem: Chronic Conditions and Co-morbidities  Goal: Patient's chronic conditions and co-morbidity symptoms are monitored and maintained or improved  Outcome: Progressing     Problem: Discharge Planning  Goal: Discharge to home or other facility with appropriate resources  Outcome: Progressing     Problem: Pain  Goal: Verbalizes/displays adequate comfort level or baseline comfort level  Outcome: Progressing     Problem: Safety - Medical Restraint  Goal: Remains free of injury from restraints (Restraint for Interference with Medical Device)  Description: INTERVENTIONS:  1. Determine that other, less restrictive measures have been tried or would not be effective before applying the restraint  2. Evaluate the patient's condition at the time of restraint application  3. Inform patient/family regarding the reason for restraint  4. Q2H: Monitor safety, psychosocial status, comfort, nutrition and hydration  Outcome: Progressing     Problem: Physical Therapy - Adult  Goal: By Discharge: Performs mobility at highest level of function for 
Dialysis ETA between 2004-6622. Thanks  
Discharge PICC and Antibiotic Orders  [address] [address]  [phone]  [phone]    1.  Diagnosis:  Bacteremia  Discitis/OM T11-T12  2.  Antibiotic:  IV cefepime 2/2/2gm after each dialysis     IV Vancomycin 500mg after each dialysis       End Date 4/22/2025  3.  Routine PICC/ Yonatan/ Portacath Care including PRN catheter flow management  4.  Weekly labs:   __x_CBC/diff/platelets   __x_BMP   ___CPK   ___AST/TotalBilirubin/AlkalinePhosphatase   __x_CRP   ___Gentamicin level  ___gentamicin peak/trough   Trough Vancomycin level ____goal 10-15 __x_goal 15-20  5.  Fax lab to [phone] Call Critical Lab Values to [phone]  6.  May send to IR for line evaluation or replacement [phone]  7.  Home Health to pull PICC line at end of therapy or send to IR for Yonatan removal  8.  Allergies:    Allergies   Allergen Reactions    Shellfish Allergy Swelling     9.  Pharmacy Consult for Vancomycin dosing/gentamicin dosing.  10. Recommend to follow up with neurosurgery and dialysis team      NICK Graham NP      
0604 by [place], Rylen, RN  Outcome: Progressing  3/15/2025 0438 by Luck, Rylen, RN  Outcome: Progressing  Flowsheets (Taken 3/14/2025 2000)  Discharge to home or other facility with appropriate resources:   Identify barriers to discharge with patient and caregiver   Arrange for needed discharge resources and transportation as appropriate   Identify discharge learning needs (meds, wound care, etc)   Arrange for interpreters to assist at discharge as needed     Problem: Pain  Goal: Verbalizes/displays adequate comfort level or baseline comfort level  3/15/2025 0604 by Luck, Rylen, RN  Outcome: Progressing  3/15/2025 0438 by Luck, Rylen, RN  Outcome: Progressing     
discharge  3/13/2025 2256 by Fiordaliza Case RN  Outcome: Progressing     Problem: Discharge Planning  Goal: Discharge to home or other facility with appropriate resources  3/14/2025 0956 by Rachele Kuafman RN  Outcome: Progressing  Flowsheets (Taken 3/14/2025 0800)  Discharge to home or other facility with appropriate resources:   Arrange for needed discharge resources and transportation as appropriate   Identify barriers to discharge with patient and caregiver   Identify discharge learning needs (meds, wound care, etc)  3/13/2025 2256 by Fiordaliza Case RN  Outcome: Progressing     Problem: Pain  Goal: Verbalizes/displays adequate comfort level or baseline comfort level  3/14/2025 0956 by Rachele Kaufman RN  Outcome: Progressing  3/13/2025 2256 by Fiordaliza Case RN  Outcome: Progressing     Problem: Safety - Medical Restraint  Goal: Remains free of injury from restraints (Restraint for Interference with Medical Device)  Description: INTERVENTIONS:  1. Determine that other, less restrictive measures have been tried or would not be effective before applying the restraint  2. Evaluate the patient's condition at the time of restraint application  3. Inform patient/family regarding the reason for restraint  4. Q2H: Monitor safety, psychosocial status, comfort, nutrition and hydration  3/14/2025 0956 by Rachele Kaufman RN  Outcome: Progressing  3/13/2025 2256 by Fiordaliza Case RN  Outcome: Progressing     
discharge with patient and caregiver   Arrange for needed discharge resources and transportation as appropriate   Identify discharge learning needs (meds, wound care, etc)   Arrange for interpreters to assist at discharge as needed   Refer to discharge planning if patient needs post-hospital services based on physician order or complex needs related to functional status, cognitive ability or social support system     Problem: Pain  Goal: Verbalizes/displays adequate comfort level or baseline comfort level  Outcome: Progressing     
needs (meds, wound care, etc)   Arrange for interpreters to assist at discharge as needed     Problem: Pain  Goal: Verbalizes/displays adequate comfort level or baseline comfort level  Outcome: Progressing     Problem: Safety - Medical Restraint  Goal: Remains free of injury from restraints (Restraint for Interference with Medical Device)  Description: INTERVENTIONS:  1. Determine that other, less restrictive measures have been tried or would not be effective before applying the restraint  2. Evaluate the patient's condition at the time of restraint application  3. Inform patient/family regarding the reason for restraint  4. Q2H: Monitor safety, psychosocial status, comfort, nutrition and hydration  Outcome: Progressing     
order or complex needs related to functional status, cognitive ability or social support system     Problem: Pain  Goal: Verbalizes/displays adequate comfort level or baseline comfort level  Outcome: Progressing     Problem: Safety - Medical Restraint  Goal: Remains free of injury from restraints (Restraint for Interference with Medical Device)  Description: INTERVENTIONS:  1. Determine that other, less restrictive measures have been tried or would not be effective before applying the restraint  2. Evaluate the patient's condition at the time of restraint application  3. Inform patient/family regarding the reason for restraint  4. Q2H: Monitor safety, psychosocial status, comfort, nutrition and hydration  Outcome: Progressing     
family training/education    Frequency/Duration: OT Plan of Care: 5 times/week    Recommend with staff: Recommend with nursing, ADLs with assist, OOB to chair 3x/day via gait belt & RW, and toileting via  BSC with 2 assist & RW . Thank you for completing as able in order to maintain patient strength, endurance and independence.       Recommendation for discharge: (in order for the patient to meet his/her long term goals):   High intensity/comprehensive skilled occupational therapy in a multidisciplinary setting as patient is working towards tolerating up to 3 hours of therapy/day 5-7x/week    Other factors to consider for discharge: patient's current support system is unable to meet their requirements for physical assistance, poor safety awareness, impaired cognition, high risk for falls, not safe to be alone, and concern for safely navigating or managing the home environment    IF patient discharges home will need the following DME: continuing to assess with progress     SUBJECTIVE:   Patient stated “I wish you could feel this pain. My back hurts so much.” stated as patient in near full flexion with BUEs on knees transferring to the chair, not receptive to education/repositioning attempts    OBJECTIVE DATA SUMMARY:   Cognitive/Behavioral Status:  Orientation  Overall Orientation Status: Within Normal Limits  Orientation Level: Oriented X4  Cognition  Overall Cognitive Status: Exceptions  Arousal/Alertness: Appears intact  Following Commands: Follows one step commands with increased time;Follows one step commands with repetition  Attention Span: Attends with cues to redirect;Difficulty dividing attention;Difficulty attending to directions  Memory: Decreased recall of recent events;Decreased short term memory;Decreased recall of precautions  Safety Judgement: Decreased awareness of need for assistance;Decreased awareness of need for safety  Problem Solving: Decreased awareness of errors;Assistance required to 
needed discharge resources and transportation as appropriate   Identify discharge learning needs (meds, wound care, etc)   Arrange for interpreters to assist at discharge as needed   Refer to discharge planning if patient needs post-hospital services based on physician order or complex needs related to functional status, cognitive ability or social support system     Problem: Pain  Goal: Verbalizes/displays adequate comfort level or baseline comfort level  3/16/2025 1522 by Mery Bee RN  Outcome: Progressing  3/16/2025 1521 by Mery Bee RN  Outcome: Progressing  3/16/2025 0504 by Luck, Rylen, RN  Outcome: Progressing  3/16/2025 0431 by Luck, Rylen, RN  Outcome: Progressing     Problem: Safety - Medical Restraint  Goal: Remains free of injury from restraints (Restraint for Interference with Medical Device)  Description: INTERVENTIONS:  1. Determine that other, less restrictive measures have been tried or would not be effective before applying the restraint  2. Evaluate the patient's condition at the time of restraint application  3. Inform patient/family regarding the reason for restraint  4. Q2H: Monitor safety, psychosocial status, comfort, nutrition and hydration  3/16/2025 1522 by Mery Bee RN  Outcome: Progressing  3/16/2025 1521 by Mery Bee RN  Outcome: Progressing  3/16/2025 0504 by Luck, Rylen, RN  Outcome: Progressing  3/16/2025 0431 by Luck, Rylen, RN  Outcome: Progressing     
left in no apparent distress in bed, Call bell within reach, Bed/ chair alarm activated, and Side rails x3    COMMUNICATION/EDUCATION:   The patient's plan of care was discussed with: physical therapist and registered nurse    Patient Education  Education Given To: Patient  Education Provided: Role of Therapy;Plan of Care;Precautions;ADL Adaptive Strategies;Transfer Training;Energy Conservation;Fall Prevention Strategies;Mobility Training  Education Method: Demonstration;Verbal;Teach Back  Barriers to Learning: Cognition;Readiness to Learn  Education Outcome: Verbalized understanding;Continued education needed    Thank you for this referral.  WALTER Yanez, OTR/L  Minutes: 24    
practitioner responsible for assessment, treatment, and documentation.

## 2025-03-20 NOTE — DISCHARGE INSTRUCTIONS
Discharge Instructions       PATIENT ID: Leandra Perez  MRN: [id number]   YOB: 1965    DATE OF ADMISSION: 3/7/2025   DATE OF DISCHARGE: 3/20/2025    PRIMARY CARE PROVIDER: Margo Bella     ATTENDING PHYSICIAN: Ana Díaz MD   DISCHARGING PROVIDER: Ana Díaz MD    To contact this individual call [phone] and ask the  to page.   If unavailable ask to be transferred the Adult Hospitalist Department.    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   Serratia Bacteremia   CoNS bacteremia, MRSE   Chronic combined systolic and diastolic failure  Chronic respiratory failure on 2 L home O2  Hypotension  NIDDM II  ESRD on HD TTS  Hyperkalemia  Hypertensive urgency  Opioid use disorder      CONSULTATIONS:   Nephrology  Infectious Diseases  Addiction Medicine    PROCEDURES/SURGERIES: * No surgery found *    PENDING TEST RESULTS:   At the time of discharge the following test results are still pending: None    FOLLOW UP APPOINTMENTS:   PCP in 1-2 weeks for general medical follow-up and medications refills;  Nephrology with next dialysis;    ADDITIONAL CARE RECOMMENDATIONS:   Please take all your medications as prescribed;    DIET: renal diet    ACTIVITY: activity as tolerated  PT/OT to evaluate and treat;    WOUND CARE: n/a    EQUIPMENT needed: Per PT/OT      DISCHARGE MEDICATIONS:   See Medication Reconciliation Form    It is important that you take the medication exactly as they are prescribed.   Keep your medication in the bottles provided by the pharmacist and keep a list of the medication names, dosages, and times to be taken in your wallet.   Do not take other medications without consulting your doctor.       NOTIFY YOUR PHYSICIAN FOR ANY OF THE FOLLOWING:   Fever over 101 degrees for 24 hours.   Chest pain, shortness of breath, fever, chills, nausea, vomiting, diarrhea, change in mentation, falling, weakness, bleeding. Severe pain or pain not relieved by

## 2025-03-20 NOTE — FLOWSHEET NOTE
PRE HD TREATMENT   03/08/25 1315   Treatment   Treatment Goal 3L   Observations & Evaluations   Level of Consciousness 0   Oriented X 4   Heart Rhythm Regular   Respiratory Quality/Effort Unlabored   O2 Device None (Room air)   Skin Color Ashen   Skin Condition/Temp Warm   Abdomen Inspection Flat;Soft   Edema None   Vital Signs   BP (!) 123/90   Temp 98.1 °F (36.7 °C)   Pulse 91   Respirations 20   SpO2 95 %   Pain Assessment   Pain Assessment 0-10   Pain Level 10   Pain Location Back   Pain Orientation Lower   Pain Descriptors Aching   Technical Checks   Dialysis Machine No. 08  (SN [id number])   RO Machine Number [id number]  (SN [id number])   Dialyzer Lot No. [id number]   Tubing Lot Number [id number]   All Connections Secure Yes   NS Bag Yes   Saline Line Double Clamped Yes   Dialyzer Nipro ELISIO   Prime Volume (mL) 200 mL   ICEBOAT I;C;E;B;O;A;T   RO Machine Log Sheet Completed Yes   Machine Alarm Self Test Completed;Passed   Air Foam Detector Tested;Proper Function;pH Reading   Extracorporeal Circuit Tested for Integrity Yes   Machine Conductivity 13.7   Manual Ph 7.2   Bleach Test (Neg) Yes   Bath Temperature 98.6 °F (37 °C)   Hemodialysis Central Access - Permanent/Tunneled Right Subclavian   Placement Date/Time: (c) 12/01/24 2000   Present on Admission/Arrival: Yes  Orientation: Right  Access Location: Subclavian   Continued need for line? Yes   Site Assessment Clean, dry & intact   Blue Lumen Status Brisk blood return;Flushed   Red Lumen Status Brisk blood return;Flushed   Line Care Connections checked and tightened;Chlorhexidine wipes;Ports disinfected   Dressing Type Sterile dressing, transparent   Date of Last Dressing Change 03/07/25   Dressing Status Clean, dry & intact   Treatment Initiation   Treatment  Initiation Universal Precautions maintained;Lines secured to patient;Connections secured;Prime given;Venous Parameters set;Arterial Parameters set;Air foam detector engaged;[place] Device;Dialysate;Saline line 
Primary RN SBAR: Ivis Cox RN  Incapacitated Nurse [facility]. provided: Yes  Patient Education provided: HD treatment procedure  Preferred Education method and Primary language: English/Verbal  Hospital General Consent Verified: Yes  Hospital associated wait time; reason: NA  Hepatitis B Surface Ag   Date/Time Value Ref Range Status   02/13/2025 04:54 AM <0.10 Index Final     Hep B S Ag Interp   Date/Time Value Ref Range Status   02/13/2025 04:54 AM Negative NEG   Final     HEP B SURF AB   Date/Time Value Ref Range Status   09/05/2022 02:15 AM 25.46 mIU/mL Final     Hep B S Ab   Date/Time Value Ref Range Status   02/13/2025 04:54 AM 17.56 mIU/mL Final     Hep B S Ab Interp   Date/Time Value Ref Range Status   02/13/2025 04:54 AM REACTIVE (A) NR   Final             03/18/25 1300   Vital Signs   /78   Pulse (!) 103   Respirations 15   SpO2 93 %   Pain Assessment   Pain Assessment None - Denies Pain   Treatment   Time On 1312   Treatment Goal 1L/3.5hrs   Observations & Evaluations   Level of Consciousness 0   Oriented X x4   Heart Rhythm Irregular   Respiratory Quality/Effort Unlabored   O2 Device Nasal cannula   Bilateral Breath Sounds Diminished   Skin Color Ashen   Skin Condition/Temp Dry;Warm   Appetite Good   Abdomen Inspection Soft   Edema None   Technical Checks   Dialysis Machine No. 01   RO Machine Number [id number]   Dialyzer Lot No. [id number]   Tubing Lot Number [id number]   All Connections Secure Yes   NS Bag Yes   Saline Line Double Clamped Yes   Dialyzer Nipro ELISIO   Prime Volume (mL) 250 mL   ICEBOAT I;C;E;B;O;A;T   RO Machine Log Sheet Completed Yes   Machine Alarm Self Test Completed;Passed   Air Foam Detector Tested;Proper Function;pH Reading   Extracorporeal Circuit Tested for Integrity Yes   Machine Conductivity 13.5   Manual Ph 7.4   Bleach Test (Neg) Yes   Bath Temperature 98.6 °F (37 °C)   Dialysis Bath   K+ (Potassium) 2   Ca+ (Calcium) 2.5   Na+ (Sodium) 138   HCO3 (Bicarb) 35   Bicarbonate 
Primary RN SBAR: Rylen RN  Incapacitated Nurse [facility]. provided: Yes  Patient Education provided: HD treatment procedure  Preferred Education method and Primary language: English/verbal  Hospital General Consent Verified: Yes  Hospital associated wait time; reason: NA  Hepatitis B Surface Ag   Date/Time Value Ref Range Status   02/13/2025 04:54 AM <0.10 Index Final     Hep B S Ag Interp   Date/Time Value Ref Range Status   02/13/2025 04:54 AM Negative NEG   Final     HEP B SURF AB   Date/Time Value Ref Range Status   09/05/2022 02:15 AM 25.46 mIU/mL Final     Hep B S Ab   Date/Time Value Ref Range Status   02/13/2025 04:54 AM 17.56 mIU/mL Final     Hep B S Ab Interp   Date/Time Value Ref Range Status   02/13/2025 04:54 AM REACTIVE (A) NR   Final          PRE-HD Assessment     03/15/25 0815   Vital Signs   /88   Pulse 78   Respirations 11   SpO2 95 %   Pain Assessment   Pain Assessment None - Denies Pain   Treatment   Time On 0835   Treatment Goal 3L/3.5hrs   Observations & Evaluations   Level of Consciousness 0   Heart Rhythm Regular   Respiratory Quality/Effort Unlabored   O2 Device None (Room air)   Bilateral Breath Sounds Diminished   Skin Condition/Temp Dry;Warm   Appetite Good   Abdomen Inspection Soft   Edema None   Technical Checks   Dialysis Machine No. 07   RO Machine Number [id number]   Dialyzer Lot No. [id number]   Tubing Lot Number [id number]   All Connections Secure Yes   NS Bag Yes   Saline Line Double Clamped Yes   Dialyzer Nipro ELISIO   Prime Volume (mL) 250 mL   ICEBOAT I;C;E;B;O;A;T   RO Machine Log Sheet Completed Yes   Machine Alarm Self Test Completed;Passed   Air Foam Detector Tested;Proper Function;pH Reading   Extracorporeal Circuit Tested for Integrity Yes   Machine Conductivity 13.7   Manual Ph 7.4   Bleach Test (Neg) Yes   Bath Temperature 98.6 °F (37 °C)   Dialysis Bath   K+ (Potassium) 2   Ca+ (Calcium) 2.5   Na+ (Sodium) 138   HCO3 (Bicarb) 35   Bicarbonate Concentrate Lot No. [id number]   Acid 
  Line Care Connections checked and tightened   Dressing Type Sterile dressing, transparent   Date of Last Dressing Change 03/11/25   Dialysis Bath   K+ (Potassium) 2   Ca+ (Calcium) 2.5   Na+ (Sodium) 138   HCO3 (Bicarb) 35     Primary RN SBAR: Serjio Carter RN  Incapacitated Nurse edu. provided: yes,   Patient Education provided: yes, r/t dialysis process  Preferred Education method and Primary language: verbal/English  Hospital General Consent Verified: yes  Hospital associated wait time; reason: no wait time  Hepatitis B Surface Ag   Date/Time Value Ref Range Status   02/13/2025 04:54 AM <0.10 Index Final     Hep B S Ag Interp   Date/Time Value Ref Range Status   02/13/2025 04:54 AM Negative NEG   Final     HEP B SURF AB   Date/Time Value Ref Range Status   09/05/2022 02:15 AM 25.46 mIU/mL Final     Hep B S Ab   Date/Time Value Ref Range Status   02/13/2025 04:54 AM 17.56 mIU/mL Final     Hep B S Ab Interp   Date/Time Value Ref Range Status   02/13/2025 04:54 AM REACTIVE (A) NR   Final        03/11/25 1030   Treatment   Time On 1030   Vital Signs   /77   Pulse 89   Respirations 20   SpO2 96 %   During Hemodialysis Assessment   Blood Flow Rate (ml/min) 400 ml/min   Arterial Pressure (mmHg) -200 mmHg   Venous Pressure (mmHg) 250   TMP 60      Access Visible Yes   Ultrafiltration Rate (ml/hr) 670 ml/hr      03/11/25 1423   Treatment   Time Off 1415   Observations & Evaluations   Level of Consciousness 0   Oriented X 4   Heart Rhythm Regular   Respiratory Quality/Effort Unlabored   O2 Device Nasal cannula   Bilateral Breath Sounds Diminished   Skin Condition/Temp Dry   Abdomen Inspection Flat   Edema Generalized   Edema Generalized Trace   RLE Edema Trace   LLE Edema Trace   Vital Signs   /86   Temp 98 °F (36.7 °C)   Weight - Scale 68.5 kg (151 lb 0.2 oz)   Weight Method Bed scale   Percent Weight Change -3.11   Pain Assessment   Pain Assessment 0-10   Pain Level 4   Pain Location Back 
  Line Care Connections checked and tightened;Chlorhexidine wipes;Ports disinfected   Dressing Type Sterile dressing, transparent   Date of Last Dressing Change 03/07/25   Dressing Status Clean, dry & intact   Post-Hemodialysis Assessment   Post-Treatment Procedures Blood returned;Catheter capped, clamped and heparinized x 2 ports   Machine Disinfection Process Acid/Vinegar Clean;Heat Disinfect;Exterior Machine Disinfection   Rinseback Volume (ml) 300 ml   Blood Volume Processed (Liters) 53 L   Dialyzer Clearance Moderately streaked   Duration of Treatment (minutes) 166 minutes   Heparin Amount Administered During Treatment (mL) 0 mL   Hemodialysis Intake (ml) 500 ml   Hemodialysis Output (ml) 3020 ml   NET Removed (ml) 2520   Tolerated Treatment Fair   Patient Response to Treatment   (Pt had high HR sustaining at 120s and was also in pain. Treatment terminated)   Physician Notified Yes   Patient Disposition Remain in ICU/ED   Provider Notification   Reason for Communication Evaluate  (notifying of Pt's HR sustaining at 120s and clotting of circuit)   Provider Name Andrés Ag   Provider Notification Physician   Method of Communication Call   Response   (Terminate HD)     Primary RN SBAR: Albina Baca RN and CYRIL Liang  Comments: Treatment terminated 44 min early due to HR sustaining at 120s. Lines also starting to get clotted. Had access issue with high AP, unresolved with reversing of lines and power flushing. BFR only reaching at 350. Dr. Ag made aware of the events. All possible blood returned. Ports flushed with NS, locked with heparin. CVC limbs and hubs disinfected. Clamped and capped. NO SOB. VSS. Bed lowered and locked. Side rails up x 2. Call bell in reach.  
locked;Flushed   Line Care Connections checked and tightened;Ports disinfected;Cap changed   Dressing Type Antimicrobial;Transparent   Date of Last Dressing Change 03/18/25   Dressing Status Clean, dry & intact   During Hemodialysis Assessment   Blood Flow Rate (ml/min) 200 ml/min   Arterial Pressure (mmHg) -190 mmHg   Venous Pressure (mmHg) 130   TMP 60      Access Visible Yes   Ultrafiltration Removed (ml) 837 ml   Post-Hemodialysis Assessment   Post-Treatment Procedures Blood returned;Catheter capped, clamped and heparinized x 2 ports   Machine Disinfection Process Exterior Machine Disinfection   Rinseback Volume (ml) 250 ml   Blood Volume Processed (Liters) 76.3 L   Dialyzer Clearance Clear   Duration of Treatment (minutes) 210 minutes   Hemodialysis Intake (ml) 500 ml   Hemodialysis Output (ml) 850 ml   NET Removed (ml) 350   Tolerated Treatment Good   Patient Response to Treatment Pt was alert and stable, but hypotensive during Tx.   Handoff   Communication Given Transfer Handoff   Handoff Given To MARIOLA Fitzpatrick RN   Handoff Communication Face to Face   Time Handoff Given 5025             
low position with call bell in reach.      
with access. Venous port is sluggish.  is high. Not able to reverse due to high AP.  Dr. Falcon made aware of the issues. All possible blood returned. Ports flushed with NS, locked with heparin. CVC limbs and hubs disinfected. Clamped and capped. Pt remain alert and oriented. Denies chest pain. NO SOB. VSS. Bed lowered and locked. Side rails up x 2. Call bell in reach.

## 2025-03-20 NOTE — DISCHARGE SUMMARY
Problem:    Discitis  Active Problems:    Community acquired pneumonia    Controlled diabetes mellitus type 2 with complications (HCC)    ESRD on hemodialysis (HCC)    Osteomyelitis (HCC)    Hypertensive emergency    Serratia marcescens infection    Combined systolic and diastolic congestive heart failure (HCC)    Influenza A    Bacteremia    Severe protein-calorie malnutrition  Resolved Problems:    * No resolved hospital problems. *        Greater than 31 minutes were spent with the patient on counseling and coordination of care    Signed:   Ana Díaz MD  3/20/2025  1:45 PM

## 2025-03-20 NOTE — PROGRESS NOTES
Hospitalist Progress Note  Ab Lee MD  Answering service: [phone] OR [phone] from in house phone        Date of Service:  3/18/2025  NAME:  Leandra Perez  :  1965  MRN:  [id number]      Admission Summary:   Leandra Perez is a 60 y.o. male with past medical history of ESRD on HD, hypertension, combined systolic and diastolic heart failure, history of opioid abuse on Suboxone, type 2 diabetes, tobacco use, known history of thoracic discitis who presents to ED with complaints of low back pain. Reports chronic hx of upper back pain which acutely worsened over the last few days. Chart review shows chronic hx of discitis previously treated with iv abs.  Denies any associated neurological complaints.    Interval history / Subjective:   Seen and examined for follow up of discitis/OM and bacteremia. No acute complaints when I saw him. Discussed plan to treat hyperkalemia. Permacath placement today.      Assessment & Plan:     Discitis and osteomyelitis at T11-T12   Serratia Bacteremia   CoNS bacteremia, MRSE   -Appears to be acute on chronic  -Chart review shows patient's previously declined surgical intervention  -CT L spine: Findings suggestive of discitis osteomyelitis at T11-T12 as detailed with  associated high-grade spinal canal and neuroforaminal narrowing at this level.  -Blood cultures 3/7: Serration in 3 of 4 bottles, GPC in 1 bottle   -rpt blood cx 3/12: no growth <24 hours  -MRI T spine : Discitis at T11-12 with paravertebral extension along the ALL   -Appreciate ID input   - IR consult placed for aspiration/ biopsy: this occurred on 3/10 - growing serratia marcescens   -cont IV cefepime and Vancomycin   -No role for neurosurgical intervention at this time per nsgy   -ID on board   -Will need long term antibiotics  -Second set of blood cultures are negative 4 days  -Plan for antibiotics 
                                                                                                Hospitalist Progress Note  Ab Lee MD  Answering service: [phone] OR [phone] from in house phone        Date of Service:  3/17/2025  NAME:  Leandra Perez  :  1965  MRN:  [id number]      Admission Summary:   Leandra Perez is a 60 y.o. male with past medical history of ESRD on HD, hypertension, combined systolic and diastolic heart failure, history of opioid abuse on Suboxone, type 2 diabetes, tobacco use, known history of thoracic discitis who presents to ED with complaints of low back pain. Reports chronic hx of upper back pain which acutely worsened over the last few days. Chart review shows chronic hx of discitis previously treated with iv abs.  Denies any associated neurological complaints.    Interval history / Subjective:   Seen and examined for follow up of discitis/OM and bacteremia. No acute complaints when I saw him. Discussed plan to treat hyperkalemia. Permacath placement today.      Assessment & Plan:     Discitis and osteomyelitis at T11-T12   Serratia Bacteremia   CoNS bacteremia, MRSE   -Appears to be acute on chronic  -Chart review shows patient's previously declined surgical intervention  -CT L spine: Findings suggestive of discitis osteomyelitis at T11-T12 as detailed with  associated high-grade spinal canal and neuroforaminal narrowing at this level.  -Blood cultures 3/7: Serration in 3 of 4 bottles, GPC in 1 bottle   -rpt blood cx 3/12: no growth <24 hours  -MRI T spine : Discitis at T11-12 with paravertebral extension along the ALL   -Appreciate ID input   - IR consult placed for aspiration/ biopsy: this occurred on 3/10 - growing serratia marcescens   -cont IV cefepime and Vancomycin   -No role for neurosurgical intervention at this time per nsgy   -ID on board   -Will need long term antibiotics  -Second set of blood cultures are negative 4 days  -Plan for antibiotics 
                                                                                                Hospitalist Progress Note  Ab Lee MD  Answering service: [phone] OR [phone] from in house phone        Date of Service:  3/14/2025  NAME:  Leandra Perez  :  1965  MRN:  [id number]      Admission Summary:   Leandra Perez is a 60 y.o. male with past medical history of ESRD on HD, hypertension, combined systolic and diastolic heart failure, history of opioid abuse on Suboxone, type 2 diabetes, tobacco use, known history of thoracic discitis who presents to ED with complaints of low back pain. Reports chronic hx of upper back pain which acutely worsened over the last few days. Chart review shows chronic hx of discitis previously treated with iv abs.  Denies any associated neurological complaints.    Interval history / Subjective:   Seen and examined for follow up of discitis/OM and bacteremia. Reports feeling better today. Plan for HD tomorrow. Discussed that he will need long term antibiotics. He is concerned about his HD access. I explained that he will need a permacath.     Assessment & Plan:     Discitis and osteomyelitis at T11-T12   Serratia Bacteremia   CoNS bacteremia, MRSE   -Appears to be acute on chronic  -Chart review shows patient's previously declined surgical intervention  -CT L spine: Findings suggestive of discitis osteomyelitis at T11-T12 as detailed with  associated high-grade spinal canal and neuroforaminal narrowing at this level.  -Blood cultures 3/7: Serration in 3 of 4 bottles, GPC in 1 bottle   -rpt blood cx 3/12: no growth <24 hours  -MRI T spine : Discitis at T11-12 with paravertebral extension along the ALL   -Appreciate ID input   - IR consult placed for aspiration/ biopsy: this occurred on 3/10 - growing serratia marcescens   -cont IV cefepime and Vancomycin   -No role for neurosurgical intervention at this time per nsgy   -ID on board   -Will need long term 
                                                 Hospitalist Night Cover     Name: Leandra Perez  YOB: 1965      Overnight update:        Leandar Perez is a 61yo with a pmhx of ESRD on HD, HTN, combined systolic and diastolic heart failure, hx of opioid abuse on suboxone, T2DM and known hx of thoracic discitis.     Notified by RN that patient's respiratory status worsening.   Seen and examined patient at bedside. He is aaox4, states that he is having chest pain. Respiratory distress noted. Respirations 30-40's, O2 sats 90's on 15L midflow. Lungs coarse throughout.   Had rapid response earlier for chest pain.   ICU consulted.     Patient noted to have further decline, rapid response called. Mental status worsening. O2 sats, 80's, RT bagging patient   ICU team at bedside. Patient transferred to CCU 25 for intubation.      Barbara Pavon [facility]-NP   
        [facility]                                                                                          Hospitalist Progress Note  NICK Mcgregor - CNP  Office Phone: [phone]        Date of Service:  3/12/2025  NAME:  Leandra Perez  :  1965  MRN:  [id number]       Admission Summary:   Leandra Perez is a 60 y.o. male with past medical history of ESRD on HD, hypertension, combined systolic and diastolic heart failure, history of opioid abuse on Suboxone, type 2 diabetes, tobacco use, known history of thoracic discitis who presents to ED with complaints of low back pain. Reports chronic hx of upper back pain which acutely worsened over the last few days. Chart review shows chronic hx of discitis previously treated with iv abs.  Denies any associated neurological complaints.       Interval history / Subjective:   3/9: Patient developed respiratory distress and was transferred to the ICU for intubation.  Underwent bronchoscopy with findings of secretions in the left lower main bronchus and most of the other bronchi.  Blood cultures positive: Serratia  3/10: Extubated, underwent IR aspiration of the discs -culture is currently growing GNR  3/11: Weaned off pressors  3/12: Oxygenating well on room air, still having back pain but better controlled.  Hospitalists called for transfer out of ICU     Assessment & Plan:     Discitis and osteomyelitis at T11-T12   Serratia Bacteremia   -Appears to be acute on chronic  -Chart review shows patient's previously declined surgical intervention  -CT L spine: Findings suggestive of discitis osteomyelitis at T11-T12 as detailed with  associated high-grade spinal canal and neuroforaminal narrowing at this level.  -Blood cultures 3/7: Serration in 3 of 4 bottles, GPC in 1 bottle   -rpt blood cx 3/12: no growth <24 hours  -MRI T spine : Discitis at T11-12 with paravertebral extension along the ALL   -Appreciate ID input 
     [facility]    ICU Team Consult Note/History and Physical    Name: Leandra Perez   : 1965   MRN: [id number]   Date: 3/11/2025      Reason for ICU Admission: Acute respiratory failure      ICU PROBLEM LIST   -Acute hypoxic respiratory failure  -Concern for acute discitis  -Serratia bacteremia  -Shock     NEUROLOGICAL:      Acute discitis  Suspect infection related, noted to have osteomyelitis.  Concern for cord compression  --MRI done overnight.  Discitis T11-T12, paravertebral extension along ALL  --neuro surgical soft, signed off no surgical intervention warranted.  --IR performed aspiration/biopsy 3/10, follow-up cultures  --Continue broad-spectrum antibiotics.   --Continue neuroassessment    Sedation  History of opioid use, prior on Suboxone  -- Reduce Suboxone dose  --Continue opiates in addition to multimodal analgesia regimen, methocarbamol, lidocaine patch.  Increase gabapentin dose from home dose 300 TID  --Doing well on ketamine drip.  Attempt to wean as tolerated.    Anxiety/depression  -Resume home meds including trazodone,     PULMONOLOGY:     Acute hypoxic respiratory failure,  DDx; aspiration pneumonia, noted to have left lower lobe partial collapse with endobronchial debris   -- Extubated 3/10.  Oxygenating adequately on low-flow nasal cannula    Small to moderate pleural effusion  No intervention, continue monitor.     CARDIOVASCULAR:      Hypotension /shock, suspect related to sedation/vasoplegia  -- Off pressors as of 3/10 PM    History of hypertension  --Holding of home beta blocker, calcium channel blocker and other meds for now     GASTROINTESTINAL:     N.p.o.,  GI prophylaxis-resume on home omeprazole.     RENAL/ELECTROLYTE/FLUIDS:     ESRD  Hemodialysis catheter removed on 3/8 allowing for a line holiday  -- Appreciate nephrology consult  -- Plan catheter placement today 3/11.  Next dialysis today 3/11  -- Resume Renvela,     Electrolyte -replacement      ENDOCRINE: 
  [facility]  Rebel Falcon MD  [phone]            Renal Progress Note    NAME:  Leandra Perez   :   1965   MRN:   [id number]     Date/Time:  3/11/2025 9:59 AM            DISCUSSION / PLAN :      Assessment:  [facility] Had a Kit catheter placed today  Accelerated HTN-resolved  Thoracic spine discitis/OM T11-12 (has prior history of discitis 2024))  G- bacteremia 1/2 bottles-Hd catheter removed 3/8  Acute resp failure 3/8, intubated, -Ct with LLL partial collapse-s/p bronh  Mild hyperkalemia-resolved  Hyperphosphatemia  Pre HD hyponatremia  Back pain  Question pulmonary mass versus infection on recent admission  Anemia of ESRD  Diabetes mellitus type 2     Plan:    HD TTHS- HD will correct all electrolytes   S/p removal of permcath for bacteriemia, has a Kit catheter , HD on 2 nd shift  Needs kit catheter on Tuesday morning or tomorrow for Hd on Tuesday  Abx per ID  No MARTELL  ID following  Continue with Home BP meds  Renvela to 1600 mg TID meals  Renal diet  Pain management             ___________________________________________________  Subjective:       3/8-patient had shorter dialysis yesterday due to malfunction with dialysis catheter.  Had 2.5 L UF.  Will have dialysis again today.  Blood culture 1 out of 2 bottles growing gram-negative rods.  Currently is off antibiotics    3/9-had HD yesterday, had 1.2 L UF due to tachycardia. Developed chest pain and SOB at the end of HD and HD , RR called and patient intubated. Now on vent, low dose IV pressor-Permcath removed after HD    3/10/25 Awake, now at ICU. Complains of severe pain. No SOB  3/11/25 having a Kit catheter placed.      Medications reviewed:  Current Facility-Administered Medications   Medication Dose Route Frequency    mineral oil-hydrophilic petrolatum (AQUAPHOR) ointment   Topical PRN    pantoprazole (PROTONIX) 40 mg in sodium chloride (PF) 0.9 % 10 mL injection  40 mg 
  [facility]  Rebel Falcon MD  [phone]            Renal Progress Note    NAME:  Leandra Perez   :   1965   MRN:   [id number]     Date/Time:  3/12/2025 11:01 AM            DISCUSSION / PLAN :      Assessment:  [facility] Had a Kit catheter placed.  Accelerated HTN-resolved  Thoracic spine discitis/OM T11-12 (has prior history of discitis 2024))  G- bacteremia 1/2 bottles-Hd catheter removed 3/8  Acute resp failure 3/8, intubated, -Ct with LLL partial collapse-s/p bronh  Mild hyperkalemia-resolved  Hyperphosphatemia  Pre HD hyponatremia  Back pain  Question pulmonary mass versus infection on recent admission  Anemia of ESRD  Diabetes mellitus type 2     Plan:    HD TTHS- HD will correct all electrolytes   Next HD - on Thursday 3/13/25  Pain management as per primary team.  Abx per ID  No MARTELL  ID following  Continue with Home BP meds  Renvela to 1600 mg TID meals  Renal diet               ___________________________________________________  Subjective:       3/8-patient had shorter dialysis yesterday due to malfunction with dialysis catheter.  Had 2.5 L UF.  Will have dialysis again today.  Blood culture 1 out of 2 bottles growing gram-negative rods.  Currently is off antibiotics    3/9-had HD yesterday, had 1.2 L UF due to tachycardia. Developed chest pain and SOB at the end of HD and HD , RR called and patient intubated. Now on vent, low dose IV pressor-Permcath removed after HD    3/10/25 Awake, now at ICU. Complains of severe pain. No SOB  3/11/25 having a Kit catheter placed.  3/12/25 Patient c/o pain. The pain medications requests are escalating. The addiction management input noted.      Medications reviewed:  Current Facility-Administered Medications   Medication Dose Route Frequency    sodium chloride (Inhalant) 3 % nebulizer solution 4 mL  4 mL Nebulization BID PRN    buprenorphine-naloxone (SUBOXONE) 8-2 MG SL tablet 1 tablet  1 
  [facility]  Rebel Falcon MD  [phone]            Renal Progress Note    NAME:  Leandra Perez   :   1965   MRN:   [id number]     Date/Time:  3/13/2025 1:59 PM            DISCUSSION / PLAN :      Assessment:  [facility] Had a Kit catheter placed. Had HD earlier today.  Accelerated HTN-resolved  Thoracic spine discitis/OM T11-12 (has prior history of discitis 2024))  G- bacteremia 1/2 bottles-Hd catheter removed 3/8  Acute resp failure 3/8, intubated, -Ct with LLL partial collapse-s/p bronh  Mild hyperkalemia-resolved  Hyperphosphatemia  Pre HD hyponatremia  Back pain  Question pulmonary mass versus infection on recent admission  Anemia of ESRD  Diabetes mellitus type 2     Plan:    HD TTHS- HD will correct all electrolytes . Next HD on Saturday  Next HD - on Thursday 3/13/25  Pain management as per primary team.  Abx per ID  No MARTELL  ID following  Continue with Home BP meds  Renvela to 1600 mg TID meals  Renal diet               ___________________________________________________  Subjective:       3/8-patient had shorter dialysis yesterday due to malfunction with dialysis catheter.  Had 2.5 L UF.  Will have dialysis again today.  Blood culture 1 out of 2 bottles growing gram-negative rods.  Currently is off antibiotics    3/9-had HD yesterday, had 1.2 L UF due to tachycardia. Developed chest pain and SOB at the end of HD and HD , RR called and patient intubated. Now on vent, low dose IV pressor-Permcath removed after HD    3/10/25 Awake, now at ICU. Complains of severe pain. No SOB  3/11/25 having a Kit catheter placed.  3/12/25 Patient c/o pain. The pain medications requests are escalating. The addiction management input noted.  3/13/25 eating lunch from outside food. Son at bedside. Had completed HD this morning. Pain is better controlled      Medications reviewed:  Current Facility-Administered Medications   Medication Dose Route Frequency 
  [facility]  Rebel Falcon MD  [phone]            Renal Progress Note    NAME:  Leandra Perez   :   1965   MRN:   [id number]     Date/Time:  3/14/2025 12:41 PM            DISCUSSION / PLAN :      Assessment:  [facility] Had a Kit catheter placed. Accelerated HTN-resolved  Thoracic spine discitis/OM T11-12 (has prior history of discitis 2024))  G- bacteremia 1/2 bottles-Hd catheter removed 3/8  Acute resp failure 3/8, intubated, -Ct with LLL partial collapse-s/p bronh  Mild hyperkalemia-resolved  Hyperphosphatemia  Pre HD hyponatremia  Back pain  Question pulmonary mass versus infection on recent admission  Anemia of ESRD  Diabetes mellitus type 2     Plan:    HD TTHS- HD will correct all electrolytes . Next HD on Saturday  Next HD - on Thursday 3/13/25  Pain management as per primary team.  Abx per ID  No MARTELL  ID following  Continue with Home BP meds  Renvela to 1600 mg TID meals  Renal diet               ___________________________________________________  Subjective:       3/8-patient had shorter dialysis yesterday due to malfunction with dialysis catheter.  Had 2.5 L UF.  Will have dialysis again today.  Blood culture 1 out of 2 bottles growing gram-negative rods.  Currently is off antibiotics    3/9-had HD yesterday, had 1.2 L UF due to tachycardia. Developed chest pain and SOB at the end of HD and HD , RR called and patient intubated. Now on vent, low dose IV pressor-Permcath removed after HD    3/10/25 Awake, now at ICU. Complains of severe pain. No SOB  3/11/25 having a Kit catheter placed.  3/12/25 Patient c/o pain. The pain medications requests are escalating. The addiction management input noted.  3/13/25 eating lunch from outside food. Son at bedside. Had completed HD this morning. Pain is better controlled  3/14/25 Sitting up in chair. NAD      Medications reviewed:  Current Facility-Administered Medications   Medication Dose 
  [facility]  Andrés Ag MD  [phone]            Renal Progress Note    NAME:  Leandra Perez   :   1965   MRN:   [id number]     Date/Time:  3/9/2025 9:48 AM            DISCUSSION / PLAN :      Assessment:  ESRD on hemodialysis at [facility] 25th history-TTH S-missed HD on Thurs, s/p HD on Friday and sat  Accelerated HTN-resolved  Thoracic spine discitis/OM T11-12 (has prior history of discitis 2024))  G- bacteremia 1/2 bottles-Hd catheter removed 3/8  Acute resp failure 3/8, intubated, -Ct with LLL partial collapse-s/p bronh  Mild hyperkalemia-resolved  hyperphosphatemia  Back pain  Question pulmonary mass versus infection on recent admission  Anemia of ESRD  Diabetes mellitus type 2     Plan:  HD TTHS  S/p removal of permcath for bacteriemia  Needs chon catheter on Tuesday morning or tomorrow for Hd on Tuesday  Abx per ID  No MARTELL  ID following  Continue with Home BP meds  Renvela to 1600 mg TID meals  Renal diet  Pain management             ___________________________________________________  Subjective:       3/8-patient had shorter dialysis yesterday due to malfunction with dialysis catheter.  Had 2.5 L UF.  Will have dialysis again today.  Blood culture 1 out of 2 bottles growing gram-negative rods.  Currently is off antibiotics    3/9-had HD yesterday, had 1.2 L UF due to tachycardia. Developed chest pain and SOB at the end of HD and HD , RR called and patient intubated. Now on vent, low dose IV pressor-Permcath removed after HD      Medications reviewed:  Current Facility-Administered Medications   Medication Dose Route Frequency    propofol infusion  5-50 mcg/kg/min IntraVENous Continuous    norepinephrine (LEVOPHED) 16 mg in sodium chloride 0.9 % 250 mL infusion (premix)  1-100 mcg/min IntraVENous Continuous    buprenorphine-naloxone (SUBOXONE) 8-2 MG SL tablet 1 tablet  1 tablet SubLINGual TID    cefepime (MAXIPIME) 1,000 mg in sodium chloride 0.9 % 50 mL IVPB (addEASE)  1,000 
  [facility]  Rebel Falcon MD  [phone]            Renal Progress Note    NAME:  Leandra Perez   :   1965   MRN:   [id number]     Date/Time:  3/15/2025 9:50 AM            DISCUSSION / PLAN :      Assessment:  ESRD on hemodialysis at [facility] 25th history-TTH Has a Kit catheter.  HTN- BP is acceptable.  Thoracic spine discitis/OM T11-12 (has prior history of discitis 2024))  G- bacteremia 1/2 bottles-Hd catheter removed 3/8  Acute resp failure 3/8, intubated, -Ct with LLL partial collapse-s/p bronh  Mild hyperkalemia-resolved  Hyperphosphatemia  Pre HD hyponatremia  Back pain  Question pulmonary mass versus infection on recent admission  Anemia of ESRD  Diabetes mellitus type 2     Plan:    HD TTHS- complete a full HD Tx today  Will request a perm cath on Monday, patient was cleared by ID  Pain management as per primary team.  Abx per ID  No MARTELL  ID following  Continue with Home BP meds  Renvela to 1600 mg TID meals  Renal diet               ___________________________________________________  Subjective:       3/8-patient had shorter dialysis yesterday due to malfunction with dialysis catheter.  Had 2.5 L UF.  Will have dialysis again today.  Blood culture 1 out of 2 bottles growing gram-negative rods.  Currently is off antibiotics    3/9-had HD yesterday, had 1.2 L UF due to tachycardia. Developed chest pain and SOB at the end of HD and HD , RR called and patient intubated. Now on vent, low dose IV pressor-Permcath removed after HD    3/10/25 Awake, now at ICU. Complains of severe pain. No SOB  3/11/25 having a Kit catheter placed.  3/12/25 Patient c/o pain. The pain medications requests are escalating. The addiction management input noted.  3/13/25 eating lunch from outside food. Son at bedside. Had completed HD this morning. Pain is better controlled  3/14/25 Sitting up in chair. NAD  3/15/25 Seen during HD Tx. Acute Connor Farris in attendance.      Medications 
  [facility]  Rebel Falcon MD  [phone]            Renal Progress Note    NAME:  Leandra Perez   :   1965   MRN:   [id number]     Date/Time:  3/20/2025 9:09 AM            DISCUSSION / PLAN :      Assessment:  [facility]  HTN- stable  Thoracic spine discitis/OM T11-12 (has prior history of discitis 2024))  G- bacteremia 1/ bottles-Hd catheter removed 3/8- S/p new left IJ permcath 3/17  Acute resp failure 3/8, intubated, -Ct with LLL partial collapse-s/p bronh-extubated  Hyperkalemia-persisting despite HD. Patient has been noted to have food brought in by family from outside.  Hyperphosphatemia  Pre HD hyponatremia  Back pain  Question pulmonary mass versus infection on recent admission  Anemia of ESRD-Hb is dropping-iron adequate  Diabetes mellitus type 2     Plan:  Complete HD today  HD TTHS  Add Midodrine prn on HD  Restarted procrit 10K TTHS  Pain management as per primary team.  Abx per ID  Renvela  1600 mg TID meals  Renal diet               ___________________________________________________  Subjective:       3/8-patient had shorter dialysis yesterday due to malfunction with dialysis catheter.  Had 2.5 L UF.  Will have dialysis again today.  Blood culture 1 out of 2 bottles growing gram-negative rods.  Currently is off antibiotics    3/9-had HD yesterday, had 1.2 L UF due to tachycardia. Developed chest pain and SOB at the end of HD and HD , RR called and patient intubated. Now on vent, low dose IV pressor-Permcath removed after HD    3/10/25 Awake, now at ICU. Complains of severe pain. No SOB  3/11/25 having a Kit catheter placed.  3/12/25 Patient c/o pain. The pain medications requests are escalating. The addiction management input noted.  3/13/25 eating lunch from outside food. Son at bedside. Had completed HD this morning. Pain is better controlled  3/14/25 Sitting up in chair. NAD  3/15/25 Seen during HD Tx. Acute Connor Farris in 
  [facility]  Rebel Falcon MD  [phone]            Renal Progress Note    NAME:  Leandra Perez   :   1965   MRN:   [id number]     Date/Time:  3/19/2025 9:03 AM            DISCUSSION / PLAN :      Assessment:  ESRD on hemodialysis at [facility] 25th history-TTHS  HTN- stable  Thoracic spine discitis/OM T11-12 (has prior history of discitis 2024))  G- bacteremia 1/2 bottles-Hd catheter removed 3/8-has new Permcath  Acute resp failure 3/8, intubated, -Ct with LLL partial collapse-s/p bronh-extubated  Hyperkalemia-persisting despite HD. Patient has been noted to have food brought in by family from outside.  Hyperphosphatemia  Pre HD hyponatremia  Back pain  Question pulmonary mass versus infection on recent admission  Anemia of ESRD-Hb is dropping  Diabetes mellitus type 2     Plan:  Hd tomorrow  HD TTHS  S/p new left IJ permcath 3/17  Check iron panel  Restart procrit  Pain management as per primary team.  Abx per ID  Continue with Home BP meds  Renvela to 1600 mg TID meals  Renal diet               ___________________________________________________  Subjective:       3/8-patient had shorter dialysis yesterday due to malfunction with dialysis catheter.  Had 2.5 L UF.  Will have dialysis again today.  Blood culture 1 out of 2 bottles growing gram-negative rods.  Currently is off antibiotics    3/9-had HD yesterday, had 1.2 L UF due to tachycardia. Developed chest pain and SOB at the end of HD and HD , RR called and patient intubated. Now on vent, low dose IV pressor-Permcath removed after HD    3/10/25 Awake, now at ICU. Complains of severe pain. No SOB  3/11/25 having a Kit catheter placed.  3/12/25 Patient c/o pain. The pain medications requests are escalating. The addiction management input noted.  3/13/25 eating lunch from outside food. Son at bedside. Had completed HD this morning. Pain is better controlled  3/14/25 Sitting up in chair. NAD  3/15/25 Seen during HD Tx. Acute Davpaulo RN 
0115: Pt emergently brought to CCU for intubation.  Bedside SBAR received from Ashley NAM.      0120: MD and RT at bedside, pt intubated and bronched.  Prop gtt started.      0200: A system downtime occurred on March 9,2025 from 7896-2690 (60 minutes) due to Daylight Savings Eastern Standard Time.  During this downtime, paper charting was completed and will be scanned into the patient's electronic medical record.    0330: Pt maxed on prop, agitated and attempting to remove ETT, MD aware, onetime order for versed received and given.      0400: MAP 59 MD aware, levo started and orders for x1 albumin received.      0730: Bedside report given to Diane NAM      
0730: Bedside and Verbal shift change report given to CYRIL Contreras (oncoming nurse) by CYRIL Mcbride (offgoing nurse). Report included the following information Nurse Handoff Report, Index, Adult Overview, Intake/Output, MAR, Recent Results, Med Rec Status, Cardiac Rhythm Sinus Tach, Alarm Parameters, and Neuro Assessment.     Drips: Levo @ 1, Propofol @ 50    0745: ECHO @ bedside     0800: NGT not identified on KUB. Replaced. Repeat KUB ordered. Wound care consult placed     0900: Spoke w/ pharmacy, OK to still give SL suboxone     0905: Nephro @ bedside. Plan for Kit placement tomorrow or Tuesday     1000: VAT @ bedside, peripheral IV placed     1115: Pt agitated, already maxed on propofol & PRN meds given. Orders placed for precedex & dilaudid gtts, wean propofol     1300: BP 70/50's, propofol stopped per MD     1315: Pt waking up attempting to sit up and reach for ETT, propofol restarted and levo restarted for 75/58    1445: BP low, orders for 500 LR bolus     1500: Pt Ozzy lange, @ bedside. Spoke about patients primary contact, stated that pt sister, Phuc (listed in chart), is unreachable. Ozzy has been taking care of Mr. Perez and was meeting with a  to get in home care for Mr. Perez @ [facility]. Ozzy added to pt contact list, social work & case management notified.     1530: TRANSFER - OUT REPORT:    Verbal report given to CYRIL Ward on Leandra Perez  being transferred to ICU 8 for routine progression of patient care       Report consisted of patient's Situation, Background, Assessment and   Recommendations(SBAR).     Information from the following report(s) Nurse Handoff Report, Index, ED SBAR, Adult Overview, Intake/Output, MAR, Recent Results, Med Rec Status, Cardiac Rhythm NSR, Alarm Parameters, and Neuro Assessment was reviewed with the receiving nurse.     Lines:   Peripheral IV 03/07/25 Distal;Left;Anterior Cephalic (Active)   Site Assessment Clean, dry & intact 03/09/25 
0900 GABRIELA Griffith at bedside placing dialysis catheter.   
0930 Neurosurgery consult placed.   
1527 - TRANSFER - IN REPORT:    Verbal report received from Yokasta on Leandra Perez  being received from [facility] for routine progression of patient care      Report consisted of patient's Situation, Background, Assessment and   Recommendations(SBAR).     Information from the following report(s) Nurse Handoff Report, ED Encounter Summary, ED SBAR, Adult Overview, Intake/Output, MAR, Recent Results, and Med Rec Status was reviewed with the receiving nurse.    Opportunity for questions and clarification was provided.      Assessment completed upon patient's arrival to unit and care assumed.    
1740 - received pt from 2 N. R PIV occluded, removed. Pt placed on humidified O2 @8 LPM. Pt with rales and crackles, refusing to be sat up d/t back pain. Placed on isolation d/t MRSA + blood    1800 - O2 to 9 LPM for sats in upper 80s. Vanc started. PO meds held, pt not able to sit up at this time. Pain meds not due until 2000 1820 - O2 up to 15 L midflow, unable to maintain sats >90. Suctioned significant amount sputum from pt mouth. Educated on aspiration, pt continues to refuse. Placed in reverse trendelenburg. Stat trop sent     1830 - pt adamantly refusing to turn for 4 eyes skin check and to remove old linens from underneath of him. Educated importance of turning with sacral wound and removing excessive linens from beneath him. Dual skin assessment deferred at this time. MD messaged for pain coverage, as currently has PO oxy and 0.5 dilaudid. RT paged for possible heated high flow. Satting 90-92% on midflow.    1840 - dilaudid orders updated per Dr. Serrato    1930 - orders for heated high flow in case pt should need overnight. Pt now satting well on 15 LPM. Requesting ensure, educated that pt cannot eat until he is able to sit up. Night shift updated, plan to give pain meds and reassess positioning for optimal breathing.     1945 - spoke with IR, plans to remove HD cath @ BS. Relayed info to Night nurse  
3/20 @ 1600 patient was looking for some of his belongings, Wallet etc.    Called:    Security    ED - ER06 - 3/7/25   2N - 208 - 3/7/25-3/8/25   4W - 442 - 3/8/25 - 3/9/25   4CCU - 4225 - 3/9/25 - 3/9/25   7S1 ICU - 108 - 3/9/25 - 3/9/25   7S1 ICU - 105 - 3/10/25 - 3/12/25  6S NSTU - 656 - 3/13/25 - 3/20/25    All Dept's have check and said they do not have any items that belong to Leandra Perez.      
4 Eyes Skin Assessment     NAME:  Leandra Perez  YOB: 1965  MEDICAL RECORD NUMBER:  [id number]    The patient is being assessed for  Admission    I agree that at least one RN has performed a thorough Head to Toe Skin Assessment on the patient. ALL assessment sites listed below have been assessed.      Areas assessed by both nurses:    Head, Face, Ears, Shoulders, Back, Chest, Arms, Elbows, Hands, Sacrum. Buttock, Coccyx, Ischium, Legs. Feet and Heels, and Under Medical Devices         Does the Patient have a Wound? Yes wound(s) were present on assessment. LDA wound assessment was Initiated and completed by RN       Ernesto Prevention initiated by RN: Yes  Wound Care Orders initiated by RN: Yes    Pressure Injury (Stage 3,4, Unstageable, DTI, NWPT, and Complex wounds) if present, place Wound referral order by RN under : Yes    New Ostomies, if present place, Ostomy referral order under : No     Nurse 1 eSignature: Electronically signed by Rae Qiu RN on 3/9/25 at 5:28 AM EDT    **SHARE this note so that the co-signing nurse can place an eSignature**    Nurse 2 eSignature: {Esignature:[id number]}    
5402-7013: pt off floor to MRI for Thoracic spine scan.    0300: pt restless in bed sitting up, mouthing pain. Increased dilaudid drip to 1.4mg/hr.   
CRITICAL CARE NOTE    Name: Leandra Perez   : 1965   MRN: [id number]   Date: 3/9/2025      Reason for ICU Admission: Acute respiratory failure      ICU PROBLEM LIST   Acute hypoxic respiratory failure.  Serratia bacteremia  Throacic discitis, T spine OM  ESRD on HD  HFrEF  Opioid abuse     HISTORY OF PRESENT ILLNESS:   61 y/o male had  Rapid response for hypoxia requiring transfer to CCU and intubation. He reportedly became hypoxic acutely. Earlier on 3/8 he also had rapid response for chest pain and SOB at the end of HD treatment. ABG done at the time was wnl. No acute pathology noted at the time so he was left in his room.      His original admission was on 3/7 for low back pain. CT lumbar spine on admission showed discitis and osteomyelitis at T11-12 with associated high grade spinal canal neuroforaminal narrowing.    24 HOUR EVENTS:   Remains intubated, sedated, on low dose levophed.     NEUROLOGICAL:    Propofol, precedex for RASS goal 0 to -1  SID  PRN pain regimen  suboxone    PULMONOLOGY:   ?concern for aspiration leading to PNA  Lung protective vent settings  spO2 goal 92-98%    CARDIOVASCULAR:   Low dose levophed  Wean as tolerated for MAP >65  Hold BP regimen    GASTROINTESTINAL:   NPO   PPI    RENAL/ELECTROLYTE/FLUIDS:   Strict I/Os  Nephrology following for HD  Will need to replace temp HD line tomorrow  C/w sevelamer, calcitriol  Renal dose medicines  Monitor renal labs  Mg/Phos/K >2/3/4    ENDOCRINE:   Goal euglycemia    HEMATOLOGY/ONCOLOGY:   SCDs, heparin    ID/MICRO:   Re-consult ID  C/w vanc, cefepime    3/7 Bcx GNR, Serratia, Coag neg staph    ICU DAILY CHECKLIST     Code Status:Full  DVT Prophylaxis:heparin   T/L/D: PIV, ETT, NGT  SUP: PPI  Diet: NPO  Activity Level:bed rest  ABCDEF Bundle/Checklist Completed:Yes  Disposition: Stay in ICU  Multidisciplinary Rounds Completed:  Pending  Patient/Family Updated: Pending      Review of Systems:     Unable to obtain secondary to 
Called [facility] x3 and awaiting return call    1300: Report given to Mohsen at [facility]  
Cc: OUD    Assessment/Plan:    Leandra Perez, is a 60 y.o.  male with opioid use disorder which has been stable on long-term buprenorphine/naloxone and a supervised living environment.  He has end-stage renal disease and was admitted with bacteremia.      Earlier in the hospitalization he was having severe pain being treated with opioid agonists.  At that time we decreased the dose of bup/naloxone to 8 mg BID from his stable dose of TID.     Given his pain is better controlled and he is no longer receiving opioid pain medications resume bup/naloxone 8 mg TID.        Geovanni Mckinnon MD [facility]  [facility]  [phone]  Fax [phone]    Subjective:    He reports pain is doing a lot better, denies using opioid pain medications in the last 24 hours consistent with his medication admin record.      Tentative plan for discharge back to long term care in 1-3 days.      Objective:     /72   Pulse (!) 101   Temp 97.6 °F (36.4 °C) (Oral)   Resp 15   Ht 1.778 m (5' 10\")   Wt 68.5 kg (151 lb)   SpO2 93%   BMI 21.67 kg/m²     Alert and oriented, left neck temporary dialysis catheter.  He is being dialyzed during this visit.    No distress  Non labored respirations  Pupils equal round and reactive to light.  Abdomen soft  No edema in legs  No tract marks on skin    
Cc: OUD    Assessment/Plan:    Leandra Perez, is a 60 y.o.  male with opioid use disorder which has been stable on long-term buprenorphine/naloxone and a supervised living environment.  He has end-stage renal disease and was admitted with bacteremia.      Still complaining of a lot of pain but encouraged him to stick with bup/naloxone 8 mg TID unless there is a significant worsening in pain.          Geovanni Mckinnon MD [facility]  [facility] [place]  [phone]  Fax [phone]    Subjective:    He has been back on bup/naloxone 8 mg tid.  Feels ok on this but still complains of a lot of pain with activity, expects to go to long term care facility later today.      Objective:     BP 99/61   Pulse (!) 108   Temp 97.7 °F (36.5 °C)   Resp 18   Ht 1.778 m (5' 10\")   Wt 74.5 kg (164 lb 4.8 oz)   SpO2 95%   BMI 23.57 kg/m²     Alert and oriented, left neck temporary dialysis catheter.  He is being dialyzed during this visit.    No distress  Non labored respirations  Pupils equal round and reactive to light.  Abdomen soft  No edema in legs  No tract marks on skin    
Comprehensive Nutrition Assessment    Type and Reason for Visit: Initial, LOS    Nutrition Recommendations/Plan:   Continue current diet, added low potassium dietary restriction  Added Nepro BID    H/o Vit D deficiency, consider re-checking and supplementing prn   Continue Marinol   Continue folic acid/thiamin    Please document % intake of meals in flowsheets   Continue to trend weights     Malnutrition Assessment:  Malnutrition Status:  Severe malnutrition (03/17/25 1533)    Context:  Chronic Illness     Findings of the 6 clinical characteristics of malnutrition:  Energy Intake:  75% or less estimated energy requirements for 1 month or longer  Weight Loss:  Unable to assess (2/2 fluid shifts from HD)     Body Fat Loss:  Mild body fat loss Buccal region, Triceps   Muscle Mass Loss:  Severe muscle mass loss Temples (temporalis), Clavicles (pectoralis & deltoids)  Fluid Accumulation:  No fluid accumulation     Strength:  Not Performed         Nutrition Assessment:    PMH of ESRD on HD, HTN, combined systolic and diastolic HF, opioid abuse on Suboxone, T2DM, tobacco use, thoracic discitis, allergy to shellfish.     Presents with c/o low back pain. CT of lumbar spine revealing discitis and osteomyelitis at T11 - T12 with associated high-grade spinal canal neuro foraminal narrowing per H&P. +ESRD on HD, Nephrology following. Pt NPO for perm cath placement today. +Hyperkalemia, pt on Lokelma.     Chart reviewed for LOS. RD attempted to visit pt earlier in the day however, pt off of floor. RD revisited pt later in the day, pt reports decreased intakes PTA (got in 1 good meal/day per pt). Low intakes documented during admission ( <25% on 3/8). Pt endorses wt loss however, unable to specify amount or time-frame. Pt remembers weighing 260# a couple of years ago. EMR revealing wt loss, ~5% x 1 month however, cannot rule out wt loss 2/2 fluid shifts 2/2 HD. Pt amenable to try Nepro to better meet estimated needs once diet 
Day #1 of Cefepime - Dosing Update  Indication:  Serratia bacteremia  Current regimen:  Rx to dose  Abx regimen: Monotherapy  Recent Labs     25  0841 25  1255 25  0710   WBC 7.9  --  6.7   CREATININE 11.30* 11.50* 8.70*   BUN 64* 68* 53*     Est CrCl: ESRD on HD  Temp (24hrs), Av.1 °F (36.7 °C), Min:97.7 °F (36.5 °C), Max:98.8 °F (37.1 °C)    Cultures:   3/7 Blood 1: probable Serratia marcescens in 1/2 bottles, pending        BCID2: Serratia marcescens, no resistance genes detected  3/7 Blood 2: NGTD    Plan: Will start cefepime 2 gm IV x 1 dose now, then 1 gm IV Q24H (starting this evening after HD is completed today) per [facility] P&T Committee Protocol with respect to renal function.  Pharmacy will continue to monitor patient daily and will make dosage adjustments based upon changing renal function.      
Day #6 of Vancomycin  Indication:  Polymicrobial bacteremia     - Osteomyelitis and discitis  Current regimen:  500 mg IV PRN HD  Abx regimen:  Cefepime + Vancomycin  ID Following ?: YES  Inpatient dialysis schedule: Tuesday/Thursday/Saturday    Recent Labs     25  0504 25  0438 25  1506 25  0342   WBC 5.1 6.0  --  5.8   CREATININE 9.85* 6.62* 7.35* 8.01*   BUN 79* 41* 52* 73*     Est CrCl: ESRD on HD  Temp (24hrs), Av.2 °F (36.8 °C), Min:97.5 °F (36.4 °C), Max:98.8 °F (37.1 °C)    Cultures:   3/7 Blood 1: Serratia marcescens in 2/2 bottles (S-cefepime)        BCID2: Serratia marcescens, no resistance genes detected  3/7 Blood 2: Serratia marcescens in 1/2 bottles + CoNS in 1/2 bottles        BCID2: Staph epidermidis, mecA/C detected  3/9 Sputum: light normal domingo, final  3/9 Blood 1: NGTD  3/9 Blood 2: MRSE in 1/2 bottles, pending  3/10 Fluid from disc aspirate: light Serratia marcescens, final  3/12 Blood 1/2: NG prelim    MRSA Swab ordered (if applicable)? N/A    Goal pre-dialysis concentration = 20 - 25 mcg/mL for therapeutic goal of 15 - 20 mcg/mL (assuming ~35% removal by dialysis)    Date                Dialysis (Yes/No)       Pre-HD Level              Dose  3/8  Yes (5894-6030) --   1750 mg @ 1756  3/9                   No                              --                                  --  3/10                 No                              --                                  --  3/11                 Yes (2610-4372)         --                                 500 mg @ 1521  3/12  No   --   --  3/13  YES   22.7   500 mg x 1    Reminder staff message entered (if needed): N/A    Plan: Continue current regimen   Vancomycin Process in Hemodialysis            
Deferred visit: Referral received and acknowledged.  The patient was cleared by nursing and currently is resting in the bed in a lot of pain and is unable to participate today.  He states he just hurts to much.  PT is deferred.  Will continue to follow and evaluate as able.   
I agree with Rolanda Walls's charting on 3/18/2025.  
I had read and agree with CYRIL Miller's charting.   
I have read and agreed with Alo Miller charting.   
INTERVENTIONAL RADIOLOGY    Right IJ tunneled dialysis catheter removed for bacteremia at the patient's bedside by a member of the IR team on 3/8 at 8:30 pm. Please let us know when the patient's blood cultures have cleared and we can replace a tunneled line for the patient.     Rachele Capps MD  [facility]  8:34 AM, 3/9/2025    
Infectious Disease Progress     Impression:   Bacteremia  Discitis/OM T11-T12  S/p fluoroscopic guided T11-12 disc aspiration (3/10)  - afebrile, wbc     Blood cx (3/7) serratia marcescens, CoNS, (3/9) CoNS, (3/12) no growth so far    Fluid cx (3/10) serratia marcescens    Resp cx (3/9) no growth    ESRD on HD  Left renal mass  - nephrology following    Type II DM  - management per primary team    Hx of substance abuse  Plan:     - cotinue with IV cefepime and vancomycin    Pharmacy to dose per creatinine clearance.     spoke with microbiology team; CoNS to be identified with sensitivity    Pt was evaluated by neurosurgery team; no plan for surgical intervention    Pt will need a long term IV abx therapy    Will follow outstanding cx results     plan of care d/w pt's nurse, and Dr. Mujica         History of Present Illness   3/7/2025  \"Patient is a 60 y.o. male with past medical Hx of recent T11-T12 osteomyelitis s/p cefepime in 202, type 2 diabetes, ESRD on HD, CHF, substance use disorder on Suboxone who presented to the ED with persistent lower back pain, admitted for hypertensive emergency, discitis of thoracic region, osteomyelitis.     Patient was recently admitted for influenza and acute respiratory failure.  Of note he had gradual improvement of his back and refused any operative intervention at this time.    ED workup without leukocytosis, afebrile.  CT lumbar spine suggestive of discitis, osteomyelitis at T11-T12.  MRI already ordered.  CXR with low lung volumes, no consolidation.  Blood cultures pending.  Received 1 dose Unasyn.  Of note patient missed dialysis.  On examination I am unable to assess the back because patient is refusing to be turned.  Complaining of mid abdominal pain on palpation.  Reports back pain has been getting progressively worse states is in the lower back.  Denies any fever, chills.  Right permacatheter only covered with gauze and tape.  Infectious disease services was consulted to 
Infectious Disease Progress     Impression:   Bacteremia  Discitis/OM T11-T12  S/p fluoroscopic guided T11-12 disc aspiration (3/10)  - afebrile, wbc 7.6    Blood cx (3/7) serratia marcescens, GNR, CoNS, (3/9) no growth so far    ESRD on HD  - nephrology following    Type II DM  - management per primary team  Plan:     - cotinine with IV cefepime and vancomycin    Pharmacy to dose per creatinine clearance.     Disc aspiration fungal, anaerob, aerobe cx pending    Will follow outstanding cx results     plan of care d/w pt's nurse, Intensive team, IR team, and Dr. Mujica         History of Present Illness   3/7/2025  \"Patient is a 60 y.o. male with past medical Hx of recent T11-T12 osteomyelitis s/p cefepime in 202, type 2 diabetes, ESRD on HD, CHF, substance use disorder on Suboxone who presented to the ED with persistent lower back pain, admitted for hypertensive emergency, discitis of thoracic region, osteomyelitis.     Patient was recently admitted for influenza and acute respiratory failure.  Of note he had gradual improvement of his back and refused any operative intervention at this time.    ED workup without leukocytosis, afebrile.  CT lumbar spine suggestive of discitis, osteomyelitis at T11-T12.  MRI already ordered.  CXR with low lung volumes, no consolidation.  Blood cultures pending.  Received 1 dose Unasyn.  Of note patient missed dialysis.  On examination I am unable to assess the back because patient is refusing to be turned.  Complaining of mid abdominal pain on palpation.  Reports back pain has been getting progressively worse states is in the lower back.  Denies any fever, chills.  Right permacatheter only covered with gauze and tape.  Infectious disease services was consulted to assist with management of possible osteomyelitis/discitis\"    Subjective:      Intubated it  Review of Systems:            Symptom Y/N Comments   Symptom Y/N Comments   Fever/Chills       Chest Pain        Poor Appetite      
Occupational Therapy   03.20.2025    Chart reviewed in prep for OT, patient currently undergoing HD. Will defer and follow up as able and medically appropriate. Thank you.     Elaine Forbes MS, OTR/L  
Occupational Therapy  03/08/25    Chart review completed in prep for OT galina. Attempted to see pt, however, pt reporting severe back pain and requesting to defer at this time. Will defer and follow up as able and appropriate.     Thank you  Vijaya Morrison, OTR/L    
Occupational Therapy  03/17/25    934: Chart reviewed. Attempted to see for OT treatment however transport arriving to take patient JAQUAN for permacath placement. Will follow up as able/appropriate.     1400: Chart reviewed. Per RN & PT, patient hypotensive s/p permacath placement. Will defer and follow up for OT treatment tomorrow as able/appropriate.     Thank you,  Ana Maria Mims, OTD, OTR/L    
Occupational Therapy  3/9/2025    Chart reviewed, patient received sedated and on vent. Per ABCDEF protocol, will work with patient when PEEP is 10.0 or less, FIO2 60% or less, and patient is following basic commands. Will follow patient peripherally.     Recommend nursing to complete with patient, as able and per protocol, in order to promote cardiopulmonary systems, maintain strength, endurance and independence:   -bed in chair position or maximize full reverse Trendelenburg position to facilitate upright activity with foot board and non-skid footwear on 3x/day ~30-60 mins each   -ROM during bathing B UEs and LEs  -positioning to prevent contractures and edema.  RASS -1/0/+1 (during SAT) Active ROM  Sitting (bed in chair position)  Standing (reverse Trendelenburg)  ADLs   RASS -3/2 Passive ROM  Sit (bed in chair position)   RASS -5/-4 Passive ROM     Thank you for your assistance.     Donna WATSON, OTR/L    
Occupational Therapy Note:  Orders received and appreciated.  Chart reviewed.  Attempted to see pt for OT eval.  Pt currently on HD and with be unavailable for the next 3 hours.  Will continue to follow for OT eval.  Thank you for this consult.  Rasheeda Yanez, OTR/L, CBIS  
Occupational Therapy: Defer    Chart reviewed up to date. Pt currently on dialysis. Will defer for now and follow up as able/medically stable.    Thank you,  Donna Bennett OTD, OTR/L  
Occupational Therapy: defer    Chart reviewed up to date. Pt currently still intubated with neurosurgery consulted for cord compression. Will defer for now and continue to follow.     Thank you,  Donna Bennett OTDAT, OTR/L  
Occupational Therapy; Defer      Chart reviewed in prep for OT eval; attempted to see however currently undergoing bedside HD. Will defer and continue to follow.     Thank you,  Donna Bennett OTDAT, OTR/L  
Patient refused to do MRI. Updated Hospitalist NP-on-shift and MRI department.     Educated patient multiple times on importance of mobilizing and turning in bed, patient refusing persistently. Removed excess sheets underneath but getting physically aggressive even with slight movement.   Shanelle-care performed, male wick exchanged and secured in place. Unable to visualize patient's back and sacral area.     2330hrs Brought up to Hospitalist NP about suboxone not being reordered, advised to endorse to day shift team to verify current dosing. Attempted to inquire with patient but pt was unsure--can only remember that he takes a sublingual pill.   
Pharmacist Note - Vancomycin Dosing (Hemodialysis Patient)    Consult provided for this 60 y.o. male for indication of Bloodstream infection  -H/O OM T11-T12 .  This patient is also on the following antibiotic regimen(s): Vancomycin + Cefepime   Patient on vancomycin PTA? NO     Lab Results   Component Value Date/Time    WBC 6.7 2025 07:10 AM    BUN 53 2025 07:10 AM   Temp (24hrs), Av.2 °F (36.8 °C), Min:97.7 °F (36.5 °C), Max:98.8 °F (37.1 °C)      Estimated CrCl:  ESRD on HD (Tuesday/Thursday/Saturday)  Cultures:  Blood-Serratia  MRSA Swab ordered (if applicable)? N/A    For this HD patient, will initiate therapy with a loading dose of Vancomycin 1750 mg, to be followed with a dose of 500 mg after each HD session.  Dose will be adjusted to maintain a pre-HD trough of approximately 20 - 25 mcg/mL for therapeutic goal of 15 - 20 mcg/mL as approximately 35% of drug will be removed by HD filtration.  Pharmacy to follow patient daily and order levels / make dose adjustments as appropriate.    Lenny Tenorio PharmD      
Physical Therapy 3/9/2025    Chart reviewed, patient received sedated and on vent. Per ABCDEF protocol, will work with patient when PEEP is 10.0 or less, FIO2 60% or less, and patient is following basic commands. Will follow patient peripherally.     Recommend nursing to complete with patient, as able and per protocol, in order to promote cardiopulmonary systems, maintain strength, endurance and independence:   -bed in chair position or maximize full reverse Trendelenburg position to facilitate upright activity with foot board and non-skid footwear on 3x/day ~30-60 mins each   -ROM during bathing B UEs and LEs  -positioning to prevent contractures and edema.  RASS -1/0/+1 (during SAT) Active ROM  Sitting (bed in chair position)  Standing (reverse Trendelenburg)  ADLs   RASS -3/2 Passive ROM  Sit (bed in chair position)   RASS -5/-4 Passive ROM     Thank you for your assistance.     Izabella King, PT, DPT, NCS    
Physical Therapy Note  03/10/2025    Chart reviewed in prep for PT eval; pt remains intubated and sedated. Will defer and continue to follow as medically appropriate.    Thank you,  Mery Lan, PT, DPT  
Physical Therapy Note  03/11/2025    Chart reviewed in prep for PT eval; attempted to see however currently undergoing bedside HD. Will defer and continue to follow.     Thank you,  Mery Lan, PT, DPT  
Physical Therapy Note  03/17/2025    Chart reviewed in prep for PT tx session; attempted to see this AM however off unit for permacath placement. Re-attempted this PM and spoke with RN who requests to defer - pt just returning and now hypotensive. Will continue to follow.     Mery Lan, PT, DPT  
Physical Therapy: Defer    Chart reviewed in preparation for PT intervention. Patient on dialysis at this time. Will continue to follow.    Thank you,  Antwan Hernandez, PT, DPT   
Pt arrived from ED, refuses to have blankets removed from under him. Pt informed that skin needs to be assessed. Pt refuses to turn. Pt refuses to be pulled up in bed. Pt stated that he will kick staff if he is moved. Security called and at bedside, moved pt onto hercules bed. Pt still refusing to have excess blankets moved from him.   
Pt refused the head of bed raised in order to give PO  medication due to back pain. This RN educated pt on risks of aspirations. Pt's PO meds not given. NP notified.   
Pt's breathing labored, respiratory rate in 30's. Remains on midflow at 15L, oxygen saturation 91%.  Pt's breath sound coarse/crackles. Respiratory therapist at bedside to assess pt's HFNC need. NPBarbara at bedside.   
Renal Dosing/Monitoring  Medication: Gabapentin   Current regimen:  300 mg PO TID  Recent Labs     03/13/25  0342 03/14/25  0225 03/15/25  0553   CREATININE 8.01* 6.06* 7.96*   BUN 73* 51* 87*     Estimated CrCl:  ESRD on HD  Plan: Change to 100 mg PO TID per [facility] P&T Committee Protocol with respect to renal function.  Pharmacy will continue to monitor patient daily and will make dosage adjustments based upon changing renal function.      
Spiritual Health History and Assessment/Progress Note  [facility]    Initial Encounter,  ,  ,      Name: Leandra Perez MRN: [id number]    Age: 60 y.o.     Sex: male   Language: English   Congregation: Confucianism   Discitis     Date: 3/17/2025            Total Time Calculated: 14 min              Spiritual Assessment began in [facility] 6S NEURO-SCI TELE        Referral/Consult From: Rounding   Encounter Overview/Reason: Initial Encounter  Service Provided For: Patient    Nani, Belief, Meaning:   Patient has beliefs or practices that help with coping during difficult times  Family/Friends No family/friends present      Importance and Influence:  Patient has spiritual/personal beliefs that influence decisions regarding their health  Family/Friends No family/friends present    Community:  Patient feels well-supported. Support system includes: Extended family  Family/Friends No family/friends present    Assessment and Plan of Care:     Patient Interventions include: Facilitated expression of thoughts and feelings  Family/Friends Interventions include: No family/friends present    Patient Plan of Care: Spiritual Care available upon further referral  Family/Friends Plan of Care: No family/friends present    I visited Leandra during my regular rounds on the unit. He was getting dialysis at the time.     Electronically signed by Les Mitchell, Chaplain Resident, M.Div., on 3/17/2025 at 4:36 PM   
TRANSFER - IN REPORT:    Verbal report received from CYRIL Langston on Leandra Perez  being received from 656 for ordered procedure      Report consisted of patient's Situation, Background, Assessment and   Recommendations(SBAR).     Information from the following report(s) Nurse Handoff Report was reviewed with the receiving nurse.    Opportunity for questions and clarification was provided.      Assessment completed upon patient's arrival to unit and care assumed.     
TRANSFER - OUT REPORT:    Verbal report given to CYRIL Langston on Leandra Perez  being transferred to [facility] for routine progression of patient care       Report consisted of patient's Situation, Background, Assessment and   Recommendations(SBAR).     Information from the following report(s) Nurse Handoff Report was reviewed with the receiving nurse.           Lines:   Peripheral IV 03/07/25 Distal;Left;Anterior Cephalic (Active)   Site Assessment Clean, dry & intact 03/17/25 0800   Line Status Capped;Flushed;Normal saline locked 03/17/25 0800   Line Care Cap changed;Connections checked and tightened 03/17/25 0800   Phlebitis Assessment No symptoms 03/17/25 0800   Infiltration Assessment 0 03/17/25 0800   Alcohol Cap Used Yes 03/17/25 0800   Dressing Status New dressing applied 03/17/25 0800   Dressing Type Transparent 03/17/25 0800       Peripheral IV 03/09/25 Right;Anterior Forearm (Active)   Site Assessment Clean, dry & intact 03/17/25 0800   Line Status Capped;Flushed;Normal saline locked 03/17/25 0800   Line Care Cap changed;Connections checked and tightened 03/17/25 0800   Phlebitis Assessment No symptoms 03/17/25 0800   Infiltration Assessment 0 03/17/25 0800   Alcohol Cap Used Yes 03/17/25 0800   Dressing Status Clean, dry & intact 03/17/25 0800   Dressing Type Transparent 03/17/25 0800   Dressing Intervention New 03/17/25 0800       Hemodialysis Central Access - Permanent/Tunneled Left Subclavian (Active)   $Tunneled Hemodialysis Catheter $Yes 03/17/25 1224   Site Assessment Clean, dry & intact 03/17/25 1245   Blue Lumen Status Heparin locked;Flushed 03/17/25 1245   Red Lumen Status Heparin locked;Flushed 03/17/25 1245   Dressing Type Sterile dressing, transparent 03/17/25 1245   Dressing Status Clean, dry & intact 03/17/25 1245   Dressing Intervention New 03/17/25 1224        Opportunity for questions and clarification was provided.      Patient transported with:  O2 @ 2lpm and Tech        
TRANSFER - OUT REPORT:    Verbal report given to JIMENA HESS RN on Leandra Perez  being transferred to [facility] for routine progression of patient care       Report consisted of patient's Situation, Background, Assessment and   Recommendations(SBAR).     Information from the following report(s) Nurse Handoff Report, ED Encounter Summary, ED SBAR, Adult Overview, Surgery Report, Intake/Output, MAR, Recent Results, Cardiac Rhythm NSR-Sinus TAch, Quality Measures, and Neuro Assessment was reviewed with the receiving nurse.           Lines:   Peripheral IV 03/07/25 Distal;Left;Anterior Cephalic (Active)   Site Assessment Clean, dry & intact 03/12/25 2000   Line Status Flushed;Capped 03/12/25 2000   Line Care Cap changed;Connections checked and tightened 03/12/25 2000   Phlebitis Assessment No symptoms 03/12/25 2000   Infiltration Assessment 0 03/12/25 2000   Alcohol Cap Used Yes 03/12/25 2000   Dressing Status Clean, dry & intact 03/12/25 2000   Dressing Type Transparent 03/12/25 2000       Peripheral IV 03/09/25 Right;Anterior Forearm (Active)   Site Assessment Clean, dry & intact 03/12/25 2000   Line Status Flushed;Capped 03/12/25 2000   Line Care Cap changed;Connections checked and tightened 03/12/25 2000   Phlebitis Assessment No symptoms 03/12/25 2000   Infiltration Assessment 0 03/12/25 2000   Alcohol Cap Used Yes 03/12/25 2000   Dressing Status Clean, dry & intact 03/12/25 2000   Dressing Type Transparent 03/12/25 2000   Dressing Intervention New 03/09/25 1025       Hemodialysis Central Access - Temporary Left Neck (Active)   $Hemodialysis Central Access $Yes 03/11/25 1025   Continued need for line? Yes 03/12/25 2000   Site Assessment Clean, dry & intact 03/12/25 2000   CVC Lumen Status Normal saline locked;Alcohol cap present 03/12/25 2000   Blue Lumen Status Alcohol cap present 03/12/25 2000   Red Lumen Status Alcohol cap present 03/12/25 2000   Line Care Connections checked and tightened 03/12/25 2000   Dressing 
                   500 mg given  3/19                 No                              --                                   --  3/20                 Yes                             20.4                              500 mg recommended        Reminder staff message entered (if needed): YES    Plan: Pre-HD level within target range Continue current regimen - next HD session planned for today (3/20/25). Permacath placed. Therapy to continue through 4/22 per ID with HD.    Vancomycin Process in Hemodialysis  
failure  Chronic respiratory failure on 2 L home O2  -Had increased home O2 requirements of 4L   -Recent admission for multilobar pneumonia  -3/9: Patient developed respiratory distress and was transferred to the ICU for intubation.  Underwent bronchoscopy with findings of secretions in the left lower main bronchus and most of the other bronchi  -Currently satting well on room air     NIDDM II  -c/w SSI +Hypoglycemic protocols  -recent A1c 5.2   -Glucose currently well-controlled     ESRD on HD TTS  Hyperkalemia   -Received calcium gluconate in ED  -Nephro on board for inpatient HD  -permacath removed 3/8 due to bacteremia, LIJ 14Fr double lumen catheter replaced 3/11    -Next HD Tuesday  -Permacath placement tomorrow      Hypertensive urgency  -s/p nicardipine drip  -c/w home medications of Coreg, hydralazine, Imdur     Opioid use disorder  -Continue Suboxone: appreciate addiction medicine input - decreased dose to 16mg daily (in two 8mg doses) to allow more available opioid mu receptors for oxycodone to work on - may consider increasing dose of oxy or possibly reducing suboxone to 8mg daily trying to avoid stopping completely as can be difficult to resume per his note      Code status: full  Prophylaxis: heparin   Care Plan discussed with: patient, nurse, CM   Anticipated Disposition: Plan for IPR. Likely can DC Monday if permacath is placed     Principal Problem:    Discitis  Active Problems:    Community acquired pneumonia    Controlled diabetes mellitus type 2 with complications (HCC)    ESRD on hemodialysis (HCC)    Osteomyelitis (HCC)    Hypertensive emergency    Serratia marcescens infection    Combined systolic and diastolic congestive heart failure (HCC)    Influenza A    Bacteremia  Resolved Problems:    * No resolved hospital problems. *            Review of Systems:   Pertinent items are noted in HPI.         Vital Signs:    Last 24hrs VS reviewed since prior progress note. Most recent are:  /88  
methocarbamol (ROBAXIN) tablet 1,000 mg  1,000 mg Oral TID    acetylcysteine (MUCOMYST) 20 % solution 600 mg  600 mg Inhalation BID RT    albuterol (PROVENTIL) (2.5 MG/3ML) 0.083% nebulizer solution 2.5 mg  2.5 mg Nebulization Q3H PRN    buprenorphine-naloxone (SUBOXONE) 8-2 MG SL tablet 1 tablet  1 tablet SubLINGual TID    cefepime (MAXIPIME) 1,000 mg in sodium chloride 0.9 % 50 mL IVPB (addEASE)  1,000 mg IntraVENous Q24H    Vancomycin - pharmacy to dose   Other RX Placeholder    HYDROmorphone HCl PF (DILAUDID) injection 1 mg  1 mg IntraVENous Q4H PRN    calcitRIOL (ROCALTROL) capsule 0.25 mcg  0.25 mcg Oral Daily    [Held by provider] carvedilol (COREG) tablet 6.25 mg  6.25 mg Oral BID    ferrous sulfate (IRON 325) tablet 325 mg  325 mg Oral QAM AC    folic acid (FOLVITE) tablet 1 mg  1 mg Oral Daily    [Held by provider] hydrALAZINE (APRESOLINE) tablet 25 mg  25 mg Oral 3 times per day    [Held by provider] isosorbide dinitrate (ISORDIL) tablet 20 mg  20 mg Oral BID    thiamine tablet 100 mg  100 mg Oral Daily    traZODone (DESYREL) tablet 50 mg  50 mg Oral Nightly    sodium chloride flush 0.9 % injection 5-40 mL  5-40 mL IntraVENous 2 times per day    sodium chloride flush 0.9 % injection 5-40 mL  5-40 mL IntraVENous PRN    0.9 % sodium chloride infusion   IntraVENous PRN    [Held by provider] heparin (porcine) injection 5,000 Units  5,000 Units SubCUTAneous 3 times per day    ondansetron (ZOFRAN-ODT) disintegrating tablet 4 mg  4 mg Oral Q8H PRN    Or    ondansetron (ZOFRAN) injection 4 mg  4 mg IntraVENous Q6H PRN    polyethylene glycol (GLYCOLAX) packet 17 g  17 g Oral Daily PRN    acetaminophen (TYLENOL) tablet 650 mg  650 mg Oral Q6H PRN    Or    acetaminophen (TYLENOL) suppository 650 mg  650 mg Rectal Q6H PRN    oxyCODONE (ROXICODONE) immediate release tablet 5 mg  5 mg Oral Q4H PRN    heparin (porcine) 1000 UNIT/ML injection 1,900 Units  1,900 Units IntraCATHeter PRN    And    heparin (porcine) 1000 
respiratory failure on 2 L home O2  -Had increased home O2 requirements of 4L   -Recent admission for multilobar pneumonia  -3/9: Patient developed respiratory distress and was transferred to the ICU for intubation.  Underwent bronchoscopy with findings of secretions in the left lower main bronchus and most of the other bronchi  -Currently satting well on room air     NIDDM II  -c/w SSI +Hypoglycemic protocols  -recent A1c 5.2   -Glucose currently well-controlled     ESRD on HD TTS  Hyperkalemia   -Received calcium gluconate in ED  -Nephro on board for inpatient HD  -permacath removed 3/8 due to bacteremia, LIJ 14Fr double lumen catheter replaced 3/11       Hypertensive urgency  -s/p nicardipine drip  -c/w home medications of Coreg, hydralazine, Imdur     Opioid use disorder  -Continue Suboxone: appreciate addiction medicine input - decreased dose to 16mg daily (in two 8mg doses) to allow more available opioid mu receptors for oxycodone to work on - may consider increasing dose of oxy or possibly reducing suboxone to 8mg daily trying to avoid stopping completely as can be difficult to resume per his note      Code status: full  Prophylaxis: heparin   Care Plan discussed with: patient, nurse, CM   Anticipated Disposition: TBD. Will likely need IPR.     Principal Problem:    Discitis  Active Problems:    Community acquired pneumonia    Controlled diabetes mellitus type 2 with complications (HCC)    ESRD on hemodialysis (HCC)    Osteomyelitis (HCC)    Hypertensive emergency    Serratia marcescens infection    Combined systolic and diastolic congestive heart failure (HCC)    Influenza A    Bacteremia  Resolved Problems:    * No resolved hospital problems. *            Review of Systems:   Pertinent items are noted in HPI.         Vital Signs:    Last 24hrs VS reviewed since prior progress note. Most recent are:  /79   Pulse 93   Temp 97.5 °F (36.4 °C)   Resp 16   Ht 1.778 m (5' 10\")   Wt 69.4 kg (152 lb 14.4 
today    COMPREHENSIVE ASSESSMENT & PLAN     Acute discitis  Suspect infection related, noted to have osteomyelitis.  Concern for cord compression  --MRI done.  Discitis T11-T12, paravertebral extension along ALL  --neurosurgery consulted, signed off, no surgical intervention warranted  --IR performed aspiration/biopsy 3/10, follow-up cultures  --Continue broad-spectrum antibiotics with Vanco + Cefepime  --Continue neuro assessment, pain control    Serratia bacteremia, CoNS  -- Follow-up cultures from IR procedure.  Growing gram-negative rods initially  -- cont. Vanco + Cefepime  -- follow up repeat blood cultures  -- Transthoracic echocardiogram 2/10 EF of 55 to 60%.  Consider TONYA depending on cultures.    Acute hypoxic respiratory failure  DDx; aspiration pneumonia, noted to have left lower lobe partial collapse with endobronchial debris   -- Extubated 3/10. Oxygenating adequately on room air  -- Discontinue hypertonic nebs    ESRD on HD TTS  Hemodialysis catheter removed on 3/8 allowing for a line holiday  -- Appreciate nephrology consult  -- Status post L IJ Kit 3/11. Last dialyzed 3/11  -- Resume Renvela  -- monitor Potassium, follow up PM BMP    Hypotension / shock, suspect related to sedation/vasoplegia  -- Stable off pressors    History of opioid use, prior on Suboxone  -- Reduce Suboxone dose  --Continue opiates in addition to multimodal analgesia regimen, methocarbamol, lidocaine patch, gabapentin 300 TID  --Doing well off of ketamine drip     Anxiety/depression  -Resume home meds including trazodone     History of hypertension  -- Holding of home beta blocker, calcium channel blocker and other meds for now      CULTURES TO DATE:  Results       Procedure Component Value Units Date/Time    Culture, Blood 1 [[id number]] Collected: 03/12/25 0448    Order Status: Completed Specimen: Blood Updated: 03/12/25 0654     Special Requests --        NO SPECIAL REQUESTS  RIGHT  Antecubital       Culture NO GROWTH 
troponin.  CTA chest to rule out dissection, PE and other cardiothoracic processes.  Supplemental oxygen to keep SpO2 above 94%  Transfer to intermediate care for close monitoring  Further management based on clinical progress, tests/imaging results.      Medications Administered:   Sedation: [ ] yes [ x] no   Anxiolytics: [ ] yes [ x] no   Antiarrhythmics: [ ] yes [x ] no   Antihypertensives: [ ] yes [ x] no   Pressors: [ ] yes [x ] no   IVF's: [ ] yes [x ] no       Critical Care Attestation:  This patient is unstable and critically ill. Due to a high probability of clinically significant, life threatening deterioration, the patient required my highest level of preparedness to intervene emergently and I personally spent this critical care time directly and personally managing the patient. This critical care time included obtaining a history; examining the patient; pulse oximetry; ordering and review of studies; arranging urgent treatment with development of a management plan; evaluation of patient's response to treatment; frequent reassessment; and, discussions with other providers and/or family. This critical care time was performed to assess and manage the high probability of imminent, life-threatening deterioration that could result in multi-organ failure and death. It was exclusive of separately billable procedures, treating other patients, and teaching time.      Time Spent:     I personally spent 35 minutes in providing critical care time.    Walt Swan MD  3/8/2025  5:11 PM  
Discitis  Active Problems:    Community acquired pneumonia    Controlled diabetes mellitus type 2 with complications (HCC)    ESRD on hemodialysis (HCC)    Osteomyelitis (HCC)    Hypertensive emergency    Serratia marcescens infection    Combined systolic and diastolic congestive heart failure (HCC)    Influenza A    Bacteremia    Severe protein-calorie malnutrition  Resolved Problems:    * No resolved hospital problems. *            Review of Systems:   Pertinent items are noted in HPI.         Vital Signs:    Last 24hrs VS reviewed since prior progress note. Most recent are:  /73   Pulse (!) 109   Temp 98 °F (36.7 °C) (Oral)   Resp 14   Ht 1.778 m (5' 10\")   Wt 73 kg (161 lb)   SpO2 95%   BMI 23.10 kg/m²        Intake/Output Summary (Last 24 hours) at 3/19/2025 0833  Last data filed at 3/18/2025 1650  Gross per 24 hour   Intake 500 ml   Output 1000 ml   Net -500 ml            Physical Examination:     I had a face to face encounter with this patient and independently examined them on 3/19/2025 as outlined below:          General : alert x 3, awake, no acute distress,   HEENT: PEERL, EOMI, moist mucus membrane  Neck: supple, no JVD, no meningeal signs  Chest: Clear to auscultation bilaterally   CVS: S1 S2 heard, Capillary refill less than 2 seconds  Abd: soft/ non tender, non distended, BS physiological,   Ext: no clubbing, no cyanosis, no edema, brisk 2+ DP pulses  Neuro/Psych: pleasant mood and affect, CN 2-12 grossly intact, sensory grossly within normal limit  Skin: warm            Data Review:    Review and/or order of clinical lab test  Review and/or order of tests in the radiology section of CPT  Review and/or order of tests in the medicine section of CPT    I have independently reviewed and interpreted patient's lab and all other diagnostic data    Notes reviewed from all clinical/nonclinical/nursing services involved in patient's clinical care. Care coordination discussions were held with 
Influenza A  Resolved Problems:    * No resolved hospital problems. *      Plan:     T11-12 discitis   -IR disc biopsy 3/10; cx pending   -Blood cx growing serratia and staph   -ID following    -On cefepime and vanc   -No role for neurosurgical intervention at this time   -Medical mgmt per ICU    Plan d/w Dr. Layton John, PA  
[facility], [facility]    Housing Stability Vital Sign     In the last 12 months, was there a time when you were not able to pay the mortgage or rent on time?: Patient declined     Number of Times Moved in the Last Year: Not on file     At any time in the past 12 months, were you homeless or living in a shelter (including now)?: Patient declined   Recent Concern: Housing Stability - High Risk (7/10/2024)    Housing Stability Vital Sign     Unable to Pay for Housing in the Last Year: Not on file     Number of Places Lived in the Last Year: Not on file     Unstable Housing in the Last Year: Yes   Interpersonal Safety: Not At Risk (2/14/2025)    Interpersonal Safety Domain Source: IP Abuse Screening     Physical abuse: Denies     Verbal abuse: Denies     Emotional abuse: Denies     Financial abuse: Denies     Sexual abuse: Denies   Utilities: Patient Unable To Answer (10/25/2024)    Received from [facility] Utilities     Threatened with loss of utilities: Patient unable to answer       Review of Systems:   A comprehensive review of systems was negative.       Vital Signs:    Last 24hrs VS reviewed since prior progress note. Most recent are:  Vitals:    03/08/25 1345   BP: 121/81   Pulse: 93   Resp:    Temp:    SpO2:          Intake/Output Summary (Last 24 hours) at 3/8/2025 1354  Last data filed at 3/7/2025 1730  Gross per 24 hour   Intake 500 ml   Output 3020 ml   Net -2520 ml        Physical Examination:     I had a face to face encounter with this patient and independently examined them on 3/8/2025 as outlined below:          General : alert x 3, awake, moderate distress,   HEENT: PEERL, EOMI, moist mucus membrane  Neck: supple, no JVD, no meningeal signs  Chest: Clear to auscultation bilaterally   CVS: S1 S2 heard, Capillary refill less than 2 seconds  Abd: soft, diffuse lower tender, non distended, BS physiological,   Ext: no clubbing, no cyanosis, no edema, brisk 2+ DP pulses  Neuro/Psych:  grossly 
wilson  Extremities: No cyanosis, No clubbing, No Edema  Skin: No rash , not jaundiced, dry, warm  Neurologic: Alert and awake, nl speech  Psych: Flat affect  Dialysis Access: Rt IJ permcath       [] Telemetry Reviewed     [] NSR [] PAC/PVCs   [] Afib  [] Paced   [] NSVT   [] [place] [] NGT  [] Intubated on vent          LABS:  Recent Labs     03/08/25  0710 03/07/25  1255 03/07/25  0841 02/14/25  0205 02/13/25  0454 02/13/25  0403 02/12/25  0216 02/11/25  0255   * 133* 133* 134* 132* 133* 137 135*   K 4.8 4.8 5.4* 4.9 4.7 4.9 4.5 4.5   CL 99 98 98 96* 96* 96* 99 96*   CO2 23 22 24 29 26 26 27 25   BUN 53* 68* 64* 40* 51* 51* 33* 55*   CREATININE 8.70* 11.50* 11.30* 7.16* 10.20* 10.00* 8.30* 12.10*   CALCIUM 9.1 9.5 9.4 9.4 9.0 8.9 9.2 8.6   PHOS  --   --   --  6.0* 6.0* 6.2* 5.0* 5.5*   MG  --   --   --   --   --  2.2 2.2 2.2     Recent Labs     03/08/25  0710 03/07/25  0841 02/14/25  0204   WBC 6.7 7.9 5.9   HGB 13.0 12.5 11.3*   HCT 39.6 37.7 36.4*   * 165 286     No results for input(s): \"KU\", \"CLU\" in the last 720 hours.    Invalid input(s): \"SUDHA\", \"CREAU\", \"PROU\"        Total time spent with patient:  [] 15   [] 25   [x] 35   []  __ minutes    [] Critical Care Provided    Care Plan discussed with: medical team    [x] Patient   [] Family    [] Care Manager   [] Consultant/Specialist :      []   >50% of visit spent in counseling and coordination of care   (Discussed [] CODE status,  [] Care Plan, [] D/C Planning)    ___________________________________________________    Attending Physician: YOLIE MITCHELL MD    [facility]  
Advancement/Tolerance, Food and Nutrient Intake, Supplement Intake  Physical Signs/Symptoms Outcomes: Weight, Nutrition Focused Physical Findings, Meal Time Behavior, Biochemical Data, Fluid Status or Edema, Constipation    Discharge Planning:    Continue current diet, Continue Oral Nutrition Supplement     Mirtha Peter RD  Available via [employer]      
NSR [] PAC/PVCs   [] Afib  [] Paced   [] NSVT   [] Richter [] NGT  [] Intubated on vent          LABS:  Recent Labs     03/18/25  0244 03/17/25  1448 03/17/25  0131 03/16/25  1728 03/16/25  0557 03/15/25  0553 03/14/25  0225 03/13/25  0342 03/12/25  1506 03/12/25  0438   *  --  132*  --  132*   < > 129* 130*   < > 131*   K 6.1* 5.8* 6.5*   < > 5.8*   < > 5.0 4.8   < > 5.2*   CL 98  --  98  --  98   < > 97 90*   < > 96*   CO2 25  --  23  --  26   < > 24 28   < > 22   BUN 49*  --  83*  --  65*   < > 51* 73*   < > 41*   CREATININE 5.85*  --  7.76*  --  6.26*   < > 6.06* 8.01*   < > 6.62*   CALCIUM 9.3  --  9.6  --  9.9   < > 9.2 9.4   < > 9.9   PHOS  --   --   --   --   --   --  4.3 4.6  --  5.1*   MG  --   --   --   --   --   --  2.4 2.7*  --  2.3    < > = values in this interval not displayed.     Recent Labs     03/18/25  0244 03/17/25  0131 03/16/25  0557   WBC 8.6 5.5 5.3   HGB 8.6* 9.2* 11.0*   HCT 27.5* 28.8* 35.4*    304 328     No results for input(s): \"KU\", \"CLU\" in the last 720 hours.    Invalid input(s): \"SUDHA\", \"CREAU\", \"PROU\"        Total time spent with patient:  [] 15   [] 25   [] 35   [x]  55__ minutes    [] Critical Care Provided    Care Plan discussed with: medical team    [] Patient   [] Family    [] Care Manager   [] Consultant/Specialist :      []   >50% of visit spent in counseling and coordination of care   (Discussed [] CODE status,  [] Care Plan, [] D/C Planning)    Discussed with ICU team  ___________________________________________________    Attending Physician: Rebel Falcon MD    [facility]  
S1 S2 normal, No rub  Abdomen: Soft, Non tender, Not distended, bowel sounds present  : No wilson  Extremities: No cyanosis, No clubbing, No Edema  Skin: No rash , not jaundiced, dry, warm  Neurologic: Alert and awake, nl speech  Psych: Flat affect  Dialysis Access: Rt IJ permcath       [] Telemetry Reviewed     [] NSR [] PAC/PVCs   [] Afib  [] Paced   [] NSVT   [] Wilson [] NGT  [] Intubated on vent          LABS:  Recent Labs     03/17/25  0131 03/16/25  1728 03/16/25  0557 03/15/25  0553 03/14/25  0225 03/13/25  0342 03/12/25  1506 03/12/25  0438   *  --  132* 130* 129* 130*   < > 131*   K 6.5* 6.4* 5.8* 5.6* 5.0 4.8   < > 5.2*   CL 98  --  98 97 97 90*   < > 96*   CO2 23  --  26 23 24 28   < > 22   BUN 83*  --  65* 87* 51* 73*   < > 41*   CREATININE 7.76*  --  6.26* 7.96* 6.06* 8.01*   < > 6.62*   CALCIUM 9.6  --  9.9 9.6 9.2 9.4   < > 9.9   PHOS  --   --   --   --  4.3 4.6  --  5.1*   MG  --   --   --   --  2.4 2.7*  --  2.3    < > = values in this interval not displayed.     Recent Labs     03/17/25  0131 03/16/25  0557 03/15/25  0553   WBC 5.5 5.3 5.1   HGB 9.2* 11.0* 9.6*   HCT 28.8* 35.4* 30.5*    328 280     No results for input(s): \"KU\", \"CLU\" in the last 720 hours.    Invalid input(s): \"SUDHA\", \"CREAU\", \"PROU\"        Total time spent with patient:  [] 15   [] 25   [] 35   [x]  55__ minutes    [] Critical Care Provided    Care Plan discussed with: medical team    [] Patient   [] Family    [] Care Manager   [] Consultant/Specialist :      []   >50% of visit spent in counseling and coordination of care   (Discussed [] CODE status,  [] Care Plan, [] D/C Planning)    Discussed with ICU team  ___________________________________________________    Attending Physician: Rebel Falcon MD    [facility]  
\"HLPSE\"    No results for input(s): \"INR\", \"APTT\" in the last 72 hours.    Invalid input(s): \"PTP\"   No results for input(s): \"TIBC\" in the last 72 hours.    Invalid input(s): \"FE\", \"PSAT\", \"FERR\"   No results found for: \"RBCF\"   No results for input(s): \"PH\", \"PCO2\", \"PO2\" in the last 72 hours.  No results for input(s): \"CPK\" in the last 72 hours.    Invalid input(s): \"CPKMB\", \"CKNDX\", \"TROIQ\"  No results found for: \"CHOL\", \"CHLST\", \"CHOLV\", \"HDL\", \"HDLC\", \"LDL\", \"LDLC\"  No results found for: \"GLUCPOC\"        Medications Reviewed:     Current Facility-Administered Medications   Medication Dose Route Frequency    sodium zirconium cyclosilicate (LOKELMA) oral suspension 5 g  5 g Oral Once    gabapentin (NEURONTIN) capsule 100 mg  100 mg Oral TID    oxyCODONE (ROXICODONE) immediate release tablet 5 mg  5 mg Oral Q4H PRN    oxyCODONE (ROXICODONE) immediate release tablet 10 mg  10 mg Oral Q4H PRN    pantoprazole (PROTONIX) tablet 40 mg  40 mg Oral QAM AC    buprenorphine-naloxone (SUBOXONE) 8-2 MG SL tablet 1 tablet  1 tablet SubLINGual BID    droNABinol (MARINOL) capsule 2.5 mg  2.5 mg Oral BID    sevelamer (RENVELA) packet 1.6 g  1.6 g Oral TID WC    mineral oil-hydrophilic petrolatum (AQUAPHOR) ointment   Topical PRN    glucose chewable tablet 16 g  4 tablet Oral PRN    dextrose bolus 10% 125 mL  125 mL IntraVENous PRN    Or    dextrose bolus 10% 250 mL  250 mL IntraVENous PRN    glucagon injection 1 mg  1 mg SubCUTAneous PRN    dextrose 10 % infusion   IntraVENous Continuous PRN    albuterol (PROVENTIL) (2.5 MG/3ML) 0.083% nebulizer solution 2.5 mg  2.5 mg Nebulization Q3H PRN    cefepime (MAXIPIME) 1,000 mg in sodium chloride 0.9 % 50 mL IVPB (addEASE)  1,000 mg IntraVENous Q24H    Vancomycin - pharmacy to dose   Other RX Placeholder    calcitRIOL (ROCALTROL) capsule 0.25 mcg  0.25 mcg Oral Daily    [Held by provider] carvedilol (COREG) tablet 6.25 mg  6.25 mg Oral BID    ferrous sulfate (IRON 325) tablet 325 mg  
relapses, give additional doses every 2 to 3 minutes, alternating nostrils, until medical help arrives. Use as directed. 10/30/23   Provider, MD Lindsay   diclofenac sodium (VOLTAREN) 1 % GEL Apply topically 2 times daily 7/10/24   Margo Bella MD   ferrous sulfate (IRON 325) 325 (65 Fe) MG tablet Take 1 tablet by mouth every morning (before breakfast) 7/10/24   Margo Bella MD   isosorbide dinitrate (ISORDIL) 20 MG tablet Take 1 tablet by mouth 2 times daily 7/10/24   Margo Bella MD   traZODone (DESYREL) 50 MG tablet Take 1 tablet by mouth nightly ceived the following from [facility]: Outside name: traZODone (DESYREL) 50 mg tablet 7/10/24   Margo Bella MD   acetaminophen (TYLENOL) 500 MG tablet Take 2 tablets by mouth 3 times daily as needed for Pain 24   Kelton Sams MD   calcitRIOL (ROCALTROL) 0.25 MCG capsule Take by mouth daily 22   Automatic Reconciliation, Ar   folic acid (FOLVITE) 1 MG tablet Take by mouth daily 22   Automatic Reconciliation, Ar   sevelamer (RENVELA) 0.8 g PACK packet Take by mouth 3 times daily (with meals) 22   Automatic Reconciliation, Ar   thiamine 100 MG tablet Take by mouth daily 22   Automatic Reconciliation, Ar       Allergies/Social/Family History:     Allergies   Allergen Reactions    Shellfish Allergy Swelling      Social History     Tobacco Use    Smoking status: Some Days     Current packs/day: 1.00     Types: Cigarettes    Smokeless tobacco: Never   Substance Use Topics    Alcohol use: Not Currently      Family History   Problem Relation Age of Onset    Diabetes Other     Hypertension Other        Review of Systems:   Unable to review    Objective:   Vital Signs:  BP 94/65   Pulse 82   Temp 97.4 °F (36.3 °C) (Axillary)   Resp 16   Ht 1.778 m (5' 10\")   Wt 66 kg (145 lb 8.1 oz)   SpO2 100%   BMI 20.88 kg/m²      Temp (24hrs), Av.7 °F (37.1 °C), Min:97.4 °F (36.3 °C), Max:99.8 °F (37.7 °C)       
of preparedness to intervene urgently. I participated in the decision-making and personally managed or directed the management of the following life and organ supporting interventions that required my frequent assessment to treat or prevent imminent deterioration.        Jefferson Calzada MD   Critical Care Medicine  [facility]  [phone]  3/9/2025      
10\"), weight 68.5 kg (151 lb 0.2 oz), SpO2 97%.    Physical Exam:   BP 95/67   Pulse 93   Temp 97.4 °F (36.3 °C) (Axillary)   Resp 12   Ht 1.778 m (5' 10\")   Wt 68.5 kg (151 lb 0.2 oz)   SpO2 97%   BMI 21.67 kg/m²     BP 95/67   Pulse 93   Temp 97.4 °F (36.3 °C) (Axillary)   Resp 12   Ht 1.778 m (5' 10\")   Wt 68.5 kg (151 lb 0.2 oz)   SpO2 97%   BMI 21.67 kg/m²     PHYSICAL EXAM:  General: Ill appearing. awake, alert, cooperative. no acute distress    EENT:  EOMI. Anicteric sclerae. MMM  Resp:  O2 sat 97% with 2L of O2,  No accessory muscle use  CV:  Regular  rhythm,  No edema  Neurologic:  Alert and orient to self, place, and year.  Psych:   Fair insight. Not anxious nor agitated  Skin:  No rashes.  No jaundice     Data Review:     CBC:  Recent Labs     03/10/25  0446 03/11/25  0504 03/12/25  0438   WBC 7.2 5.1 6.0   HGB 10.8* 10.0* 12.6   HCT 33.7* 31.3* 39.9    165 203       BMP:  Recent Labs     03/10/25  0446 03/11/25  0504 03/12/25  0438   BUN 64* 79* 41*   * 131* 131*   K 4.1 4.6 5.2*   CL 94* 95* 96*   CO2 19* 20* 22       LFTS:  Recent Labs     03/10/25  0446 03/11/25  0504   ALT 25 24       Microbiology:   [unfilled]    Cultures:   Results       Procedure Component Value Units Date/Time    Culture, Blood 1 [[id number]] Collected: 03/12/25 0448    Order Status: Completed Specimen: Blood Updated: 03/12/25 0611     Special Requests --        NO SPECIAL REQUESTS  RIGHT  Antecubital       Culture NO GROWTH <24 HRS       Culture, Blood 2 [[id number]]     Order Status: Sent Specimen: Blood     Culture, Fungus [[id number]] Collected: 03/10/25 1045    Order Status: Sent Specimen: Spine Updated: 03/10/25 1303    Culture, Anaerobic [[id number]] Collected: 03/10/25 1045    Order Status: Canceled Specimen: Spine     Culture, Body Fluid (with Gram Stain) [[id number]]  (Abnormal) Collected: 03/10/25 1045    Order Status: Completed Specimen: Body Fluid Updated: 03/11/25 0745     Special 
Pulse 88   Temp 98.4 °F (36.9 °C) (Oral)   Resp 11   Ht 1.778 m (5' 10\")   Wt 66.1 kg (145 lb 11.6 oz)   SpO2 92%   BMI 20.91 kg/m²     /71   Pulse 88   Temp 98.4 °F (36.9 °C) (Oral)   Resp 11   Ht 1.778 m (5' 10\")   Wt 66.1 kg (145 lb 11.6 oz)   SpO2 92%   BMI 20.91 kg/m²     PHYSICAL EXAM:  General: Ill appearing. awake, alert, cooperative. no acute distress    EENT:  EOMI. Anicteric sclerae. MMM  Resp:  Bilateral rhonchi.  No accessory muscle use  CV:  Regular  rhythm,  No edema  GI:  Soft, Non distended, Non tender.  +Bowel sounds  Neurologic:  Alert and orient to self, place, and year.  Psych:   Fair insight. Not anxious nor agitated  Skin:  No rashes.  No jaundice     Data Review:     CBC:  Recent Labs     03/09/25  0312 03/10/25  0446 03/11/25  0504   WBC 7.6 7.2 5.1   HGB 12.5 10.8* 10.0*   HCT 40.2 33.7* 31.3*    178 165       BMP:  Recent Labs     03/09/25  0312 03/10/25  0446 03/11/25  0504   BUN 39* 64* 79*   * 130* 131*   K 4.1 4.1 4.6   CL 97 94* 95*   CO2 24 19* 20*       LFTS:  Recent Labs     03/09/25  0312 03/10/25  0446 03/11/25  0504   ALT 19 25 24       Microbiology:   [unfilled]    Cultures:   Results       Procedure Component Value Units Date/Time    Culture, Fungus [[id number]] Collected: 03/10/25 1045    Order Status: Sent Specimen: Spine Updated: 03/10/25 1303    Culture, Anaerobic [[id number]] Collected: 03/10/25 1045    Order Status: Canceled Specimen: Spine     Culture, Body Fluid (with Gram Stain) [[id number]]  (Abnormal) Collected: 03/10/25 1045    Order Status: Completed Specimen: Body Fluid Updated: 03/11/25 0745     Special Requests NO SPECIAL REQUESTS        Gram Stain 3+ WBCS SEEN         NO ORGANISMS SEEN        Culture LIGHT Gram negative rods               Checking for possible other organisms          Culture, Anaerobic [[id number]] Collected: 03/10/25 1045    Order Status: Canceled Specimen: Tissue     Culture, Body Fluid (with Gram Stain) 
aerogenes by PCR Not detected        Klebsiella oxytoca by PCR Not detected        Klebsiella pneumoniae group by PCR Not detected        Proteus by PCR Not detected        Salmonella species by PCR Not detected        Serratia marcescens by PCR Not detected        Haemophilus Influenzae by PCR Not detected        Neisseria meningitidis by PCR Not detected        Pseudomonas aeruginosa Not detected        Stenotrophomonas maltophilia by PCR Not detected        Candida albicans by PCR Not detected        Candida auris by PCR Not detected        Candida glabrata Not detected        Candida krusei by PCR Not detected        Candida parapsilosis by PCR Not detected        Candida tropicalis by PCR Not detected        Cryptococcus neoformans/gattii by PCR Not detected        Resistant gene targets          Methicillin Resistance mecA/C  by PCR Detected        [employer] test comment       False positive results may rarely occur. Correlate with clinical,epidemiologic, and other laboratory findings           Comment: Please see BCID Interpretation Guide in EPIC Links                Labs:   Labs:   Lab Results   Component Value Date/Time    WBC 5.3 03/14/2025 02:25 AM    HGB 10.0 03/14/2025 02:25 AM    HCT 32.3 03/14/2025 02:25 AM     03/14/2025 02:25 AM    .0 03/14/2025 02:25 AM     Lab Results   Component Value Date/Time     03/14/2025 02:25 AM    K 5.0 03/14/2025 02:25 AM    CL 97 03/14/2025 02:25 AM    CO2 24 03/14/2025 02:25 AM    BUN 51 03/14/2025 02:25 AM    GFRAA 13 09/07/2022 06:52 AM    GLOB 5.6 03/11/2025 05:04 AM    ALT 24 03/11/2025 05:04 AM         Images:     XR CHEST PORTABLE 03/11/2025    Narrative  EXAM:  XR CHEST PORTABLE    INDICATION: HD cath confirmation    COMPARISON: 3/9/2025    TECHNIQUE: 1007 hours portable chest AP view    FINDINGS: Left IJ catheter overlies the SVC. Heart size is normal. Lungs  demonstrate persistent bibasilar atelectasis left greater than right.  Atelectasis in

## 2025-03-21 LAB — HBV E AG SERPL QL IA: NEGATIVE

## 2025-03-24 LAB
BACTERIA SPEC CULT: NORMAL
SERVICE CMNT-IMP: NORMAL

## 2025-03-25 LAB
EKG ATRIAL RATE: 111 BPM
EKG DIAGNOSIS: NORMAL
EKG P AXIS: 69 DEGREES
EKG P-R INTERVAL: 128 MS
EKG Q-T INTERVAL: 332 MS
EKG QRS DURATION: 64 MS
EKG QTC CALCULATION (BAZETT): 451 MS
EKG R AXIS: 38 DEGREES
EKG T AXIS: 89 DEGREES
EKG VENTRICULAR RATE: 111 BPM

## 2025-03-26 NOTE — PROGRESS NOTES
Physician Progress Note      PATIENT:               SRAVANTHI THAKKAR  CSN #:                  [id number]  :                       1965  ADMIT DATE:       2025 8:20 PM  DISCH DATE:        2025 4:57 PM  RESPONDING  PROVIDER #:        Estiven Stevens MD          QUERY TEXT:    Pt admitted with influenza and has moderate malnutrition documented in    Comprehensive Nutrition Assessment. Please further specify type of   malnutrition with documentation in the medical record.    The medical record reflects the following:  Risk Factors: acute illness; influenza  Clinical Indicators:  Comprehensive Nutrition Assessment  Malnutrition Assessment:  Malnutrition Status:  Moderate malnutrition (25 1442)  Context:  Acute Illness  Findings of the 6 clinical characteristics of malnutrition:  Energy Intake:  75% or less of estimated energy requirements for 7 or more   days  Weight Loss:  No weight loss  Body Fat Loss:  Moderate body fat loss Orbital, Buccal region  Muscle Mass Loss:  Mild muscle mass loss Temples (temporalis), Clavicles   (pectoralis & deltoids)  Fluid Accumulation:  No fluid accumulation   Strength:  Not Performed  Treatment: RD: Nepro TID, Please record %PO intakes in I/Os, Monitor weight    ASPEN Criteria:    https://aspenjournals.onlinelibrary.meyers.com/doi/full/10.1177/499460501497935  5  Options provided:  -- Moderate Malnutrition  -- Other - I will add my own diagnosis  -- Disagree - Not applicable / Not valid  -- Disagree - Clinically unable to determine / Unknown  -- Refer to Clinical Documentation Reviewer    PROVIDER RESPONSE TEXT:    This patient has moderate malnutrition.    Query created by: Alva Corrigan on 3/25/2025 1:45 PM      Electronically signed by:  Estiven Stevens MD 3/26/2025 1:48 PM

## 2025-03-31 LAB
BACTERIA SPEC CULT: NORMAL
SERVICE CMNT-IMP: NORMAL

## 2025-04-07 LAB
BACTERIA SPEC CULT: NORMAL
SERVICE CMNT-IMP: NORMAL

## 2025-04-08 PROBLEM — J10.1 INFLUENZA A: Status: RESOLVED | Noted: 2025-03-09 | Resolved: 2025-04-08

## 2025-04-15 ENCOUNTER — CLINICAL DOCUMENTATION (OUTPATIENT)
Age: 60
End: 2025-04-15

## 2025-04-15 ENCOUNTER — TELEPHONE (OUTPATIENT)
Age: 60
End: 2025-04-15

## 2025-04-15 NOTE — PROGRESS NOTES
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     PMHs:  ESRD on HD  Combined systolic and diastolic HF  OUD on suboxone  DMII  Tobacco use disorder    History of serratia bacteremia 2/2 discitis declined surgery    Discharge: continue cefepime and vanc through 4/22/25  Was intubated due to ARF found to have PNA  ESRD on HD  Last A1c 5.2%  ECHO no vegetation    Discharged on IV Vanc and cefepime to OP rehab.

## 2025-04-15 NOTE — TELEPHONE ENCOUNTER
Nicole from [facility] calling regarding a mutual patient. Informed her that Mr. Perez has not yet established care at [facility]. She understood.

## 2025-04-16 ENCOUNTER — TELEPHONE (OUTPATIENT)
Age: 60
End: 2025-04-16

## 2025-04-16 NOTE — TELEPHONE ENCOUNTER
Donna from [facility] states they have transportation arranged for the patient's appointment at [facility] today. Informed her that I do not have an appointment scheduled for the patient at [facility] today. Let her know that the patient may have an appointment somewhere else, but I cannot confirm or deny. She understood.

## 2025-05-15 NOTE — CARE COORDINATION
"[facility]-- Individual Psychotherapy (PhD/LCSW)    5/14/2025    Site:  [facility]         Therapeutic Intervention: Met with patient.  Outpatient - Insight oriented psychotherapy 60 min - CPT code 79891 and Outpatient - Behavior modifying psychotherapy 60 min - CPT code 18260    Chief complaint/reason for encounter: anxiety     Interval history and content of current session:   Cognitive Behavioral Therapy for Anxiety:   Session 2: Session Focus:  To review your homework  To understand the purpose of anxiety and its components  To recognize your own physical symptoms of anxiety and record them  To fill out the Sequence of Anxiety Components form  To continue record-keeping  To complete the self-assessment    Practice Assignments:  Use information on your Worry Record forms to identify positive feedback loops among your anxiety components.  Continue record-keeping using the Worry Record and the Daily Mood Record.       Treatment plan:  Target symptoms: Anxiety  Why chosen therapy is appropriate versus another modality: relevant to diagnosis" "evidence based practice  Outcome monitoring methods: checklist/rating scale  Therapeutic intervention type: Cognitive Behavioral Therapy     Risk parameters:  Patient reports no suicidal ideation  Patient reports no homicidal ideation  Patient reports no self-injurious behavior  Patient reports no violent behavior    Verbal deficits: None    Patient's response to intervention:  The patient's response to intervention is motivated.    Progress toward goals and other mental status changes:  The patient's progress toward goals is good.    Diagnosis:   No diagnosis found.    Plan:  individual psychotherapy    Return to clinic: 1 week    Length of Service (minutes): 60      " Transition of Care Plan:    [facility] 3/20  - call report [phone]    Transport: ** addendum ** FABIAN GOMEZ  scheduled for 4pm   TRIP ID [id number]       [facility]  [address] [place] [address]    RUR: 24% - high  Prior Level of Functioning: [facility]; requires assistance  Disposition: [facility]   JOANNE: 3/20  IPR: Date FOC offered: 3/13/25  Date FOC received: 3/14  Accepting facility:  [facility]  Date authorization started with reference number: 3/18  Date authorization received and expires: 3/19  Follow up appointments: PCP; ID; defer to [facility]  DME needed: defer to rehab  Transportation at discharge: stretcher  Caregiver Contact: nazario Jeffrey - p: [phone]  Discharge Caregiver contacted prior to discharge? yes  Care Conference needed? no  Barriers to discharge: awaiting transport to facility     Pt ESRD HD pt: [facility] [id number] [place] [id number]  P: [phone]  F: [phone]    11am: Pt medically stable and [facility] can accept Pt 3/20. Endorsed Pt being on  MWF schedule while rehabbing then moving back to [facility] when dc'd. Medicaid stretcher scheduled for 1:30pm, nurse and Pt informed of time.     330pm: Due to Medicaid not arriving, BLS [facility] trip scheduled - billed to Medicaid w/ TRIP ID [id number].    3/14 - Final ID order faxed to [facility] dialysis clinic.     MAKAYLA Rosas

## 2025-05-20 NOTE — WOUND CARE
Consulted for sacrum.  Admitted for discitis  Discussed with RN at bedside. Photo in chart shows no wound, only hypopigmentation to sacrum.  Being treated with prevention sacral foam.  Resting on VINICIO.   Heels offloaded with pillows.     Will sign off.    CHANDLER Dewitt  
Detail Level: Detailed
Depth Of Biopsy: dermis
Was A Bandage Applied: Yes
Size Of Lesion In Cm: 0.5
X Size Of Lesion In Cm: 0
Biopsy Type: H and E
Biopsy Method: Dermablade
Anesthesia Type: 1% Xylocaine with epinephrine
Hemostasis: Aluminum Chloride and Electrocautery
Wound Care: Petrolatum
Dressing: bandage
Destruction After The Procedure: No
Type Of Destruction Used: Curettage
Curettage Text: The wound bed was treated with curettage after the biopsy was performed.
Cryotherapy Text: The wound bed was treated with cryotherapy after the biopsy was performed.
Electrodesiccation Text: The wound bed was treated with electrodesiccation after the biopsy was performed.
Electrodesiccation And Curettage Text: The wound bed was treated with electrodesiccation and curettage after the biopsy was performed.
Silver Nitrate Text: The wound bed was treated with silver nitrate after the biopsy was performed.
Lab: [id number]
Lab Facility: [id number]
Medical Necessity Information: It is in your best interest to select a reason for this procedure from the list below. All of these items fulfill various CMS LCD requirements except the new and changing color options.
Consent was obtained and risks were reviewed including but not limited to scarring, infection, bleeding, scabbing, incomplete removal, nerve damage and allergy to anesthesia.
Post-Care Instructions: I reviewed with the patient in detail post-care instructions. Patient is to keep the biopsy site dry overnight, and then apply Vaseline twice daily until healed.
Notification Instructions: Patient will be notified of biopsy results. However, patient instructed to call the office if not contacted within 2 weeks.
Billing Type: Third-Party Bill
Information: Selecting Yes will display possible errors in your note based on the variables you have selected. This validation is only offered as a suggestion for you. PLEASE NOTE THAT THE VALIDATION TEXT WILL BE REMOVED WHEN YOU FINALIZE YOUR NOTE. IF YOU WANT TO FAX A PRELIMINARY NOTE YOU WILL NEED TO TOGGLE THIS TO 'NO' IF YOU DO NOT WANT IT IN YOUR FAXED NOTE.